# Patient Record
Sex: FEMALE | Race: WHITE | NOT HISPANIC OR LATINO | Employment: FULL TIME | URBAN - METROPOLITAN AREA
[De-identification: names, ages, dates, MRNs, and addresses within clinical notes are randomized per-mention and may not be internally consistent; named-entity substitution may affect disease eponyms.]

---

## 2020-01-06 ENCOUNTER — OFFICE VISIT (OUTPATIENT)
Dept: INTERNAL MEDICINE | Facility: CLINIC | Age: 30
End: 2020-01-06

## 2020-01-06 VITALS
DIASTOLIC BLOOD PRESSURE: 80 MMHG | TEMPERATURE: 98.9 F | RESPIRATION RATE: 16 BRPM | WEIGHT: 170.13 LBS | SYSTOLIC BLOOD PRESSURE: 120 MMHG | BODY MASS INDEX: 28.35 KG/M2 | HEART RATE: 82 BPM | HEIGHT: 65 IN | OXYGEN SATURATION: 97 %

## 2020-01-06 DIAGNOSIS — Z00.00 ANNUAL PHYSICAL EXAM: Primary | ICD-10-CM

## 2020-01-06 DIAGNOSIS — Z13.29 SCREENING FOR THYROID DISORDER: ICD-10-CM

## 2020-01-06 DIAGNOSIS — J40 BRONCHITIS: ICD-10-CM

## 2020-01-06 DIAGNOSIS — D22.9 ATYPICAL NEVI: ICD-10-CM

## 2020-01-06 DIAGNOSIS — Z13.220 SCREENING, LIPID: ICD-10-CM

## 2020-01-06 DIAGNOSIS — J30.9 ALLERGIC RHINITIS, UNSPECIFIED SEASONALITY, UNSPECIFIED TRIGGER: ICD-10-CM

## 2020-01-06 PROBLEM — Z80.8 FAMILY HISTORY OF MELANOMA: Status: ACTIVE | Noted: 2020-01-06

## 2020-01-06 PROBLEM — Z83.71 FAMILY HISTORY OF COLONIC POLYPS: Status: ACTIVE | Noted: 2020-01-06

## 2020-01-06 PROBLEM — Z83.719 FAMILY HISTORY OF COLONIC POLYPS: Status: ACTIVE | Noted: 2020-01-06

## 2020-01-06 PROCEDURE — 99385 PREV VISIT NEW AGE 18-39: CPT | Performed by: NURSE PRACTITIONER

## 2020-01-06 RX ORDER — BENZONATATE 200 MG/1
200 CAPSULE ORAL 3 TIMES DAILY PRN
Qty: 30 CAPSULE | Refills: 0 | Status: SHIPPED | OUTPATIENT
Start: 2020-01-06 | End: 2020-03-03

## 2020-01-06 RX ORDER — AZITHROMYCIN 250 MG/1
TABLET, FILM COATED ORAL
Qty: 6 TABLET | Refills: 0 | Status: SHIPPED | OUTPATIENT
Start: 2020-01-06 | End: 2020-03-03

## 2020-01-06 NOTE — PROGRESS NOTES
Chief Complaint   Patient presents with   • Establish Care        Subjective     History of Present Illness   Patient is here today to establish care.    She has a croupy cough and otc meds are not helping and has had one week.     Last opt yearly exam  Pap 1 yr ago sees gyn  Walks 3-5 times per week not following a diet.     LFA with new change mold now bigger and irregular.  FH grandparent with melanoma.     Past medical history allergies, history of mono.    Past surgical history colonoscopy sinus surgery    Family history includes mother thyroid disease; father hyperlipidemia; maternal grandmother arthritis; maternal grandmother thyroid disease; maternal grandfather diabetes and stroke.    Social history patient is sexually active, non-smoker, alcohol use occasional.  No substance use.    The following portions of the patient's history were reviewed and updated as appropriate: allergies, current medications, past family history, past medical history, past social history, past surgical history and problem list.    Review of Systems   Constitutional: Negative for activity change, appetite change, chills, fatigue, fever, unexpected weight gain and unexpected weight loss.        No night sweats.  No generalized pain.   HENT: Negative for congestion, ear pain, hearing loss, nosebleeds, postnasal drip, rhinorrhea, sinus pressure, sneezing, sore throat, tinnitus and voice change.         Denies snoring.   Eyes: Negative for photophobia, pain, discharge, redness, itching and visual disturbance.   Respiratory: Negative for cough, chest tightness, shortness of breath, wheezing and stridor.         No orthopnea, dyspnea on exertion, or PND.  No chest congestion.   No  hemoptysis.   Cardiovascular: Negative for chest pain, palpitations and leg swelling.        No claudication or syncope.   Gastrointestinal: Negative for abdominal pain, blood in stool, constipation, diarrhea, nausea, rectal pain, vomiting and GERD.         No hematemesis.  No heartburn, dysphagia or odynophagia.  No belching or bloating.  No melena.   Endocrine: Negative for cold intolerance, heat intolerance, polydipsia, polyphagia and polyuria.        No hair loss or dry skin.  No generalized weakness.  No hot flashes.   Genitourinary: Negative for breast discharge, difficulty urinating, dyspareunia, dysuria, flank pain, frequency, hematuria, menstrual problem, pelvic pain, urgency, urinary incontinence, vaginal bleeding and vaginal discharge.        No nocturia or incomplete emptying.   Musculoskeletal: Negative for arthralgias, back pain, gait problem, joint swelling, myalgias, neck pain and neck stiffness.        No joint stiffness.   Skin: Positive for skin lesions. Negative for rash.   Allergic/Immunologic: Negative for environmental allergies.   Neurological: Negative for dizziness, tremors, syncope, speech difficulty, weakness, light-headedness, numbness, memory problem and confusion.        No tingling.   Hematological: Negative for adenopathy. Does not bruise/bleed easily.   Psychiatric/Behavioral: Negative for decreased concentration, sleep disturbance, suicidal ideas and depressed mood. The patient is not nervous/anxious.         No confusion.   All other systems reviewed and are negative.      Objective   Physical Exam   Constitutional: She is oriented to person, place, and time. She appears well-developed and well-nourished.   HENT:   Head: Normocephalic and atraumatic.   Right Ear: External ear normal.   Left Ear: External ear normal.   Nose: Nose normal.   Mouth/Throat: Oropharynx is clear and moist.   Eyes: Conjunctivae are normal. Right eye exhibits no discharge. Left eye exhibits no discharge.   Neck: No thyromegaly present.   Cardiovascular: Normal rate, regular rhythm and intact distal pulses.   Pulmonary/Chest: Effort normal and breath sounds normal.   Abdominal: Soft. Bowel sounds are normal. She exhibits no distension. There is no tenderness.    Musculoskeletal: She exhibits no edema.   Lymphadenopathy:     She has no cervical adenopathy.   Neurological: She is alert and oriented to person, place, and time.   Skin: Skin is warm and dry. Capillary refill takes 2 to 3 seconds.   2mm irregular shaped flesh light brown   Psychiatric: She has a normal mood and affect. Her behavior is normal. Judgment and thought content normal.   Nursing note and vitals reviewed.         Assessment/Plan   Sima was seen today for establish care.    Diagnoses and all orders for this visit:    Annual physical exam  -     CBC & Differential; Future  -     Comprehensive Metabolic Panel; Future  -     POC Urinalysis Dipstick, Automated; Future    Bronchitis  -     azithromycin (ZITHROMAX Z-LEODAN) 250 MG tablet; Take 2 tablets the first day, then 1 tablet daily for 4 days.  -     benzonatate (TESSALON) 200 MG capsule; Take 1 capsule by mouth 3 (Three) Times a Day As Needed for Cough.    Allergic rhinitis, unspecified seasonality, unspecified trigger    Screening for thyroid disorder  -     TSH; Future    Screening, lipid  -     Lipid Panel; Future    Atypical nevi  -     Ambulatory Referral to Dermatology; Future      Patient education regarding the importance of avoidance of texting while driving and the need for wearing seatbelt.  Use of sunscreen, use of helmets while on bikes.  The appropriate amounts of sleep and H20 consumption per day was reviewed with pt.  The need for healthy well-balanced diet based off the food guide pyramid and avoidance of skipping meals along with following a NCS diet with appropriate amounts of fats, protein, and carbohydrate and low sodium diet. Encouraging 30minutes of cardio 5d weekly and muscle strengthening 3x weekly.       Return if symptoms worsen or fail to improve.  RTC/call  If symptoms worsen  Meds MOA and SE's reviewed and pt v/u

## 2020-01-15 ENCOUNTER — LAB (OUTPATIENT)
Dept: INTERNAL MEDICINE | Facility: CLINIC | Age: 30
End: 2020-01-15

## 2020-01-15 DIAGNOSIS — Z13.29 SCREENING FOR THYROID DISORDER: ICD-10-CM

## 2020-01-15 DIAGNOSIS — Z00.00 ANNUAL PHYSICAL EXAM: ICD-10-CM

## 2020-01-15 DIAGNOSIS — Z13.220 SCREENING, LIPID: ICD-10-CM

## 2020-01-15 LAB
ALBUMIN SERPL-MCNC: 4.4 G/DL (ref 3.5–5.2)
ALBUMIN/GLOB SERPL: 1.6 G/DL
ALP SERPL-CCNC: 62 U/L (ref 39–117)
ALT SERPL W P-5'-P-CCNC: 18 U/L (ref 1–33)
ANION GAP SERPL CALCULATED.3IONS-SCNC: 14.3 MMOL/L (ref 5–15)
AST SERPL-CCNC: 17 U/L (ref 1–32)
BASOPHILS # BLD AUTO: 0.06 10*3/MM3 (ref 0–0.2)
BASOPHILS NFR BLD AUTO: 1 % (ref 0–1.5)
BILIRUB BLD-MCNC: NEGATIVE MG/DL
BILIRUB SERPL-MCNC: 0.5 MG/DL (ref 0.2–1.2)
BUN BLD-MCNC: 10 MG/DL (ref 6–20)
BUN/CREAT SERPL: 13.7 (ref 7–25)
CALCIUM SPEC-SCNC: 9.4 MG/DL (ref 8.6–10.5)
CHLORIDE SERPL-SCNC: 99 MMOL/L (ref 98–107)
CHOLEST SERPL-MCNC: 160 MG/DL (ref 0–200)
CLARITY, POC: CLEAR
CO2 SERPL-SCNC: 25.7 MMOL/L (ref 22–29)
COLOR UR: YELLOW
CREAT BLD-MCNC: 0.73 MG/DL (ref 0.57–1)
DEPRECATED RDW RBC AUTO: 41 FL (ref 37–54)
EOSINOPHIL # BLD AUTO: 0.1 10*3/MM3 (ref 0–0.4)
EOSINOPHIL NFR BLD AUTO: 1.7 % (ref 0.3–6.2)
ERYTHROCYTE [DISTWIDTH] IN BLOOD BY AUTOMATED COUNT: 12.5 % (ref 12.3–15.4)
EXPIRATION DATE: NORMAL
GFR SERPL CREATININE-BSD FRML MDRD: 94 ML/MIN/1.73
GLOBULIN UR ELPH-MCNC: 2.8 GM/DL
GLUCOSE BLD-MCNC: 85 MG/DL (ref 65–99)
GLUCOSE UR STRIP-MCNC: NEGATIVE MG/DL
HCT VFR BLD AUTO: 42 % (ref 34–46.6)
HDLC SERPL-MCNC: 43 MG/DL (ref 40–60)
HGB BLD-MCNC: 14.5 G/DL (ref 12–15.9)
IMM GRANULOCYTES # BLD AUTO: 0.02 10*3/MM3 (ref 0–0.05)
IMM GRANULOCYTES NFR BLD AUTO: 0.3 % (ref 0–0.5)
KETONES UR QL: NEGATIVE
LDLC SERPL CALC-MCNC: 99 MG/DL (ref 0–100)
LDLC/HDLC SERPL: 2.3 {RATIO}
LEUKOCYTE EST, POC: NEGATIVE
LYMPHOCYTES # BLD AUTO: 1.69 10*3/MM3 (ref 0.7–3.1)
LYMPHOCYTES NFR BLD AUTO: 28.4 % (ref 19.6–45.3)
Lab: NORMAL
MCH RBC QN AUTO: 31.1 PG (ref 26.6–33)
MCHC RBC AUTO-ENTMCNC: 34.5 G/DL (ref 31.5–35.7)
MCV RBC AUTO: 90.1 FL (ref 79–97)
MONOCYTES # BLD AUTO: 0.69 10*3/MM3 (ref 0.1–0.9)
MONOCYTES NFR BLD AUTO: 11.6 % (ref 5–12)
NEUTROPHILS # BLD AUTO: 3.39 10*3/MM3 (ref 1.7–7)
NEUTROPHILS NFR BLD AUTO: 57 % (ref 42.7–76)
NITRITE UR-MCNC: NEGATIVE MG/ML
NRBC BLD AUTO-RTO: 0 /100 WBC (ref 0–0.2)
PH UR: 7 [PH] (ref 5–8)
PLATELET # BLD AUTO: 293 10*3/MM3 (ref 140–450)
PMV BLD AUTO: 10.4 FL (ref 6–12)
POTASSIUM BLD-SCNC: 3.9 MMOL/L (ref 3.5–5.2)
PROT SERPL-MCNC: 7.2 G/DL (ref 6–8.5)
PROT UR STRIP-MCNC: NEGATIVE MG/DL
RBC # BLD AUTO: 4.66 10*6/MM3 (ref 3.77–5.28)
RBC # UR STRIP: NEGATIVE /UL
SODIUM BLD-SCNC: 139 MMOL/L (ref 136–145)
SP GR UR: 1.01 (ref 1–1.03)
TRIGL SERPL-MCNC: 90 MG/DL (ref 0–150)
TSH SERPL DL<=0.05 MIU/L-ACNC: 3.09 UIU/ML (ref 0.27–4.2)
UROBILINOGEN UR QL: NORMAL
VLDLC SERPL-MCNC: 18 MG/DL (ref 5–40)
WBC NRBC COR # BLD: 5.95 10*3/MM3 (ref 3.4–10.8)

## 2020-01-15 PROCEDURE — 85025 COMPLETE CBC W/AUTO DIFF WBC: CPT | Performed by: NURSE PRACTITIONER

## 2020-01-15 PROCEDURE — 80053 COMPREHEN METABOLIC PANEL: CPT | Performed by: NURSE PRACTITIONER

## 2020-01-15 PROCEDURE — 80061 LIPID PANEL: CPT | Performed by: NURSE PRACTITIONER

## 2020-01-15 PROCEDURE — 81003 URINALYSIS AUTO W/O SCOPE: CPT | Performed by: NURSE PRACTITIONER

## 2020-01-15 PROCEDURE — 36415 COLL VENOUS BLD VENIPUNCTURE: CPT | Performed by: NURSE PRACTITIONER

## 2020-01-15 PROCEDURE — 84443 ASSAY THYROID STIM HORMONE: CPT | Performed by: NURSE PRACTITIONER

## 2020-02-27 DIAGNOSIS — F41.9 ANXIETY: Primary | ICD-10-CM

## 2020-02-27 RX ORDER — HYDROXYZINE PAMOATE 25 MG/1
25 CAPSULE ORAL 3 TIMES DAILY PRN
Qty: 30 CAPSULE | Refills: 0 | Status: SHIPPED | OUTPATIENT
Start: 2020-02-27 | End: 2021-02-08

## 2020-03-03 ENCOUNTER — OFFICE VISIT (OUTPATIENT)
Dept: INTERNAL MEDICINE | Facility: CLINIC | Age: 30
End: 2020-03-03

## 2020-03-03 VITALS
TEMPERATURE: 98.7 F | HEART RATE: 70 BPM | DIASTOLIC BLOOD PRESSURE: 70 MMHG | OXYGEN SATURATION: 98 % | RESPIRATION RATE: 18 BRPM | SYSTOLIC BLOOD PRESSURE: 110 MMHG | BODY MASS INDEX: 28.87 KG/M2 | WEIGHT: 173.5 LBS

## 2020-03-03 DIAGNOSIS — R25.1 TREMORS OF NERVOUS SYSTEM: Primary | ICD-10-CM

## 2020-03-03 LAB
T4 FREE SERPL-MCNC: 1.26 NG/DL (ref 0.93–1.7)
TSH SERPL DL<=0.05 MIU/L-ACNC: 2.92 UIU/ML (ref 0.27–4.2)

## 2020-03-03 PROCEDURE — 86376 MICROSOMAL ANTIBODY EACH: CPT | Performed by: NURSE PRACTITIONER

## 2020-03-03 PROCEDURE — 84439 ASSAY OF FREE THYROXINE: CPT | Performed by: NURSE PRACTITIONER

## 2020-03-03 PROCEDURE — 83520 IMMUNOASSAY QUANT NOS NONAB: CPT | Performed by: NURSE PRACTITIONER

## 2020-03-03 PROCEDURE — 84432 ASSAY OF THYROGLOBULIN: CPT | Performed by: NURSE PRACTITIONER

## 2020-03-03 PROCEDURE — 36415 COLL VENOUS BLD VENIPUNCTURE: CPT | Performed by: NURSE PRACTITIONER

## 2020-03-03 PROCEDURE — 86800 THYROGLOBULIN ANTIBODY: CPT | Performed by: NURSE PRACTITIONER

## 2020-03-03 PROCEDURE — 84443 ASSAY THYROID STIM HORMONE: CPT | Performed by: NURSE PRACTITIONER

## 2020-03-03 PROCEDURE — 99214 OFFICE O/P EST MOD 30 MIN: CPT | Performed by: NURSE PRACTITIONER

## 2020-03-03 RX ORDER — CITALOPRAM 10 MG/1
10 TABLET ORAL DAILY
Qty: 30 TABLET | Refills: 2 | Status: SHIPPED | OUTPATIENT
Start: 2020-03-03 | End: 2020-05-31 | Stop reason: SDUPTHER

## 2020-03-03 NOTE — PROGRESS NOTES
Chief Complaint   Patient presents with   • Panic Attack     out of nowhere was shaking/ hot and cold/ hyperventilation and crying/ jittery/ legs were out/ feels medictation works but does not like the drowsiness        Subjective     History of Present Illness   The patient reports last week she had a 30 minutes episode that occurred at work.  She was searching for a file and developed dizziness.  She got down and sat down then when she got up she had hot flases and chills and started crying off and on. She felt tired and exhausted.  She did not feel well the next day.  She feels like she is shaking but not and at times her heart feel like it starts to pound.  She sent message about feeling like she was having a panic attack last week and given vistaril and this helps but then develops drowsiness. She started to hyperventilate.  She cant think what she wants to say and has to stop mid sentence and has to think about what she wanted to say.    Per the patients mom - she has had panic attacks in past and hyperventilated.     She is on her period and thinks HA and abd cramping are related to her periods.    No new  Stressor for this patient.      The following portions of the patient's history were reviewed and updated as appropriate: allergies, current medications, past family history, past medical history, past social history, past surgical history and problem list.      Current Outpatient Medications:   •  Azelastine-Fluticasone (DYMISTA NA), into the nostril(s) as directed by provider., Disp: , Rfl:   •  hydrOXYzine pamoate (VISTARIL) 25 MG capsule, Take 1 capsule by mouth 3 (Three) Times a Day As Needed for Anxiety., Disp: 30 capsule, Rfl: 0  •  Loratadine-Pseudoephedrine (CLARITIN-D 24 HOUR PO), Take  by mouth., Disp: , Rfl:   •  citalopram (CELEXA) 10 MG tablet, Take 1 tablet by mouth Daily., Disp: 30 tablet, Rfl: 2    Vitals:    03/03/20 0859   BP: 110/70   Pulse: 70   Resp: 18   Temp: 98.7 °F (37.1 °C)    SpO2: 98%       Body mass index is 28.87 kg/m².        Review of Systems   Constitutional: Positive for fatigue. Negative for activity change, appetite change, chills and fever.   HENT: Negative for congestion, postnasal drip and sore throat.    Eyes: Negative for visual disturbance.   Respiratory: Negative for cough and shortness of breath.    Cardiovascular: Negative for chest pain, palpitations and leg swelling.   Gastrointestinal: Negative for abdominal pain, constipation, diarrhea, nausea and GERD.   Musculoskeletal: Negative for arthralgias and myalgias.   Skin: Negative for rash.   Allergic/Immunologic: Negative for environmental allergies.   Neurological: Positive for dizziness, tremors and weakness.   Psychiatric/Behavioral: Negative for sleep disturbance. The patient is nervous/anxious.    All other systems reviewed and are negative.      Objective   Physical Exam   Constitutional: She is oriented to person, place, and time. She appears well-developed and well-nourished.   HENT:   Head: Normocephalic and atraumatic.   Right Ear: External ear normal.   Left Ear: External ear normal.   Nose: Nose normal.   Mouth/Throat: Oropharynx is clear and moist.   Neck: No thyromegaly present.   Cardiovascular: Normal rate, regular rhythm and intact distal pulses.   Pulmonary/Chest: Effort normal and breath sounds normal.   Abdominal: Soft. Bowel sounds are normal. She exhibits no distension. There is no tenderness.   Musculoskeletal: She exhibits no edema.   Lymphadenopathy:     She has no cervical adenopathy.   Neurological: She is alert and oriented to person, place, and time. She displays normal reflexes. No cranial nerve deficit. She exhibits normal muscle tone.   Skin: Capillary refill takes 2 to 3 seconds.   Psychiatric: She has a normal mood and affect. Her behavior is normal.   Nursing note and vitals reviewed.              Assessment/Plan   Sima was seen today for panic attack.    Diagnoses and all  orders for this visit:    Tremors of nervous system  -     Thyroid Antibodies  -     Cancel: Thyroid Peroxidase Antibody  -     Thyrotropin Receptor Antibody  -     Cancel: Thyroglobulin  -     TSH  -     T4, Free    Other orders  -     citalopram (CELEXA) 10 MG tablet; Take 1 tablet by mouth Daily.    labs normal and celexa not helpful then consider CT head / neuro.      Return if symptoms worsen or fail to improve.  RTC/call  If symptoms worsen  Meds MOA and SE's reviewed and pt v/u

## 2020-03-04 LAB
THYROGLOB AB SERPL-ACNC: <1 IU/ML (ref 0–0.9)
THYROPEROXIDASE AB SERPL-ACNC: 10 IU/ML (ref 0–34)

## 2020-03-05 LAB — TSH RECEP AB SER-ACNC: <1.1 IU/L (ref 0–1.75)

## 2020-03-25 ENCOUNTER — TELEPHONE (OUTPATIENT)
Dept: INTERNAL MEDICINE | Facility: CLINIC | Age: 30
End: 2020-03-25

## 2020-03-25 NOTE — TELEPHONE ENCOUNTER
Spoke to pt about a referral-she stated she thought a CT of her (head) or maybe a neuro referral was going to be placed?  Are you ordering and/or referring?

## 2020-03-30 DIAGNOSIS — R25.1 TREMORS OF NERVOUS SYSTEM: Primary | ICD-10-CM

## 2020-03-30 NOTE — TELEPHONE ENCOUNTER
Neuro consult placed let pt know may take extra time due to pandemic.  Please have her let me know if symptoms worsen.

## 2020-05-19 ENCOUNTER — OFFICE VISIT (OUTPATIENT)
Dept: NEUROLOGY | Facility: CLINIC | Age: 30
End: 2020-05-19

## 2020-05-19 VITALS
BODY MASS INDEX: 28.82 KG/M2 | DIASTOLIC BLOOD PRESSURE: 82 MMHG | SYSTOLIC BLOOD PRESSURE: 143 MMHG | HEART RATE: 92 BPM | OXYGEN SATURATION: 98 % | WEIGHT: 173 LBS | TEMPERATURE: 97.2 F | HEIGHT: 65 IN

## 2020-05-19 DIAGNOSIS — R25.1 TREMORS OF NERVOUS SYSTEM: Primary | ICD-10-CM

## 2020-05-19 PROCEDURE — 99245 OFF/OP CONSLTJ NEW/EST HI 55: CPT | Performed by: PSYCHIATRY & NEUROLOGY

## 2020-05-19 RX ORDER — CHLORCYCLIZINE HYDROCHLORIDE AND PSEUDOEPHEDRINE HYDROCHLORIDE 25; 60 MG/1; MG/1
1 TABLET ORAL 2 TIMES DAILY
COMMUNITY
Start: 2020-05-03 | End: 2021-02-08

## 2020-05-19 RX ORDER — OLOPATADINE HYDROCHLORIDE 7 MG/ML
SOLUTION OPHTHALMIC
COMMUNITY
Start: 2020-04-07 | End: 2021-02-08

## 2020-05-19 RX ORDER — AZELASTINE HYDROCHLORIDE AND FLUTICASONE PROPIONATE 137; 50 UG/1; UG/1
SPRAY, METERED NASAL
COMMUNITY
Start: 2020-04-07 | End: 2021-02-08

## 2020-05-19 RX ORDER — CHLORPHENIRAMINE MALEATE 4 MG
TABLET ORAL
COMMUNITY
Start: 2020-02-27 | End: 2020-06-17

## 2020-05-19 NOTE — PROGRESS NOTES
Subjective   Patient ID: Sima Booker is a 30 y.o. female     Chief Complaint   Patient presents with   • Tremors        History of Present Illness    30 y.o. female referred by Sonia Bridges for tremors.  Feb 26, 2020 developed tunnel vision and unsteadiness.  Sat down and dizziness improved.  Then felt hot and had something to eat.  Hands had tremors.  Then felt cold.  Trouble speaking and started crying and hyperventilating.  Calmed down and able to speak.      Started on hydroxyzine and Celexa.      Internal sensation of tremor.  Left hand will tingles and has dizziness/LH.      HA frequency a two times a week.  Located over forehead.  Pulling sensation.      Reviewed medical records:    Pt had episdoe of dizziness, hot flashes and chills, started crying on and off.  Tired and exhausted after spell.  PMH of panic attacks.      Labs TSH, T4, thyroid ab    Past Medical History:   Diagnosis Date   • Allergic    • Infectious mononucleosis      Family History   Problem Relation Age of Onset   • Thyroid disease Mother    • Hyperlipidemia Father    • Arthritis Maternal Grandmother    • Miscarriages / Stillbirths Maternal Grandmother    • Diabetes Maternal Grandfather    • Miscarriages / Stillbirths Maternal Grandfather    • Diabetes Paternal Grandmother      Social History     Socioeconomic History   • Marital status:      Spouse name: Not on file   • Number of children: Not on file   • Years of education: Not on file   • Highest education level: Not on file   Tobacco Use   • Smoking status: Never Smoker   • Smokeless tobacco: Never Used   Substance and Sexual Activity   • Alcohol use: Yes     Comment: OCCASIONALLY   • Drug use: Never   • Sexual activity: Yes     Partners: Male     Birth control/protection: None       Review of Systems   Constitutional: Negative for activity change, fatigue and unexpected weight change.   HENT: Negative for tinnitus and trouble swallowing.    Eyes: Negative for photophobia  "and visual disturbance.   Respiratory: Negative for apnea, cough and choking.    Cardiovascular: Negative for leg swelling.   Gastrointestinal: Negative for nausea and vomiting.   Endocrine: Negative for cold intolerance and heat intolerance.   Genitourinary: Negative for difficulty urinating, frequency, menstrual problem and urgency.   Musculoskeletal: Negative for back pain, gait problem, myalgias and neck pain.   Skin: Negative for color change and rash.   Allergic/Immunologic: Negative for immunocompromised state.   Neurological: Positive for dizziness, tremors, light-headedness, numbness and headaches. Negative for seizures, syncope, facial asymmetry, speech difficulty and weakness.   Hematological: Negative for adenopathy. Does not bruise/bleed easily.   Psychiatric/Behavioral: Negative for behavioral problems, confusion, decreased concentration, hallucinations and sleep disturbance. The patient is nervous/anxious.        Objective     Vitals:    05/19/20 1422   BP: 143/82   Pulse: 92   Temp: 97.2 °F (36.2 °C)   SpO2: 98%   Weight: 78.5 kg (173 lb)   Height: 165.1 cm (65\")       Neurologic Exam     Mental Status   Oriented to person, place, and time.   Registration: recalls 3 of 3 objects. Recall at 5 minutes: recalls 3 of 3 objects. Follows 3 step commands.   Attention: normal. Concentration: normal.   Speech: speech is normal   Level of consciousness: alert  Knowledge: good and consistent with education.   Able to name object. Able to read. Able to repeat. Able to write. Normal comprehension.     Cranial Nerves     CN II   Visual fields full to confrontation.   Visual acuity: normal  Right visual field deficit: none  Left visual field deficit: none     CN III, IV, VI   Pupils are equal, round, and reactive to light.  Extraocular motions are normal.   Right pupil: Shape: regular. Reactivity: brisk. Consensual response: intact.   Left pupil: Shape: regular. Reactivity: brisk. Consensual response: intact. "   Nystagmus: none   Diplopia: none  Ophthalmoparesis: none  Upgaze: normal  Downgaze: normal  Conjugate gaze: present  Vestibulo-ocular reflex: present    CN V   Facial sensation intact.   Right corneal reflex: normal  Left corneal reflex: normal    CN VII   Right facial weakness: none  Left facial weakness: none    CN VIII   Hearing: intact    CN IX, X   Palate: symmetric  Right gag reflex: normal  Left gag reflex: normal    CN XI   Right sternocleidomastoid strength: normal  Left sternocleidomastoid strength: normal    CN XII   Tongue: not atrophic  Fasciculations: absent  Tongue deviation: none    Motor Exam   Muscle bulk: normal  Overall muscle tone: normal  Right arm tone: normal  Left arm tone: normal  Right leg tone: normal  Left leg tone: normal    Strength   Strength 5/5 throughout.     Sensory Exam   Light touch normal.   Vibration normal.   Proprioception normal.   Pinprick normal.     Gait, Coordination, and Reflexes     Gait  Gait: normal    Coordination   Romberg: negative  Finger to nose coordination: normal  Heel to shin coordination: normal  Tandem walking coordination: normal    Tremor   Resting tremor: absent  Intention tremor: absent  Action tremor: absent    Reflexes   Reflexes 2+ except as noted.       Physical Exam   Constitutional: She is oriented to person, place, and time. Vital signs are normal. She appears well-developed and well-nourished.   HENT:   Head: Normocephalic and atraumatic.   Eyes: Pupils are equal, round, and reactive to light. EOM and lids are normal.   Fundoscopic exam:       The right eye shows no exudate, no hemorrhage and no papilledema. The right eye shows venous pulsations.        The left eye shows no exudate, no hemorrhage and no papilledema. The left eye shows venous pulsations.   Neck: Normal range of motion and phonation normal. Normal carotid pulses present. Carotid bruit is not present. No thyroid mass and no thyromegaly present.   Cardiovascular: Normal rate,  regular rhythm and normal heart sounds.   Pulmonary/Chest: Effort normal.   Neurological: She is oriented to person, place, and time. She has normal strength. She has a normal Finger-Nose-Finger Test, a normal Heel to Shin Test, a normal Romberg Test and a normal Tandem Gait Test. Gait normal.   Skin: Skin is warm and dry.   Psychiatric: She has a normal mood and affect. Her speech is normal.   Nursing note and vitals reviewed.      Office Visit on 03/03/2020   Component Date Value Ref Range Status   • Thyroid Peroxidase Antibody 03/03/2020 10  0 - 34 IU/mL Final   • Thyroglobulin Ab 03/03/2020 <1.0  0.0 - 0.9 IU/mL Final    Thyroglobulin Antibody measured by [facility] Methodology   • Thyrotropin Receptor Antibody 03/03/2020 <1.10  0.00 - 1.75 IU/L Final   • TSH 03/03/2020 2.920  0.270 - 4.200 uIU/mL Final   • Free T4 03/03/2020 1.26  0.93 - 1.70 ng/dL Final         Assessment/Plan     Problem List Items Addressed This Visit        Other    Tremors of nervous system - Primary    Current Assessment & Plan     Episodes of tremors and dizziness    MRI Brain and EEG          Relevant Orders    MRI Brain With & Without Contrast    EEG Awake or Drowsy Routine             No follow-ups on file.                 Answers for HPI/ROS submitted by the patient on 5/19/2020   What is the primary reason for your visit?: Other  Please describe your symptoms.: Have had small issue with hands filing like they are shaking when they aren't, light headedness, dizziness. It comes and goes.  Have you had these symptoms before?: Yes  How long have you been having these symptoms?: Greater than 2 weeks

## 2020-06-01 NOTE — TELEPHONE ENCOUNTER
Please advise.  Notes do not indicate when patient needs to fup and she does not have a pending appt.  Thanks

## 2020-06-02 RX ORDER — CITALOPRAM 10 MG/1
10 TABLET ORAL DAILY
Qty: 30 TABLET | Refills: 2 | Status: SHIPPED | OUTPATIENT
Start: 2020-06-02 | End: 2020-09-02 | Stop reason: SDUPTHER

## 2020-06-05 ENCOUNTER — HOSPITAL ENCOUNTER (OUTPATIENT)
Dept: NEUROLOGY | Facility: HOSPITAL | Age: 30
Discharge: HOME | End: 2020-06-05
Admitting: PSYCHIATRY & NEUROLOGY

## 2020-06-05 ENCOUNTER — HOSPITAL ENCOUNTER (OUTPATIENT)
Dept: MRI IMAGING | Facility: HOSPITAL | Age: 30
Discharge: HOME | End: 2020-06-05

## 2020-06-05 DIAGNOSIS — R25.1 TREMORS OF NERVOUS SYSTEM: ICD-10-CM

## 2020-06-05 PROCEDURE — A9577 INJ MULTIHANCE: HCPCS | Performed by: PSYCHIATRY & NEUROLOGY

## 2020-06-05 PROCEDURE — 0 GADOBENATE DIMEGLUMINE 529 MG/ML SOLUTION: Performed by: PSYCHIATRY & NEUROLOGY

## 2020-06-05 PROCEDURE — 70553 MRI BRAIN STEM W/O & W/DYE: CPT

## 2020-06-05 PROCEDURE — 95819 EEG AWAKE AND ASLEEP: CPT

## 2020-06-05 PROCEDURE — 95819 EEG AWAKE AND ASLEEP: CPT | Performed by: PSYCHIATRY & NEUROLOGY

## 2020-06-05 RX ADMIN — GADOBENATE DIMEGLUMINE 15 ML: 529 INJECTION, SOLUTION INTRAVENOUS at 08:10

## 2020-06-17 ENCOUNTER — OFFICE VISIT (OUTPATIENT)
Dept: NEUROLOGY | Facility: CLINIC | Age: 30
End: 2020-06-17

## 2020-06-17 VITALS
TEMPERATURE: 98 F | OXYGEN SATURATION: 98 % | BODY MASS INDEX: 27.49 KG/M2 | HEIGHT: 65 IN | SYSTOLIC BLOOD PRESSURE: 116 MMHG | HEART RATE: 71 BPM | DIASTOLIC BLOOD PRESSURE: 78 MMHG | WEIGHT: 165 LBS

## 2020-06-17 DIAGNOSIS — R25.1 TREMORS OF NERVOUS SYSTEM: Primary | ICD-10-CM

## 2020-06-17 PROCEDURE — 99213 OFFICE O/P EST LOW 20 MIN: CPT | Performed by: PSYCHIATRY & NEUROLOGY

## 2020-06-17 NOTE — PROGRESS NOTES
Subjective   Patient ID: Siam Booker is a 30 y.o. female     Chief Complaint   Patient presents with   • Tremors     Post MRI/EEG        History of Present Illness    30 y.o. female returns in follow up for tremors.  Last visit on 5/19/20 ordered MRI Brain and EEG.      MRI Brain, my review of films, normal     EEG - normal     Denies further episodes.  Internal sensation of tremors.  Lasts for a few minutes.  Moving hand improves sx.      Problem history:    Feb 26, 2020 developed tunnel vision and unsteadiness.  Sat down and dizziness improved.  Then felt hot and had something to eat.  Hands had tremors.  Then felt cold.  Trouble speaking and started crying and hyperventilating.  Calmed down and able to speak.      Started on hydroxyzine and Celexa.      Internal sensation of tremor.  Left hand will tingles and has dizziness/LH.      HA frequency a two times a week.  Located over forehead.  Pulling sensation.      Reviewed medical records:    Pt had episdoe of dizziness, hot flashes and chills, started crying on and off.  Tired and exhausted after spell.  PMH of panic attacks.      Labs TSH, T4, thyroid ab    Past Medical History:   Diagnosis Date   • Allergic    • Infectious mononucleosis      Family History   Problem Relation Age of Onset   • Thyroid disease Mother    • Hyperlipidemia Father    • Arthritis Maternal Grandmother    • Miscarriages / Stillbirths Maternal Grandmother    • Diabetes Maternal Grandfather    • Miscarriages / Stillbirths Maternal Grandfather    • Diabetes Paternal Grandmother      Social History     Socioeconomic History   • Marital status:      Spouse name: Not on file   • Number of children: Not on file   • Years of education: Not on file   • Highest education level: Not on file   Tobacco Use   • Smoking status: Never Smoker   • Smokeless tobacco: Never Used   Substance and Sexual Activity   • Alcohol use: Yes     Comment: OCCASIONALLY   • Drug use: Never   • Sexual  "activity: Yes     Partners: Male     Birth control/protection: None       Review of Systems   Constitutional: Negative for activity change, fatigue and unexpected weight change.   HENT: Negative for tinnitus and trouble swallowing.    Eyes: Negative for photophobia and visual disturbance.   Respiratory: Negative for apnea, cough and choking.    Cardiovascular: Negative for leg swelling.   Gastrointestinal: Negative for nausea and vomiting.   Endocrine: Negative for cold intolerance and heat intolerance.   Genitourinary: Negative for difficulty urinating, frequency, menstrual problem and urgency.   Musculoskeletal: Negative for back pain, gait problem, myalgias and neck pain.   Skin: Negative for color change and rash.   Allergic/Immunologic: Negative for immunocompromised state.   Neurological: Positive for dizziness, tremors, light-headedness, numbness and headaches. Negative for seizures, syncope, facial asymmetry, speech difficulty and weakness.   Hematological: Negative for adenopathy. Does not bruise/bleed easily.   Psychiatric/Behavioral: Negative for behavioral problems, confusion, decreased concentration, hallucinations and sleep disturbance. The patient is nervous/anxious.        Objective     Vitals:    06/17/20 1315   BP: 116/78   Pulse: 71   Temp: 98 °F (36.7 °C)   SpO2: 98%   Weight: 74.8 kg (165 lb)   Height: 165.1 cm (65\")       Neurologic Exam     Mental Status   Oriented to person, place, and time.   Registration: recalls 3 of 3 objects. Recall at 5 minutes: recalls 3 of 3 objects. Follows 3 step commands.   Attention: normal. Concentration: normal.   Speech: speech is normal   Level of consciousness: alert  Knowledge: good and consistent with education.   Able to name object. Able to read. Able to repeat. Able to write. Normal comprehension.     Cranial Nerves     CN II   Visual fields full to confrontation.   Visual acuity: normal  Right visual field deficit: none  Left visual field deficit: none "     CN III, IV, VI   Pupils are equal, round, and reactive to light.  Extraocular motions are normal.   Nystagmus: none   Diplopia: none  Ophthalmoparesis: none  Upgaze: normal  Downgaze: normal  Conjugate gaze: present  Vestibulo-ocular reflex: present    CN V   Facial sensation intact.   Right corneal reflex: normal  Left corneal reflex: normal    CN VII   Right facial weakness: none  Left facial weakness: none    CN VIII   Hearing: intact    CN IX, X   Palate: symmetric  Right gag reflex: normal  Left gag reflex: normal    CN XI   Right sternocleidomastoid strength: normal  Left sternocleidomastoid strength: normal    CN XII   Tongue: not atrophic  Fasciculations: absent  Tongue deviation: none    Motor Exam   Muscle bulk: normal  Overall muscle tone: normal  Right arm tone: normal  Left arm tone: normal  Right leg tone: normal  Left leg tone: normal    Strength   Strength 5/5 throughout.     Sensory Exam   Light touch normal.   Pinprick normal.     Gait, Coordination, and Reflexes     Gait  Gait: normal    Coordination   Romberg: negative  Finger to nose coordination: normal  Heel to shin coordination: normal  Tandem walking coordination: normal    Tremor   Resting tremor: absent  Intention tremor: absent  Action tremor: absent    Reflexes   Reflexes 2+ except as noted.       Physical Exam   Constitutional: She is oriented to person, place, and time. She appears well-developed and well-nourished.   Eyes: Pupils are equal, round, and reactive to light. EOM are normal.   Neurological: She is oriented to person, place, and time. She has normal strength. She has a normal Finger-Nose-Finger Test, a normal Heel to Shin Test, a normal Romberg Test and a normal Tandem Gait Test. Gait normal.   Psychiatric: Her speech is normal.   Nursing note and vitals reviewed.      Office Visit on 03/03/2020   Component Date Value Ref Range Status   • Thyroid Peroxidase Antibody 03/03/2020 10  0 - 34 IU/mL Final   • Thyroglobulin Ab  03/03/2020 <1.0  0.0 - 0.9 IU/mL Final    Thyroglobulin Antibody measured by [facility] [place] Methodology   • Thyrotropin Receptor Antibody 03/03/2020 <1.10  0.00 - 1.75 IU/L Final   • TSH 03/03/2020 2.920  0.270 - 4.200 uIU/mL Final   • Free T4 03/03/2020 1.26  0.93 - 1.70 ng/dL Final         Assessment/Plan     Problem List Items Addressed This Visit        Other    Tremors of nervous system - Primary    Overview     Neuro eval-Dr. Turner plan for MRI and EEG 5/2020         Current Assessment & Plan     MRI Brain and EEG are normal    Suspect due to anxiety                   No follow-ups on file.                 Answers for HPI/ROS submitted by the patient on 5/19/2020   What is the primary reason for your visit?: Other  Please describe your symptoms.: Have had small issue with hands filing like they are shaking when they aren't, light headedness, dizziness. It comes and goes.  Have you had these symptoms before?: Yes  How long have you been having these symptoms?: Greater than 2 weeks

## 2020-09-04 RX ORDER — CITALOPRAM 10 MG/1
10 TABLET ORAL DAILY
Qty: 30 TABLET | Refills: 2 | Status: SHIPPED | OUTPATIENT
Start: 2020-09-04 | End: 2020-12-14 | Stop reason: SDUPTHER

## 2020-11-30 ENCOUNTER — TELEPHONE (OUTPATIENT)
Dept: INTERNAL MEDICINE | Facility: CLINIC | Age: 30
End: 2020-11-30

## 2020-12-14 RX ORDER — CITALOPRAM 10 MG/1
10 TABLET ORAL DAILY
Qty: 30 TABLET | Refills: 2 | Status: SHIPPED | OUTPATIENT
Start: 2020-12-14 | End: 2021-02-08

## 2020-12-14 NOTE — TELEPHONE ENCOUNTER
Last Office Visit: 3/3/2020  Next Office Visit: 1/19/2021    Labs completed in past 6 months? no  Labs completed in past year? yes    Last Refill Date: 9/4/2020  Quantity: 30  Refills: 2    Pharmacy:   [facility] - [place] [address] - [phone] [facility] [phone] FX

## 2021-01-06 ENCOUNTER — TELEPHONE (OUTPATIENT)
Dept: INTERNAL MEDICINE | Facility: CLINIC | Age: 31
End: 2021-01-06

## 2021-01-06 NOTE — TELEPHONE ENCOUNTER
Patient called and stated that she left a message about her anxiety medications on MyChart but never got a response.  Patient is newly pregnant and is asking if her anxiety medication can be changed from her OBGYN.  Patient is also asking if she needs to keep her physical on 2/9/21 as she will be going to OBGYN for next few months.      Patient callback: [phone]    Please advise

## 2021-01-07 NOTE — TELEPHONE ENCOUNTER
Please have patient call OB/GYN in regards to her anxiety medication.  Please let patient know she can have her physical completed after her pregnancy.

## 2021-02-08 ENCOUNTER — INITIAL PRENATAL (OUTPATIENT)
Dept: OBSTETRICS AND GYNECOLOGY | Facility: CLINIC | Age: 31
End: 2021-02-08

## 2021-02-08 VITALS — SYSTOLIC BLOOD PRESSURE: 120 MMHG | BODY MASS INDEX: 30.29 KG/M2 | DIASTOLIC BLOOD PRESSURE: 76 MMHG | WEIGHT: 171 LBS

## 2021-02-08 DIAGNOSIS — Z34.91 FIRST TRIMESTER PREGNANCY: ICD-10-CM

## 2021-02-08 PROBLEM — O09.819 PREGNANCY RESULTING FROM IN VITRO FERTILIZATION: Status: ACTIVE | Noted: 2021-02-08

## 2021-02-08 PROBLEM — Z34.90 PREGNANCY: Status: ACTIVE | Noted: 2021-02-08

## 2021-02-08 PROCEDURE — 76817 TRANSVAGINAL US OBSTETRIC: CPT | Performed by: OBSTETRICS & GYNECOLOGY

## 2021-02-08 PROCEDURE — 0501F PRENATAL FLOW SHEET: CPT | Performed by: OBSTETRICS & GYNECOLOGY

## 2021-02-08 NOTE — PROGRESS NOTES
Initial ob visit     CC- Here for care of pregnancy        Sima Booker is a 31 y.o. female, , who presents for her first obstetrical visit.  Her last LMP was Patient's last menstrual period was 2020..    # 1 - Date: None, Sex: None, Weight: None, GA: None, Delivery: None, Apgar1: None, Apgar5: None, Living: None, Birth Comments: None      Current obstetric complaints : none   Initial positive test date : 21     Location : [facility]    Prior obstetric issues, none  Potential pregnancy concerns: none  Family history of genetic issues (includes FOB): none  Prior infections concerning in pregnancy (Rash, fever in last 2 weeks): none  Varicella Hx -yes  Prior testing for Cystic Fibrosis Carrier or Sickle Cell Trait- no  Prepregnancy BMI - Body mass index is 30.29 kg/m².  Hx of HSV for patient or partner : no     Additional Pertinent History   Last Pap :   Last Completed Pap Smear       Status Date      PAP SMEAR Done 10/25/2018 neg        History of abnormal Pap smear: no  Family history of uterine, colon, breast, or ovarian cancer: yes - Breast CA-great aunt  Performs monthly Self-Breast Exam: no  Exercises Regularly: yes - 1-3 times/week  Feelings of Anxiety or Depression: no  Tobacco Usage?: No     The additional following portions of the patient's history were reviewed and updated as appropriate: allergies, current medications, past family history, past medical history, past social history, past surgical history and problem list.    Review of Systems   Review of Systems  Constitutional : Nausea, fatigue -no nausea. Mild fatigue   : Vaginal bleeding, cramping -2weeks ago spotting  Breast Tenderness : yes  All systems reviewed and updated    /76   Wt 77.6 kg (171 lb)   LMP 2020   BMI 30.29 kg/m²     Physical Exam  General Appearance:    Alert, cooperative, in no acute distress   Head:    Normocephalic, without obvious abnormality, atraumatic   Eyes:            Lids and lashes  normal, conjunctivae and sclerae normal, no icterus, no pallor, corneas clear   Ears:    Ears appear intact with no abnormalities noted       Neck:      Neck without masses or thyromegaly    Abdomen:    Soft without masses or tenderness   :           Neck:   No adenopathy, supple, trachea midline, no thyromegaly   Back:     No kyphosis present, no scoliosis present,                       Extremities:   Moves all extremities well, no edema, no cyanosis   Skin:   No bleeding, bruising or rash   Lymph nodes:   No palpable adenopathy   Neurologic:   Sensation intact, A&O times 3        Assessment/Plan   Assessment     Problem List Items Addressed This Visit     RESOLVED: First trimester pregnancy    Relevant Orders    Pap IG, Ct-Ng, HPV-hr    Obstetric Panel    HIV-1 / O / 2 Ag / Antibody 4th Generation    Urine Culture - Urine, Urine, Clean Catch    Urine Drug Screen - Urine, Clean Catch          1. Pregnancy at 9w3d      Plan     1. Reviewed routine prenatal care with the office and educational materials given  2. Lab(s) Ordered  3. Patient is on Prenatal vitamins and encouraged additional folic acid supplementation.  4. Counseled on genetic testing options including CF DNA, carrier testing including CF, SMA, and Fragile X,  and NT screening.  5. Follow up: 4 week(s)  6. Plan fetal echo 22-24 wks      Hue Patel MD  02/08/2021

## 2021-02-10 LAB
ABO GROUP BLD: ABNORMAL
AMPHETAMINES UR QL SCN: NEGATIVE NG/ML
BACTERIA UR CULT: NO GROWTH
BACTERIA UR CULT: NORMAL
BARBITURATES UR QL SCN: NEGATIVE NG/ML
BASOPHILS # BLD AUTO: 0 X10E3/UL (ref 0–0.2)
BASOPHILS NFR BLD AUTO: 0 %
BENZODIAZ UR QL SCN: NEGATIVE NG/ML
BLD GP AB SCN SERPL QL: NEGATIVE
BZE UR QL SCN: NEGATIVE NG/ML
CANNABINOIDS UR QL SCN: NEGATIVE NG/ML
CREAT UR-MCNC: 82 MG/DL (ref 20–300)
EOSINOPHIL # BLD AUTO: 0 X10E3/UL (ref 0–0.4)
EOSINOPHIL NFR BLD AUTO: 0 %
ERYTHROCYTE [DISTWIDTH] IN BLOOD BY AUTOMATED COUNT: 12 % (ref 11.7–15.4)
HBV SURFACE AG SERPL QL IA: NEGATIVE
HCT VFR BLD AUTO: 38.8 % (ref 34–46.6)
HCV AB S/CO SERPL IA: <0.1 S/CO RATIO (ref 0–0.9)
HGB BLD-MCNC: 13.4 G/DL (ref 11.1–15.9)
HIV 1+2 AB+HIV1 P24 AG SERPL QL IA: NON REACTIVE
IMM GRANULOCYTES # BLD AUTO: 0 X10E3/UL (ref 0–0.1)
IMM GRANULOCYTES NFR BLD AUTO: 0 %
LABORATORY COMMENT REPORT: NORMAL
LYMPHOCYTES # BLD AUTO: 2 X10E3/UL (ref 0.7–3.1)
LYMPHOCYTES NFR BLD AUTO: 17 %
MCH RBC QN AUTO: 30.5 PG (ref 26.6–33)
MCHC RBC AUTO-ENTMCNC: 34.5 G/DL (ref 31.5–35.7)
MCV RBC AUTO: 88 FL (ref 79–97)
METHADONE UR QL SCN: NEGATIVE NG/ML
MONOCYTES # BLD AUTO: 1 X10E3/UL (ref 0.1–0.9)
MONOCYTES NFR BLD AUTO: 8 %
NEUTROPHILS # BLD AUTO: 8.4 X10E3/UL (ref 1.4–7)
NEUTROPHILS NFR BLD AUTO: 75 %
OPIATES UR QL SCN: NEGATIVE NG/ML
OXYCODONE+OXYMORPHONE UR QL SCN: NEGATIVE NG/ML
PCP UR QL: NEGATIVE NG/ML
PH UR: 6.7 [PH] (ref 4.5–8.9)
PLATELET # BLD AUTO: 297 X10E3/UL (ref 150–450)
PROPOXYPH UR QL SCN: NEGATIVE NG/ML
RBC # BLD AUTO: 4.39 X10E6/UL (ref 3.77–5.28)
RH BLD: POSITIVE
RPR SER QL: NON REACTIVE
RUBV IGG SERPL IA-ACNC: 2.32 INDEX
WBC # BLD AUTO: 11.5 X10E3/UL (ref 3.4–10.8)

## 2021-02-12 DIAGNOSIS — Z34.91 FIRST TRIMESTER PREGNANCY: ICD-10-CM

## 2021-03-09 ENCOUNTER — ROUTINE PRENATAL (OUTPATIENT)
Dept: OBSTETRICS AND GYNECOLOGY | Facility: CLINIC | Age: 31
End: 2021-03-09

## 2021-03-09 VITALS — DIASTOLIC BLOOD PRESSURE: 82 MMHG | WEIGHT: 175 LBS | BODY MASS INDEX: 31 KG/M2 | SYSTOLIC BLOOD PRESSURE: 140 MMHG

## 2021-03-09 DIAGNOSIS — Z3A.13 13 WEEKS GESTATION OF PREGNANCY: Primary | ICD-10-CM

## 2021-03-09 DIAGNOSIS — O09.819 PREGNANCY RESULTING FROM IN VITRO FERTILIZATION, ANTEPARTUM: ICD-10-CM

## 2021-03-09 LAB
GLUCOSE UR STRIP-MCNC: NEGATIVE MG/DL
PROT UR STRIP-MCNC: NEGATIVE MG/DL

## 2021-03-09 PROCEDURE — 0502F SUBSEQUENT PRENATAL CARE: CPT | Performed by: OBSTETRICS & GYNECOLOGY

## 2021-03-09 NOTE — PROGRESS NOTES
OB FOLLOW UP  CC- Here for care of pregnancy        Sima Booker is a 31 y.o.  13w4d patient being seen today for her obstetrical follow up visit. Patient reports headache and light headed/dizzy spells . Denies vag bldg, lof, dysuria, n/v.  Declines genetic testing.       Her prenatal care is complicated by (and status) :    Patient Active Problem List   Diagnosis   • Family history of melanoma   • Family history of colonic polyps   • Tremors of nervous system   • Pregnancy   • Pregnancy resulting from in vitro fertilization       Ultrasound Today: No.        The additional following portions of the patient's history were reviewed and updated as appropriate: allergies, current medications, past family history, past medical history, past social history, past surgical history and problem list.    I have reviewed and agree with the HPI, ROS, and historical information as entered above. Hue Patel MD    /82   Wt 79.4 kg (175 lb)   LMP 2020   BMI 31.00 kg/m²       EXAM:     FHT: + BPM   Uterine Size: size equals dates  Pelvic Exam: No    Urine glucose/protein: See prenatal flowsheet       Assessment and Plan    Problem List Items Addressed This Visit     Pregnancy - Primary    Relevant Orders    POC Urinalysis Dipstick (Completed)    Pregnancy resulting from in vitro fertilization    Overview     Fetal echo 22-24 wks               1. Pregnancy at 13w4d  2. Declines cfDNA, desires gender US.   3. BP elevated today, she denies history, will monitor.   4. Fetal status reassuring.   5. Activity and Exercise discussed.  No follow-ups on file.    Hue Patel MD  2021

## 2021-04-05 ENCOUNTER — ROUTINE PRENATAL (OUTPATIENT)
Dept: OBSTETRICS AND GYNECOLOGY | Facility: CLINIC | Age: 31
End: 2021-04-05

## 2021-04-05 VITALS — SYSTOLIC BLOOD PRESSURE: 122 MMHG | WEIGHT: 180 LBS | BODY MASS INDEX: 31.89 KG/M2 | DIASTOLIC BLOOD PRESSURE: 72 MMHG

## 2021-04-05 DIAGNOSIS — Z3A.17 17 WEEKS GESTATION OF PREGNANCY: Primary | ICD-10-CM

## 2021-04-05 DIAGNOSIS — O09.819 PREGNANCY RESULTING FROM IN VITRO FERTILIZATION, ANTEPARTUM: ICD-10-CM

## 2021-04-05 LAB
GLUCOSE UR STRIP-MCNC: NEGATIVE MG/DL
PROT UR STRIP-MCNC: NEGATIVE MG/DL

## 2021-04-05 PROCEDURE — 0502F SUBSEQUENT PRENATAL CARE: CPT | Performed by: OBSTETRICS & GYNECOLOGY

## 2021-04-05 NOTE — PROGRESS NOTES
OB FOLLOW UP    Sima Booker is a 31 y.o.  17w3d patient being seen today for her obstetrical follow up visit. Patient reports intermittent dizziness and restless legs. She also has itchy red patches on both ankles and feet.    Her prenatal care is complicated by (and status) : None    The additional following portions of the patient's history were reviewed and updated as appropriate: allergies, current medications, past family history, past medical history, past social history, past surgical history and problem list.      ROS -   dizziness, restless leg, constipation   Vaginal bleeding no    Wt 81.6 kg (180 lb)   LMP 2020   BMI 31.89 kg/m²     FHT:  present   Pelvic Exam: Performed: No     Assessment    1. Pregnancy at 17w3d  2. Fetal status reassuring   3. Counseled on MSAFP alone in relation to OTD and placental issues.  Declines AFP    Problem List Items Addressed This Visit     Pregnancy - Primary    Relevant Orders    POC Urinalysis Dipstick (Completed)    US Ob 14 + Weeks Single or First Gestation    Maternal Screen 4    Pregnancy resulting from in vitro fertilization    Overview     Fetal echo 22-24 wks         Relevant Orders    Maternal Screen 4          Plan    1. Anatomy scan next visit.   2. desires QS today.   3. Follow up: 3 weeks  4. Call for any problems, bleeding    Hue Patel MD  2021

## 2021-04-07 LAB
2ND TRIMESTER 4 SCREEN SERPL-IMP: NORMAL
2ND TRIMESTER 4 SCREEN SERPL-IMP: NORMAL
AFP ADJ MOM SERPL: 1.13
AFP SERPL-MCNC: 41.7 NG/ML
AGE AT DELIVERY: 31.6 YR
FET TS 18 RISK FROM MAT AGE: NORMAL
FET TS 21 RISK FROM MAT AGE: 561
GA METHOD: NORMAL
GA: 17.4 WEEKS
HCG ADJ MOM SERPL: 2.31
HCG SERPL-ACNC: NORMAL MIU/ML
IDDM PATIENT QL: NO
INHIBIN A ADJ MOM SERPL: 1.44
INHIBIN A SERPL-MCNC: 199.47 PG/ML
LABORATORY COMMENT REPORT: NORMAL
MULTIPLE PREGNANCY: NO
NEURAL TUBE DEFECT RISK FETUS: 8106 %
RESULT: NORMAL
TS 18 RISK FETUS: NORMAL
TS 21 RISK FETUS: 1670
U ESTRIOL ADJ MOM SERPL: 0.93
U ESTRIOL SERPL-MCNC: 1.13 NG/ML

## 2021-04-27 ENCOUNTER — ROUTINE PRENATAL (OUTPATIENT)
Dept: OBSTETRICS AND GYNECOLOGY | Facility: CLINIC | Age: 31
End: 2021-04-27

## 2021-04-27 VITALS — SYSTOLIC BLOOD PRESSURE: 128 MMHG | WEIGHT: 185 LBS | BODY MASS INDEX: 32.77 KG/M2 | DIASTOLIC BLOOD PRESSURE: 80 MMHG

## 2021-04-27 DIAGNOSIS — Z3A.20 20 WEEKS GESTATION OF PREGNANCY: Primary | ICD-10-CM

## 2021-04-27 DIAGNOSIS — O09.812 PREGNANCY RESULTING FROM IN VITRO FERTILIZATION IN SECOND TRIMESTER: ICD-10-CM

## 2021-04-27 LAB
EXPIRATION DATE: NORMAL
GLUCOSE UR STRIP-MCNC: NEGATIVE MG/DL
Lab: NORMAL
PROT UR STRIP-MCNC: NEGATIVE MG/DL

## 2021-04-27 PROCEDURE — 0502F SUBSEQUENT PRENATAL CARE: CPT | Performed by: NURSE PRACTITIONER

## 2021-04-27 NOTE — PROGRESS NOTES
OB FOLLOW UP  CC- Here for care of pregnancy        Sima Booker is a 31 y.o.  20w4d patient being seen today for her obstetrical follow up visit. Patient reports no complaints today. She does have questions regarding some blood work that her allergist suggested, which includes MTHFR and Vit D. Her allergist also suggested she stop her iron.     Her prenatal care is complicated by (and status) :    Patient Active Problem List   Diagnosis   • Family history of melanoma   • Family history of colonic polyps   • Tremors of nervous system   • Pregnancy   • Pregnancy resulting from in vitro fertilization         Ultrasound Today: Yes.  Findings showed normal anatomy.     ROS -   Patient Reports : No Problems  Patient Denies: Loss of Fluid, Vaginal Spotting, Vision Changes, Headaches, Nausea , Vomiting  and Epigastric pain  Fetal Movement : has not felt yet  All other systems reviewed and are negative.       The additional following portions of the patient's history were reviewed and updated as appropriate: allergies, current medications, past family history, past medical history, past social history, past surgical history and problem list.    I have reviewed and agree with the HPI, ROS, and historical information as entered above. Brenda Olivera, APRN    /80   Wt 83.9 kg (185 lb)   LMP 2020   BMI 32.77 kg/m²       EXAM:     FHT: 153 BPM     Urine glucose/protein: See prenatal flowsheet       Assessment and Plan    Problem List Items Addressed This Visit        Gravid and     Pregnancy - Primary    Relevant Orders    POC Protein, Urine, Qualitative, Dipstick (Completed)    POC Glucose, Urine, Qualitative, Dipstick (Completed)    Pregnancy resulting from in vitro fertilization    Overview     Fetal echo 22-24 wks         Relevant Orders    [facility]          1. Pregnancy at 20w4d  2. Fetal status reassuring. Anatomy scan wnl.   3. Pt and KS had discussion regarding  bloodwork.   4. Fetal echo at [facility] in next 2-4 weeks.   5. RTC in 4 weeks with Jorge.      Brenda Olivera, APRN  04/27/2021

## 2021-05-25 ENCOUNTER — HOSPITAL ENCOUNTER (OUTPATIENT)
Dept: WOMENS IMAGING | Facility: HOSPITAL | Age: 31
Discharge: HOME | End: 2021-05-25
Admitting: NURSE PRACTITIONER

## 2021-05-25 ENCOUNTER — OFFICE VISIT (OUTPATIENT)
Dept: OBSTETRICS AND GYNECOLOGY | Facility: HOSPITAL | Age: 31
End: 2021-05-25

## 2021-05-25 ENCOUNTER — ROUTINE PRENATAL (OUTPATIENT)
Dept: OBSTETRICS AND GYNECOLOGY | Facility: CLINIC | Age: 31
End: 2021-05-25

## 2021-05-25 VITALS
DIASTOLIC BLOOD PRESSURE: 84 MMHG | BODY MASS INDEX: 31.96 KG/M2 | WEIGHT: 191.8 LBS | HEIGHT: 65 IN | SYSTOLIC BLOOD PRESSURE: 132 MMHG

## 2021-05-25 VITALS — SYSTOLIC BLOOD PRESSURE: 140 MMHG | DIASTOLIC BLOOD PRESSURE: 88 MMHG | WEIGHT: 192 LBS | BODY MASS INDEX: 32.45 KG/M2

## 2021-05-25 DIAGNOSIS — O09.812 PREGNANCY RESULTING FROM IN VITRO FERTILIZATION IN SECOND TRIMESTER: Primary | ICD-10-CM

## 2021-05-25 DIAGNOSIS — Z3A.24 24 WEEKS GESTATION OF PREGNANCY: Primary | ICD-10-CM

## 2021-05-25 DIAGNOSIS — O09.812 PREGNANCY RESULTING FROM IN VITRO FERTILIZATION IN SECOND TRIMESTER: ICD-10-CM

## 2021-05-25 DIAGNOSIS — Z34.90 PREGNANCY, UNSPECIFIED GESTATIONAL AGE: ICD-10-CM

## 2021-05-25 LAB
GLUCOSE UR STRIP-MCNC: NEGATIVE MG/DL
PROT UR STRIP-MCNC: NEGATIVE MG/DL

## 2021-05-25 PROCEDURE — 76825 ECHO EXAM OF FETAL HEART: CPT

## 2021-05-25 PROCEDURE — 93325 DOPPLER ECHO COLOR FLOW MAPG: CPT

## 2021-05-25 PROCEDURE — 0502F SUBSEQUENT PRENATAL CARE: CPT | Performed by: OBSTETRICS & GYNECOLOGY

## 2021-05-25 PROCEDURE — 76825 ECHO EXAM OF FETAL HEART: CPT | Performed by: OBSTETRICS & GYNECOLOGY

## 2021-05-25 PROCEDURE — 76811 OB US DETAILED SNGL FETUS: CPT | Performed by: OBSTETRICS & GYNECOLOGY

## 2021-05-25 PROCEDURE — 76811 OB US DETAILED SNGL FETUS: CPT

## 2021-05-25 PROCEDURE — 93325 DOPPLER ECHO COLOR FLOW MAPG: CPT | Performed by: OBSTETRICS & GYNECOLOGY

## 2021-05-25 RX ORDER — LORATADINE 10 MG/1
10 TABLET ORAL DAILY
COMMUNITY
End: 2022-09-16

## 2021-05-25 NOTE — PROGRESS NOTES
"OB FOLLOW UP  CC- Here for care of pregnancy        Sima Booker is a 31 y.o.  24w4d patient being seen today for her obstetrical follow up visit. Patient reports headaches and feels 2-3 episodes weekly of \"irregular heartbeat\" . Fetal echo at [facility] today wnl. GTT next visit.     Her prenatal care is complicated by (and status) :    Patient Active Problem List   Diagnosis   • Family history of melanoma   • Family history of colonic polyps   • Tremors of nervous system   • Pregnancy   • Pregnancy resulting from in vitro fertilization         Ultrasound Today: Yes.  Findings showed (see PDC report) .  I have personally evaluated the U/S and agree with the findings. Hue Patel MD    ROS -   Patient Reports : No Problems  Patient Denies: Loss of Fluid, Vaginal Spotting, Vision Changes, Nausea , Vomiting  and Contractions  Fetal Movement : normal  All other systems reviewed and are negative.       The additional following portions of the patient's history were reviewed and updated as appropriate: allergies, current medications, past family history, past medical history, past social history, past surgical history and problem list.    I have reviewed and agree with the HPI, ROS, and historical information as entered above. Hue Patel MD    /88   Wt 87.1 kg (192 lb)   LMP 2020   BMI 32.45 kg/m²       EXAM:     FHT: + BPM     Urine glucose/protein: See prenatal flowsheet       Assessment and Plan    Problem List Items Addressed This Visit     Pregnancy - Primary    Relevant Orders    POC Urinalysis Dipstick (Completed)    Pregnancy resulting from in vitro fertilization    Overview     Fetal echo 22-24 wks               1. Pregnancy at 24w4d. She will increase hydration, compression socks, glucola and can check tsh next time if palpitations continue. PDC echo today wnl.   2. Fetal status reassuring.   3. Activity and Exercise discussed.  Return in about 1 month (around 2021) for F/U " Prenatal and glucola.    Hue Patel MD  05/25/2021

## 2021-06-15 ENCOUNTER — OFFICE VISIT (OUTPATIENT)
Dept: INTERNAL MEDICINE | Facility: CLINIC | Age: 31
End: 2021-06-15

## 2021-06-15 VITALS
SYSTOLIC BLOOD PRESSURE: 142 MMHG | RESPIRATION RATE: 20 BRPM | BODY MASS INDEX: 33.63 KG/M2 | WEIGHT: 199 LBS | DIASTOLIC BLOOD PRESSURE: 90 MMHG | TEMPERATURE: 98 F | OXYGEN SATURATION: 97 % | HEART RATE: 105 BPM

## 2021-06-15 DIAGNOSIS — L30.9 DERMATITIS: Primary | ICD-10-CM

## 2021-06-15 PROCEDURE — 99213 OFFICE O/P EST LOW 20 MIN: CPT | Performed by: NURSE PRACTITIONER

## 2021-06-25 ENCOUNTER — ROUTINE PRENATAL (OUTPATIENT)
Dept: OBSTETRICS AND GYNECOLOGY | Facility: CLINIC | Age: 31
End: 2021-06-25

## 2021-06-25 VITALS — SYSTOLIC BLOOD PRESSURE: 138 MMHG | WEIGHT: 207.4 LBS | BODY MASS INDEX: 35.05 KG/M2 | DIASTOLIC BLOOD PRESSURE: 82 MMHG

## 2021-06-25 DIAGNOSIS — Z3A.29 29 WEEKS GESTATION OF PREGNANCY: Primary | ICD-10-CM

## 2021-06-25 DIAGNOSIS — O09.819 PREGNANCY RESULTING FROM IN VITRO FERTILIZATION, ANTEPARTUM: ICD-10-CM

## 2021-06-25 DIAGNOSIS — O26.849 UTERINE SIZE DATE DISCREPANCY PREGNANCY: ICD-10-CM

## 2021-06-25 LAB
GLUCOSE UR STRIP-MCNC: NEGATIVE MG/DL
PROT UR STRIP-MCNC: NEGATIVE MG/DL

## 2021-06-25 PROCEDURE — 90715 TDAP VACCINE 7 YRS/> IM: CPT | Performed by: OBSTETRICS & GYNECOLOGY

## 2021-06-25 PROCEDURE — 90471 IMMUNIZATION ADMIN: CPT | Performed by: OBSTETRICS & GYNECOLOGY

## 2021-06-25 PROCEDURE — 0502F SUBSEQUENT PRENATAL CARE: CPT | Performed by: OBSTETRICS & GYNECOLOGY

## 2021-06-25 NOTE — PROGRESS NOTES
OB FOLLOW UP    Sima Booker is a 31 y.o.  29w0d patient being seen today for her obstetrical follow up visit. Patient reports backache, fatigue, headache and heartburn.     Her prenatal care is complicated by (and status) :    Patient Active Problem List   Diagnosis   • Family history of melanoma   • Family history of colonic polyps   • Tremors of nervous system   • Pregnancy   • Pregnancy resulting from in vitro fertilization       ROS -   Patient Reports : Headaches  Patient Denies: Loss of Fluid, Vaginal Spotting, Vision Changes and Cramping/Contractions   Fetal Movement : normal      /82   Wt 94.1 kg (207 lb 6.4 oz)   LMP 2020   BMI 35.05 kg/m²     Ultrasound Today: no    EXAM:  Vitals: See prenatal flowsheet   Abdomen: See prenatal flowsheet   Urine glucose/protein: See prenatal flowsheet   Pelvic: See prenatal flowsheet     Assessment    1. Pregnancy at 29w0d  2. Fetal status reassuring     Problem List Items Addressed This Visit     Pregnancy - Primary    Relevant Orders    CBC (No Diff)    Antibody Screen    Gestational Screen 1 Hr ([facility])    POC Urinalysis Dipstick (Completed)    US Ob Follow Up Transabdominal Approach    Pregnancy resulting from in vitro fertilization    Overview     Fetal echo 22-24 wks wnl         Relevant Orders    US Ob Follow Up Transabdominal Approach      Other Visit Diagnoses     Uterine size date discrepancy pregnancy              Plan    1. Fetal movement/ Labor Precautions  2. glucola today. tdap today. Discussed monitoring sugar, carbs and weight gain.   3. Follow up: 3 week(s)      Hue Patel MD  2021

## 2021-06-26 LAB
BLD GP AB SCN SERPL QL: NEGATIVE
ERYTHROCYTE [DISTWIDTH] IN BLOOD BY AUTOMATED COUNT: 13.7 % (ref 12.3–15.4)
GLUCOSE 1H P 50 G GLC PO SERPL-MCNC: 146 MG/DL (ref 65–139)
HCT VFR BLD AUTO: 35.4 % (ref 34–46.6)
HGB BLD-MCNC: 11.9 G/DL (ref 12–15.9)
MCH RBC QN AUTO: 31.2 PG (ref 26.6–33)
MCHC RBC AUTO-ENTMCNC: 33.6 G/DL (ref 31.5–35.7)
MCV RBC AUTO: 92.9 FL (ref 79–97)
PLATELET # BLD AUTO: 235 10*3/MM3 (ref 140–450)
RBC # BLD AUTO: 3.81 10*6/MM3 (ref 3.77–5.28)
WBC # BLD AUTO: 10.2 10*3/MM3 (ref 3.4–10.8)

## 2021-07-15 ENCOUNTER — ROUTINE PRENATAL (OUTPATIENT)
Dept: OBSTETRICS AND GYNECOLOGY | Facility: CLINIC | Age: 31
End: 2021-07-15

## 2021-07-15 VITALS — WEIGHT: 202 LBS | BODY MASS INDEX: 34.14 KG/M2 | SYSTOLIC BLOOD PRESSURE: 138 MMHG | DIASTOLIC BLOOD PRESSURE: 80 MMHG

## 2021-07-15 DIAGNOSIS — Z3A.31 31 WEEKS GESTATION OF PREGNANCY: Primary | ICD-10-CM

## 2021-07-15 DIAGNOSIS — O09.819 PREGNANCY RESULTING FROM IN VITRO FERTILIZATION, ANTEPARTUM: ICD-10-CM

## 2021-07-15 LAB
GLUCOSE UR STRIP-MCNC: NEGATIVE MG/DL
PROT UR STRIP-MCNC: NEGATIVE MG/DL

## 2021-07-15 PROCEDURE — 0502F SUBSEQUENT PRENATAL CARE: CPT | Performed by: OBSTETRICS & GYNECOLOGY

## 2021-07-15 NOTE — PROGRESS NOTES
OB FOLLOW UP  CC- Here for care of pregnancy        Sima Booker is a 31 y.o.  31w6d patient being seen today for her obstetrical follow up visit. Patient reports backache.     Her prenatal care is complicated by (and status) :    Patient Active Problem List   Diagnosis   • Family history of melanoma   • Family history of colonic polyps   • Tremors of nervous system   • Pregnancy   • Pregnancy resulting from in vitro fertilization       Flu Status: Already given in current flu season  Ultrasound Today: yes    ROS -   Patient Reports : No Problems  Patient Denies: Loss of Fluid, Vaginal Spotting, Vision Changes, Headaches, Nausea , Vomiting , Contractions and Epigastric pain  Fetal Movement : normal  All other systems reviewed and are negative.       The additional following portions of the patient's history were reviewed and updated as appropriate: allergies, current medications, past family history, past medical history, past social history, past surgical history and problem list.    I have reviewed and agree with the HPI, ROS, and historical information as entered above. Hue Patel MD    /80   Wt 91.6 kg (202 lb)   LMP 2020   BMI 34.14 kg/m²       EXAM:     FHT: + BPM   Uterine Size: size equals dates  Pelvic Exam: No    Urine glucose/protein: See prenatal flowsheet       Assessment and Plan    Problem List Items Addressed This Visit     Pregnancy - Primary    Overview     EFW 31 wks 59%ile         Relevant Orders    POC Urinalysis Dipstick (Completed)    Pregnancy resulting from in vitro fertilization    Overview     Fetal echo 22-24 wks wnl               1. Pregnancy at 31w6d. US today for growth, EFW 59%ile, DIMA 12cm,   2. Fetal status reassuring.   3. Activity and Exercise discussed.  Return in about 2 weeks (around 2021) for F/U Prenatal.    Hue Patel MD  07/15/2021

## 2021-07-30 ENCOUNTER — ROUTINE PRENATAL (OUTPATIENT)
Dept: OBSTETRICS AND GYNECOLOGY | Facility: CLINIC | Age: 31
End: 2021-07-30

## 2021-07-30 ENCOUNTER — HOSPITAL ENCOUNTER (INPATIENT)
Facility: HOSPITAL | Age: 31
LOS: 2 days | Discharge: HOME | End: 2021-08-02
Attending: OBSTETRICS & GYNECOLOGY | Admitting: OBSTETRICS & GYNECOLOGY

## 2021-07-30 VITALS — DIASTOLIC BLOOD PRESSURE: 100 MMHG | BODY MASS INDEX: 34.98 KG/M2 | WEIGHT: 207 LBS | SYSTOLIC BLOOD PRESSURE: 160 MMHG

## 2021-07-30 DIAGNOSIS — Z3A.34 34 WEEKS GESTATION OF PREGNANCY: Primary | ICD-10-CM

## 2021-07-30 LAB
ABO GROUP BLD: NORMAL
ABO GROUP BLD: NORMAL
ALP SERPL-CCNC: 81 U/L (ref 39–117)
ALT SERPL W P-5'-P-CCNC: 13 U/L (ref 1–33)
AST SERPL-CCNC: 19 U/L (ref 1–32)
BILIRUB SERPL-MCNC: 0.3 MG/DL (ref 0–1.2)
BLD GP AB SCN SERPL QL: NEGATIVE
CREAT SERPL-MCNC: 0.53 MG/DL (ref 0.57–1)
DEPRECATED RDW RBC AUTO: 48.5 FL (ref 37–54)
ERYTHROCYTE [DISTWIDTH] IN BLOOD BY AUTOMATED COUNT: 14.3 % (ref 12.3–15.4)
EXPIRATION DATE: 0
GLUCOSE UR STRIP-MCNC: NEGATIVE MG/DL
HCT VFR BLD AUTO: 35.9 % (ref 34–46.6)
HGB BLD-MCNC: 12.1 G/DL (ref 12–15.9)
LDH SERPL-CCNC: 192 U/L (ref 135–214)
Lab: 0
MCH RBC QN AUTO: 31.6 PG (ref 26.6–33)
MCHC RBC AUTO-ENTMCNC: 33.7 G/DL (ref 31.5–35.7)
MCV RBC AUTO: 93.7 FL (ref 79–97)
PLATELET # BLD AUTO: 200 10*3/MM3 (ref 140–450)
PMV BLD AUTO: 9.9 FL (ref 6–12)
PROT UR STRIP-MCNC: NEGATIVE MG/DL
PROT UR STRIP-MCNC: NEGATIVE MG/DL
RBC # BLD AUTO: 3.83 10*6/MM3 (ref 3.77–5.28)
RH BLD: POSITIVE
RH BLD: POSITIVE
SARS-COV-2 RDRP RESP QL NAA+PROBE: NORMAL
T&S EXPIRATION DATE: NORMAL
URATE SERPL-MCNC: 4.4 MG/DL (ref 2.4–5.7)
WBC # BLD AUTO: 10.28 10*3/MM3 (ref 3.4–10.8)

## 2021-07-30 PROCEDURE — 83615 LACTATE (LD) (LDH) ENZYME: CPT | Performed by: OBSTETRICS & GYNECOLOGY

## 2021-07-30 PROCEDURE — 84460 ALANINE AMINO (ALT) (SGPT): CPT | Performed by: OBSTETRICS & GYNECOLOGY

## 2021-07-30 PROCEDURE — 87635 SARS-COV-2 COVID-19 AMP PRB: CPT | Performed by: OBSTETRICS & GYNECOLOGY

## 2021-07-30 PROCEDURE — 84450 TRANSFERASE (AST) (SGOT): CPT | Performed by: OBSTETRICS & GYNECOLOGY

## 2021-07-30 PROCEDURE — 84075 ASSAY ALKALINE PHOSPHATASE: CPT | Performed by: OBSTETRICS & GYNECOLOGY

## 2021-07-30 PROCEDURE — 86900 BLOOD TYPING SEROLOGIC ABO: CPT

## 2021-07-30 PROCEDURE — 81002 URINALYSIS NONAUTO W/O SCOPE: CPT | Performed by: OBSTETRICS & GYNECOLOGY

## 2021-07-30 PROCEDURE — 84550 ASSAY OF BLOOD/URIC ACID: CPT | Performed by: OBSTETRICS & GYNECOLOGY

## 2021-07-30 PROCEDURE — 59025 FETAL NON-STRESS TEST: CPT

## 2021-07-30 PROCEDURE — 82565 ASSAY OF CREATININE: CPT | Performed by: OBSTETRICS & GYNECOLOGY

## 2021-07-30 PROCEDURE — 82247 BILIRUBIN TOTAL: CPT | Performed by: OBSTETRICS & GYNECOLOGY

## 2021-07-30 PROCEDURE — 0502F SUBSEQUENT PRENATAL CARE: CPT | Performed by: OBSTETRICS & GYNECOLOGY

## 2021-07-30 PROCEDURE — 25010000002 BETAMETHASONE ACET & SOD PHOS PER 4 MG: Performed by: OBSTETRICS & GYNECOLOGY

## 2021-07-30 PROCEDURE — 86900 BLOOD TYPING SEROLOGIC ABO: CPT | Performed by: OBSTETRICS & GYNECOLOGY

## 2021-07-30 PROCEDURE — 86850 RBC ANTIBODY SCREEN: CPT | Performed by: OBSTETRICS & GYNECOLOGY

## 2021-07-30 PROCEDURE — 85027 COMPLETE CBC AUTOMATED: CPT | Performed by: OBSTETRICS & GYNECOLOGY

## 2021-07-30 PROCEDURE — 86901 BLOOD TYPING SEROLOGIC RH(D): CPT

## 2021-07-30 PROCEDURE — 99218 POCT URINALYSIS DIPSTICK, MANUAL: CPT | Performed by: OBSTETRICS & GYNECOLOGY

## 2021-07-30 PROCEDURE — 86901 BLOOD TYPING SEROLOGIC RH(D): CPT | Performed by: OBSTETRICS & GYNECOLOGY

## 2021-07-30 RX ORDER — BETAMETHASONE SODIUM PHOSPHATE AND BETAMETHASONE ACETATE 3; 3 MG/ML; MG/ML
12 INJECTION, SUSPENSION INTRA-ARTICULAR; INTRALESIONAL; INTRAMUSCULAR; SOFT TISSUE EVERY 24 HOURS
Status: COMPLETED | OUTPATIENT
Start: 2021-07-30 | End: 2021-07-31

## 2021-07-30 RX ORDER — ACETAMINOPHEN 500 MG
1000 TABLET ORAL EVERY 6 HOURS PRN
Status: DISCONTINUED | OUTPATIENT
Start: 2021-07-30 | End: 2021-08-02 | Stop reason: HOSPADM

## 2021-07-30 RX ORDER — BETAMETHASONE SODIUM PHOSPHATE AND BETAMETHASONE ACETATE 3; 3 MG/ML; MG/ML
12 INJECTION, SUSPENSION INTRA-ARTICULAR; INTRALESIONAL; INTRAMUSCULAR; SOFT TISSUE EVERY 24 HOURS
Status: DISCONTINUED | OUTPATIENT
Start: 2021-07-30 | End: 2021-07-30

## 2021-07-30 RX ADMIN — BETAMETHASONE SODIUM PHOSPHATE AND BETAMETHASONE ACETATE 12 MG: 3; 3 INJECTION, SUSPENSION INTRA-ARTICULAR; INTRALESIONAL; INTRAMUSCULAR at 17:59

## 2021-07-30 RX ADMIN — ACETAMINOPHEN 1000 MG: 500 TABLET, FILM COATED ORAL at 17:29

## 2021-07-30 NOTE — PROGRESS NOTES
OB FOLLOW UP  CC- Here for care of pregnancy        Sima Booker is a 31 y.o.  34w0d patient being seen today for her obstetrical follow up visit. Patient reports bruise on abdomen without trauma and lower abdominal/pelvic pain with position changes.  GBS next visit. BP today 160/100; NST today.     Her prenatal care is complicated by (and status) :    Patient Active Problem List   Diagnosis   • Family history of melanoma   • Family history of colonic polyps   • Tremors of nervous system   • Pregnancy   • Pregnancy resulting from in vitro fertilization         Her Delivery Plan is: Undecided  Ultrasound Today: No.    ROS -   Patient Denies: Loss of Fluid, Vaginal Spotting, Vision Changes, Headaches, Vomiting  and Contractions  Fetal Movement : normal  All other systems reviewed and are negative.       The additional following portions of the patient's history were reviewed and updated as appropriate: allergies, current medications, past family history, past medical history, past social history, past surgical history and problem list.    I have reviewed and agree with the HPI, ROS, and historical information as entered above. Hue Patel MD        /100   Wt 93.9 kg (207 lb)   LMP 2020   BMI 34.98 kg/m²     EXAM:     FHT: +  Uterine Size: size equals dates      Urine glucose/protein: See prenatal flowsheet       Assessment and Plan    Problem List Items Addressed This Visit     Pregnancy - Primary    Overview     EFW 31 wks 59%ile         Relevant Orders    POC Urinalysis Dipstick (Completed)          1. Pregnancy at 34w0d. Initial severe range bp. Repeats were mild range. She has no headache or visual changes, neg proteinuria. On NST there was a questionalble variable decel, and so given her bp and efm, will sent to l&d for further eval.   2. Needs 3hGTT.   3. Fetal status reassuring.   4. Activity and Exercise discussed.  No follow-ups on file.    Hue Patel MD  2021

## 2021-07-31 PROBLEM — O13.9 GESTATIONAL HYPERTENSION: Status: ACTIVE | Noted: 2021-07-31

## 2021-07-31 LAB — PROT 24H UR-MRATE: 195.3 MG/24HOURS (ref 0–150)

## 2021-07-31 PROCEDURE — 81050 URINALYSIS VOLUME MEASURE: CPT | Performed by: OBSTETRICS & GYNECOLOGY

## 2021-07-31 PROCEDURE — 99232 SBSQ HOSP IP/OBS MODERATE 35: CPT | Performed by: OBSTETRICS & GYNECOLOGY

## 2021-07-31 PROCEDURE — 25010000002 BETAMETHASONE ACET & SOD PHOS PER 4 MG: Performed by: OBSTETRICS & GYNECOLOGY

## 2021-07-31 PROCEDURE — 84156 ASSAY OF PROTEIN URINE: CPT | Performed by: OBSTETRICS & GYNECOLOGY

## 2021-07-31 PROCEDURE — 59025 FETAL NON-STRESS TEST: CPT

## 2021-07-31 RX ORDER — CHOLECALCIFEROL (VITAMIN D3) 125 MCG
10 CAPSULE ORAL NIGHTLY PRN
Status: DISCONTINUED | OUTPATIENT
Start: 2021-07-31 | End: 2021-08-02 | Stop reason: HOSPADM

## 2021-07-31 RX ADMIN — BETAMETHASONE SODIUM PHOSPHATE AND BETAMETHASONE ACETATE 12 MG: 3; 3 INJECTION, SUSPENSION INTRA-ARTICULAR; INTRALESIONAL; INTRAMUSCULAR at 18:04

## 2021-07-31 RX ADMIN — Medication 5 MG: at 21:06

## 2021-08-01 PROCEDURE — 59025 FETAL NON-STRESS TEST: CPT

## 2021-08-01 PROCEDURE — 99232 SBSQ HOSP IP/OBS MODERATE 35: CPT | Performed by: OBSTETRICS & GYNECOLOGY

## 2021-08-01 RX ADMIN — Medication 10 MG: at 21:42

## 2021-08-01 NOTE — PROGRESS NOTES
Progress Note    Patient name: Sima Booker  YOB: 1990   MRN: [id number]  Admission Date: 2021  Date of Service: 2021    Sima Booker is a 31 y.o.    at 34w2d  admitted on 2021 for   Patient Active Problem List   Diagnosis   • Family history of melanoma   • Family history of colonic polyps   • Tremors of nervous system   • Pregnancy   • Pregnancy resulting from in vitro fertilization   • Gestational hypertension       Hospital day 1    Diagnoses:   Patient Active Problem List   Diagnosis   • Family history of melanoma   • Family history of colonic polyps   • Tremors of nervous system   • Pregnancy   • Pregnancy resulting from in vitro fertilization   • Gestational hypertension       Subjective:      Sima has no complaints today. No HA/vision changes/ RUQ pain  Reports fetal movement is normal  Denies leakage of amniotic fluid.  Denies vaginal bleeding  denies ctxs    Objective:     Vital signs:  Temp:  [98 °F (36.7 °C)-98.7 °F (37.1 °C)] 98.4 °F (36.9 °C)  Heart Rate:  [] 89  Resp:  [14-18] 14  BP: (120-148)/(60-83) 131/77    Abdomen: soft, nontender  Uterus: gravid, nontender  Extremities: nontender; no edema     FHT's: Category 1  TOCO: none    Cervix: last check      Medications:      •  acetaminophen  •  melatonin    Labs:  Lab Results   Component Value Date    HGB 12.1 2021     Lab Results   Component Value Date    GLUCOSE 85 01/15/2020       Assessment/Plan:      Sima is a 31 y.o.    at 34w2d.  1. Gestational HTN  :   2. Elevated 1 hour test, no 3 hour testing yet.    3. IVF pregnancy      Plan:   1. Will be in steroid window tonight. If BPs stable on bedrest, will dc home with twice weekly testing tomorrow. PDC to scan in am.        Vale Sepulveda MD  2021  12:16 EDT

## 2021-08-01 NOTE — NURSING NOTE
PRENATAL CONTACT - NDRT    Requested by OB to meet with parents to update them with delivery and admission procedures for their infant.    Present: mother and sister    Pertinent Prenatal Information:    Gestational Age: 34  2   /7 weeks  US report: ok  Other: pos.  birth due to HTN      The following issues were discussed:    1) Admission Procedure.  2) NICU Visitation Policy.  3) Sibling Visitation Policy may not  4) Skin to Skin Care ([facility]).  5) Hand Expression for Breast Milk soon after birth.  6) Breast Feeding/Expressing Breast Milk.  7) Tour of NICU offered. Not posible  8) Other Issues Discussed: none      Concerns Voiced by Parents: How long will he need to stay in hospitol        Haylee Aldridge RN    2021   16:05 EDT

## 2021-08-02 ENCOUNTER — APPOINTMENT (OUTPATIENT)
Dept: WOMENS IMAGING | Facility: HOSPITAL | Age: 31
End: 2021-08-02

## 2021-08-02 VITALS
SYSTOLIC BLOOD PRESSURE: 122 MMHG | RESPIRATION RATE: 16 BRPM | HEIGHT: 64 IN | TEMPERATURE: 98.3 F | WEIGHT: 206 LBS | BODY MASS INDEX: 35.17 KG/M2 | HEART RATE: 82 BPM | DIASTOLIC BLOOD PRESSURE: 75 MMHG | OXYGEN SATURATION: 97 %

## 2021-08-02 PROBLEM — R73.09 ELEVATED GLUCOSE TOLERANCE TEST: Status: ACTIVE | Noted: 2021-08-02

## 2021-08-02 PROCEDURE — 76816 OB US FOLLOW-UP PER FETUS: CPT

## 2021-08-02 PROCEDURE — 59025 FETAL NON-STRESS TEST: CPT

## 2021-08-02 PROCEDURE — 76819 FETAL BIOPHYS PROFIL W/O NST: CPT

## 2021-08-02 PROCEDURE — 99238 HOSP IP/OBS DSCHRG MGMT 30/<: CPT | Performed by: OBSTETRICS & GYNECOLOGY

## 2021-08-02 PROCEDURE — 76819 FETAL BIOPHYS PROFIL W/O NST: CPT | Performed by: OBSTETRICS & GYNECOLOGY

## 2021-08-02 PROCEDURE — 76816 OB US FOLLOW-UP PER FETUS: CPT | Performed by: OBSTETRICS & GYNECOLOGY

## 2021-08-02 NOTE — PAYOR COMM NOTE
"Shanice Llanos Mirela (31 y.o. Female)   ID: [id number]    Notification of Admission for medical Diagnosis code O13.9 Gestational Hypertension    Faxed BY : Charisse Barnes [phone] p, [phone] f    Date of Birth Social Security Number Address Home Phone MRN    1990  [address] [facility] [address] [phone] [id number]    Baptism Marital Status          Hinduism        Admission Date Admission Type Admitting Provider Attending Provider Department, Room/Bed    7/30/21 Elective Hue Patel MD Schell, Karen, MD [facility] ANTEPARTUM, N331/1    Discharge Date Discharge Disposition Discharge Destination         Home or Self Care              Attending Provider: Hue Patel MD    Allergies: Penicillins    Isolation: None   Infection: None   Code Status: CPR    Ht: 162.6 cm (64\")   Wt: 93.4 kg (206 lb)    Admission Cmt: None   Principal Problem: None                Active Insurance as of 7/30/2021     Primary Coverage     Payor Plan Insurance Group Employer/Plan Group    [facility] [id number]     Payor Plan Address Payor Plan Phone Number Payor Plan Fax Number Effective Dates    [address] [phone]  1/1/2020 - None Entered    [facility] [address]       Subscriber Name Subscriber Birth Date Member ID       SHANICE LLANOS MIRELA 1990 [id number]                 Emergency Contacts      (Rel.) Home Phone Work Phone Mobile Phone    Elis Clifton (Mother) [phone] -- --    LAURENCE LLANOS (Spouse) [phone] -- [phone]            Insurance Information                [facility] Phone: [phone]    Subscriber: Shanice Llanos Subscriber#: [id number]    Group#: [id number] Precert#:           Problem List         Codes Noted - Resolved       Hospital    Elevated glucose tolerance test ICD-10-CM: R73.09  ICD-9-CM: 790.22 8/2/2021 - Present    Gestational hypertension ICD-10-CM: O13.9  ICD-9-CM: 642.30 " 2021 - Present       Non-Hospital    Pregnancy ICD-10-CM: Z34.90  ICD-9-CM: V22.2 2021 - Present    Pregnancy resulting from in vitro fertilization ICD-10-CM: O09.819  ICD-9-CM: V23.85 2021 - Present    Tremors of nervous system ICD-10-CM: R25.1  ICD-9-CM: 781.0 2020 - Present    Family history of melanoma ICD-10-CM: Z80.8  ICD-9-CM: V16.8 2020 - Present    Family history of colonic polyps ICD-10-CM: Z83.71  ICD-9-CM: V18.51 2020 - Present             History & Physical      Hue Patel MD at 21 1441          [facility]  Sima Booker  : 1990  MRN: [id number]  CSN: [id number]    History and Physical    Subjective   Chief Complaint   Patient presents with   • Elevated Blood Pressure     Sima Booker is a 31 y.o. year old  with an Estimated Date of Delivery: 9/10/21 currently at 34w0d presenting with elevated BP in the office of 160/100, then repeats 140s-150s/90s. No headache, visual changes, RUQ pain. Neg proteinuria.     Prenatal care has been with jennifer.  It has been complicated by IVF pregnancy..    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    Allergies   Allergen Reactions   • Penicillins Hives     Social History    Tobacco Use      Smoking status: Never Smoker      " Smokeless tobacco: Never Used    Review of Systems      Objective   /94   Pulse 105   Temp 98.3 °F (36.8 °C) (Oral)   Resp 16   Ht 162.6 cm (64\")   Wt 93.4 kg (206 lb)   LMP 12/04/2020   BMI 35.36 kg/m²   General: well developed; well nourished  no acute distress   Abdomen: soft, non-tender; no masses  no umbilical or inguinal hernias are present  no hepato-splenomegaly   FHT's: reassuring and category 1      Cervix: was not checked.   Presentation: cephalic   Contractions: none     Prenatal Labs  Lab Results   Component Value Date    HGB 12.1 07/30/2021    RUBELLAABIGG 2.32 02/08/2021    HEPBSAG Negative 02/08/2021    ABSCRN Negative 06/25/2021    [id number] Non Reactive 02/08/2021    HEPCVIRUSABY <0.1 02/08/2021     (H) 06/25/2021    URINECX Final report 02/08/2021       Current Labs Reviewed   CBC w/ diff:   Lab Results   Component Value Date     07/30/2021    HGB 12.1 07/30/2021    HCT 35.9 07/30/2021    MCV 93.7 07/30/2021    RDW 14.3 07/30/2021    WBC 10.28 07/30/2021    NEUTRORELPCT 75 02/08/2021    AUTOIGPER 0.3 01/15/2020    LYMPHORELPCT 17 02/08/2021    MONORELPCT 8 02/08/2021    EOSRELPCT 0 02/08/2021    BASORELPCT 0 02/08/2021     Pre-eclampsia Panel:   Lab Results   Component Value Date    ALKPHOS 81 07/30/2021    AST 19 07/30/2021    ALT 13 07/30/2021    BILITOT 0.3 07/30/2021     07/30/2021    URICACID 4.4 07/30/2021    CREATININE 0.53 (L) 07/30/2021          Assessment   1. IUP at 34w0d  2. Elevated BP- had an initial severe range in office. They cont to be elevated, but in the mild range. Her labs are normal. A 24h urine was started. Fetal monitoring reassuring.      Plan   1. Will plan on finishing 24h urine in hospital, but currently no severe si/sx. As long as this continues, anticipate home tomorrow, and then twice weekly follow up in the office for GHTN. If any more severe range pressures, will need to start steroids for FLM, and continue inpatient eval. She " will do her 3hGTT in the morning.     Hue Patel MD  7/30/2021  14:41 EDT       Electronically signed by Hue Patel MD at 07/30/21 1445       Vital Signs (last 7 days)     Date/Time   Temp   Temp src   Pulse   Resp   BP   Patient Position   SpO2    08/02/21 1150   98.3 (36.8)   Oral   82   16   122/75   Lying   --    08/02/21 0750   98.5 (36.9)   Oral   94   16   129/83   Sitting   --    08/02/21 0410   98.1 (36.7)   Oral   80   14   127/70   Lying   --    08/02/21 0012   98.4 (36.9)   --   85   16   111/54   Lying   --    08/01/21 1949   98.1 (36.7)   Oral   81   14   127/61   Sitting   --    08/01/21 1600   98.3 (36.8)   Oral   106   14   139/80   Sitting   --    08/01/21 1209   98.4 (36.9)   Oral   89   14   131/77   Sitting   --    08/01/21 0825   98.7 (37.1)   Oral   89   16   136/81   Sitting   --    08/01/21 0824   98.7 (37.1)   Oral   --   --   --   --   --    08/01/21 0430   98.1 (36.7)   Oral   91   16   122/64   Lying   --    08/01/21 0008   98.3 (36.8)   Oral   94   16   120/60   Lying   --    07/31/21 2036   --   --   99   --   139/80   --   --    07/31/21 1938   --   --   97   --   --   --   97    07/31/21 1935   98 (36.7)   Oral   96   18   148/79   Lying   97    07/31/21 1605   98.2 (36.8)   Oral   103   16   138/83   Lying   --    07/31/21 1146   98.5 (36.9)   Oral   106   16   134/85   Sitting   --    07/31/21 0759   98.2 (36.8)   Oral   118   16   157/89   Lying   --    07/31/21 0440   98.3 (36.8)   Oral   104   16   118/69   Lying   --    07/31/21 0043   98.2 (36.8)   Oral   91   16   144/77   Lying   --    07/30/21 2052   --   --   97   --   --   --   97    07/30/21 1932   --   --   96   --   132/80   --   --    07/30/21 1930   98.2 (36.8)   Oral   98   16   131/82   Sitting   --    07/30/21 1844   --   --   98   --   127/74   --   --    07/30/21 1747   --   --   102   --   143/83   --   --    07/30/21 1732   --   --   102   --   153/86   --   --    07/30/21 1717   --   --   98   --    136/82   --   --    07/30/21 1702   --   --   95   --   164/96   --   --    07/30/21 1658   --   --   102   --   149/96   --   --    07/30/21 1647   --   --   95   --   166/92   --   --    07/30/21 1632   --   --   93   --   143/80   --   --    07/30/21 1617   --   --   90   --   132/75   --   --    07/30/21 1602   --   --   98   --   124/66   --   --    07/30/21 1547   --   --   99   --   135/80   --   --    07/30/21 1534   --   --   93   --   124/70   --   --    07/30/21 1517   --   --   96   --   144/84   --   --    07/30/21 1516   --   --   96   --   144/84   --   --    Comment rows:    OBSERV: updates given to pt about her stay at 07/30/21 1516    07/30/21 1502   --   --   80   --   134/79   --   --    07/30/21 1447   --   --   92   --   142/80   --   --    07/30/21 1432   --   --   97   --   142/88   --   --    07/30/21 1417   --   --   105   --   152/94   --   --    07/30/21 1402   --   --   81   --   152/83   --   --    07/30/21 1348   --   --   85   --   140/82   --   --    07/30/21 1332   --   --   91   --   145/94   --   --    07/30/21 1327   --   --   85   --   141/95   --   --    07/30/21 1302   --   --   104   --   149/87   --   --    07/30/21 1247   --   --   83   --   159/89   --   --    07/30/21 1232   --   --   93   --   141/89   --   --    07/30/21 1217   --   --   85   --   151/84   --   --    07/30/21 1203   --   --   88   --   158/91   --   --    07/30/21 1147   --   --   95   --   139/92   --   --    07/30/21 1127   98.3 (36.8)   Oral   91   16   157/89   Lying   --                Facility-Administered Medications as of 8/2/2021   Medication Dose Route Frequency Provider Last Rate Last Admin   • acetaminophen (TYLENOL) tablet 1,000 mg  1,000 mg Oral Q6H PRN Hue Patel MD   1,000 mg at 07/30/21 1729   • [COMPLETED] betamethasone acetate-betamethasone sodium phosphate (CELESTONE SOLUSPAN) injection 12 mg  12 mg Intramuscular Q24H Hue Patel MD   12 mg at 07/31/21 1804   • melatonin  tablet 10 mg  10 mg Oral Nightly WENDYN James Call MD   10 mg at 08/01/21 2142     Lab Results (last 7 days)     Procedure Component Value Units Date/Time    Protein, Urine, 24 Hour - Urine, Clean Catch [[id number]]  (Abnormal) Collected: 07/31/21 1124    Specimen: 24 Hour Urine from Urine, Clean Catch Updated: 07/31/21 1206     Protein, 24H Urine 195.3 mg/24hours     Narrative:      Reference ranges are based on a 24 hour period, interperet results accordingly.    COVID PRE-OP / PRE-PROCEDURE SCREENING ORDER (NO ISOLATION) - Swab, Nasopharynx [[id number]]  (Normal) Collected: 07/30/21 2045    Specimen: Swab from Nasopharynx Updated: 07/30/21 2133    Narrative:      The following orders were created for panel order COVID PRE-OP / PRE-PROCEDURE SCREENING ORDER (NO ISOLATION) - Swab, Nasopharynx.  Procedure                               Abnormality         Status                     ---------                               -----------         ------                     COVID-19, ABBOTT IN-HOUS...[[id number]]  Normal              Final result                 Please view results for these tests on the individual orders.    COVID-19, ABBOTT IN-HOUSE,NASAL Swab (NO TRANSPORT MEDIA) 2 HR TAT - Swab, Nasopharynx [[id number]]  (Normal) Collected: 07/30/21 2045    Specimen: Swab from Nasopharynx Updated: 07/30/21 2133     COVID19 Presumptive Negative    Narrative:      Fact sheet for providers: https://www.fda.gov/media/580309/download     Fact sheet for patients: https://www.fda.gov/media/301695/download    Test performed by PCR.  Fact sheet for providers: https://www.fda.gov/media/330826/download     Fact sheet for patients: https://www.fda.gov/media/435870/download    Test performed by PCR.  If inconsistent with clinical signs and symptoms patient should be tested with different authorized molecular test.    Preeclampsia Panel [[id number]]  (Abnormal) Collected: 07/30/21 1324    Specimen: Blood Updated: 07/30/21 1408      Alkaline Phosphatase 81 U/L      ALT (SGPT) 13 U/L      AST (SGOT) 19 U/L      Creatinine 0.53 mg/dL      Total Bilirubin 0.3 mg/dL       U/L      Uric Acid 4.4 mg/dL     CBC (No Diff) [[id number]]  (Normal) Collected: 21 1324    Specimen: Blood Updated: 21 1348     WBC 10.28 10*3/mm3      RBC 3.83 10*6/mm3      Hemoglobin 12.1 g/dL      Hematocrit 35.9 %      MCV 93.7 fL      MCH 31.6 pg      MCHC 33.7 g/dL      RDW 14.3 %      RDW-SD 48.5 fl      MPV 9.9 fL      Platelets 200 10*3/mm3     POC Protein, Urine, Qualitative, Dipstick [[id number]]  (Normal) Collected: 21 1300    Specimen: Urine Updated: 21 1347     Protein, POC Negative mg/dL      Lot Number 0     Expiration Date 0          Imaging Results (Last 7 Days)     Procedure Component Value Units Date/Time    [facility] [[id number]] Collected: 21     Updated: 21    Narrative:      PAT NAME: SHANICE LLANOS  [facility] REC#: [id number]  BIRTH DA: 1990  PAT GEND: F  ACCOUNT#: [id number]  PAT TYPE: I  EXAM WENDY: [id number]  REF PHYS HEAVENLY KHANNA  ACCESSION [id number]      Patient Status  ============    Inpatient      Indication  ========    GHTN, IVF pregnancy      History  ======    General History  Height 163 cm  Height (ft) 5 ft  Height (in) 4 in  Other: BMI-35.3      Maternal Assessment  ==================    Height 163 cm  Height (ft) 5 ft  Height (in) 4 in      Method  =======    Transabdominal ultrasound examination. View: Adequate view      Pregnancy  =========    Chamberlain pregnancy. Number of fetuses: 1      Dating  ======    LMP on: 2020  GA by LMP 34 w + 3 d  ANI by LMP: 9/10/2021  GA by prior assessment 34 w + 3 d  ANI by prior assessment: 9/10/2021  Ultrasound examination on: 2021  GA by U/S based upon: AC, BPD, Femur, HC  GA by U/S 36 w + 5 d  ANI by U/S: 2021  Method of dating: Restore dating from previous exam  Previous dating: based on stated ANI,  selected on 07/15/2021  Agreed ANI of previous datin/10/2021  Assigned: based on stated ANI, selected on 07/15/2021  Assigned GA 34 w + 3 d  Assigned ANI: 9/10/2021  Pregnancy length 280 d      Fetal Biometry  ============    Standard  BPD 90.2 mm  36w 4d        94%        Hadlock    .6 mm  -/-        >99%        Lalito    .1 mm  39w 4d        >99%        Hadlock    Cerebellum tr 47.2 mm  35w 4d        58%        Hill    .5 mm  34w 1d        48%        Hadlock    Femur 71.2 mm  36w 3d        88%        Hadlock    HC / AC 1.13    EFW 2,727 g          66%        Rodrigo    EFW (lb) 6 lb  EFW (oz) 0 oz  EFW by: Hadlock (BPD-HC-AC-FL)  Extended  Cav. septi pel. tr 6.7 mm   4.8 mm  CM 8.3 mm          72%        Nicolaides    Head / Face / Neck  Cephalic index 0.72          <1%        Nicolaides    Extremities / Bony Struc  FL / BPD 0.79    FL / HC 0.21    FL / AC 0.24    Other Structures   bpm      General Evaluation  ================    Cardiac activity present.  bpm.  Fetal movements present.  Presentation cephalic.  Placenta anterior, Grade 1.  Umbilical cord 3 vessel cord; normal placental insertion.  Amniotic fluid Amount of AF: normal. MVP 3.8 cm. DIMA 12.3 cm. Q1 2.6 cm, Q2 3.8 cm, Q3 3.4 cm, Q4 2.5 cm.      Fetal Anatomy  ============    Cranium: Normal  Cavum septi pellucidi: Normal  Cerebellum: Normal  Cisterna magna: Normal  Head / Neck  Rt lateral ventricle: Normal  Lt lateral ventricle: Normal  Lips: Normal  Profile: Normal  Nose: Normal  4-chamber view: Appears normal  RVOT view: Normal  LVOT view: Normal  Heart / Thorax  3-vessel view: Normal  3-vessel-trachea view: normal  Cord insertion: Normal  Stomach: Appears normal  Kidneys: Appears normal  Bladder: Appears normal  Gender: male  Wants to know gender: yes      Fetal Doppler  ===========    Arterial  Umbilical A PI 1.05          83%        Choco    Umbilical A RI 0.64          74%        Choco    Umbilical A  PS 54.40 cm/s          72%        Ebbing    Umbilical A ED 19.64 cm/s  Umbilical A TAmax 33.20 cm/s          54%        Ebbing    Umbilical A MD 19.56 cm/s  Umbilical A S / D 2.77          65%        Choco    Umbilical A  bpm      Biophysical Profile  ===============    2: Fetal breathing movements  2: Gross body movements  2: Fetal tone  2: Amniotic fluid volume   Biophysical profile score      Impression  =========    Cephalic  S=D  Normal appearing limited anatomy  Normal fluid  BPP   UA doppler normal      Coding  ======    Description:  Follow Up Ultrasound  Description:  BPP without NST        Sonographer: Reny Morin RDMS  Physician: Jolynn Diaz MD, FACOG    Electronically signed by: Jolynn Diaz MD, FACOG at:  08:52        Operative/Procedure Notes (last 7 days) (Notes from 21 1527 through 21 1527)    No notes of this type exist for this encounter.            Physician Progress Notes (last 7 days) (Notes from 21 1528 through 21 1528)      Vale Sepulveda MD at 21 1216            Progress Note    Patient name: Sima Booker  YOB: 1990   MRN: [id number]  Admission Date: 2021  Date of Service: 2021    Sima Booker is a 31 y.o.    at 34w2d  admitted on 2021 for   Patient Active Problem List   Diagnosis   • Family history of melanoma   • Family history of colonic polyps   • Tremors of nervous system   • Pregnancy   • Pregnancy resulting from in vitro fertilization   • Gestational hypertension       Hospital day 1    Diagnoses:   Patient Active Problem List   Diagnosis   • Family history of melanoma   • Family history of colonic polyps   • Tremors of nervous system   • Pregnancy   • Pregnancy resulting from in vitro fertilization   • Gestational hypertension       Subjective:      Sima has no complaints today. No HA/vision changes/ RUQ pain  Reports fetal movement is  normal  Denies leakage of amniotic fluid.  Denies vaginal bleeding  denies ctxs    Objective:     Vital signs:  Temp:  [98 °F (36.7 °C)-98.7 °F (37.1 °C)] 98.4 °F (36.9 °C)  Heart Rate:  [] 89  Resp:  [14-18] 14  BP: (120-148)/(60-83) 131/77    Abdomen: soft, nontender  Uterus: gravid, nontender  Extremities: nontender; no edema     FHT's: Category 1  TOCO: none    Cervix: last check      Medications:      •  acetaminophen  •  melatonin    Labs:  Lab Results   Component Value Date    HGB 12.1 2021     Lab Results   Component Value Date    GLUCOSE 85 01/15/2020       Assessment/Plan:      Sima is a 31 y.o.    at 34w2d.  1. Gestational HTN  :   2. Elevated 1 hour test, no 3 hour testing yet.    3. IVF pregnancy      Plan:   1. Will be in steroid window tonight. If BPs stable on bedrest, will dc home with twice weekly testing tomorrow. PDC to scan in am.        Vale Sepulveda MD  2021  12:16 EDT        Electronically signed by Vale Sepulveda MD at 21 1218     Vale Sepulveda MD at 21 1510            Progress Note    Patient name: Sima Booker  YOB: 1990   MRN: [id number]  Admission Date: 2021  Date of Service: 2021    Sima Booker is a 31 y.o.    at 34w1d  admitted on 2021 for   Patient Active Problem List   Diagnosis   • Family history of melanoma   • Family history of colonic polyps   • Tremors of nervous system   • Pregnancy   • Pregnancy resulting from in vitro fertilization   • Gestational hypertension       Hospital day 0    Diagnoses:   Patient Active Problem List   Diagnosis   • Family history of melanoma   • Family history of colonic polyps   • Tremors of nervous system   • Pregnancy   • Pregnancy resulting from in vitro fertilization   • Gestational hypertension       Subjective:      Sima has no complaints today. Patient denies headache, nausea, right upper quadrant pain, vision  changes.  Reports fetal movement is normal  Denies leakage of amniotic fluid.  Denies vaginal bleeding  Patient reports no contractions    Objective:     Vital signs:  Temp:  [98.2 °F (36.8 °C)-98.5 °F (36.9 °C)] 98.5 °F (36.9 °C)  Heart Rate:  [] 106  Resp:  [16] 16  BP: (118-166)/(66-96) 134/85    Abdomen: soft, nontender  Uterus: gravid, nontender  Extremities: nontender; no edema     FHT's: Category 1  TOCO: none    Cervix: last check      Medications:  betamethasone acetate-betamethasone sodium phosphate, 12 mg, Intramuscular, Q24H       •  acetaminophen    Labs:  Lab Results   Component Value Date    HGB 12.1 2021     Lab Results   Component Value Date    GLUCOSE 85 01/15/2020       Assessment/Plan:      Sima is a 31 y.o.    at 34w1d.  1. Gestational hypertension-all labs reviewed with the patient. Preeclampsia labs as well as 24-hour urine are within normal limits. Patient has had 2 severe range blood pressures at 4:30 AM 5:00 yesterday afternoon. She was started on steroids at approximately 1730 last p.m. Blood pressures have been mainly in the mild range since that time.  :   2. IVF pregnancy      Plan:   1. Continue in-house management of blood pressures at this time. Plan to keep patient in-house at least until Monday when steroid effect will show if she has a rebound of blood pressures and will get a PDC ultrasound at that time.      Vale Sepulveda MD  2021  15:10 EDT        Electronically signed by Vale Sepulveda MD at 21 1512       Consult Notes (last 7 days) (Notes from 21 through 21)    No notes of this type exist for this encounter.            Discharge Summary      Vale Sepulveda MD at 21 1315          Daily Progress Note    Patient name: Sima Booker  YOB: 1990   MRN: [id number]  Admission Date: 2021  Date of Service: 2021  Referring Provider: Hue Patel MD    Sima Booker is a 31  yAngeliqueoAngelique    at 34w3d  admitted on 2021 for elevated blood pressure    Hospital day 2      Diagnoses:   Patient Active Problem List    Diagnosis    • Elevated glucose tolerance test [R73.09]    • Gestational hypertension [O13.9]    • Pregnancy [Z34.90]    • Pregnancy resulting from in vitro fertilization [O09.819]    • Tremors of nervous system [R25.1]    • Family history of melanoma [Z80.8]    • Family history of colonic polyps [Z83.71]        Chief Complaint:  Chief Complaint   Patient presents with   • Elevated Blood Pressure       Subjective:      Sima has no complaints today.  Reports fetal movement is normal  Denies leakage of amniotic fluid.  Denies vaginal bleeding    Objective:     Vital signs:  Temp:  [98.1 °F (36.7 °C)-98.5 °F (36.9 °C)] 98.3 °F (36.8 °C)  Heart Rate:  [] 82  Resp:  [14-16] 16  BP: (111-139)/(54-83) 122/75    Abdomen: soft, nontender  Uterus: gravid, nontender  Extremities: nontender; no edema        Non-Stress Test:    Fetal Heart Rate Assessment   Method: Fetal HR Assessment Method: external   Beats/min: Fetal HR (beats/min): 140   Baseline: Fetal Heart Baseline Rate: normal range   Variability: Fetal HR Variability: moderate (amplitude range 6 to 25 bpm)   Accels: Fetal HR Accelerations: greater than/equal to 15 bpm, lasting at least 15 seconds   Decels: Fetal HR Decelerations: absent   Tracing Category:       Uterine Assessment   Method: Method: external tocotransducer   Frequency (min): Contraction Frequency (Minutes): x1   Ctx Count in 10 min:     Duration:     Intensity: Contraction Intensity: no contractions   Intensity by IUPC:     Resting Tone: Uterine Resting Tone: soft by palpation   Resting Tone by IUPC:     [place] Units:       Cervix: Exam by:     Dilation:     Effacement:     Station:         Most recent ultrasound:  Estimated fetal weight 66 percentile with BPP 8 out of 8.    Medications:      •  acetaminophen  •  melatonin    Labs:  Lab Results (last  24 hours)     ** No results found for the last 24 hours. **        Lab Results   Component Value Date    HGB 12.1 07/30/2021     Hospital course: Patient was admitted for elevated blood pressures.  They reached severe range on 7/30/2021.  Decision was made to start steroid administration.  She received these on 7/30/2021 and 7/31/2021.  Continuous blood pressure monitoring occurred during this time.  With rest, and blood pressure is improved without medication.  Preeclampsia panel and CBC within normal limits.  24-hour urine came back normal at 194.  She had a PDC ultrasound on 8/2/2021 that was within normal limits and an estimated fetal weight of 66 percentile and a BPP of 8 out of 8.  Decision was made for discharge to home with twice weekly monitoring.  Patient to be off work at this time to encourage rest to control blood pressures.  She was advised to call if blood pressures are consistently above 140/90.    Assessment/Plan:      Gestational hypertension-DC for now.  Follow-up in 3 days in the office for nonstress testing and evaluation.  Patient will need 3-hour glucose tolerance testing.  I would recommend this greater than 1 week after steroids complete.    Vale Sepulveda MD  8/2/2021      Electronically signed by Vale Sepulveda MD at 08/02/21 6790

## 2021-08-02 NOTE — DISCHARGE SUMMARY
Daily Progress Note    Patient name: Sima Booker  YOB: 1990   MRN: [id number]  Admission Date: 2021  Date of Service: 2021  Referring Provider: Hue Patel MD    Sima Booker is a 31 y.o.    at 34w3d  admitted on 2021 for elevated blood pressure    Hospital day 2      Diagnoses:   Patient Active Problem List    Diagnosis    • Elevated glucose tolerance test [R73.09]    • Gestational hypertension [O13.9]    • Pregnancy [Z34.90]    • Pregnancy resulting from in vitro fertilization [O09.819]    • Tremors of nervous system [R25.1]    • Family history of melanoma [Z80.8]    • Family history of colonic polyps [Z83.71]        Chief Complaint:  Chief Complaint   Patient presents with   • Elevated Blood Pressure       Subjective:      Sima has no complaints today.  Reports fetal movement is normal  Denies leakage of amniotic fluid.  Denies vaginal bleeding    Objective:     Vital signs:  Temp:  [98.1 °F (36.7 °C)-98.5 °F (36.9 °C)] 98.3 °F (36.8 °C)  Heart Rate:  [] 82  Resp:  [14-16] 16  BP: (111-139)/(54-83) 122/75    Abdomen: soft, nontender  Uterus: gravid, nontender  Extremities: nontender; no edema        Non-Stress Test:    Fetal Heart Rate Assessment   Method: Fetal HR Assessment Method: external   Beats/min: Fetal HR (beats/min): 140   Baseline: Fetal Heart Baseline Rate: normal range   Variability: Fetal HR Variability: moderate (amplitude range 6 to 25 bpm)   Accels: Fetal HR Accelerations: greater than/equal to 15 bpm, lasting at least 15 seconds   Decels: Fetal HR Decelerations: absent   Tracing Category:       Uterine Assessment   Method: Method: external tocotransducer   Frequency (min): Contraction Frequency (Minutes): x1   Ctx Count in 10 min:     Duration:     Intensity: Contraction Intensity: no contractions   Intensity by IUPC:     Resting Tone: Uterine Resting Tone: soft by palpation   Resting Tone by IUPC:     [place] Units:        Cervix: Exam by:     Dilation:     Effacement:     Station:         Most recent ultrasound:  Estimated fetal weight 66 percentile with BPP 8 out of 8.    Medications:      •  acetaminophen  •  melatonin    Labs:  Lab Results (last 24 hours)     ** No results found for the last 24 hours. **        Lab Results   Component Value Date    HGB 12.1 07/30/2021     Hospital course: Patient was admitted for elevated blood pressures.  They reached severe range on 7/30/2021.  Decision was made to start steroid administration.  She received these on 7/30/2021 and 7/31/2021.  Continuous blood pressure monitoring occurred during this time.  With rest, and blood pressure is improved without medication.  Preeclampsia panel and CBC within normal limits.  24-hour urine came back normal at 194.  She had a PDC ultrasound on 8/2/2021 that was within normal limits and an estimated fetal weight of 66 percentile and a BPP of 8 out of 8.  Decision was made for discharge to home with twice weekly monitoring.  Patient to be off work at this time to encourage rest to control blood pressures.  She was advised to call if blood pressures are consistently above 140/90.    Assessment/Plan:      Gestational hypertension-DC for now.  Follow-up in 3 days in the office for nonstress testing and evaluation.  Patient will need 3-hour glucose tolerance testing.  I would recommend this greater than 1 week after steroids complete.    Vale Sepulveda MD  8/2/2021

## 2021-08-03 NOTE — PAYOR COMM NOTE
"Shanice Llanos (31 y.o. Female)   Ref# [id number]    Pt DC on 21    Faxed By : Charisse Barnes [phone] p , [phone] F    Date of Birth Social Security Number Address [place] Phone MRN    1990  [address] [address] [phone] [id number]    Jew Marital Status          Latter day        Admission Date Admission Type Admitting Provider Attending Provider Department, Room/Bed    21 Elective Hue Patel MD  [facility] ANTEPARTUM, N331/    Discharge Date Discharge Disposition Discharge Destination        2021 Home or Self Care              Attending Provider: (none)   Allergies: Penicillins    Isolation: None   Infection: None   Code Status: Prior    Ht: 162.6 cm (64\")   Wt: 93.4 kg (206 lb)    Admission Cmt: None   Principal Problem: None                Active Insurance as of 2021     Primary Coverage     Payor Plan Insurance Group Employer/Plan Group    [facility] [id number]     Payor Plan Address Payor Plan Phone Number Payor Plan Fax Number Effective Dates    [address] [phone]  2020 - None Entered    [facility] [id number]       Subscriber Name Subscriber Birth Date Member ID       SHANICE LLANOS 1990 [id number]                 Emergency Contacts      (Rel.) Home Phone Work Phone Mobile Phone    Elis Clifton (Mother) [phone] -- --    LAURENCE LLANOS (Spouse) [phone] -- [phone]               Discharge Summary      Vale Sepulveda MD at 21 1315          Daily Progress Note    Patient name: Shanice Llanos  YOB: 1990   MRN: [id number]  Admission Date: 2021  Date of Service: 2021  Referring Provider: Hue Patel MD    Shanice Llanos is a 31 y.o.    at 34w3d  admitted on 2021 for elevated blood pressure    Hospital day 2      Diagnoses:   Patient Active Problem List    Diagnosis    • Elevated glucose tolerance " test [R73.09]    • Gestational hypertension [O13.9]    • Pregnancy [Z34.90]    • Pregnancy resulting from in vitro fertilization [O09.819]    • Tremors of nervous system [R25.1]    • Family history of melanoma [Z80.8]    • Family history of colonic polyps [Z83.71]        Chief Complaint:  Chief Complaint   Patient presents with   • Elevated Blood Pressure       Subjective:      Sima has no complaints today.  Reports fetal movement is normal  Denies leakage of amniotic fluid.  Denies vaginal bleeding    Objective:     Vital signs:  Temp:  [98.1 °F (36.7 °C)-98.5 °F (36.9 °C)] 98.3 °F (36.8 °C)  Heart Rate:  [] 82  Resp:  [14-16] 16  BP: (111-139)/(54-83) 122/75    Abdomen: soft, nontender  Uterus: gravid, nontender  Extremities: nontender; no edema        Non-Stress Test:    Fetal Heart Rate Assessment   Method: Fetal HR Assessment Method: external   Beats/min: Fetal HR (beats/min): 140   Baseline: Fetal Heart Baseline Rate: normal range   Variability: Fetal HR Variability: moderate (amplitude range 6 to 25 bpm)   Accels: Fetal HR Accelerations: greater than/equal to 15 bpm, lasting at least 15 seconds   Decels: Fetal HR Decelerations: absent   Tracing Category:       Uterine Assessment   Method: Method: external tocotransducer   Frequency (min): Contraction Frequency (Minutes): x1   Ctx Count in 10 min:     Duration:     Intensity: Contraction Intensity: no contractions   Intensity by IUPC:     Resting Tone: Uterine Resting Tone: soft by palpation   Resting Tone by IUPC:     [place] Units:       Cervix: Exam by:     Dilation:     Effacement:     Station:         Most recent ultrasound:  Estimated fetal weight 66 percentile with BPP 8 out of 8.    Medications:      •  acetaminophen  •  melatonin    Labs:  Lab Results (last 24 hours)     ** No results found for the last 24 hours. **        Lab Results   Component Value Date    HGB 12.1 07/30/2021     Hospital course: Patient was admitted for elevated  blood pressures.  They reached severe range on 7/30/2021.  Decision was made to start steroid administration.  She received these on 7/30/2021 and 7/31/2021.  Continuous blood pressure monitoring occurred during this time.  With rest, and blood pressure is improved without medication.  Preeclampsia panel and CBC within normal limits.  24-hour urine came back normal at 194.  She had a PDC ultrasound on 8/2/2021 that was within normal limits and an estimated fetal weight of 66 percentile and a BPP of 8 out of 8.  Decision was made for discharge to home with twice weekly monitoring.  Patient to be off work at this time to encourage rest to control blood pressures.  She was advised to call if blood pressures are consistently above 140/90.    Assessment/Plan:      Gestational hypertension-DC for now.  Follow-up in 3 days in the office for nonstress testing and evaluation.  Patient will need 3-hour glucose tolerance testing.  I would recommend this greater than 1 week after steroids complete.    Vale Sepulveda MD  8/2/2021      Electronically signed by Vale Sepulveda MD at 08/02/21 6314

## 2021-08-05 ENCOUNTER — ROUTINE PRENATAL (OUTPATIENT)
Dept: OBSTETRICS AND GYNECOLOGY | Facility: CLINIC | Age: 31
End: 2021-08-05

## 2021-08-05 VITALS — DIASTOLIC BLOOD PRESSURE: 80 MMHG | WEIGHT: 200 LBS | SYSTOLIC BLOOD PRESSURE: 138 MMHG | BODY MASS INDEX: 34.33 KG/M2

## 2021-08-05 DIAGNOSIS — Z3A.34 34 WEEKS GESTATION OF PREGNANCY: Primary | ICD-10-CM

## 2021-08-05 DIAGNOSIS — R73.09 ELEVATED GLUCOSE TOLERANCE TEST: ICD-10-CM

## 2021-08-05 DIAGNOSIS — O13.3 GESTATIONAL HYPERTENSION, THIRD TRIMESTER: ICD-10-CM

## 2021-08-05 LAB
GLUCOSE UR STRIP-MCNC: NEGATIVE MG/DL
PROT UR STRIP-MCNC: NEGATIVE MG/DL

## 2021-08-05 PROCEDURE — 0502F SUBSEQUENT PRENATAL CARE: CPT | Performed by: OBSTETRICS & GYNECOLOGY

## 2021-08-05 PROCEDURE — 59025 FETAL NON-STRESS TEST: CPT | Performed by: OBSTETRICS & GYNECOLOGY

## 2021-08-05 NOTE — PROGRESS NOTES
OB FOLLOW UP  CC- Here for care of pregnancy        Sima Booker is a 31 y.o.  34w6d patient being seen today for her obstetrical follow up visit. Patient reports no complaints. Her BP since d/c has averaged 128-138/70-82.    Her prenatal care is complicated by (and status) :    Patient Active Problem List   Diagnosis   • Family history of melanoma   • Family history of colonic polyps   • Tremors of nervous system   • Pregnancy   • Pregnancy resulting from in vitro fertilization   • Gestational hypertension   • Elevated glucose tolerance test       Ultrasound Today: No.  Fetal Non-Stress Test  Patient: Sima Booker  : 1990  MRN: [id number]  CSN: [id number]  Date: 2021    Estimated Date of Delivery: 9/10/21  Gestational Age: 34w6d    Indication for NST pregnancy-induced hypertension       Time On 2:53 pm   Time Off 3:15 pm       Interpretation    Baseline  beats per minute   Category 1   Decelerations Absent       Additional Comments  reactive NST               Vale Sepulveda MD      ROS -   Patient Reports : No Problems  Patient Denies: Loss of Fluid, Vaginal Spotting, Vision Changes, Headaches and Contractions  Fetal Movement : normal  All other systems reviewed and are negative.       The additional following portions of the patient's history were reviewed and updated as appropriate: allergies, current medications, past family history, past medical history, past social history, past surgical history and problem list.    I have reviewed and agree with the HPI, ROS, and historical information as entered above. Vale Sepulveda MD    /80   Wt 90.7 kg (200 lb)   LMP 2020   BMI 34.33 kg/m²       EXAM:     FHT: 150 BPM   Uterine Size: size equals dates  Pelvic Exam: No    Urine glucose/protein: See prenatal flowsheet       Assessment and Plan    Problem List Items Addressed This Visit        Endocrine and Metabolic    Elevated glucose tolerance test     Overview     3 or needs to be done.  Patient status post steroids on 2021.  Would not recommend performing until after 2021.            Gravid and     Pregnancy - Primary    Overview     EFW 31 wks 59%ile         Relevant Orders    POC Urinalysis Dipstick (Completed)    Gestational hypertension    Overview     Patient taken off work on 2021.  Plan for twice weekly NSTs.  Status post steroids on 2021 and 2021.           BP stable off of work.  Will recommend Glucola testing on Monday when she comes in for her repeat NST.    1. Pregnancy at 34w6d  2. Fetal status reassuring.   3. Activity and Exercise discussed.  Return in about 4 days (around 2021) for glucola with Hue Fernandez or the nurse practitioner.    Vale Sepulveda MD  2021

## 2021-08-09 ENCOUNTER — LAB (OUTPATIENT)
Dept: OBSTETRICS AND GYNECOLOGY | Facility: CLINIC | Age: 31
End: 2021-08-09

## 2021-08-09 ENCOUNTER — ROUTINE PRENATAL (OUTPATIENT)
Dept: OBSTETRICS AND GYNECOLOGY | Facility: CLINIC | Age: 31
End: 2021-08-09

## 2021-08-09 VITALS — SYSTOLIC BLOOD PRESSURE: 128 MMHG | DIASTOLIC BLOOD PRESSURE: 70 MMHG | BODY MASS INDEX: 34.71 KG/M2 | WEIGHT: 202.2 LBS

## 2021-08-09 DIAGNOSIS — O13.3 GESTATIONAL HYPERTENSION, THIRD TRIMESTER: ICD-10-CM

## 2021-08-09 DIAGNOSIS — Z3A.35 35 WEEKS GESTATION OF PREGNANCY: Primary | ICD-10-CM

## 2021-08-09 DIAGNOSIS — O09.819 PREGNANCY RESULTING FROM IN VITRO FERTILIZATION, ANTEPARTUM: ICD-10-CM

## 2021-08-09 LAB
CLARITY, POC: CLEAR
COLOR UR: YELLOW
GLUCOSE UR STRIP-MCNC: NEGATIVE MG/DL
PROT UR STRIP-MCNC: NEGATIVE MG/DL

## 2021-08-09 PROCEDURE — 0502F SUBSEQUENT PRENATAL CARE: CPT | Performed by: OBSTETRICS & GYNECOLOGY

## 2021-08-09 NOTE — PROGRESS NOTES
OB FOLLOW UP  CC- Here for care of pregnancy        Sima Booker is a 31 y.o.  35w3d patient being seen today for her obstetrical follow up visit. Patient reports headache. Patient stated that she had one headache in the back of her head. Patient stated that she believes it was a tension headache. NST today. Patient stated that she had her 3HR Gucose testing earlier this morning.    Her prenatal care is complicated by (and status) :    Patient Active Problem List   Diagnosis   • Family history of melanoma   • Family history of colonic polyps   • Tremors of nervous system   • Pregnancy   • Pregnancy resulting from in vitro fertilization   • Gestational hypertension   • Elevated glucose tolerance test       Flu Status: Already given in current flu season  Ultrasound Today: No.    ROS -   Patient Reports : Headaches  Patient Denies: Loss of Fluid, Vaginal Spotting, Vision Changes, Headaches, Contractions and Epigastric pain  Fetal Movement : normal  All other systems reviewed and are negative.       The additional following portions of the patient's history were reviewed and updated as appropriate: allergies and current medications.    I have reviewed and agree with the HPI, ROS, and historical information as entered above. Hue Patel MD    /70   Wt 91.7 kg (202 lb 3.2 oz)   LMP 2020   BMI 34.71 kg/m²       EXAM:     FHT: + BPM   Uterine Size: size equals dates  Pelvic Exam: No   Non Stress Test: minutes 20  non-stress test: NST: Reactive        Urine glucose/protein: See prenatal flowsheet       Assessment and Plan    Problem List Items Addressed This Visit     Pregnancy - Primary    Overview     EFW 31 wks 59%ile         Relevant Orders    POC Urinalysis Dipstick (Completed)    Fetal Nonstress Test    Pregnancy resulting from in vitro fertilization    Overview     Fetal echo 22-24 wks wnl         Gestational hypertension    Overview     Patient taken off work on 2021.  Plan for  twice weekly NSTs.  Status post steroids on 7/30/2021 and 7/31/2021.               1. Pregnancy at 35w3d. NST today reactive, BP stable. She will go home and treat headache and call if not improved. She gal monitor BP at home.   2. Cont twice weekly monitoriing for GHTN.   3. 3hGTT today.   4. sched induction  5. Fetal status reassuring.   6. Activity and Exercise discussed.  Return in about 3 days (around 8/12/2021) for F/U Prenatal, NST Next Visit.    Hue Patel MD  08/09/2021

## 2021-08-10 LAB
GLUCOSE 1H P 100 G GLC PO SERPL-MCNC: 192 MG/DL (ref 65–179)
GLUCOSE 2H P 100 G GLC PO SERPL-MCNC: 189 MG/DL (ref 65–154)
GLUCOSE 3H P 100 G GLC PO SERPL-MCNC: 173 MG/DL (ref 65–139)
GLUCOSE P FAST SERPL-MCNC: 83 MG/DL (ref 65–94)

## 2021-08-12 ENCOUNTER — ROUTINE PRENATAL (OUTPATIENT)
Dept: OBSTETRICS AND GYNECOLOGY | Facility: CLINIC | Age: 31
End: 2021-08-12

## 2021-08-12 ENCOUNTER — LAB (OUTPATIENT)
Dept: LAB | Facility: HOSPITAL | Age: 31
End: 2021-08-12

## 2021-08-12 VITALS — DIASTOLIC BLOOD PRESSURE: 80 MMHG | SYSTOLIC BLOOD PRESSURE: 130 MMHG | WEIGHT: 201 LBS | BODY MASS INDEX: 34.5 KG/M2

## 2021-08-12 DIAGNOSIS — O24.410 DIET CONTROLLED GESTATIONAL DIABETES MELLITUS (GDM), ANTEPARTUM: ICD-10-CM

## 2021-08-12 DIAGNOSIS — O24.410 GDM (GESTATIONAL DIABETES MELLITUS), CLASS A1: ICD-10-CM

## 2021-08-12 DIAGNOSIS — Z3A.35 35 WEEKS GESTATION OF PREGNANCY: Primary | ICD-10-CM

## 2021-08-12 DIAGNOSIS — O09.819 PREGNANCY RESULTING FROM IN VITRO FERTILIZATION, ANTEPARTUM: ICD-10-CM

## 2021-08-12 DIAGNOSIS — O13.3 GESTATIONAL HYPERTENSION, THIRD TRIMESTER: ICD-10-CM

## 2021-08-12 LAB
GLUCOSE UR STRIP-MCNC: NEGATIVE MG/DL
PROT UR STRIP-MCNC: NEGATIVE MG/DL

## 2021-08-12 PROCEDURE — 87081 CULTURE SCREEN ONLY: CPT

## 2021-08-12 PROCEDURE — 0502F SUBSEQUENT PRENATAL CARE: CPT | Performed by: OBSTETRICS & GYNECOLOGY

## 2021-08-12 NOTE — PROGRESS NOTES
OB FOLLOW UP  CC- Here for care of pregnancy        Sima Booker is a 31 y.o.  35w6d patient being seen today for her obstetrical follow up visit. Patient reports occasional headaches. Elevated 3h GTT, will start testing.     Her prenatal care is complicated by (and status) :    Patient Active Problem List   Diagnosis   • Family history of melanoma   • Family history of colonic polyps   • Tremors of nervous system   • Pregnancy   • Pregnancy resulting from in vitro fertilization   • Gestational hypertension   • Elevated glucose tolerance test   • GDM (gestational diabetes mellitus), class A1         GBS Status: performed today   Her Delivery Plan is: IOL that is scheduled (instructions reviewed)  Ultrasound Today: No.    ROS -     Patient Denies: Loss of Fluid, Vaginal Spotting, Vision Changes, Nausea , Vomiting  and Contractions  Fetal Movement : normal  All other systems reviewed and are negative.       The additional following portions of the patient's history were reviewed and updated as appropriate: allergies, current medications, past family history, past medical history, past social history, past surgical history and problem list.    I have reviewed and agree with the HPI, ROS, and historical information as entered above. Hue Patel MD        /80   Wt 91.2 kg (201 lb)   LMP 2020   BMI 34.50 kg/m²     EXAM:     FHT: + BPM   Uterine Size: size equals dates  Pelvic Exam: No    Urine glucose/protein: See prenatal flowsheet       Assessment and Plan    Problem List Items Addressed This Visit     Pregnancy - Primary    Overview     EFW 31 wks 59%ile         Relevant Orders    Group B Streptococcus Culture - Swab, Vaginal/Rectum    POC Urinalysis Dipstick (Completed)    Fetal Nonstress Test    Pregnancy resulting from in vitro fertilization    Overview     Fetal echo 22-24 wks wnl         Gestational hypertension    Overview     Patient taken off work on 2021.  Plan for twice  weekly NSTs.  Status post steroids on 7/30/2021 and 7/31/2021.         GDM (gestational diabetes mellitus), class A1      Other Visit Diagnoses     Diet controlled gestational diabetes mellitus (GDM), antepartum        Relevant Medications    Blood Glucose Monitoring Suppl w/Device kit    Other Relevant Orders    Fetal Nonstress Test          1. Pregnancy at 35w6d. Checking fsbs, cont twice weekly monitoring for GHTN. gbs today  2. Fetal status reassuring.   3. Activity and Exercise discussed.  Return in about 4 days (around 8/16/2021) for F/U Prenatal, NST Next Visit.    Hue Patel MD  08/12/2021

## 2021-08-15 LAB — BACTERIA SPEC AEROBE CULT: NORMAL

## 2021-08-17 ENCOUNTER — ROUTINE PRENATAL (OUTPATIENT)
Dept: OBSTETRICS AND GYNECOLOGY | Facility: CLINIC | Age: 31
End: 2021-08-17

## 2021-08-17 VITALS — WEIGHT: 203 LBS | SYSTOLIC BLOOD PRESSURE: 140 MMHG | BODY MASS INDEX: 34.84 KG/M2 | DIASTOLIC BLOOD PRESSURE: 78 MMHG

## 2021-08-17 DIAGNOSIS — O24.410 GDM (GESTATIONAL DIABETES MELLITUS), CLASS A1: ICD-10-CM

## 2021-08-17 DIAGNOSIS — O13.3 GESTATIONAL HYPERTENSION, THIRD TRIMESTER: ICD-10-CM

## 2021-08-17 DIAGNOSIS — Z3A.36 36 WEEKS GESTATION OF PREGNANCY: Primary | ICD-10-CM

## 2021-08-17 LAB
GLUCOSE UR STRIP-MCNC: NEGATIVE MG/DL
PROT UR STRIP-MCNC: NEGATIVE MG/DL

## 2021-08-17 PROCEDURE — 59025 FETAL NON-STRESS TEST: CPT | Performed by: NURSE PRACTITIONER

## 2021-08-17 PROCEDURE — 0502F SUBSEQUENT PRENATAL CARE: CPT | Performed by: NURSE PRACTITIONER

## 2021-08-17 NOTE — PROGRESS NOTES
OB FOLLOW UP  CC- Here for care of pregnancy        Sima Booker is a 31 y.o.  36w4d patient being seen today for her obstetrical follow up visit. Patient reports no complaints.  She reports glucose has been wnl.      Her prenatal care is complicated by (and status) :    Patient Active Problem List   Diagnosis   • Family history of melanoma   • Family history of colonic polyps   • Tremors of nervous system   • Pregnancy   • Pregnancy resulting from in vitro fertilization   • Gestational hypertension   • Elevated glucose tolerance test   • GDM (gestational diabetes mellitus), class A1           GBS Status: Was already done and it was negative.   Her Delivery Plan is: Desires IOL at 39wks. Scheduled  Ultrasound Today: No.    ROS -   Patient Reports : No Problems  Patient Denies: Loss of Fluid, Vaginal Spotting, Vision Changes, Headaches and Contractions  Fetal Movement : normal  All other systems reviewed and are negative.       The additional following portions of the patient's history were reviewed and updated as appropriate: allergies, current medications, past family history, past medical history, past social history, past surgical history and problem list.    I have reviewed and agree with the HPI, ROS, and historical information as entered above. Rosalie Olmos Jadyn, APRN        /78   Wt 92.1 kg (203 lb)   LMP 2020   BMI 34.84 kg/m²     EXAM:     FHT: wnl BPM   Uterine Size: deferred  Pelvic Exam: deferred    Urine glucose/protein: See prenatal flowsheet       Assessment and Plan    Problem List Items Addressed This Visit        Endocrine and Metabolic    GDM (gestational diabetes mellitus), class A1       Gravid and     Pregnancy - Primary    Overview     EFW 31 wks 59%ile         Relevant Orders    POC Urinalysis Dipstick (Completed)    Gestational hypertension    Overview     Patient taken off work on 2021.  Plan for twice weekly NSTs.  Status post steroids on 2021  and 7/31/2021.               1. Pregnancy at 36w4d  2. Fetal status reassuring.   3. Activity and Exercise discussed.  Return in about 3 days (around 8/20/2021) for Next scheduled follow up.    Rosalie Salamanca, APRN  08/17/2021

## 2021-08-18 ENCOUNTER — HOSPITAL ENCOUNTER (INPATIENT)
Facility: HOSPITAL | Age: 31
LOS: 1 days | Discharge: HOME | End: 2021-08-18
Attending: OBSTETRICS & GYNECOLOGY | Admitting: OBSTETRICS & GYNECOLOGY

## 2021-08-18 VITALS
HEART RATE: 100 BPM | TEMPERATURE: 97.9 F | DIASTOLIC BLOOD PRESSURE: 79 MMHG | SYSTOLIC BLOOD PRESSURE: 135 MMHG | RESPIRATION RATE: 16 BRPM

## 2021-08-18 PROBLEM — O99.891 KIDNEY STONE COMPLICATING PREGNANCY, THIRD TRIMESTER: Status: ACTIVE | Noted: 2021-08-18

## 2021-08-18 PROBLEM — O26.833 KIDNEY STONE COMPLICATING PREGNANCY, THIRD TRIMESTER: Status: ACTIVE | Noted: 2021-08-18

## 2021-08-18 PROBLEM — N20.0 KIDNEY STONE COMPLICATING PREGNANCY, THIRD TRIMESTER: Status: ACTIVE | Noted: 2021-08-18

## 2021-08-18 LAB
ALP SERPL-CCNC: 94 U/L (ref 39–117)
ALT SERPL W P-5'-P-CCNC: 12 U/L (ref 1–33)
AST SERPL-CCNC: 15 U/L (ref 1–32)
BACTERIA UR QL AUTO: ABNORMAL /HPF
BILIRUB SERPL-MCNC: 0.2 MG/DL (ref 0–1.2)
BILIRUB UR QL STRIP: NEGATIVE
CLARITY UR: ABNORMAL
COLOR UR: YELLOW
CREAT SERPL-MCNC: 0.6 MG/DL (ref 0.57–1)
DEPRECATED RDW RBC AUTO: 47.3 FL (ref 37–54)
ERYTHROCYTE [DISTWIDTH] IN BLOOD BY AUTOMATED COUNT: 14.1 % (ref 12.3–15.4)
GLUCOSE BLDC GLUCOMTR-MCNC: 107 MG/DL (ref 70–130)
GLUCOSE BLDC GLUCOMTR-MCNC: 114 MG/DL (ref 70–130)
GLUCOSE UR STRIP-MCNC: NEGATIVE MG/DL
HCT VFR BLD AUTO: 33 % (ref 34–46.6)
HGB BLD-MCNC: 11.7 G/DL (ref 12–15.9)
HGB UR QL STRIP.AUTO: ABNORMAL
HYALINE CASTS UR QL AUTO: ABNORMAL /LPF
KETONES UR QL STRIP: ABNORMAL
LDH SERPL-CCNC: 194 U/L (ref 135–214)
LEUKOCYTE ESTERASE UR QL STRIP.AUTO: NEGATIVE
MCH RBC QN AUTO: 32.6 PG (ref 26.6–33)
MCHC RBC AUTO-ENTMCNC: 35.5 G/DL (ref 31.5–35.7)
MCV RBC AUTO: 91.9 FL (ref 79–97)
NITRITE UR QL STRIP: NEGATIVE
PH UR STRIP.AUTO: 6 [PH] (ref 5–8)
PLATELET # BLD AUTO: 185 10*3/MM3 (ref 140–450)
PMV BLD AUTO: 10.2 FL (ref 6–12)
PROT UR QL STRIP: ABNORMAL
RBC # BLD AUTO: 3.59 10*6/MM3 (ref 3.77–5.28)
RBC # UR: ABNORMAL /HPF
REF LAB TEST METHOD: ABNORMAL
SARS-COV-2 RDRP RESP QL NAA+PROBE: NORMAL
SP GR UR STRIP: 1.02 (ref 1–1.03)
SQUAMOUS #/AREA URNS HPF: ABNORMAL /HPF
URATE SERPL-MCNC: 4.3 MG/DL (ref 2.4–5.7)
UROBILINOGEN UR QL STRIP: ABNORMAL
WBC # BLD AUTO: 11.68 10*3/MM3 (ref 3.4–10.8)
WBC UR QL AUTO: ABNORMAL /HPF

## 2021-08-18 PROCEDURE — 25010000002 ONDANSETRON PER 1 MG: Performed by: OBSTETRICS & GYNECOLOGY

## 2021-08-18 PROCEDURE — 84460 ALANINE AMINO (ALT) (SGPT): CPT | Performed by: OBSTETRICS & GYNECOLOGY

## 2021-08-18 PROCEDURE — 84450 TRANSFERASE (AST) (SGOT): CPT | Performed by: OBSTETRICS & GYNECOLOGY

## 2021-08-18 PROCEDURE — 82247 BILIRUBIN TOTAL: CPT | Performed by: OBSTETRICS & GYNECOLOGY

## 2021-08-18 PROCEDURE — 87635 SARS-COV-2 COVID-19 AMP PRB: CPT | Performed by: OBSTETRICS & GYNECOLOGY

## 2021-08-18 PROCEDURE — 25010000002 CEFTRIAXONE PER 250 MG: Performed by: OBSTETRICS & GYNECOLOGY

## 2021-08-18 PROCEDURE — 85027 COMPLETE CBC AUTOMATED: CPT | Performed by: OBSTETRICS & GYNECOLOGY

## 2021-08-18 PROCEDURE — 82962 GLUCOSE BLOOD TEST: CPT

## 2021-08-18 PROCEDURE — 82565 ASSAY OF CREATININE: CPT | Performed by: OBSTETRICS & GYNECOLOGY

## 2021-08-18 PROCEDURE — 25010000002 HYDROMORPHONE 1 MG/ML SOLUTION: Performed by: OBSTETRICS & GYNECOLOGY

## 2021-08-18 PROCEDURE — 59025 FETAL NON-STRESS TEST: CPT

## 2021-08-18 PROCEDURE — 84075 ASSAY ALKALINE PHOSPHATASE: CPT | Performed by: OBSTETRICS & GYNECOLOGY

## 2021-08-18 PROCEDURE — 25010000002 HYDROMORPHONE PER 4 MG: Performed by: OBSTETRICS & GYNECOLOGY

## 2021-08-18 PROCEDURE — 84550 ASSAY OF BLOOD/URIC ACID: CPT | Performed by: OBSTETRICS & GYNECOLOGY

## 2021-08-18 PROCEDURE — 87086 URINE CULTURE/COLONY COUNT: CPT | Performed by: OBSTETRICS & GYNECOLOGY

## 2021-08-18 PROCEDURE — 83615 LACTATE (LD) (LDH) ENZYME: CPT | Performed by: OBSTETRICS & GYNECOLOGY

## 2021-08-18 PROCEDURE — 81001 URINALYSIS AUTO W/SCOPE: CPT | Performed by: OBSTETRICS & GYNECOLOGY

## 2021-08-18 PROCEDURE — 99238 HOSP IP/OBS DSCHRG MGMT 30/<: CPT | Performed by: OBSTETRICS & GYNECOLOGY

## 2021-08-18 RX ORDER — LIDOCAINE HYDROCHLORIDE 10 MG/ML
5 INJECTION, SOLUTION EPIDURAL; INFILTRATION; INTRACAUDAL; PERINEURAL AS NEEDED
Status: DISCONTINUED | OUTPATIENT
Start: 2021-08-18 | End: 2021-08-18 | Stop reason: HOSPADM

## 2021-08-18 RX ORDER — ONDANSETRON 4 MG/1
4 TABLET, FILM COATED ORAL EVERY 8 HOURS PRN
Status: DISCONTINUED | OUTPATIENT
Start: 2021-08-18 | End: 2021-08-18 | Stop reason: HOSPADM

## 2021-08-18 RX ORDER — SODIUM CHLORIDE 0.9 % (FLUSH) 0.9 %
10 SYRINGE (ML) INJECTION AS NEEDED
Status: DISCONTINUED | OUTPATIENT
Start: 2021-08-18 | End: 2021-08-18 | Stop reason: HOSPADM

## 2021-08-18 RX ORDER — SODIUM CHLORIDE, SODIUM LACTATE, POTASSIUM CHLORIDE, CALCIUM CHLORIDE 600; 310; 30; 20 MG/100ML; MG/100ML; MG/100ML; MG/100ML
1000 INJECTION, SOLUTION INTRAVENOUS ONCE
Status: COMPLETED | OUTPATIENT
Start: 2021-08-18 | End: 2021-08-18

## 2021-08-18 RX ORDER — CEFTRIAXONE SODIUM 1 G/50ML
1 INJECTION, SOLUTION INTRAVENOUS
Status: DISCONTINUED | OUTPATIENT
Start: 2021-08-18 | End: 2021-08-18 | Stop reason: HOSPADM

## 2021-08-18 RX ORDER — HYDROMORPHONE HYDROCHLORIDE 1 MG/ML
0.5 INJECTION, SOLUTION INTRAMUSCULAR; INTRAVENOUS; SUBCUTANEOUS
Status: DISCONTINUED | OUTPATIENT
Start: 2021-08-18 | End: 2021-08-18 | Stop reason: HOSPADM

## 2021-08-18 RX ORDER — ONDANSETRON 2 MG/ML
4 INJECTION INTRAMUSCULAR; INTRAVENOUS EVERY 6 HOURS PRN
Status: DISCONTINUED | OUTPATIENT
Start: 2021-08-18 | End: 2021-08-18 | Stop reason: HOSPADM

## 2021-08-18 RX ORDER — ONDANSETRON 2 MG/ML
4 INJECTION INTRAMUSCULAR; INTRAVENOUS EVERY 8 HOURS PRN
Status: DISCONTINUED | OUTPATIENT
Start: 2021-08-18 | End: 2021-08-18 | Stop reason: HOSPADM

## 2021-08-18 RX ORDER — CEPHALEXIN 500 MG/1
500 CAPSULE ORAL 3 TIMES DAILY
Qty: 21 CAPSULE | Refills: 0 | Status: ON HOLD | OUTPATIENT
Start: 2021-08-18 | End: 2021-08-24

## 2021-08-18 RX ORDER — ACETAMINOPHEN 500 MG
1000 TABLET ORAL EVERY 4 HOURS PRN
Status: DISCONTINUED | OUTPATIENT
Start: 2021-08-18 | End: 2021-08-18 | Stop reason: HOSPADM

## 2021-08-18 RX ORDER — SODIUM CHLORIDE 0.9 % (FLUSH) 0.9 %
10 SYRINGE (ML) INJECTION EVERY 12 HOURS SCHEDULED
Status: DISCONTINUED | OUTPATIENT
Start: 2021-08-18 | End: 2021-08-18 | Stop reason: HOSPADM

## 2021-08-18 RX ORDER — DEXTROSE AND SODIUM CHLORIDE 5; .45 G/100ML; G/100ML
150 INJECTION, SOLUTION INTRAVENOUS CONTINUOUS
Status: DISCONTINUED | OUTPATIENT
Start: 2021-08-18 | End: 2021-08-18 | Stop reason: HOSPADM

## 2021-08-18 RX ORDER — SODIUM CHLORIDE 0.9 % (FLUSH) 0.9 %
3 SYRINGE (ML) INJECTION EVERY 12 HOURS SCHEDULED
Status: DISCONTINUED | OUTPATIENT
Start: 2021-08-18 | End: 2021-08-18 | Stop reason: HOSPADM

## 2021-08-18 RX ADMIN — ONDANSETRON 4 MG: 2 INJECTION INTRAMUSCULAR; INTRAVENOUS at 00:33

## 2021-08-18 RX ADMIN — HYDROMORPHONE HYDROCHLORIDE 1 MG: 1 INJECTION, SOLUTION INTRAMUSCULAR; INTRAVENOUS; SUBCUTANEOUS at 01:02

## 2021-08-18 RX ADMIN — DEXTROSE AND SODIUM CHLORIDE 150 ML/HR: 5; 450 INJECTION, SOLUTION INTRAVENOUS at 01:51

## 2021-08-18 RX ADMIN — SODIUM CHLORIDE, POTASSIUM CHLORIDE, SODIUM LACTATE AND CALCIUM CHLORIDE 1000 ML/HR: 600; 310; 30; 20 INJECTION, SOLUTION INTRAVENOUS at 00:34

## 2021-08-18 RX ADMIN — CEFTRIAXONE SODIUM 1 G: 1 INJECTION, SOLUTION INTRAVENOUS at 02:19

## 2021-08-18 RX ADMIN — HYDROMORPHONE HYDROCHLORIDE 0.5 MG: 1 INJECTION, SOLUTION INTRAMUSCULAR; INTRAVENOUS; SUBCUTANEOUS at 00:33

## 2021-08-18 NOTE — DISCHARGE SUMMARY
2021  HD:0  31 y.o. female  at 36w5d    Ryan Gao was admitted overnight for possible kidney stone, +/- UTI. She required a dose of iv pain meds at admission, but no pain since then. She received dose of iv rocephin. Urine culture pending this am.       This morning her pain is gone. EFM reassuring. BPs stable, with known GHTN. Afebrile.        Objective   Temp: Temp:  [97.9 °F (36.6 °C)-98.7 °F (37.1 °C)] 97.9 °F (36.6 °C) Temp src: Oral   BP: BP: (117-156)/(69-87) 135/79        Pulse: Heart Rate:  [] 100  RR: Resp:  [16-20] 16    General:  nad   Abdomen: Gravid, nontender         Lab Results   Component Value Date    WBC 11.68 (H) 2021    HGB 11.7 (L) 2021    HCT 33.0 (L) 2021    MCV 91.9 2021     2021    HEPBSAG Negative 2021     Results from last 7 days   Lab Units 21  0142   AST (SGOT) U/L 15     Results from last 7 days   Lab Units 21  0142   ALT (SGPT) U/L 12      Results from last 7 days   Lab Units 21  0142   CREATININE mg/dL 0.60      Results from last 7 days   Lab Units 21  0142   BILIRUBIN mg/dL 0.2   EFM reactive      Assessment  1.   31 y.o. yo female  at 36w5d  2. UTI  3. Possible kidney stone, but pain completely resolved.     Plan  She desires to go home now that pain gone. Will cont PO antibiotics for UTI, will fu on culture when it returns. She has sched FU in office in next couple days.     This note has been electronically signed.    Hue Patel MD  2021

## 2021-08-18 NOTE — NURSING NOTE
Patient discharged home undelivered. Informed patient to keep follow up appointment or induction date. Informed patient to return to hospital or clinic with elevated blood pressure, contractions that get stronger or closer together, decreased fetal movement, leaking of fluid, or vaginal bleeding. Patient verbalized understanding. Patient ambulated to private vehicle.

## 2021-08-18 NOTE — H&P
Sima Amarilis [facility]  1990  [id number]  [id number]    CC: rt flank pain  HPI:  Patient is 31 y.o. female   currently at 36w5d presents with c/o right flank pain, onset ~2200, constant, radiates to abd, severe, assoc N, V.  Denies fever.  Good FM.  PNC comp by gest HTN    PMH:  Current meds: PNV, claritan  Illnesses: none  Surgeries: sinus surg, oral surg  Allergies: PCN- hives (childhood)    Past OB History:       OB History    Para Term  AB Living   1 0 0 0 0 0   SAB TAB Ectopic Molar Multiple Live Births   0 0 0 0 0 0      # Outcome Date GA Lbr Russell/2nd Weight Sex Delivery Anes PTL Lv   1 Current               Obstetric Comments   FOB #1 : Pregnancy #1(IVF, frozen cycle retrieved 2020, no donors)            SH: tob neg , EtOH neg, drugs neg  FH: heart dz pos , diabetes pos , cancer pos    General ROS: flank pain, N, V.   All other systems reviewed and are negative.      Physical Examination: General appearance - alert, well appearing, and in no distress  Vital signs - /87   Temp 98.7 °F (37.1 °C) (Oral)   Resp 20   LMP 2020   HEENT: normocephalic, atraumatic,oropharynx clear, appearance of ears and nose normal  Neck - supple, no significant adenopathy, no thyromegaly  Lymphatics - no palpable lymphadenopathy in the neck or groin, no hepatosplenomegaly  Chest - clear to auscultation, no wheezes, rales or rhonchi, respiratory effort non-labored  Back: Right CVAT  Heart - normal rate, regular rhythm, no murmurs, rubs, clicks or gallops, no JVD, no lower extremity edema  Abdomen - soft, nontender, nondistended, no masses, no hepatosplenomegaly  no rebound tenderness noted, bowel sounds normal  Vaginal Exam: deferred ,external genitalia normal  Extremities - no pedal edema noted, no calf tend  Skin -warm and dry, normal coloration and turgor, no rashes, no suspicious skin lesions noted    Fetal monitoring: indication flank pain , onset 0016 , offset 0102 , baseline 125 , mod  BTB variability , no accels (15 X 15), no decels, no contractions, interpretation non-reactive NST (limited strip)    Radiology     Assessment 1)IUP 36 5/7 weeks   2)flank pain- ccUA c/w stone +/- pyelo vs severe UTI   3)gest HTN    Plan 1)admit   2)pain meds/antiemetics   3)IV hydration   4)rocephin    Nba Bazzi MD  8/18/2021  01:02 EDT

## 2021-08-19 LAB — BACTERIA SPEC AEROBE CULT: NORMAL

## 2021-08-20 ENCOUNTER — ROUTINE PRENATAL (OUTPATIENT)
Dept: OBSTETRICS AND GYNECOLOGY | Facility: CLINIC | Age: 31
End: 2021-08-20

## 2021-08-20 ENCOUNTER — APPOINTMENT (OUTPATIENT)
Dept: PREADMISSION TESTING | Facility: HOSPITAL | Age: 31
End: 2021-08-20

## 2021-08-20 ENCOUNTER — PREP FOR SURGERY (OUTPATIENT)
Dept: OTHER | Facility: HOSPITAL | Age: 31
End: 2021-08-20

## 2021-08-20 VITALS — DIASTOLIC BLOOD PRESSURE: 80 MMHG | WEIGHT: 205.8 LBS | SYSTOLIC BLOOD PRESSURE: 132 MMHG | BODY MASS INDEX: 35.33 KG/M2

## 2021-08-20 DIAGNOSIS — O13.3 GESTATIONAL HYPERTENSION, THIRD TRIMESTER: ICD-10-CM

## 2021-08-20 DIAGNOSIS — O09.819 PREGNANCY RESULTING FROM IN VITRO FERTILIZATION, ANTEPARTUM: ICD-10-CM

## 2021-08-20 DIAGNOSIS — Z3A.37 37 WEEKS GESTATION OF PREGNANCY: Primary | ICD-10-CM

## 2021-08-20 DIAGNOSIS — O24.410 GDM (GESTATIONAL DIABETES MELLITUS), CLASS A1: ICD-10-CM

## 2021-08-20 DIAGNOSIS — O13.3 GESTATIONAL HYPERTENSION, THIRD TRIMESTER: Primary | ICD-10-CM

## 2021-08-20 LAB
GLUCOSE UR STRIP-MCNC: NEGATIVE MG/DL
PROT UR STRIP-MCNC: NEGATIVE MG/DL
SARS-COV-2 RNA PNL SPEC NAA+PROBE: NOT DETECTED

## 2021-08-20 PROCEDURE — 0502F SUBSEQUENT PRENATAL CARE: CPT | Performed by: OBSTETRICS & GYNECOLOGY

## 2021-08-20 PROCEDURE — U0004 COV-19 TEST NON-CDC HGH THRU: HCPCS

## 2021-08-20 PROCEDURE — C9803 HOPD COVID-19 SPEC COLLECT: HCPCS

## 2021-08-20 PROCEDURE — 59025 FETAL NON-STRESS TEST: CPT | Performed by: OBSTETRICS & GYNECOLOGY

## 2021-08-20 RX ORDER — MISOPROSTOL 200 UG/1
800 TABLET ORAL AS NEEDED
Status: CANCELLED | OUTPATIENT
Start: 2021-08-20

## 2021-08-20 RX ORDER — OXYTOCIN-SODIUM CHLORIDE 0.9% IV SOLN 30 UNIT/500ML 30-0.9/5 UT/ML-%
650 SOLUTION INTRAVENOUS ONCE
Status: CANCELLED | OUTPATIENT
Start: 2021-08-20 | End: 2021-08-20

## 2021-08-20 RX ORDER — MORPHINE SULFATE 2 MG/ML
2 INJECTION, SOLUTION INTRAMUSCULAR; INTRAVENOUS
Status: CANCELLED | OUTPATIENT
Start: 2021-08-20 | End: 2021-08-30

## 2021-08-20 RX ORDER — SODIUM CHLORIDE, SODIUM LACTATE, POTASSIUM CHLORIDE, CALCIUM CHLORIDE 600; 310; 30; 20 MG/100ML; MG/100ML; MG/100ML; MG/100ML
125 INJECTION, SOLUTION INTRAVENOUS CONTINUOUS
Status: CANCELLED | OUTPATIENT
Start: 2021-08-20

## 2021-08-20 RX ORDER — SODIUM CHLORIDE 0.9 % (FLUSH) 0.9 %
3 SYRINGE (ML) INJECTION EVERY 12 HOURS SCHEDULED
Status: CANCELLED | OUTPATIENT
Start: 2021-08-20

## 2021-08-20 RX ORDER — MAGNESIUM CARB/ALUMINUM HYDROX 105-160MG
30 TABLET,CHEWABLE ORAL ONCE
Status: CANCELLED | OUTPATIENT
Start: 2021-08-20 | End: 2021-08-20

## 2021-08-20 RX ORDER — PROMETHAZINE HYDROCHLORIDE 12.5 MG/1
12.5 TABLET ORAL EVERY 6 HOURS PRN
Status: CANCELLED | OUTPATIENT
Start: 2021-08-20

## 2021-08-20 RX ORDER — OXYCODONE AND ACETAMINOPHEN 7.5; 325 MG/1; MG/1
2 TABLET ORAL EVERY 4 HOURS PRN
Status: CANCELLED | OUTPATIENT
Start: 2021-08-20 | End: 2021-08-30

## 2021-08-20 RX ORDER — CARBOPROST TROMETHAMINE 250 UG/ML
250 INJECTION, SOLUTION INTRAMUSCULAR AS NEEDED
Status: CANCELLED | OUTPATIENT
Start: 2021-08-20

## 2021-08-20 RX ORDER — SODIUM CHLORIDE 0.9 % (FLUSH) 0.9 %
10 SYRINGE (ML) INJECTION AS NEEDED
Status: CANCELLED | OUTPATIENT
Start: 2021-08-20

## 2021-08-20 RX ORDER — LIDOCAINE HYDROCHLORIDE 10 MG/ML
5 INJECTION, SOLUTION EPIDURAL; INFILTRATION; INTRACAUDAL; PERINEURAL AS NEEDED
Status: CANCELLED | OUTPATIENT
Start: 2021-08-20

## 2021-08-20 RX ORDER — OXYTOCIN-SODIUM CHLORIDE 0.9% IV SOLN 30 UNIT/500ML 30-0.9/5 UT/ML-%
2-30 SOLUTION INTRAVENOUS
Status: CANCELLED | OUTPATIENT
Start: 2021-08-20

## 2021-08-20 RX ORDER — PROMETHAZINE HYDROCHLORIDE 12.5 MG/1
12.5 SUPPOSITORY RECTAL EVERY 6 HOURS PRN
Status: CANCELLED | OUTPATIENT
Start: 2021-08-20

## 2021-08-20 RX ORDER — ACETAMINOPHEN 325 MG/1
650 TABLET ORAL EVERY 4 HOURS PRN
Status: CANCELLED | OUTPATIENT
Start: 2021-08-20

## 2021-08-20 RX ORDER — OXYTOCIN-SODIUM CHLORIDE 0.9% IV SOLN 30 UNIT/500ML 30-0.9/5 UT/ML-%
85 SOLUTION INTRAVENOUS ONCE
Status: CANCELLED | OUTPATIENT
Start: 2021-08-20 | End: 2021-08-20

## 2021-08-20 RX ORDER — METHYLERGONOVINE MALEATE 0.2 MG/ML
200 INJECTION INTRAVENOUS ONCE AS NEEDED
Status: CANCELLED | OUTPATIENT
Start: 2021-08-20

## 2021-08-20 NOTE — PROGRESS NOTES
OB FOLLOW UP  CC- Here for care of pregnancy        Sima Booker is a 31 y.o.  37w0d patient being seen today for her obstetrical follow up visit. Patient reports backache and headache. Patient states she has seen slight improvements since getting treated for a UTI. Patient still has the urgency. Patient reports BP and glucose has been well controled.      Her prenatal care is complicated by (and status) :    Patient Active Problem List   Diagnosis   • Family history of melanoma   • Family history of colonic polyps   • Tremors of nervous system   • Pregnancy   • Pregnancy resulting from in vitro fertilization   • Gestational hypertension   • Elevated glucose tolerance test   • GDM (gestational diabetes mellitus), class A1   • Kidney stone complicating pregnancy, third trimester       Flu Status: Already given in current flu season  Ultrasound Today: No.    ROS -   Patient Reports : No Problems and Headaches  Patient Denies: Loss of Fluid, Vaginal Spotting, Vision Changes, Nausea , Vomiting , Contractions and Epigastric pain  Fetal Movement : normal  All other systems reviewed and are negative.       The additional following portions of the patient's history were reviewed and updated as appropriate: allergies, current medications, past family history, past medical history, past social history, past surgical history and problem list.    I have reviewed and agree with the HPI, ROS, and historical information as entered above. Hue Patel MD    /80   Wt 93.4 kg (205 lb 12.8 oz)   LMP 2020   BMI 35.33 kg/m²       EXAM:     FHT: + BPM   Uterine Size: size equals dates  Pelvic Exam: No   Non Stress Test: minutes 20  non-stress test: NST: Reactive      Urine glucose/protein: See prenatal flowsheet       Assessment and Plan    Problem List Items Addressed This Visit     Pregnancy - Primary    Overview     EFW 31 wks 59%ile         Relevant Orders    POC Urinalysis Dipstick (Completed)     Pregnancy resulting from in vitro fertilization    Overview     Fetal echo 22-24 wks wnl         Gestational hypertension    Overview     Patient taken off work on 8/2/2021.  Plan for twice weekly NSTs.  Status post steroids on 7/30/2021 and 7/31/2021.         GDM (gestational diabetes mellitus), class A1          1. Pregnancy at 37w0d. BP nl, FSBS nl. Induction is Sunday.   2. Fetal status reassuring.   3. Activity and Exercise discussed.  No follow-ups on file.    Hue Patel MD  08/20/2021

## 2021-08-22 ENCOUNTER — HOSPITAL ENCOUNTER (INPATIENT)
Facility: HOSPITAL | Age: 31
LOS: 5 days | Discharge: HOME | End: 2021-08-27
Attending: OBSTETRICS & GYNECOLOGY | Admitting: OBSTETRICS & GYNECOLOGY

## 2021-08-22 ENCOUNTER — HOSPITAL ENCOUNTER (OUTPATIENT)
Dept: LABOR AND DELIVERY | Facility: HOSPITAL | Age: 31
Discharge: HOME | End: 2021-08-22

## 2021-08-22 DIAGNOSIS — O13.3 GESTATIONAL HYPERTENSION, THIRD TRIMESTER: ICD-10-CM

## 2021-08-22 PROBLEM — Z34.90 CURRENTLY PREGNANT: Status: ACTIVE | Noted: 2021-08-22

## 2021-08-22 LAB
ABO GROUP BLD: NORMAL
ALP SERPL-CCNC: 99 U/L (ref 39–117)
ALT SERPL W P-5'-P-CCNC: 13 U/L (ref 1–33)
AST SERPL-CCNC: 19 U/L (ref 1–32)
BILIRUB SERPL-MCNC: 0.2 MG/DL (ref 0–1.2)
BLD GP AB SCN SERPL QL: NEGATIVE
CREAT SERPL-MCNC: 0.59 MG/DL (ref 0.57–1)
DEPRECATED RDW RBC AUTO: 46.8 FL (ref 37–54)
ERYTHROCYTE [DISTWIDTH] IN BLOOD BY AUTOMATED COUNT: 14 % (ref 12.3–15.4)
GLUCOSE BLDC GLUCOMTR-MCNC: 84 MG/DL (ref 70–130)
GLUCOSE BLDC GLUCOMTR-MCNC: 93 MG/DL (ref 70–130)
HCT VFR BLD AUTO: 33.7 % (ref 34–46.6)
HGB BLD-MCNC: 12 G/DL (ref 12–15.9)
LDH SERPL-CCNC: 230 U/L (ref 135–214)
MCH RBC QN AUTO: 32.6 PG (ref 26.6–33)
MCHC RBC AUTO-ENTMCNC: 35.6 G/DL (ref 31.5–35.7)
MCV RBC AUTO: 91.6 FL (ref 79–97)
PLATELET # BLD AUTO: 221 10*3/MM3 (ref 140–450)
PMV BLD AUTO: 10.4 FL (ref 6–12)
RBC # BLD AUTO: 3.68 10*6/MM3 (ref 3.77–5.28)
RH BLD: POSITIVE
T&S EXPIRATION DATE: NORMAL
URATE SERPL-MCNC: 4.6 MG/DL (ref 2.4–5.7)
WBC # BLD AUTO: 9.16 10*3/MM3 (ref 3.4–10.8)

## 2021-08-22 PROCEDURE — 83615 LACTATE (LD) (LDH) ENZYME: CPT | Performed by: OBSTETRICS & GYNECOLOGY

## 2021-08-22 PROCEDURE — 84075 ASSAY ALKALINE PHOSPHATASE: CPT | Performed by: OBSTETRICS & GYNECOLOGY

## 2021-08-22 PROCEDURE — 59025 FETAL NON-STRESS TEST: CPT

## 2021-08-22 PROCEDURE — 85027 COMPLETE CBC AUTOMATED: CPT | Performed by: OBSTETRICS & GYNECOLOGY

## 2021-08-22 PROCEDURE — 86850 RBC ANTIBODY SCREEN: CPT | Performed by: OBSTETRICS & GYNECOLOGY

## 2021-08-22 PROCEDURE — 82962 GLUCOSE BLOOD TEST: CPT

## 2021-08-22 PROCEDURE — 82247 BILIRUBIN TOTAL: CPT | Performed by: OBSTETRICS & GYNECOLOGY

## 2021-08-22 PROCEDURE — 86901 BLOOD TYPING SEROLOGIC RH(D): CPT | Performed by: OBSTETRICS & GYNECOLOGY

## 2021-08-22 PROCEDURE — 84550 ASSAY OF BLOOD/URIC ACID: CPT | Performed by: OBSTETRICS & GYNECOLOGY

## 2021-08-22 PROCEDURE — 25010000002 BUTORPHANOL PER 1 MG: Performed by: OBSTETRICS & GYNECOLOGY

## 2021-08-22 PROCEDURE — 84460 ALANINE AMINO (ALT) (SGPT): CPT | Performed by: OBSTETRICS & GYNECOLOGY

## 2021-08-22 PROCEDURE — 82565 ASSAY OF CREATININE: CPT | Performed by: OBSTETRICS & GYNECOLOGY

## 2021-08-22 PROCEDURE — 59200 INSERT CERVICAL DILATOR: CPT | Performed by: OBSTETRICS & GYNECOLOGY

## 2021-08-22 PROCEDURE — 84450 TRANSFERASE (AST) (SGOT): CPT | Performed by: OBSTETRICS & GYNECOLOGY

## 2021-08-22 PROCEDURE — 86900 BLOOD TYPING SEROLOGIC ABO: CPT | Performed by: OBSTETRICS & GYNECOLOGY

## 2021-08-22 RX ORDER — MAGNESIUM CARB/ALUMINUM HYDROX 105-160MG
30 TABLET,CHEWABLE ORAL ONCE
Status: DISCONTINUED | OUTPATIENT
Start: 2021-08-22 | End: 2021-08-24 | Stop reason: HOSPADM

## 2021-08-22 RX ORDER — SODIUM CHLORIDE 0.9 % (FLUSH) 0.9 %
3 SYRINGE (ML) INJECTION EVERY 12 HOURS SCHEDULED
Status: DISCONTINUED | OUTPATIENT
Start: 2021-08-22 | End: 2021-08-24 | Stop reason: HOSPADM

## 2021-08-22 RX ORDER — SODIUM CHLORIDE, SODIUM LACTATE, POTASSIUM CHLORIDE, CALCIUM CHLORIDE 600; 310; 30; 20 MG/100ML; MG/100ML; MG/100ML; MG/100ML
125 INJECTION, SOLUTION INTRAVENOUS CONTINUOUS
Status: DISCONTINUED | OUTPATIENT
Start: 2021-08-22 | End: 2021-08-27 | Stop reason: HOSPADM

## 2021-08-22 RX ORDER — ONDANSETRON 2 MG/ML
4 INJECTION INTRAMUSCULAR; INTRAVENOUS EVERY 6 HOURS PRN
Status: DISCONTINUED | OUTPATIENT
Start: 2021-08-22 | End: 2021-08-27 | Stop reason: HOSPADM

## 2021-08-22 RX ORDER — OXYTOCIN-SODIUM CHLORIDE 0.9% IV SOLN 30 UNIT/500ML 30-0.9/5 UT/ML-%
2-30 SOLUTION INTRAVENOUS
Status: DISCONTINUED | OUTPATIENT
Start: 2021-08-22 | End: 2021-08-24 | Stop reason: HOSPADM

## 2021-08-22 RX ORDER — BUTORPHANOL TARTRATE 1 MG/ML
1 INJECTION, SOLUTION INTRAMUSCULAR; INTRAVENOUS
Status: DISCONTINUED | OUTPATIENT
Start: 2021-08-22 | End: 2021-08-26

## 2021-08-22 RX ORDER — SODIUM CHLORIDE 0.9 % (FLUSH) 0.9 %
10 SYRINGE (ML) INJECTION AS NEEDED
Status: DISCONTINUED | OUTPATIENT
Start: 2021-08-22 | End: 2021-08-24 | Stop reason: HOSPADM

## 2021-08-22 RX ORDER — EPHEDRINE SULFATE 5 MG/ML
INJECTION INTRAVENOUS
Status: DISCONTINUED
Start: 2021-08-22 | End: 2021-08-23 | Stop reason: WASHOUT

## 2021-08-22 RX ORDER — LIDOCAINE HYDROCHLORIDE 10 MG/ML
5 INJECTION, SOLUTION EPIDURAL; INFILTRATION; INTRACAUDAL; PERINEURAL AS NEEDED
Status: DISCONTINUED | OUTPATIENT
Start: 2021-08-22 | End: 2021-08-24 | Stop reason: HOSPADM

## 2021-08-22 RX ORDER — ERYTHROMYCIN 5 MG/G
OINTMENT OPHTHALMIC
Status: DISCONTINUED
Start: 2021-08-22 | End: 2021-08-27 | Stop reason: HOSPADM

## 2021-08-22 RX ADMIN — BUTORPHANOL TARTRATE 1 MG: 1 INJECTION, SOLUTION INTRAMUSCULAR; INTRAVENOUS at 21:23

## 2021-08-22 RX ADMIN — SODIUM CHLORIDE, POTASSIUM CHLORIDE, SODIUM LACTATE AND CALCIUM CHLORIDE 125 ML/HR: 600; 310; 30; 20 INJECTION, SOLUTION INTRAVENOUS at 21:02

## 2021-08-22 RX ADMIN — OXYTOCIN 2 MILLI-UNITS/MIN: 10 INJECTION INTRAVENOUS at 21:00

## 2021-08-22 RX ADMIN — SODIUM CHLORIDE, POTASSIUM CHLORIDE, SODIUM LACTATE AND CALCIUM CHLORIDE 125 ML/HR: 600; 310; 30; 20 INJECTION, SOLUTION INTRAVENOUS at 18:11

## 2021-08-22 RX ADMIN — BUTORPHANOL TARTRATE 2 MG: 2 INJECTION, SOLUTION INTRAMUSCULAR; INTRAVENOUS at 23:30

## 2021-08-23 ENCOUNTER — ANESTHESIA EVENT (OUTPATIENT)
Dept: LABOR AND DELIVERY | Facility: HOSPITAL | Age: 31
End: 2021-08-23

## 2021-08-23 ENCOUNTER — ANESTHESIA (OUTPATIENT)
Dept: LABOR AND DELIVERY | Facility: HOSPITAL | Age: 31
End: 2021-08-23

## 2021-08-23 LAB
GLUCOSE BLDC GLUCOMTR-MCNC: 69 MG/DL (ref 70–130)
GLUCOSE BLDC GLUCOMTR-MCNC: 70 MG/DL (ref 70–130)
GLUCOSE BLDC GLUCOMTR-MCNC: 72 MG/DL (ref 70–130)
GLUCOSE BLDC GLUCOMTR-MCNC: 76 MG/DL (ref 70–130)
GLUCOSE BLDC GLUCOMTR-MCNC: 77 MG/DL (ref 70–130)
GLUCOSE BLDC GLUCOMTR-MCNC: 97 MG/DL (ref 70–130)

## 2021-08-23 PROCEDURE — 25010000002 ONDANSETRON PER 1 MG: Performed by: NURSE ANESTHETIST, CERTIFIED REGISTERED

## 2021-08-23 PROCEDURE — C1755 CATHETER, INTRASPINAL: HCPCS | Performed by: ANESTHESIOLOGY

## 2021-08-23 PROCEDURE — C1755 CATHETER, INTRASPINAL: HCPCS

## 2021-08-23 PROCEDURE — 25010000002 ONDANSETRON PER 1 MG: Performed by: OBSTETRICS & GYNECOLOGY

## 2021-08-23 PROCEDURE — 25010000002 DIPHENHYDRAMINE PER 50 MG: Performed by: NURSE ANESTHETIST, CERTIFIED REGISTERED

## 2021-08-23 PROCEDURE — 82962 GLUCOSE BLOOD TEST: CPT

## 2021-08-23 PROCEDURE — 25010000002 ROPIVACAINE PER 1 MG: Performed by: NURSE ANESTHETIST, CERTIFIED REGISTERED

## 2021-08-23 PROCEDURE — 25010000003 CEFAZOLIN IN DEXTROSE 2-4 GM/100ML-% SOLUTION: Performed by: OBSTETRICS & GYNECOLOGY

## 2021-08-23 PROCEDURE — 51703 INSERT BLADDER CATH COMPLEX: CPT

## 2021-08-23 PROCEDURE — 0 CEFAZOLIN IN DEXTROSE 2-4 GM/100ML-% SOLUTION: Performed by: OBSTETRICS & GYNECOLOGY

## 2021-08-23 PROCEDURE — S0260 H&P FOR SURGERY: HCPCS | Performed by: OBSTETRICS & GYNECOLOGY

## 2021-08-23 PROCEDURE — 59025 FETAL NON-STRESS TEST: CPT

## 2021-08-23 PROCEDURE — 25010000002 FENTANYL CITRATE (PF) 50 MCG/ML SOLUTION: Performed by: NURSE ANESTHETIST, CERTIFIED REGISTERED

## 2021-08-23 RX ORDER — ROPIVACAINE HYDROCHLORIDE 2 MG/ML
15 INJECTION, SOLUTION EPIDURAL; INFILTRATION; PERINEURAL CONTINUOUS
Status: DISCONTINUED | OUTPATIENT
Start: 2021-08-23 | End: 2021-08-26

## 2021-08-23 RX ORDER — FAMOTIDINE 10 MG/ML
20 INJECTION, SOLUTION INTRAVENOUS ONCE AS NEEDED
Status: COMPLETED | OUTPATIENT
Start: 2021-08-23 | End: 2021-08-23

## 2021-08-23 RX ORDER — CEFAZOLIN SODIUM 2 G/100ML
2 INJECTION, SOLUTION INTRAVENOUS ONCE
Status: COMPLETED | OUTPATIENT
Start: 2021-08-23 | End: 2021-08-24

## 2021-08-23 RX ORDER — TRISODIUM CITRATE DIHYDRATE AND CITRIC ACID MONOHYDRATE 500; 334 MG/5ML; MG/5ML
30 SOLUTION ORAL ONCE
Status: COMPLETED | OUTPATIENT
Start: 2021-08-23 | End: 2021-08-24

## 2021-08-23 RX ORDER — CLINDAMYCIN PHOSPHATE 900 MG/50ML
900 INJECTION INTRAVENOUS ONCE
Status: COMPLETED | OUTPATIENT
Start: 2021-08-23 | End: 2021-08-23

## 2021-08-23 RX ORDER — FENTANYL CITRATE 50 UG/ML
INJECTION, SOLUTION INTRAMUSCULAR; INTRAVENOUS AS NEEDED
Status: DISCONTINUED | OUTPATIENT
Start: 2021-08-23 | End: 2021-08-24 | Stop reason: SURG

## 2021-08-23 RX ORDER — DIPHENHYDRAMINE HYDROCHLORIDE 50 MG/ML
12.5 INJECTION INTRAMUSCULAR; INTRAVENOUS EVERY 8 HOURS PRN
Status: DISCONTINUED | OUTPATIENT
Start: 2021-08-23 | End: 2021-08-24 | Stop reason: HOSPADM

## 2021-08-23 RX ORDER — METOCLOPRAMIDE HYDROCHLORIDE 5 MG/ML
10 INJECTION INTRAMUSCULAR; INTRAVENOUS ONCE AS NEEDED
Status: DISCONTINUED | OUTPATIENT
Start: 2021-08-23 | End: 2021-08-24 | Stop reason: HOSPADM

## 2021-08-23 RX ORDER — ACETAMINOPHEN 500 MG
1000 TABLET ORAL ONCE
Status: DISCONTINUED | OUTPATIENT
Start: 2021-08-24 | End: 2021-08-23

## 2021-08-23 RX ORDER — FENTANYL CITRATE 50 UG/ML
INJECTION, SOLUTION INTRAMUSCULAR; INTRAVENOUS
Status: COMPLETED
Start: 2021-08-23 | End: 2021-08-23

## 2021-08-23 RX ORDER — LIDOCAINE HYDROCHLORIDE AND EPINEPHRINE 20; 5 MG/ML; UG/ML
INJECTION, SOLUTION EPIDURAL; INFILTRATION; INTRACAUDAL; PERINEURAL AS NEEDED
Status: DISCONTINUED | OUTPATIENT
Start: 2021-08-23 | End: 2021-08-24 | Stop reason: SURG

## 2021-08-23 RX ORDER — ONDANSETRON 2 MG/ML
4 INJECTION INTRAMUSCULAR; INTRAVENOUS ONCE AS NEEDED
Status: COMPLETED | OUTPATIENT
Start: 2021-08-23 | End: 2021-08-23

## 2021-08-23 RX ORDER — ACETAMINOPHEN 500 MG
1000 TABLET ORAL ONCE
Status: COMPLETED | OUTPATIENT
Start: 2021-08-24 | End: 2021-08-23

## 2021-08-23 RX ORDER — ACETAMINOPHEN 325 MG/1
650 TABLET ORAL EVERY 6 HOURS PRN
Status: DISCONTINUED | OUTPATIENT
Start: 2021-08-23 | End: 2021-08-27 | Stop reason: HOSPADM

## 2021-08-23 RX ORDER — LIDOCAINE HYDROCHLORIDE AND EPINEPHRINE 15; 5 MG/ML; UG/ML
INJECTION, SOLUTION EPIDURAL AS NEEDED
Status: DISCONTINUED | OUTPATIENT
Start: 2021-08-23 | End: 2021-08-24 | Stop reason: SURG

## 2021-08-23 RX ORDER — EPHEDRINE SULFATE 5 MG/ML
10 INJECTION INTRAVENOUS
Status: DISCONTINUED | OUTPATIENT
Start: 2021-08-23 | End: 2021-08-24 | Stop reason: HOSPADM

## 2021-08-23 RX ADMIN — FENTANYL CITRATE 100 MCG: 50 INJECTION, SOLUTION INTRAMUSCULAR; INTRAVENOUS at 19:06

## 2021-08-23 RX ADMIN — CEFAZOLIN SODIUM 2 G: 2 INJECTION, SOLUTION INTRAVENOUS at 23:44

## 2021-08-23 RX ADMIN — FENTANYL CITRATE 100 MCG: 50 INJECTION, SOLUTION INTRAMUSCULAR; INTRAVENOUS at 10:41

## 2021-08-23 RX ADMIN — CLINDAMYCIN PHOSPHATE 900 MG: 900 INJECTION, SOLUTION INTRAVENOUS at 22:20

## 2021-08-23 RX ADMIN — LIDOCAINE HYDROCHLORIDE,EPINEPHRINE BITARTRATE 5 ML: 20; .005 INJECTION, SOLUTION EPIDURAL; INFILTRATION; INTRACAUDAL; PERINEURAL at 21:57

## 2021-08-23 RX ADMIN — ACETAMINOPHEN 1000 MG: 500 TABLET ORAL at 23:59

## 2021-08-23 RX ADMIN — ROPIVACAINE HYDROCHLORIDE 15 ML/HR: 2 INJECTION, SOLUTION EPIDURAL; INFILTRATION at 10:47

## 2021-08-23 RX ADMIN — ACETAMINOPHEN 650 MG: 325 TABLET, FILM COATED ORAL at 04:26

## 2021-08-23 RX ADMIN — FAMOTIDINE 20 MG: 10 INJECTION INTRAVENOUS at 16:52

## 2021-08-23 RX ADMIN — ONDANSETRON 4 MG: 2 INJECTION INTRAMUSCULAR; INTRAVENOUS at 15:13

## 2021-08-23 RX ADMIN — ONDANSETRON 4 MG: 2 INJECTION INTRAMUSCULAR; INTRAVENOUS at 19:52

## 2021-08-23 RX ADMIN — LIDOCAINE HYDROCHLORIDE,EPINEPHRINE BITARTRATE 5 ML: 20; .005 INJECTION, SOLUTION EPIDURAL; INFILTRATION; INTRACAUDAL; PERINEURAL at 19:06

## 2021-08-23 RX ADMIN — ROPIVACAINE HYDROCHLORIDE 10 ML: 5 INJECTION, SOLUTION EPIDURAL; INFILTRATION; PERINEURAL at 10:45

## 2021-08-23 RX ADMIN — LIDOCAINE HYDROCHLORIDE AND EPINEPHRINE 1 ML: 15; 5 INJECTION, SOLUTION EPIDURAL at 10:41

## 2021-08-23 RX ADMIN — DIPHENHYDRAMINE HYDROCHLORIDE 12.5 MG: 50 INJECTION INTRAMUSCULAR; INTRAVENOUS at 20:26

## 2021-08-23 RX ADMIN — LIDOCAINE HYDROCHLORIDE AND EPINEPHRINE 3 ML: 15; 5 INJECTION, SOLUTION EPIDURAL at 10:38

## 2021-08-23 RX ADMIN — ROPIVACAINE HYDROCHLORIDE 15 ML/HR: 2 INJECTION, SOLUTION EPIDURAL; INFILTRATION at 17:36

## 2021-08-23 RX ADMIN — SODIUM CHLORIDE, POTASSIUM CHLORIDE, SODIUM LACTATE AND CALCIUM CHLORIDE 1000 ML: 600; 310; 30; 20 INJECTION, SOLUTION INTRAVENOUS at 23:40

## 2021-08-23 NOTE — ANESTHESIA PREPROCEDURE EVALUATION
Anesthesia Evaluation     Patient summary reviewed and Nursing notes reviewed   NPO Solid Status: > 6 hours  NPO Liquid Status: < 2 hours           Airway   Mallampati: II  TM distance: >3 FB  Neck ROM: full  Dental      Pulmonary - negative pulmonary ROS   Cardiovascular     (+) hypertension,       Neuro/Psych  (+) tremors, psychiatric history Anxiety,     GI/Hepatic/Renal/Endo    (+)   diabetes mellitus gestational,     Musculoskeletal (-) negative ROS    Abdominal    Substance History - negative use     OB/GYN    (+) Pregnant, pregnancy induced hypertension        Other - negative ROS                       Anesthesia Plan    ASA 3     epidural       Anesthetic plan, all risks, benefits, and alternatives have been provided, discussed and informed consent has been obtained with: patient.    Plan discussed with attending.

## 2021-08-23 NOTE — ANESTHESIA PROCEDURE NOTES
Labor Epidural      Patient reassessed immediately prior to procedure    Patient location during procedure: floor  Performed By  CRNA: nIa Rayo CRNA  Preanesthetic Checklist  Completed: patient identified, IV checked, risks and benefits discussed, surgical consent, monitors and equipment checked, pre-op evaluation and timeout performed  Prep:  Pt Position:sitting  Sterile Tech:cap, gloves, mask and sterile barrier  Prep:DuraPrep  Monitoring:blood pressure monitoring  Epidural Block Procedure:  Approach:midline  Guidance:palpation technique  Location:L4-L5  Needle Type:Tuohy  Needle Gauge:17 G  Loss of Resistance Medium: air  Loss of Resistance: 5cm  Cath Depth at skin:10 cm  Paresthesia: none  Aspiration:negative  Test Dose:negative  Number of Attempts: 2  Post Assessment:  Dressing:occlusive dressing applied and secured with tape  Pt Tolerance:patient tolerated the procedure well with no apparent complications  Complications:no

## 2021-08-24 PROBLEM — O33.9 PELVIC INADEQUACY IN PREGNANCY: Status: ACTIVE | Noted: 2021-08-24

## 2021-08-24 LAB
ALP SERPL-CCNC: 78 U/L (ref 39–117)
ALT SERPL W P-5'-P-CCNC: 10 U/L (ref 1–33)
AST SERPL-CCNC: 25 U/L (ref 1–32)
ATMOSPHERIC PRESS: ABNORMAL MM[HG]
ATMOSPHERIC PRESS: ABNORMAL MM[HG]
BASE EXCESS BLDCOA CALC-SCNC: -3.1 MMOL/L (ref 0–2)
BASE EXCESS BLDCOV CALC-SCNC: -3.2 MMOL/L (ref 0–2)
BASOPHILS # BLD MANUAL: 0 10*3/MM3 (ref 0–0.2)
BASOPHILS NFR BLD AUTO: 0 % (ref 0–1.5)
BDY SITE: ABNORMAL
BDY SITE: ABNORMAL
BILIRUB SERPL-MCNC: 0.5 MG/DL (ref 0–1.2)
BODY TEMPERATURE: 37 C
BODY TEMPERATURE: 37 C
CO2 BLDA-SCNC: 22.8 MMOL/L (ref 22–33)
CO2 BLDA-SCNC: 26.2 MMOL/L (ref 22–33)
COLLECT TME SMN: ABNORMAL
CREAT SERPL-MCNC: 0.71 MG/DL (ref 0.57–1)
DACRYOCYTES BLD QL SMEAR: ABNORMAL
DEPRECATED RDW RBC AUTO: 49.5 FL (ref 37–54)
EOSINOPHIL # BLD MANUAL: 0 10*3/MM3 (ref 0–0.4)
EOSINOPHIL NFR BLD MANUAL: 0 % (ref 0.3–6.2)
EPAP: 0
EPAP: 0
ERYTHROCYTE [DISTWIDTH] IN BLOOD BY AUTOMATED COUNT: 14.5 % (ref 12.3–15.4)
HCO3 BLDCOA-SCNC: 24.5 MMOL/L (ref 16.9–20.5)
HCO3 BLDCOV-SCNC: 21.6 MMOL/L (ref 18.6–21.4)
HCT VFR BLD AUTO: 23.9 % (ref 34–46.6)
HGB BLD-MCNC: 8.3 G/DL (ref 12–15.9)
HGB BLDA-MCNC: 15.6 G/DL (ref 14–18)
HGB BLDA-MCNC: 15.7 G/DL (ref 14–18)
INHALED O2 CONCENTRATION: 21 %
INHALED O2 CONCENTRATION: 21 %
IPAP: 0
IPAP: 0
LDH SERPL-CCNC: 322 U/L (ref 135–214)
LYMPHOCYTES # BLD MANUAL: 1.47 10*3/MM3 (ref 0.7–3.1)
LYMPHOCYTES NFR BLD MANUAL: 11 % (ref 19.6–45.3)
LYMPHOCYTES NFR BLD MANUAL: 7 % (ref 5–12)
MCH RBC QN AUTO: 32.9 PG (ref 26.6–33)
MCHC RBC AUTO-ENTMCNC: 34.7 G/DL (ref 31.5–35.7)
MCV RBC AUTO: 94.8 FL (ref 79–97)
METAMYELOCYTES NFR BLD MANUAL: 2 % (ref 0–0)
MODALITY: ABNORMAL
MODALITY: ABNORMAL
MONOCYTES # BLD AUTO: 0.94 10*3/MM3 (ref 0.1–0.9)
NEUTROPHILS # BLD AUTO: 10.72 10*3/MM3 (ref 1.7–7)
NEUTROPHILS NFR BLD MANUAL: 70 % (ref 42.7–76)
NEUTS BAND NFR BLD MANUAL: 10 % (ref 0–5)
NOTE: ABNORMAL
NOTE: ABNORMAL
PAW @ PEAK INSP FLOW SETTING VENT: 0 CMH2O
PAW @ PEAK INSP FLOW SETTING VENT: 0 CMH2O
PCO2 BLDCOA: 52.7 MMHG (ref 43.3–54.9)
PCO2 BLDCOV: 37.4 MM HG (ref 28–40)
PH BLDCOA: 7.28 PH UNITS (ref 7.22–7.3)
PH BLDCOV: 7.37 PH UNITS (ref 7.31–7.37)
PLAT MORPH BLD: NORMAL
PLATELET # BLD AUTO: 177 10*3/MM3 (ref 140–450)
PMV BLD AUTO: 10.3 FL (ref 6–12)
PO2 BLDCOA: 22.5 MMHG (ref 11.5–43.3)
PO2 BLDCOV: 37 MM HG (ref 21–31)
RBC # BLD AUTO: 2.52 10*6/MM3 (ref 3.77–5.28)
SAO2 % BLDCOA: 44.3 %
SAO2 % BLDCOA: ABNORMAL %
SAO2 % BLDCOV: 82.4 %
TOTAL RATE: 0 BREATHS/MINUTE
TOTAL RATE: 0 BREATHS/MINUTE
URATE SERPL-MCNC: 4.9 MG/DL (ref 2.4–5.7)
VENTILATOR MODE: ABNORMAL
VENTILATOR MODE: ABNORMAL
WBC # BLD AUTO: 13.4 10*3/MM3 (ref 3.4–10.8)
WBC MORPH BLD: NORMAL

## 2021-08-24 PROCEDURE — 25010000002 GENTAMICIN PER 80 MG: Performed by: OBSTETRICS & GYNECOLOGY

## 2021-08-24 PROCEDURE — 0 CEFAZOLIN IN DEXTROSE 2-4 GM/100ML-% SOLUTION: Performed by: OBSTETRICS & GYNECOLOGY

## 2021-08-24 PROCEDURE — 84550 ASSAY OF BLOOD/URIC ACID: CPT | Performed by: OBSTETRICS & GYNECOLOGY

## 2021-08-24 PROCEDURE — 59510 CESAREAN DELIVERY: CPT | Performed by: OBSTETRICS & GYNECOLOGY

## 2021-08-24 PROCEDURE — 0503F POSTPARTUM CARE VISIT: CPT | Performed by: NURSE PRACTITIONER

## 2021-08-24 PROCEDURE — 82805 BLOOD GASES W/O2 SATURATION: CPT

## 2021-08-24 PROCEDURE — 59025 FETAL NON-STRESS TEST: CPT

## 2021-08-24 PROCEDURE — 82565 ASSAY OF CREATININE: CPT | Performed by: OBSTETRICS & GYNECOLOGY

## 2021-08-24 PROCEDURE — 84450 TRANSFERASE (AST) (SGOT): CPT | Performed by: OBSTETRICS & GYNECOLOGY

## 2021-08-24 PROCEDURE — 59514 CESAREAN DELIVERY ONLY: CPT | Performed by: OBSTETRICS & GYNECOLOGY

## 2021-08-24 PROCEDURE — 0 MORPHINE PER 10 MG: Performed by: ANESTHESIOLOGY

## 2021-08-24 PROCEDURE — 25010000002 MIDAZOLAM PER 1 MG: Performed by: ANESTHESIOLOGY

## 2021-08-24 PROCEDURE — 25010000002 METOCLOPRAMIDE PER 10 MG: Performed by: ANESTHESIOLOGY

## 2021-08-24 PROCEDURE — 84075 ASSAY ALKALINE PHOSPHATASE: CPT | Performed by: OBSTETRICS & GYNECOLOGY

## 2021-08-24 PROCEDURE — 25010000002 FENTANYL CITRATE (PF) 50 MCG/ML SOLUTION: Performed by: NURSE ANESTHETIST, CERTIFIED REGISTERED

## 2021-08-24 PROCEDURE — 85027 COMPLETE CBC AUTOMATED: CPT | Performed by: OBSTETRICS & GYNECOLOGY

## 2021-08-24 PROCEDURE — 25010000002 ONDANSETRON PER 1 MG: Performed by: ANESTHESIOLOGY

## 2021-08-24 PROCEDURE — 82247 BILIRUBIN TOTAL: CPT | Performed by: OBSTETRICS & GYNECOLOGY

## 2021-08-24 PROCEDURE — 85007 BL SMEAR W/DIFF WBC COUNT: CPT | Performed by: OBSTETRICS & GYNECOLOGY

## 2021-08-24 PROCEDURE — 25010000003 MORPHINE PER 10 MG: Performed by: ANESTHESIOLOGY

## 2021-08-24 PROCEDURE — 25010000002 KETOROLAC TROMETHAMINE PER 15 MG: Performed by: OBSTETRICS & GYNECOLOGY

## 2021-08-24 PROCEDURE — 83615 LACTATE (LD) (LDH) ENZYME: CPT | Performed by: OBSTETRICS & GYNECOLOGY

## 2021-08-24 PROCEDURE — 84460 ALANINE AMINO (ALT) (SGPT): CPT | Performed by: OBSTETRICS & GYNECOLOGY

## 2021-08-24 PROCEDURE — 25010000003 CEFAZOLIN IN DEXTROSE 2-4 GM/100ML-% SOLUTION: Performed by: OBSTETRICS & GYNECOLOGY

## 2021-08-24 RX ORDER — KETOROLAC TROMETHAMINE 15 MG/ML
15 INJECTION, SOLUTION INTRAMUSCULAR; INTRAVENOUS EVERY 6 HOURS
Status: COMPLETED | OUTPATIENT
Start: 2021-08-24 | End: 2021-08-25

## 2021-08-24 RX ORDER — ONDANSETRON 2 MG/ML
INJECTION INTRAMUSCULAR; INTRAVENOUS AS NEEDED
Status: DISCONTINUED | OUTPATIENT
Start: 2021-08-24 | End: 2021-08-24 | Stop reason: SURG

## 2021-08-24 RX ORDER — OXYTOCIN-SODIUM CHLORIDE 0.9% IV SOLN 30 UNIT/500ML 30-0.9/5 UT/ML-%
SOLUTION INTRAVENOUS AS NEEDED
Status: DISCONTINUED | OUTPATIENT
Start: 2021-08-24 | End: 2021-08-24 | Stop reason: SURG

## 2021-08-24 RX ORDER — METHYLERGONOVINE MALEATE 0.2 MG/ML
200 INJECTION INTRAVENOUS ONCE AS NEEDED
Status: DISCONTINUED | OUTPATIENT
Start: 2021-08-24 | End: 2021-08-24 | Stop reason: HOSPADM

## 2021-08-24 RX ORDER — ONDANSETRON 2 MG/ML
4 INJECTION INTRAMUSCULAR; INTRAVENOUS EVERY 6 HOURS PRN
Status: DISCONTINUED | OUTPATIENT
Start: 2021-08-24 | End: 2021-08-24 | Stop reason: HOSPADM

## 2021-08-24 RX ORDER — ACETAMINOPHEN 325 MG/1
650 TABLET ORAL EVERY 6 HOURS
Status: DISCONTINUED | OUTPATIENT
Start: 2021-08-25 | End: 2021-08-27 | Stop reason: HOSPADM

## 2021-08-24 RX ORDER — FENTANYL CITRATE 50 UG/ML
INJECTION, SOLUTION INTRAMUSCULAR; INTRAVENOUS AS NEEDED
Status: DISCONTINUED | OUTPATIENT
Start: 2021-08-24 | End: 2021-08-24 | Stop reason: SURG

## 2021-08-24 RX ORDER — KETOROLAC TROMETHAMINE 30 MG/ML
30 INJECTION, SOLUTION INTRAMUSCULAR; INTRAVENOUS ONCE
Status: COMPLETED | OUTPATIENT
Start: 2021-08-24 | End: 2021-08-24

## 2021-08-24 RX ORDER — MORPHINE SULFATE 0.5 MG/ML
INJECTION, SOLUTION EPIDURAL; INTRATHECAL; INTRAVENOUS AS NEEDED
Status: DISCONTINUED | OUTPATIENT
Start: 2021-08-24 | End: 2021-08-24 | Stop reason: SURG

## 2021-08-24 RX ORDER — OXYTOCIN-SODIUM CHLORIDE 0.9% IV SOLN 30 UNIT/500ML 30-0.9/5 UT/ML-%
85 SOLUTION INTRAVENOUS ONCE
Status: DISCONTINUED | OUTPATIENT
Start: 2021-08-24 | End: 2021-08-24 | Stop reason: HOSPADM

## 2021-08-24 RX ORDER — OXYCODONE HYDROCHLORIDE 5 MG/1
10 TABLET ORAL EVERY 6 HOURS PRN
Status: DISCONTINUED | OUTPATIENT
Start: 2021-08-24 | End: 2021-08-27 | Stop reason: HOSPADM

## 2021-08-24 RX ORDER — DOCUSATE SODIUM 100 MG/1
100 CAPSULE, LIQUID FILLED ORAL 2 TIMES DAILY PRN
Status: DISCONTINUED | OUTPATIENT
Start: 2021-08-24 | End: 2021-08-25 | Stop reason: SDUPTHER

## 2021-08-24 RX ORDER — CARBOPROST TROMETHAMINE 250 UG/ML
250 INJECTION, SOLUTION INTRAMUSCULAR AS NEEDED
Status: DISCONTINUED | OUTPATIENT
Start: 2021-08-24 | End: 2021-08-24 | Stop reason: HOSPADM

## 2021-08-24 RX ORDER — MISOPROSTOL 200 UG/1
800 TABLET ORAL AS NEEDED
Status: DISCONTINUED | OUTPATIENT
Start: 2021-08-24 | End: 2021-08-24 | Stop reason: HOSPADM

## 2021-08-24 RX ORDER — OXYTOCIN-SODIUM CHLORIDE 0.9% IV SOLN 30 UNIT/500ML 30-0.9/5 UT/ML-%
650 SOLUTION INTRAVENOUS ONCE
Status: DISCONTINUED | OUTPATIENT
Start: 2021-08-24 | End: 2021-08-24 | Stop reason: HOSPADM

## 2021-08-24 RX ORDER — CEFAZOLIN SODIUM 2 G/100ML
2 INJECTION, SOLUTION INTRAVENOUS EVERY 8 HOURS
Status: COMPLETED | OUTPATIENT
Start: 2021-08-24 | End: 2021-08-25

## 2021-08-24 RX ORDER — ONDANSETRON 4 MG/1
4 TABLET, FILM COATED ORAL EVERY 6 HOURS PRN
Status: DISCONTINUED | OUTPATIENT
Start: 2021-08-24 | End: 2021-08-24 | Stop reason: HOSPADM

## 2021-08-24 RX ORDER — ALUMINA, MAGNESIA, AND SIMETHICONE 2400; 2400; 240 MG/30ML; MG/30ML; MG/30ML
15 SUSPENSION ORAL EVERY 4 HOURS PRN
Status: DISCONTINUED | OUTPATIENT
Start: 2021-08-24 | End: 2021-08-27 | Stop reason: HOSPADM

## 2021-08-24 RX ORDER — METOCLOPRAMIDE HYDROCHLORIDE 5 MG/ML
INJECTION INTRAMUSCULAR; INTRAVENOUS AS NEEDED
Status: DISCONTINUED | OUTPATIENT
Start: 2021-08-24 | End: 2021-08-24 | Stop reason: SURG

## 2021-08-24 RX ORDER — IBUPROFEN 600 MG/1
600 TABLET ORAL EVERY 6 HOURS
Status: DISCONTINUED | OUTPATIENT
Start: 2021-08-25 | End: 2021-08-27 | Stop reason: HOSPADM

## 2021-08-24 RX ORDER — CLINDAMYCIN PHOSPHATE 900 MG/50ML
900 INJECTION INTRAVENOUS EVERY 8 HOURS
Status: COMPLETED | OUTPATIENT
Start: 2021-08-24 | End: 2021-08-24

## 2021-08-24 RX ORDER — MIDAZOLAM HYDROCHLORIDE 1 MG/ML
INJECTION INTRAMUSCULAR; INTRAVENOUS AS NEEDED
Status: DISCONTINUED | OUTPATIENT
Start: 2021-08-24 | End: 2021-08-24 | Stop reason: SURG

## 2021-08-24 RX ORDER — HYDROCORTISONE 25 MG/G
1 CREAM TOPICAL AS NEEDED
Status: DISCONTINUED | OUTPATIENT
Start: 2021-08-24 | End: 2021-08-27 | Stop reason: HOSPADM

## 2021-08-24 RX ORDER — FAMOTIDINE 10 MG/ML
INJECTION, SOLUTION INTRAVENOUS AS NEEDED
Status: DISCONTINUED | OUTPATIENT
Start: 2021-08-24 | End: 2021-08-24 | Stop reason: SURG

## 2021-08-24 RX ORDER — BUPIVACAINE HYDROCHLORIDE 7.5 MG/ML
INJECTION, SOLUTION INTRASPINAL AS NEEDED
Status: DISCONTINUED | OUTPATIENT
Start: 2021-08-24 | End: 2021-08-24 | Stop reason: SURG

## 2021-08-24 RX ORDER — PRENATAL VIT/IRON FUM/FOLIC AC 27MG-0.8MG
1 TABLET ORAL DAILY
Status: DISCONTINUED | OUTPATIENT
Start: 2021-08-24 | End: 2021-08-27 | Stop reason: HOSPADM

## 2021-08-24 RX ORDER — OXYCODONE HYDROCHLORIDE 5 MG/1
5 TABLET ORAL EVERY 6 HOURS PRN
Status: DISCONTINUED | OUTPATIENT
Start: 2021-08-24 | End: 2021-08-27 | Stop reason: HOSPADM

## 2021-08-24 RX ORDER — ONDANSETRON 2 MG/ML
4 INJECTION INTRAMUSCULAR; INTRAVENOUS EVERY 6 HOURS PRN
Status: DISCONTINUED | OUTPATIENT
Start: 2021-08-24 | End: 2021-08-27 | Stop reason: HOSPADM

## 2021-08-24 RX ORDER — ACETAMINOPHEN 500 MG
1000 TABLET ORAL EVERY 6 HOURS
Status: COMPLETED | OUTPATIENT
Start: 2021-08-24 | End: 2021-08-25

## 2021-08-24 RX ORDER — CALCIUM CARBONATE 200(500)MG
1 TABLET,CHEWABLE ORAL EVERY 4 HOURS PRN
Status: DISCONTINUED | OUTPATIENT
Start: 2021-08-24 | End: 2021-08-27 | Stop reason: HOSPADM

## 2021-08-24 RX ORDER — LANOLIN
CREAM (ML) TOPICAL
Status: DISCONTINUED | OUTPATIENT
Start: 2021-08-24 | End: 2021-08-27 | Stop reason: HOSPADM

## 2021-08-24 RX ORDER — CARBOPROST TROMETHAMINE 250 UG/ML
250 INJECTION, SOLUTION INTRAMUSCULAR AS NEEDED
Status: DISCONTINUED | OUTPATIENT
Start: 2021-08-24 | End: 2021-08-27 | Stop reason: HOSPADM

## 2021-08-24 RX ORDER — SIMETHICONE 80 MG
80 TABLET,CHEWABLE ORAL 4 TIMES DAILY PRN
Status: DISCONTINUED | OUTPATIENT
Start: 2021-08-24 | End: 2021-08-27 | Stop reason: HOSPADM

## 2021-08-24 RX ADMIN — KETOROLAC TROMETHAMINE 15 MG: 15 INJECTION, SOLUTION INTRAMUSCULAR; INTRAVENOUS at 20:55

## 2021-08-24 RX ADMIN — MIDAZOLAM 1 MG: 1 INJECTION INTRAMUSCULAR; INTRAVENOUS at 00:37

## 2021-08-24 RX ADMIN — CLINDAMYCIN PHOSPHATE 900 MG: 900 INJECTION, SOLUTION INTRAVENOUS at 22:37

## 2021-08-24 RX ADMIN — BUPIVACAINE HYDROCHLORIDE IN DEXTROSE 1.4 ML: 7.5 INJECTION, SOLUTION SUBARACHNOID at 00:13

## 2021-08-24 RX ADMIN — OXYCODONE 5 MG: 5 TABLET ORAL at 02:24

## 2021-08-24 RX ADMIN — ACETAMINOPHEN 1000 MG: 500 TABLET, FILM COATED ORAL at 11:16

## 2021-08-24 RX ADMIN — SIMETHICONE 80 MG: 80 TABLET, CHEWABLE ORAL at 17:32

## 2021-08-24 RX ADMIN — OXYTOCIN 500 ML: 10 INJECTION INTRAVENOUS at 00:32

## 2021-08-24 RX ADMIN — ACETAMINOPHEN 1000 MG: 500 TABLET, FILM COATED ORAL at 06:09

## 2021-08-24 RX ADMIN — MORPHINE SULFATE 4.85 MG: 0.5 INJECTION, SOLUTION EPIDURAL; INTRATHECAL; INTRAVENOUS at 00:53

## 2021-08-24 RX ADMIN — MORPHINE SULFATE 5 MG: 0.5 INJECTION, SOLUTION EPIDURAL; INTRATHECAL; INTRAVENOUS at 00:50

## 2021-08-24 RX ADMIN — MIDAZOLAM 1 MG: 1 INJECTION INTRAMUSCULAR; INTRAVENOUS at 00:35

## 2021-08-24 RX ADMIN — ACETAMINOPHEN 1000 MG: 500 TABLET, FILM COATED ORAL at 17:31

## 2021-08-24 RX ADMIN — SODIUM CITRATE AND CITRIC ACID MONOHYDRATE 30 ML: 500; 334 SOLUTION ORAL at 00:00

## 2021-08-24 RX ADMIN — METOCLOPRAMIDE 10 MG: 5 INJECTION, SOLUTION INTRAMUSCULAR; INTRAVENOUS at 00:08

## 2021-08-24 RX ADMIN — KETOROLAC TROMETHAMINE 15 MG: 15 INJECTION, SOLUTION INTRAMUSCULAR; INTRAVENOUS at 14:41

## 2021-08-24 RX ADMIN — SODIUM CHLORIDE, POTASSIUM CHLORIDE, SODIUM LACTATE AND CALCIUM CHLORIDE: 600; 310; 30; 20 INJECTION, SOLUTION INTRAVENOUS at 01:01

## 2021-08-24 RX ADMIN — OXYTOCIN 500 ML: 10 INJECTION INTRAVENOUS at 00:47

## 2021-08-24 RX ADMIN — KETOROLAC TROMETHAMINE 15 MG: 15 INJECTION, SOLUTION INTRAMUSCULAR; INTRAVENOUS at 08:33

## 2021-08-24 RX ADMIN — CLINDAMYCIN PHOSPHATE 900 MG: 900 INJECTION, SOLUTION INTRAVENOUS at 13:08

## 2021-08-24 RX ADMIN — CLINDAMYCIN PHOSPHATE 900 MG: 900 INJECTION, SOLUTION INTRAVENOUS at 05:57

## 2021-08-24 RX ADMIN — CEFAZOLIN SODIUM 2 G: 2 INJECTION, SOLUTION INTRAVENOUS at 15:00

## 2021-08-24 RX ADMIN — MIDAZOLAM 2 MG: 1 INJECTION INTRAMUSCULAR; INTRAVENOUS at 00:32

## 2021-08-24 RX ADMIN — SODIUM CHLORIDE, POTASSIUM CHLORIDE, SODIUM LACTATE AND CALCIUM CHLORIDE: 600; 310; 30; 20 INJECTION, SOLUTION INTRAVENOUS at 00:47

## 2021-08-24 RX ADMIN — FENTANYL CITRATE 20 MCG: 50 INJECTION, SOLUTION INTRAMUSCULAR; INTRAVENOUS at 00:13

## 2021-08-24 RX ADMIN — GENTAMICIN SULFATE 350 MG: 40 INJECTION, SOLUTION INTRAMUSCULAR; INTRAVENOUS at 00:16

## 2021-08-24 RX ADMIN — ONDANSETRON 4 MG: 2 INJECTION INTRAMUSCULAR; INTRAVENOUS at 00:08

## 2021-08-24 RX ADMIN — MORPHINE SULFATE 0.15 MG: 0.5 INJECTION, SOLUTION EPIDURAL; INTRATHECAL; INTRAVENOUS at 00:13

## 2021-08-24 RX ADMIN — MIDAZOLAM 1 MG: 1 INJECTION INTRAMUSCULAR; INTRAVENOUS at 00:08

## 2021-08-24 RX ADMIN — FAMOTIDINE 20 MG: 10 INJECTION, SOLUTION INTRAVENOUS at 00:08

## 2021-08-24 RX ADMIN — CEFAZOLIN SODIUM 2 G: 2 INJECTION, SOLUTION INTRAVENOUS at 08:33

## 2021-08-24 RX ADMIN — FENTANYL CITRATE 80 MCG: 50 INJECTION, SOLUTION INTRAMUSCULAR; INTRAVENOUS at 00:48

## 2021-08-24 RX ADMIN — KETOROLAC TROMETHAMINE 30 MG: 30 INJECTION, SOLUTION INTRAMUSCULAR; INTRAVENOUS at 01:54

## 2021-08-24 NOTE — ANESTHESIA PROCEDURE NOTES
Spinal Block      Patient reassessed immediately prior to procedure    Patient location during procedure: OB  Indication:procedure for pain  Performed By  Anesthesiologist: Shelia Morgan DO  Preanesthetic Checklist  Completed: patient identified, IV checked, risks and benefits discussed, surgical consent, monitors and equipment checked, pre-op evaluation and timeout performed  Spinal Block Prep:  Patient Position:sitting  Sterile Tech:cap, gloves, mask and sterile barriers  Prep:Chloraprep  Patient Monitoring:blood pressure monitoring and EKG  Spinal Block Procedure  Approach:midline  Guidance:palpation technique  Location:L3-L4  Needle Type:Clare  Needle Gauge:25 G  Placement of Spinal needle event:cerebrospinal fluid aspirated  Paresthesia: no  Fluid Appearance:clear     Post Assessment  Patient Tolerance:patient tolerated the procedure well with no apparent complications  Complications no

## 2021-08-24 NOTE — ANESTHESIA POSTPROCEDURE EVALUATION
Patient: Sima Booker    Procedure Summary     Date: 21 Room / Location: [facility] LABOR DELIVERY 2 /  [facility] LABOR DELIVERY    Anesthesia Start: 1030 Anesthesia Stop: 21 0116    Procedure:  SECTION PRIMARY (N/A Abdomen) Diagnosis:     Surgeons: Stef Lindsey MD Provider: Shelia Morgan DO    Anesthesia Type: epidural ASA Status: 3          Anesthesia Type: epidural    Vitals  Vitals Value Taken Time   /62 21 1200   Temp 98.5 °F (36.9 °C) 21 1200   Pulse 102 21 1200   Resp 20 21 1200   SpO2 97 % 21 0226   Vitals shown include unvalidated device data.        Post Anesthesia Care and Evaluation    Patient location during evaluation: bedside  Patient participation: complete - patient participated  Level of consciousness: awake and alert  Pain management: adequate  Airway patency: patent  Anesthetic complications: No anesthetic complications    Cardiovascular status: acceptable  Respiratory status: acceptable  Hydration status: acceptable  Post Neuraxial Block status: Motor and sensory function returned to baseline and No signs or symptoms of PDPH

## 2021-08-24 NOTE — ANESTHESIA POSTPROCEDURE EVALUATION
Patient: Sima Booker    Procedure Summary     Date: 21 Room / Location: [facility] LABOR DELIVERY 2 /  [facility] LABOR DELIVERY    Anesthesia Start: 1030 Anesthesia Stop:     Procedure:  SECTION PRIMARY (N/A Abdomen) Diagnosis:     Surgeons: Stef Lindsey MD Provider: Shelia Morgan DO    Anesthesia Type: epidural ASA Status: 3          Anesthesia Type: epidural    Vitals  Vitals Value Taken Time   /72 21 2350   Temp 98.5 °F (36.9 °C) 21 2330   Pulse 101 21 2350   Resp 20 21 2154   SpO2     Vitals shown include unvalidated device data.    RR 16  O2SAT 94%  T 097.9  /71      Post Anesthesia Care and Evaluation    Patient location during evaluation: bedside  Patient participation: complete - patient participated  Level of consciousness: awake and awake and alert  Pain score: 0  Pain management: satisfactory to patient  Airway patency: patent  Anesthetic complications: No anesthetic complications  PONV Status: none  Cardiovascular status: acceptable, hemodynamically stable and stable  Respiratory status: acceptable  Hydration status: stable  Post Neuraxial Block status: No signs or symptoms of PDPH

## 2021-08-25 LAB
ALP SERPL-CCNC: 79 U/L (ref 39–117)
ALT SERPL W P-5'-P-CCNC: 10 U/L (ref 1–33)
AST SERPL-CCNC: 26 U/L (ref 1–32)
BASOPHILS # BLD AUTO: 0.04 10*3/MM3 (ref 0–0.2)
BASOPHILS NFR BLD AUTO: 0.3 % (ref 0–1.5)
BILIRUB SERPL-MCNC: 0.2 MG/DL (ref 0–1.2)
CREAT SERPL-MCNC: 0.59 MG/DL (ref 0.57–1)
DEPRECATED RDW RBC AUTO: 50.2 FL (ref 37–54)
EOSINOPHIL # BLD AUTO: 0.19 10*3/MM3 (ref 0–0.4)
EOSINOPHIL NFR BLD AUTO: 1.3 % (ref 0.3–6.2)
ERYTHROCYTE [DISTWIDTH] IN BLOOD BY AUTOMATED COUNT: 14.6 % (ref 12.3–15.4)
HCT VFR BLD AUTO: 25.6 % (ref 34–46.6)
HGB BLD-MCNC: 8.6 G/DL (ref 12–15.9)
IMM GRANULOCYTES # BLD AUTO: 0.15 10*3/MM3 (ref 0–0.05)
IMM GRANULOCYTES NFR BLD AUTO: 1.1 % (ref 0–0.5)
LDH SERPL-CCNC: 261 U/L (ref 135–214)
LYMPHOCYTES # BLD AUTO: 1.76 10*3/MM3 (ref 0.7–3.1)
LYMPHOCYTES NFR BLD AUTO: 12.3 % (ref 19.6–45.3)
MCH RBC QN AUTO: 32.1 PG (ref 26.6–33)
MCHC RBC AUTO-ENTMCNC: 33.6 G/DL (ref 31.5–35.7)
MCV RBC AUTO: 95.5 FL (ref 79–97)
MONOCYTES # BLD AUTO: 1.72 10*3/MM3 (ref 0.1–0.9)
MONOCYTES NFR BLD AUTO: 12.1 % (ref 5–12)
NEUTROPHILS NFR BLD AUTO: 10.4 10*3/MM3 (ref 1.7–7)
NEUTROPHILS NFR BLD AUTO: 72.9 % (ref 42.7–76)
NRBC BLD AUTO-RTO: 0 /100 WBC (ref 0–0.2)
PLATELET # BLD AUTO: 187 10*3/MM3 (ref 140–450)
PMV BLD AUTO: 10.2 FL (ref 6–12)
RBC # BLD AUTO: 2.68 10*6/MM3 (ref 3.77–5.28)
URATE SERPL-MCNC: 5.4 MG/DL (ref 2.4–5.7)
WBC # BLD AUTO: 14.26 10*3/MM3 (ref 3.4–10.8)

## 2021-08-25 PROCEDURE — 25010000002 KETOROLAC TROMETHAMINE PER 15 MG: Performed by: OBSTETRICS & GYNECOLOGY

## 2021-08-25 PROCEDURE — 25010000003 CEFAZOLIN IN DEXTROSE 2-4 GM/100ML-% SOLUTION: Performed by: OBSTETRICS & GYNECOLOGY

## 2021-08-25 PROCEDURE — 84550 ASSAY OF BLOOD/URIC ACID: CPT | Performed by: OBSTETRICS & GYNECOLOGY

## 2021-08-25 PROCEDURE — 83615 LACTATE (LD) (LDH) ENZYME: CPT | Performed by: OBSTETRICS & GYNECOLOGY

## 2021-08-25 PROCEDURE — 84075 ASSAY ALKALINE PHOSPHATASE: CPT | Performed by: OBSTETRICS & GYNECOLOGY

## 2021-08-25 PROCEDURE — 82565 ASSAY OF CREATININE: CPT | Performed by: OBSTETRICS & GYNECOLOGY

## 2021-08-25 PROCEDURE — 0503F POSTPARTUM CARE VISIT: CPT | Performed by: NURSE PRACTITIONER

## 2021-08-25 PROCEDURE — 84450 TRANSFERASE (AST) (SGOT): CPT | Performed by: OBSTETRICS & GYNECOLOGY

## 2021-08-25 PROCEDURE — 85025 COMPLETE CBC W/AUTO DIFF WBC: CPT | Performed by: OBSTETRICS & GYNECOLOGY

## 2021-08-25 PROCEDURE — 82247 BILIRUBIN TOTAL: CPT | Performed by: OBSTETRICS & GYNECOLOGY

## 2021-08-25 PROCEDURE — 0 CEFAZOLIN IN DEXTROSE 2-4 GM/100ML-% SOLUTION: Performed by: OBSTETRICS & GYNECOLOGY

## 2021-08-25 PROCEDURE — 84460 ALANINE AMINO (ALT) (SGPT): CPT | Performed by: OBSTETRICS & GYNECOLOGY

## 2021-08-25 RX ORDER — FERROUS SULFATE 325(65) MG
325 TABLET ORAL 2 TIMES DAILY WITH MEALS
Status: DISCONTINUED | OUTPATIENT
Start: 2021-08-25 | End: 2021-08-27 | Stop reason: HOSPADM

## 2021-08-25 RX ORDER — POLYETHYLENE GLYCOL 3350 17 G/17G
17 POWDER, FOR SOLUTION ORAL DAILY
Status: DISCONTINUED | OUTPATIENT
Start: 2021-08-25 | End: 2021-08-27 | Stop reason: HOSPADM

## 2021-08-25 RX ORDER — DOCUSATE SODIUM 100 MG/1
100 CAPSULE, LIQUID FILLED ORAL 2 TIMES DAILY
Status: DISCONTINUED | OUTPATIENT
Start: 2021-08-25 | End: 2021-08-27 | Stop reason: HOSPADM

## 2021-08-25 RX ADMIN — ACETAMINOPHEN 1000 MG: 500 TABLET, FILM COATED ORAL at 00:48

## 2021-08-25 RX ADMIN — OXYCODONE 5 MG: 5 TABLET ORAL at 20:18

## 2021-08-25 RX ADMIN — ACETAMINOPHEN 650 MG: 325 TABLET, FILM COATED ORAL at 12:37

## 2021-08-25 RX ADMIN — OXYCODONE 5 MG: 5 TABLET ORAL at 03:48

## 2021-08-25 RX ADMIN — IBUPROFEN 600 MG: 600 TABLET, FILM COATED ORAL at 20:18

## 2021-08-25 RX ADMIN — ACETAMINOPHEN 650 MG: 325 TABLET, FILM COATED ORAL at 17:48

## 2021-08-25 RX ADMIN — PRENATAL VITAMINS-IRON FUMARATE 27 MG IRON-FOLIC ACID 0.8 MG TABLET 1 TABLET: at 10:32

## 2021-08-25 RX ADMIN — DOCUSATE SODIUM 100 MG: 100 CAPSULE, LIQUID FILLED ORAL at 10:32

## 2021-08-25 RX ADMIN — Medication 325 MG: at 17:48

## 2021-08-25 RX ADMIN — Medication 325 MG: at 10:32

## 2021-08-25 RX ADMIN — IBUPROFEN 600 MG: 600 TABLET, FILM COATED ORAL at 10:32

## 2021-08-25 RX ADMIN — CEFAZOLIN SODIUM 2 G: 2 INJECTION, SOLUTION INTRAVENOUS at 00:46

## 2021-08-25 RX ADMIN — KETOROLAC TROMETHAMINE 15 MG: 15 INJECTION, SOLUTION INTRAMUSCULAR; INTRAVENOUS at 03:41

## 2021-08-25 RX ADMIN — ACETAMINOPHEN 650 MG: 325 TABLET, FILM COATED ORAL at 06:42

## 2021-08-25 RX ADMIN — IBUPROFEN 600 MG: 600 TABLET, FILM COATED ORAL at 15:39

## 2021-08-25 RX ADMIN — DOCUSATE SODIUM 100 MG: 100 CAPSULE, LIQUID FILLED ORAL at 20:18

## 2021-08-26 RX ADMIN — SIMETHICONE 80 MG: 80 TABLET, CHEWABLE ORAL at 12:19

## 2021-08-26 RX ADMIN — IBUPROFEN 600 MG: 600 TABLET, FILM COATED ORAL at 02:44

## 2021-08-26 RX ADMIN — ACETAMINOPHEN 650 MG: 325 TABLET, FILM COATED ORAL at 12:19

## 2021-08-26 RX ADMIN — Medication 325 MG: at 09:49

## 2021-08-26 RX ADMIN — POLYETHYLENE GLYCOL 3350 17 G: 17 POWDER, FOR SOLUTION ORAL at 09:49

## 2021-08-26 RX ADMIN — ACETAMINOPHEN 650 MG: 325 TABLET, FILM COATED ORAL at 06:34

## 2021-08-26 RX ADMIN — ACETAMINOPHEN 650 MG: 325 TABLET, FILM COATED ORAL at 00:01

## 2021-08-26 RX ADMIN — IBUPROFEN 600 MG: 600 TABLET, FILM COATED ORAL at 15:32

## 2021-08-26 RX ADMIN — PRENATAL VITAMINS-IRON FUMARATE 27 MG IRON-FOLIC ACID 0.8 MG TABLET 1 TABLET: at 09:49

## 2021-08-26 RX ADMIN — IBUPROFEN 600 MG: 600 TABLET, FILM COATED ORAL at 20:58

## 2021-08-26 RX ADMIN — ACETAMINOPHEN 650 MG: 325 TABLET, FILM COATED ORAL at 18:18

## 2021-08-26 RX ADMIN — OXYCODONE 10 MG: 5 TABLET ORAL at 02:44

## 2021-08-26 RX ADMIN — DOCUSATE SODIUM 100 MG: 100 CAPSULE, LIQUID FILLED ORAL at 20:58

## 2021-08-26 RX ADMIN — IBUPROFEN 600 MG: 600 TABLET, FILM COATED ORAL at 09:49

## 2021-08-26 RX ADMIN — Medication 325 MG: at 19:21

## 2021-08-26 RX ADMIN — DOCUSATE SODIUM 100 MG: 100 CAPSULE, LIQUID FILLED ORAL at 09:49

## 2021-08-26 RX ADMIN — SIMETHICONE 80 MG: 80 TABLET, CHEWABLE ORAL at 18:18

## 2021-08-26 RX ADMIN — OXYCODONE 10 MG: 5 TABLET ORAL at 22:14

## 2021-08-27 VITALS
DIASTOLIC BLOOD PRESSURE: 89 MMHG | TEMPERATURE: 98.5 F | HEART RATE: 82 BPM | OXYGEN SATURATION: 97 % | SYSTOLIC BLOOD PRESSURE: 132 MMHG | RESPIRATION RATE: 16 BRPM

## 2021-08-27 PROBLEM — Z34.90 CURRENTLY PREGNANT: Status: RESOLVED | Noted: 2021-08-22 | Resolved: 2021-08-27

## 2021-08-27 PROBLEM — N20.0 KIDNEY STONE COMPLICATING PREGNANCY, THIRD TRIMESTER: Status: RESOLVED | Noted: 2021-08-18 | Resolved: 2021-08-27

## 2021-08-27 PROBLEM — R73.09 ELEVATED GLUCOSE TOLERANCE TEST: Status: RESOLVED | Noted: 2021-08-02 | Resolved: 2021-08-27

## 2021-08-27 PROBLEM — O13.9 GESTATIONAL HYPERTENSION: Status: RESOLVED | Noted: 2021-07-31 | Resolved: 2021-08-27

## 2021-08-27 PROBLEM — O24.410 GDM (GESTATIONAL DIABETES MELLITUS), CLASS A1: Status: RESOLVED | Noted: 2021-08-12 | Resolved: 2021-08-27

## 2021-08-27 PROBLEM — O99.891 KIDNEY STONE COMPLICATING PREGNANCY, THIRD TRIMESTER: Status: RESOLVED | Noted: 2021-08-18 | Resolved: 2021-08-27

## 2021-08-27 PROBLEM — O33.9 PELVIC INADEQUACY IN PREGNANCY: Status: RESOLVED | Noted: 2021-08-24 | Resolved: 2021-08-27

## 2021-08-27 PROBLEM — O09.819 PREGNANCY RESULTING FROM IN VITRO FERTILIZATION: Status: RESOLVED | Noted: 2021-02-08 | Resolved: 2021-08-27

## 2021-08-27 PROBLEM — Z34.90 PREGNANCY: Status: RESOLVED | Noted: 2021-02-08 | Resolved: 2021-08-27

## 2021-08-27 PROBLEM — O26.833 KIDNEY STONE COMPLICATING PREGNANCY, THIRD TRIMESTER: Status: RESOLVED | Noted: 2021-08-18 | Resolved: 2021-08-27

## 2021-08-27 PROCEDURE — 0503F POSTPARTUM CARE VISIT: CPT | Performed by: NURSE PRACTITIONER

## 2021-08-27 RX ORDER — ONDANSETRON 2 MG/ML
4 INJECTION INTRAMUSCULAR; INTRAVENOUS ONCE AS NEEDED
Status: DISCONTINUED | OUTPATIENT
Start: 2021-08-27 | End: 2021-08-27 | Stop reason: HOSPADM

## 2021-08-27 RX ORDER — DIPHENHYDRAMINE HYDROCHLORIDE 50 MG/ML
25 INJECTION INTRAMUSCULAR; INTRAVENOUS ONCE AS NEEDED
Status: DISCONTINUED | OUTPATIENT
Start: 2021-08-27 | End: 2021-08-27 | Stop reason: HOSPADM

## 2021-08-27 RX ORDER — NALOXONE HCL 0.4 MG/ML
0.4 VIAL (ML) INJECTION
Status: DISCONTINUED | OUTPATIENT
Start: 2021-08-27 | End: 2021-08-27 | Stop reason: HOSPADM

## 2021-08-27 RX ORDER — IBUPROFEN 600 MG/1
600 TABLET ORAL EVERY 6 HOURS
Qty: 30 TABLET | Refills: 0 | Status: SHIPPED | OUTPATIENT
Start: 2021-08-27 | End: 2022-01-19

## 2021-08-27 RX ORDER — OXYCODONE HYDROCHLORIDE 5 MG/1
5-10 TABLET ORAL EVERY 4 HOURS PRN
Qty: 18 TABLET | Refills: 0 | Status: SHIPPED | OUTPATIENT
Start: 2021-08-27 | End: 2022-01-19

## 2021-08-27 RX ORDER — DIPHENHYDRAMINE HYDROCHLORIDE 50 MG/ML
25 INJECTION INTRAMUSCULAR; INTRAVENOUS EVERY 4 HOURS PRN
Status: DISCONTINUED | OUTPATIENT
Start: 2021-08-27 | End: 2021-08-27 | Stop reason: HOSPADM

## 2021-08-27 RX ORDER — DIPHENHYDRAMINE HCL 25 MG
25 CAPSULE ORAL EVERY 4 HOURS PRN
Status: DISCONTINUED | OUTPATIENT
Start: 2021-08-27 | End: 2021-08-27 | Stop reason: HOSPADM

## 2021-08-27 RX ADMIN — SIMETHICONE 80 MG: 80 TABLET, CHEWABLE ORAL at 00:46

## 2021-08-27 RX ADMIN — DOCUSATE SODIUM 100 MG: 100 CAPSULE, LIQUID FILLED ORAL at 09:15

## 2021-08-27 RX ADMIN — ACETAMINOPHEN 650 MG: 325 TABLET, FILM COATED ORAL at 06:38

## 2021-08-27 RX ADMIN — POLYETHYLENE GLYCOL 3350 17 G: 17 POWDER, FOR SOLUTION ORAL at 09:15

## 2021-08-27 RX ADMIN — IBUPROFEN 600 MG: 600 TABLET, FILM COATED ORAL at 09:15

## 2021-08-27 RX ADMIN — ACETAMINOPHEN 650 MG: 325 TABLET, FILM COATED ORAL at 12:42

## 2021-08-27 RX ADMIN — ACETAMINOPHEN 650 MG: 325 TABLET, FILM COATED ORAL at 00:46

## 2021-08-27 RX ADMIN — PRENATAL VITAMINS-IRON FUMARATE 27 MG IRON-FOLIC ACID 0.8 MG TABLET 1 TABLET: at 09:15

## 2021-08-27 RX ADMIN — IBUPROFEN 600 MG: 600 TABLET, FILM COATED ORAL at 04:11

## 2021-08-27 RX ADMIN — Medication 325 MG: at 09:14

## 2021-08-28 ENCOUNTER — HOSPITAL ENCOUNTER (OUTPATIENT)
Facility: HOSPITAL | Age: 31
Discharge: HOME | End: 2021-08-29
Attending: OBSTETRICS & GYNECOLOGY | Admitting: OBSTETRICS & GYNECOLOGY

## 2021-08-28 PROCEDURE — G0463 HOSPITAL OUTPT CLINIC VISIT: HCPCS

## 2021-08-28 RX ORDER — DOCUSATE SODIUM 100 MG/1
100 CAPSULE, LIQUID FILLED ORAL 2 TIMES DAILY
COMMUNITY
End: 2022-01-19

## 2021-08-28 RX ORDER — ACETAMINOPHEN 500 MG
500 TABLET ORAL EVERY 6 HOURS PRN
COMMUNITY
End: 2022-01-19

## 2021-08-29 ENCOUNTER — HOSPITAL ENCOUNTER (OUTPATIENT)
Facility: HOSPITAL | Age: 31
End: 2021-08-29
Attending: OBSTETRICS & GYNECOLOGY | Admitting: OBSTETRICS & GYNECOLOGY

## 2021-08-29 VITALS
TEMPERATURE: 98.5 F | DIASTOLIC BLOOD PRESSURE: 89 MMHG | SYSTOLIC BLOOD PRESSURE: 141 MMHG | HEART RATE: 79 BPM | RESPIRATION RATE: 18 BRPM

## 2021-08-29 LAB
ALP SERPL-CCNC: 76 U/L (ref 39–117)
ALT SERPL W P-5'-P-CCNC: 23 U/L (ref 1–33)
AST SERPL-CCNC: 18 U/L (ref 1–32)
BASOPHILS # BLD AUTO: 0.04 10*3/MM3 (ref 0–0.2)
BASOPHILS NFR BLD AUTO: 0.4 % (ref 0–1.5)
BILIRUB SERPL-MCNC: 0.2 MG/DL (ref 0–1.2)
BILIRUB UR QL STRIP: NEGATIVE
CLARITY UR: CLEAR
COLOR UR: YELLOW
CREAT SERPL-MCNC: 0.62 MG/DL (ref 0.57–1)
DEPRECATED RDW RBC AUTO: 48.1 FL (ref 37–54)
EOSINOPHIL # BLD AUTO: 0.18 10*3/MM3 (ref 0–0.4)
EOSINOPHIL NFR BLD AUTO: 1.8 % (ref 0.3–6.2)
ERYTHROCYTE [DISTWIDTH] IN BLOOD BY AUTOMATED COUNT: 13.5 % (ref 12.3–15.4)
GLUCOSE UR STRIP-MCNC: NEGATIVE MG/DL
HCT VFR BLD AUTO: 24.5 % (ref 34–46.6)
HGB BLD-MCNC: 8.1 G/DL (ref 12–15.9)
HGB UR QL STRIP.AUTO: NEGATIVE
IMM GRANULOCYTES # BLD AUTO: 0.21 10*3/MM3 (ref 0–0.05)
IMM GRANULOCYTES NFR BLD AUTO: 2.2 % (ref 0–0.5)
KETONES UR QL STRIP: NEGATIVE
LDH SERPL-CCNC: 211 U/L (ref 135–214)
LEUKOCYTE ESTERASE UR QL STRIP.AUTO: NEGATIVE
LYMPHOCYTES # BLD AUTO: 1.68 10*3/MM3 (ref 0.7–3.1)
LYMPHOCYTES NFR BLD AUTO: 17.2 % (ref 19.6–45.3)
MCH RBC QN AUTO: 32.1 PG (ref 26.6–33)
MCHC RBC AUTO-ENTMCNC: 33.1 G/DL (ref 31.5–35.7)
MCV RBC AUTO: 97.2 FL (ref 79–97)
MONOCYTES # BLD AUTO: 1.33 10*3/MM3 (ref 0.1–0.9)
MONOCYTES NFR BLD AUTO: 13.6 % (ref 5–12)
NEUTROPHILS NFR BLD AUTO: 6.32 10*3/MM3 (ref 1.7–7)
NEUTROPHILS NFR BLD AUTO: 64.8 % (ref 42.7–76)
NITRITE UR QL STRIP: NEGATIVE
NRBC BLD AUTO-RTO: 0 /100 WBC (ref 0–0.2)
PH UR STRIP.AUTO: 7.5 [PH] (ref 5–8)
PLATELET # BLD AUTO: 274 10*3/MM3 (ref 140–450)
PMV BLD AUTO: 9 FL (ref 6–12)
PROT UR QL STRIP: NEGATIVE
RBC # BLD AUTO: 2.52 10*6/MM3 (ref 3.77–5.28)
SP GR UR STRIP: 1.01 (ref 1–1.03)
URATE SERPL-MCNC: 4.4 MG/DL (ref 2.4–5.7)
UROBILINOGEN UR QL STRIP: NORMAL
WBC # BLD AUTO: 9.76 10*3/MM3 (ref 3.4–10.8)

## 2021-08-29 PROCEDURE — 99213 OFFICE O/P EST LOW 20 MIN: CPT | Performed by: OBSTETRICS & GYNECOLOGY

## 2021-08-29 PROCEDURE — 82565 ASSAY OF CREATININE: CPT | Performed by: OBSTETRICS & GYNECOLOGY

## 2021-08-29 PROCEDURE — 84075 ASSAY ALKALINE PHOSPHATASE: CPT | Performed by: OBSTETRICS & GYNECOLOGY

## 2021-08-29 PROCEDURE — 84550 ASSAY OF BLOOD/URIC ACID: CPT | Performed by: OBSTETRICS & GYNECOLOGY

## 2021-08-29 PROCEDURE — 83615 LACTATE (LD) (LDH) ENZYME: CPT | Performed by: OBSTETRICS & GYNECOLOGY

## 2021-08-29 PROCEDURE — 84460 ALANINE AMINO (ALT) (SGPT): CPT | Performed by: OBSTETRICS & GYNECOLOGY

## 2021-08-29 PROCEDURE — 82247 BILIRUBIN TOTAL: CPT | Performed by: OBSTETRICS & GYNECOLOGY

## 2021-08-29 PROCEDURE — 81003 URINALYSIS AUTO W/O SCOPE: CPT | Performed by: OBSTETRICS & GYNECOLOGY

## 2021-08-29 PROCEDURE — 36415 COLL VENOUS BLD VENIPUNCTURE: CPT | Performed by: OBSTETRICS & GYNECOLOGY

## 2021-08-29 PROCEDURE — 84450 TRANSFERASE (AST) (SGOT): CPT | Performed by: OBSTETRICS & GYNECOLOGY

## 2021-08-29 PROCEDURE — 85025 COMPLETE CBC W/AUTO DIFF WBC: CPT | Performed by: OBSTETRICS & GYNECOLOGY

## 2021-08-29 NOTE — H&P
" Boby  Obstetric History and Physical    CC:  High blood pressure    HPI:      Patient is a 31 y.o. female  POD# 5 s/p LTCS for failure to progress.  During her pregnancy she had gestational HTN and GDM.   She also was admitted for renal calculi versus pyelo.  Tonight she laid down to rest and \"felt badly\" so took her blood pressure.  It was high in the 160s.   She had had a headache as well, but that had resolved.  She also complains of right sided lower back pain.  She said it kind of felt like the pain she had when she was hospitalized with the kidney issue.    She denies current headache, vision changes, RUQ pain, N/V/F/C.   No urinary symptoms.          The following portions of the patients history were reviewed and updated as appropriate: current medications, allergies, past medical history, past surgical history, past family history, past social history and problem list .       Prenatal Information:   Maternal Prenatal Labs  Blood Type No results found for: ABO   Rh Status No results found for: RH   Antibody Screen No results found for: ABSCRN   Gonnorhea No results found for: GCCX   Chlamydia No results found for: CLAMYDCU   RPR No results found for: RPR   Syphilis Antibody No results found for: SYPHILIS   Rubella No results found for: RUBELLAIGGIN   Hepatitis B Surface Antigen No results found for: HEPBSAG   HIV-1 Antibody No results found for: LABHIV1   Hepatitis C Antibody No results found for: HEPCAB   Rapid Urin Drug Screen No results found for: AMPMETHU, BARBITSCNUR, LABBENZSCN, LABMETHSCN, LABOPIASCN, THCURSCR, COCAINEUR, AMPHETSCREEN, PROPOXSCN, BUPRENORSCNU, METAMPSCNUR, OXYCODONESCN, TRICYCLICSCN   Group B Strep Culture No results found for: GBSANTIGEN           External Prenatal Results    The patient does not have a working ANI. Some results will not display without a working ANI.   Pregnancy Outside Results - Transcribed From Office Records - See Scanned Records For Details     " Test Value Date Time    ABO  A  21    Rh  Positive  21    Antibody Screen       Varicella IgG       Rubella       Hgb       Hct       Glucose Fasting GTT       Glucose Tolerance Test 1 hour       Glucose Tolerance Test 3 hour       Gonorrhea (discrete)       Chlamydia (discrete)       RPR       VDRL       Syphilis Antibody       HBsAg       Herpes Simplex Virus PCR       Herpes Simplex VIrus Culture       HIV       Hep C RNA Quant PCR       Hep C Antibody       AFP       Group B Strep       GBS Susceptibility to Clindamycin       GBS Susceptibility to Erythromycin       Fetal Fibronectin       Genetic Testing, Maternal Blood             Drug Screening     Test Value Date Time    Urine Drug Screen       Amphetamine Screen       Barbiturate Screen       Benzodiazepine Screen       Methadone Screen       Phencyclidine Screen       Opiates Screen       THC Screen       Cocaine Screen       Propoxyphene Screen       Buprenorphine Screen       Methamphetamine Screen       Oxycodone Screen       Tricyclic Antidepressants Screen             Legend    ^: Historical                          Past OB History:     OB History    Para Term  AB Living   2 1 1 0 0 1   SAB TAB Ectopic Molar Multiple Live Births   0 0 0 0 0 1      # Outcome Date GA Lbr Russell/2nd Weight Sex Delivery Anes PTL Lv   2 Current            1 Term 21 37w4d  3530 g (7 lb 12.5 oz) M CS-LTranv EPI, Spinal N SUJATA      Complications: Chorioamnionitis, Failure to Progress in Second Stage      Name: VINCENT LLANOS      Apgar1: 6  Apgar5: 7      Obstetric Comments   FOB #1 : Pregnancy #1(IVF, frozen cycle retrieved 2020, no donors)       Past Medical History: Past Medical History:   Diagnosis Date   • Allergic    • Anxiety    • 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    • Gestational diabetes    • Gestational hypertension    • Infectious mononucleosis    • Kidney stone    • Tooth fractures     porcelan tooth    • Urinary tract infection       Past Surgical History Past Surgical History:   Procedure Laterality Date   •  SECTION N/A 2021    Procedure:  SECTION PRIMARY;  Surgeon: Stef Lindsey MD;  Location: [facility] LABOR DELIVERY;  Service: Obstetrics/Gynecology;  Laterality: N/A;   • COLONOSCOPY  10/01/2017   • FERTILITY SURGERY  2020    Oocyte retrieval for IVF   • NASAL SINUS SURGERY  2016   • WISDOM TOOTH EXTRACTION        Family History: Family History   Problem Relation Age of Onset   • Thyroid disease Mother    • Hyperlipidemia Father    • Arthritis Maternal Grandmother    • Miscarriages / Stillbirths Maternal Grandmother    • Diabetes Maternal Grandfather    • Miscarriages / Stillbirths Maternal Grandfather    • Diabetes Paternal Grandmother    • Breast cancer Other         great aunt      Social History:  reports that she has never smoked. She has never used smokeless tobacco.   reports no history of alcohol use.   reports no history of drug use.        Review of Systems  Denies CP, Shortness of air, muscle weakness,                                             and rashes      Objective     Vital Signs Range for the last 24 hours  Temperature: Temp:  [98.5 °F (36.9 °C)] 98.5 °F (36.9 °C)   Temp Source: Temp src: Oral   BP: BP: (136-142)/(88-90) 141/89   Pulse: Heart Rate:  [79-86] 79   Respirations: Resp:  [16-18] 18   SPO2:     O2 Amount (l/min):     O2 Devices     Weight:       Physical Examination: General appearance - alert, well appearing, and in no distress and oriented to person, place, and time  Chest - clear to auscultation, no wheezes, rales or rhonchi, symmetric air entry  Heart - S1 and S2 normal, no murmurs noted  Abdomen - soft, nontender, nondistended, no masses or organomegaly  no rebound tenderness noted  bowel sounds normal  No guarding, No RUQ pain.  Back:   Mild tenderness to palpation just to the right of where her epidural was   Extremities - pedal edema  1+        Laboratory Results:   Lab Results   Component Value Date     2021    HGB 8.1 (L) 2021    HCT 24.5 (L) 2021    WBC 9.76 2021     Lab Results   Component Value Date    HGB 8.1 (L) 2021     2021    AST 18 2021    ALT 23 2021     2021    URICACID 4.4 2021    BUN 10 01/15/2020    CREATININE 0.62 2021    GLUCOSE 85 01/15/2020     Lab Results   Component Value Date    SQUAMEPIUA 0-2 2021    SPECGRAVUR 1.010 2021    KETONESU Negative 2021    BLOODU Negative 2021    LEUKOCYTESUR Negative 2021    NITRITEU Negative 2021                       Patient is a 31 y.o. female  POD# 5 with elevated BP and back pain     A/P   1.  Elevated BP at home.   Normal BP here with negative work-up for post   partum pre-eclampsia   2.  Lower right sided back pain with negative UA.  Pain likely related to  tenderness post epidural or M/S from recent pregnancy or combo of both   3.  Given education and reassurance.   Reviewed concerns for returning   4.  Questions answered   5.  D/C home.           James Call MD  2021  02:05 EDT

## 2021-08-30 ENCOUNTER — TELEPHONE (OUTPATIENT)
Dept: OBSTETRICS AND GYNECOLOGY | Facility: CLINIC | Age: 31
End: 2021-08-30

## 2021-09-01 ENCOUNTER — POSTPARTUM VISIT (OUTPATIENT)
Dept: OBSTETRICS AND GYNECOLOGY | Facility: CLINIC | Age: 31
End: 2021-09-01

## 2021-09-01 VITALS
HEIGHT: 64 IN | BODY MASS INDEX: 31.34 KG/M2 | DIASTOLIC BLOOD PRESSURE: 84 MMHG | SYSTOLIC BLOOD PRESSURE: 130 MMHG | WEIGHT: 183.6 LBS

## 2021-09-01 PROCEDURE — 0503F POSTPARTUM CARE VISIT: CPT | Performed by: NURSE PRACTITIONER

## 2021-09-01 RX ORDER — CALCIUM CITRATE/VITAMIN D3 200MG-6.25
TABLET ORAL
COMMUNITY
Start: 2021-08-12 | End: 2022-01-19

## 2021-09-01 RX ORDER — GLUCOSAM/CHON-MSM1/C/MANG/BOSW 500-416.6
TABLET ORAL 4 TIMES DAILY
COMMUNITY
Start: 2021-08-12 | End: 2022-01-19

## 2021-09-01 RX ORDER — ONDANSETRON HYDROCHLORIDE 8 MG/1
8 TABLET, FILM COATED ORAL EVERY 8 HOURS PRN
Qty: 20 TABLET | Refills: 0 | Status: SHIPPED | OUTPATIENT
Start: 2021-09-01 | End: 2022-02-18

## 2021-09-01 NOTE — PROGRESS NOTES
"Chief Complaint   Patient presents with   • Postpartum Care     blood pressure check        Postpartum Visit for blood pressure check        Sima Booker is a 31 y.o.  s/p , due to gestational hypertension in third trimester, GDM, failure to progress in labor at 37w4d weeks on 2021 who presents today for a post partum blood pressure check. Patient states she has not been checking her blood pressure at home. She only checked it one day it was 168/82. 168/84, she states she checked it twice that day which was this past Saturday. States she called Dr Corona and was told to go to the hospital which she did. She states they did not give her anything to get her blood pressure down they only monitored her and sent her home.    Patient reports her incision is clean, dry, intact. Patient denies postpartum depression.  Patient describes bleeding as moderate.  Patient is bottle feeding.  Desires contraceptive methods: None for contraception.  Patient denies bowel or bladder issues.    Postpartum Depression Screening Questionnaire: completed 3, treatment is not  indicated.  Baby Name: male infantKenney  Baby weight: 7lbs 12.5oz  Baby Discharged with Mom      The additional following portions of the patient's history were reviewed and updated as appropriate: allergies, current medications, past family history, past medical history, past social history, past surgical history and problem list.      Review of Systems   Constitutional: Negative.    Respiratory: Negative.    Cardiovascular: Negative.    Gastrointestinal: Positive for nausea.   Endocrine: Negative.    Genitourinary: Negative.    Neurological: Negative.    Psychiatric/Behavioral: Negative.        I have reviewed and agree with the HPI, ROS, and historical information as entered above. Elida Reyes, APRN    Objective   /84   Ht 162.6 cm (64\")   Wt 83.3 kg (183 lb 9.6 oz)   LMP 2020   Breastfeeding No   BMI 31.51 kg/m² "     Physical Exam  Constitutional:       Appearance: Normal appearance.   Pulmonary:      Effort: Pulmonary effort is normal.   Abdominal:      Palpations: Abdomen is soft.      Comments: Incision healing well   Neurological:      Mental Status: She is alert.              Assessment and Plan    Problem List Items Addressed This Visit     None      Visit Diagnoses     Encounter for postpartum visit    -  Primary        Patient is complaining of persistent nausea worsened with eating, she denies vomiting, fever.  She reports she is having soft bowel movements and is still taking stool softener and MiraLAX as needed.  Abdomen soft and appropriately tender      1. S/p , 1 weeks postpartum.  Doing well.    2. Continued pelvic rest  Return in about 1 week (around 2021) for [place] 2 week PP.   Blood pressure is in normal range today.  She reports intermittent headaches that are relieved with medication.  Preeclampsia precautions reviewed.  We will give Zofran for persistent nausea, continue with stool softener and MiraLAX.  Advised that her pain medications can also cause nausea, especially when not eating with them.  Call for worsening symptoms, vomiting, fever    Elida Reyes, QI  2021

## 2021-09-10 ENCOUNTER — POSTPARTUM VISIT (OUTPATIENT)
Dept: OBSTETRICS AND GYNECOLOGY | Facility: CLINIC | Age: 31
End: 2021-09-10

## 2021-09-10 VITALS
SYSTOLIC BLOOD PRESSURE: 122 MMHG | DIASTOLIC BLOOD PRESSURE: 80 MMHG | HEIGHT: 64 IN | WEIGHT: 174 LBS | BODY MASS INDEX: 29.71 KG/M2

## 2021-09-10 PROCEDURE — 0503F POSTPARTUM CARE VISIT: CPT | Performed by: OBSTETRICS & GYNECOLOGY

## 2021-09-10 NOTE — PROGRESS NOTES
"Chief Complaint   Patient presents with   • Postpartum Care       2 Week Postpartum Visit         Sima Booker is a 31 y.o.  s/p , due to hypertension, failure to progress at 37 weeks on 2021, who presents today for a 2 week postpartum check.      Patient reports her incision is clean, dry, intact. Patient denies postpartum depression.  Patient describes bleeding as moderate.  Patient is bottle feeding.  Desires contraceptive methods: None for contraception.  Patient denies bowel or bladder issues.    Postpartum Depression Screening Questionnaire: 6, No treatment indicated.  Baby Name: Kenney  Baby weight:7lb  Baby Discharged with Mom      The additional following portions of the patient's history were reviewed and updated as appropriate: allergies, current medications, past family history, past medical history, past social history, past surgical history and problem list.      Review of Systems   Constitutional: Negative.    HENT: Negative.    Eyes: Negative.    Respiratory: Negative.    Cardiovascular: Negative.    Gastrointestinal: Negative.    Endocrine: Negative.    Genitourinary: Negative.    Musculoskeletal: Negative.    Skin: Negative.    Allergic/Immunologic: Negative.    Neurological: Negative.    Hematological: Negative.    Psychiatric/Behavioral: Negative.        I have reviewed and agree with the HPI, ROS, and historical information as entered above. Hue Patel MD    Objective   /80   Ht 162.6 cm (64\")   Wt 78.9 kg (174 lb)   LMP 2020   Breastfeeding Unknown   BMI 29.87 kg/m²     Physical Exam  Vitals and nursing note reviewed.   Constitutional:       Appearance: She is well-developed.   HENT:      Head: Normocephalic and atraumatic.   Pulmonary:      Effort: Pulmonary effort is normal.   Abdominal:      General: A surgical scar is present.      Palpations: Abdomen is soft. Abdomen is not rigid.      Comments: Clean, Dry, and Intact.  No erythema.  "   Musculoskeletal:      Cervical back: Normal range of motion.   Neurological:      Mental Status: She is alert and oriented to person, place, and time.   Psychiatric:         Mood and Affect: Mood normal.         Behavior: Behavior normal.              Assessment and Plan    Problem List Items Addressed This Visit     None      Visit Diagnoses     Postpartum exam    -  Primary    Relevant Orders    CBC (No Diff)    TSH    Vitamin D 1,25 Dihydroxy          1. S/p , 2 weeks postpartum.  Doing well.    2. Continued pelvic rest with a return to driving and light physical activity.  3. Reports fatigue that seems out of proportion to  care, will check labs today  Return in about 1 month (around 10/10/2021) for Postpartum.    Hue Patel MD  09/10/2021

## 2021-09-13 LAB
1,25(OH)2D SERPL-MCNC: 33.1 PG/ML (ref 19.9–79.3)
ERYTHROCYTE [DISTWIDTH] IN BLOOD BY AUTOMATED COUNT: 13.8 % (ref 11.7–15.4)
HCT VFR BLD AUTO: 38.5 % (ref 34–46.6)
HGB BLD-MCNC: 12.4 G/DL (ref 11.1–15.9)
MCH RBC QN AUTO: 29.5 PG (ref 26.6–33)
MCHC RBC AUTO-ENTMCNC: 32.2 G/DL (ref 31.5–35.7)
MCV RBC AUTO: 92 FL (ref 79–97)
PLATELET # BLD AUTO: 582 X10E3/UL (ref 150–450)
RBC # BLD AUTO: 4.2 X10E6/UL (ref 3.77–5.28)
TSH SERPL DL<=0.005 MIU/L-ACNC: 1.38 UIU/ML (ref 0.45–4.5)
WBC # BLD AUTO: 7.4 X10E3/UL (ref 3.4–10.8)

## 2021-10-18 ENCOUNTER — POSTPARTUM VISIT (OUTPATIENT)
Dept: OBSTETRICS AND GYNECOLOGY | Facility: CLINIC | Age: 31
End: 2021-10-18

## 2021-10-18 VITALS
BODY MASS INDEX: 29.88 KG/M2 | SYSTOLIC BLOOD PRESSURE: 125 MMHG | HEIGHT: 64 IN | WEIGHT: 175 LBS | DIASTOLIC BLOOD PRESSURE: 80 MMHG

## 2021-10-18 PROCEDURE — 0503F POSTPARTUM CARE VISIT: CPT | Performed by: OBSTETRICS & GYNECOLOGY

## 2021-11-22 DIAGNOSIS — F41.9 ANXIETY: Primary | ICD-10-CM

## 2021-11-22 RX ORDER — HYDROXYZINE HYDROCHLORIDE 25 MG/1
25 TABLET, FILM COATED ORAL 3 TIMES DAILY PRN
Qty: 90 TABLET | Refills: 0 | Status: SHIPPED | OUTPATIENT
Start: 2021-11-22 | End: 2022-01-19 | Stop reason: SDUPTHER

## 2022-01-19 ENCOUNTER — OFFICE VISIT (OUTPATIENT)
Dept: INTERNAL MEDICINE | Facility: CLINIC | Age: 32
End: 2022-01-19

## 2022-01-19 ENCOUNTER — LAB (OUTPATIENT)
Dept: LAB | Facility: HOSPITAL | Age: 32
End: 2022-01-19

## 2022-01-19 VITALS
BODY MASS INDEX: 30.46 KG/M2 | HEART RATE: 80 BPM | SYSTOLIC BLOOD PRESSURE: 130 MMHG | RESPIRATION RATE: 18 BRPM | WEIGHT: 182.8 LBS | OXYGEN SATURATION: 97 % | TEMPERATURE: 98 F | HEIGHT: 65 IN | DIASTOLIC BLOOD PRESSURE: 80 MMHG

## 2022-01-19 DIAGNOSIS — O24.410 GDM (GESTATIONAL DIABETES MELLITUS), CLASS A1: ICD-10-CM

## 2022-01-19 DIAGNOSIS — D50.9 IRON DEFICIENCY ANEMIA, UNSPECIFIED IRON DEFICIENCY ANEMIA TYPE: ICD-10-CM

## 2022-01-19 DIAGNOSIS — Z00.00 ANNUAL PHYSICAL EXAM: Primary | ICD-10-CM

## 2022-01-19 DIAGNOSIS — Z13.220 LIPID SCREENING: ICD-10-CM

## 2022-01-19 DIAGNOSIS — F41.9 ANXIETY: ICD-10-CM

## 2022-01-19 LAB
ALBUMIN SERPL-MCNC: 4.5 G/DL (ref 3.5–5.2)
ALBUMIN/GLOB SERPL: 2 G/DL
ALP SERPL-CCNC: 77 U/L (ref 39–117)
ALT SERPL W P-5'-P-CCNC: 22 U/L (ref 1–33)
ANION GAP SERPL CALCULATED.3IONS-SCNC: 10.6 MMOL/L (ref 5–15)
AST SERPL-CCNC: 17 U/L (ref 1–32)
BASOPHILS # BLD AUTO: 0.05 10*3/MM3 (ref 0–0.2)
BASOPHILS NFR BLD AUTO: 0.8 % (ref 0–1.5)
BILIRUB SERPL-MCNC: 0.2 MG/DL (ref 0–1.2)
BUN SERPL-MCNC: 10 MG/DL (ref 6–20)
BUN/CREAT SERPL: 10 (ref 7–25)
CALCIUM SPEC-SCNC: 9.2 MG/DL (ref 8.6–10.5)
CHLORIDE SERPL-SCNC: 104 MMOL/L (ref 98–107)
CHOLEST SERPL-MCNC: 152 MG/DL (ref 0–200)
CO2 SERPL-SCNC: 25.4 MMOL/L (ref 22–29)
CREAT SERPL-MCNC: 1 MG/DL (ref 0.57–1)
DEPRECATED RDW RBC AUTO: 42.7 FL (ref 37–54)
EOSINOPHIL # BLD AUTO: 0.12 10*3/MM3 (ref 0–0.4)
EOSINOPHIL NFR BLD AUTO: 2 % (ref 0.3–6.2)
ERYTHROCYTE [DISTWIDTH] IN BLOOD BY AUTOMATED COUNT: 14.6 % (ref 12.3–15.4)
GFR SERPL CREATININE-BSD FRML MDRD: 64 ML/MIN/1.73
GLOBULIN UR ELPH-MCNC: 2.2 GM/DL
GLUCOSE SERPL-MCNC: 85 MG/DL (ref 65–99)
HCT VFR BLD AUTO: 41.2 % (ref 34–46.6)
HDLC SERPL-MCNC: 46 MG/DL (ref 40–60)
HGB BLD-MCNC: 13.8 G/DL (ref 12–15.9)
IMM GRANULOCYTES # BLD AUTO: 0.01 10*3/MM3 (ref 0–0.05)
IMM GRANULOCYTES NFR BLD AUTO: 0.2 % (ref 0–0.5)
LDLC SERPL CALC-MCNC: 82 MG/DL (ref 0–100)
LDLC/HDLC SERPL: 1.73 {RATIO}
LYMPHOCYTES # BLD AUTO: 1.78 10*3/MM3 (ref 0.7–3.1)
LYMPHOCYTES NFR BLD AUTO: 30 % (ref 19.6–45.3)
MCH RBC QN AUTO: 27.2 PG (ref 26.6–33)
MCHC RBC AUTO-ENTMCNC: 33.5 G/DL (ref 31.5–35.7)
MCV RBC AUTO: 81.3 FL (ref 79–97)
MONOCYTES # BLD AUTO: 0.66 10*3/MM3 (ref 0.1–0.9)
MONOCYTES NFR BLD AUTO: 11.1 % (ref 5–12)
NEUTROPHILS NFR BLD AUTO: 3.31 10*3/MM3 (ref 1.7–7)
NEUTROPHILS NFR BLD AUTO: 55.9 % (ref 42.7–76)
NRBC BLD AUTO-RTO: 0 /100 WBC (ref 0–0.2)
PLATELET # BLD AUTO: 308 10*3/MM3 (ref 140–450)
PMV BLD AUTO: 10.5 FL (ref 6–12)
POTASSIUM SERPL-SCNC: 4.2 MMOL/L (ref 3.5–5.2)
PROT SERPL-MCNC: 6.7 G/DL (ref 6–8.5)
RBC # BLD AUTO: 5.07 10*6/MM3 (ref 3.77–5.28)
SODIUM SERPL-SCNC: 140 MMOL/L (ref 136–145)
TRIGL SERPL-MCNC: 133 MG/DL (ref 0–150)
VLDLC SERPL-MCNC: 24 MG/DL (ref 5–40)
WBC NRBC COR # BLD: 5.93 10*3/MM3 (ref 3.4–10.8)

## 2022-01-19 PROCEDURE — 80061 LIPID PANEL: CPT

## 2022-01-19 PROCEDURE — 85025 COMPLETE CBC W/AUTO DIFF WBC: CPT

## 2022-01-19 PROCEDURE — 99395 PREV VISIT EST AGE 18-39: CPT | Performed by: NURSE PRACTITIONER

## 2022-01-19 PROCEDURE — 80053 COMPREHEN METABOLIC PANEL: CPT

## 2022-01-19 RX ORDER — HYDROXYZINE HYDROCHLORIDE 25 MG/1
25 TABLET, FILM COATED ORAL 3 TIMES DAILY PRN
Qty: 90 TABLET | Refills: 0 | Status: SHIPPED | OUTPATIENT
Start: 2022-01-19

## 2022-01-19 NOTE — PATIENT INSTRUCTIONS
Advance Care Planning and Advance Directives     You make decisions on a daily basis - decisions about where you want to live, your career, your home, your life. Perhaps one of the most important decisions you face is your choice for future medical care. Take time to talk with your family and your healthcare team and start planning today.  Advance Care Planning is a process that can help you:  · Understand possible future healthcare decisions in light of your own experiences  · Reflect on those decision in light of your goals and values  · Discuss your decisions with those closest to you and the healthcare professionals that care for you  · Make a plan by creating a document that reflects your wishes    Surrogate Decision Maker  In the event of a medical emergency, which has left you unable to communicate or to make your own decisions, you would need someone to make decisions for you.  It is important to discuss your preferences for medical treatment with this person while you are in good health.     Qualities of a surrogate decision maker:  • Willing to take on this role and responsibility  • Knows what you want for future medical care  • Willing to follow your wishes even if they don't agree with them  • Able to make difficult medical decisions under stressful circumstances    Advance Directives  These are legal documents you can create that will guide your healthcare team and decision maker(s) when needed. These documents can be stored in the electronic medical record.    · Living Will - a legal document to guide your care if you have a terminal condition or a serious illness and are unable to communicate. States vary by statute in document names/types, but most forms may include one or more of the following:        -  Directions regarding life-prolonging treatments        -  Directions regarding artificially provided nutrition/hydration        -  Choosing a healthcare decision maker        -  Direction  regarding organ/tissue donation    · Durable Power of  for Healthcare - this document names an -in-fact to make medical decisions for you, but it may also allow this person to make personal and financial decisions for you. Please seek the advice of an  if you need this type of document.    **Advance Directives are not required and no one may discriminate against you if you do not sign one.    Medical Orders  Many states allow specific forms/orders signed by your physician to record your wishes for medical treatment in your current state of health. This form, signed in personal communication with your physician, addresses resuscitation and other medical interventions that you may or may not want.      For more information or to schedule a time with a [facility] Advance Care Planning Facilitator contact: [facility]/ACP or call [phone] and someone will contact you directly.    Advance Care Planning and Advance Directives     You make decisions on a daily basis - decisions about where you want to live, your career, your home, your life. Perhaps one of the most important decisions you face is your choice for future medical care. Take time to talk with your family and your healthcare team and start planning today.  Advance Care Planning is a process that can help you:  · Understand possible future healthcare decisions in light of your own experiences  · Reflect on those decision in light of your goals and values  · Discuss your decisions with those closest to you and the healthcare professionals that care for you  · Make a plan by creating a document that reflects your wishes    Surrogate Decision Maker  In the event of a medical emergency, which has left you unable to communicate or to make your own decisions, you would need someone to make decisions for you.  It is important to discuss your preferences for medical treatment with this person while you are in good health.      Qualities of a surrogate decision maker:  • Willing to take on this role and responsibility  • Knows what you want for future medical care  • Willing to follow your wishes even if they don't agree with them  • Able to make difficult medical decisions under stressful circumstances    Advance Directives  These are legal documents you can create that will guide your healthcare team and decision maker(s) when needed. These documents can be stored in the electronic medical record.    · Living Will - a legal document to guide your care if you have a terminal condition or a serious illness and are unable to communicate. States vary by statute in document names/types, but most forms may include one or more of the following:        -  Directions regarding life-prolonging treatments        -  Directions regarding artificially provided nutrition/hydration        -  Choosing a healthcare decision maker        -  Direction regarding organ/tissue donation    · Durable Power of  for Healthcare - this document names an -in-fact to make medical decisions for you, but it may also allow this person to make personal and financial decisions for you. Please seek the advice of an  if you need this type of document.    **Advance Directives are not required and no one may discriminate against you if you do not sign one.    Medical Orders  Many states allow specific forms/orders signed by your physician to record your wishes for medical treatment in your current state of health. This form, signed in personal communication with your physician, addresses resuscitation and other medical interventions that you may or may not want.      For more information or to schedule a time with a [facility] Advance Care Planning Facilitator contact: [facility].[employer]/ACP or call [phone] and someone will contact you directly.    Advance Care Planning and Advance Directives     You make decisions on a daily basis -  decisions about where you want to live, your career, your home, your life. Perhaps one of the most important decisions you face is your choice for future medical care. Take time to talk with your family and your healthcare team and start planning today.  Advance Care Planning is a process that can help you:  · Understand possible future healthcare decisions in light of your own experiences  · Reflect on those decision in light of your goals and values  · Discuss your decisions with those closest to you and the healthcare professionals that care for you  · Make a plan by creating a document that reflects your wishes    Surrogate Decision Maker  In the event of a medical emergency, which has left you unable to communicate or to make your own decisions, you would need someone to make decisions for you.  It is important to discuss your preferences for medical treatment with this person while you are in good health.     Qualities of a surrogate decision maker:  • Willing to take on this role and responsibility  • Knows what you want for future medical care  • Willing to follow your wishes even if they don't agree with them  • Able to make difficult medical decisions under stressful circumstances    Advance Directives  These are legal documents you can create that will guide your healthcare team and decision maker(s) when needed. These documents can be stored in the electronic medical record.    · Living Will - a legal document to guide your care if you have a terminal condition or a serious illness and are unable to communicate. States vary by statute in document names/types, but most forms may include one or more of the following:        -  Directions regarding life-prolonging treatments        -  Directions regarding artificially provided nutrition/hydration        -  Choosing a healthcare decision maker        -  Direction regarding organ/tissue donation    · Durable Power of  for Healthcare - this document  names an -in-fact to make medical decisions for you, but it may also allow this person to make personal and financial decisions for you. Please seek the advice of an  if you need this type of document.    **Advance Directives are not required and no one may discriminate against you if you do not sign one.    Medical Orders  Many states allow specific forms/orders signed by your physician to record your wishes for medical treatment in your current state of health. This form, signed in personal communication with your physician, addresses resuscitation and other medical interventions that you may or may not want.      For more information or to schedule a time with a [facility] Advance Care Planning Facilitator contact: [facility]/ACP or call [phone] and someone will contact you directly.    MyPlate from USDA    MyPlate is an outline of a general healthy diet based on the 2010 Dietary Guidelines for Americans, from the U.S. Department of Agriculture (USDA). It sets guidelines for how much food you should eat from each food group based on your age, sex, and level of physical activity.  What are tips for following MyPlate?  To follow MyPlate recommendations:  · Eat a wide variety of fruits and vegetables, grains, and protein foods.  · Serve smaller portions and eat less food throughout the day.  · Limit portion sizes to avoid overeating.  · Enjoy your food.  · Get at least 150 minutes of exercise every week. This is about 30 minutes each day, 5 or more days per week.  It can be difficult to have every meal look like MyPlate. Think about MyPlate as eating guidelines for an entire day, rather than each individual meal.  Fruits and vegetables  · Make half of your plate fruits and vegetables.  · Eat many different colors of fruits and vegetables each day.  · For a 2,000 calorie daily food plan, eat:  ? 2½ cups of vegetables every day.  ? 2 cups of fruit every day.  · 1 cup is equal to:  ? 1  cup raw or cooked vegetables.  ? 1 cup raw fruit.  ? 1 medium-sized orange, apple, or banana.  ? 1 cup 100% fruit or vegetable juice.  ? 2 cups raw leafy greens, such as lettuce, spinach, or kale.  ? ½ cup dried fruit.  Grains  · One fourth of your plate should be grains.  · Make at least half of the grains you eat each day whole grains.  · For a 2,000 calorie daily food plan, eat 6 oz of grains every day.  · 1 oz is equal to:  ? 1 slice bread.  ? 1 cup cereal.  ? ½ cup cooked rice, cereal, or pasta.  Protein  · One fourth of your plate should be protein.  · Eat a wide variety of protein foods, including meat, poultry, fish, eggs, beans, nuts, and tofu.  · For a 2,000 calorie daily food plan, eat 5½ oz of protein every day.  · 1 oz is equal to:  ? 1 oz meat, poultry, or fish.  ? ¼ cup cooked beans.  ? 1 egg.  ? ½ oz nuts or seeds.  ? 1 Tbsp peanut butter.  Dairy  · Drink fat-free or low-fat (1%) milk.  · Eat or drink dairy as a side to meals.  · For a 2,000 calorie daily food plan, eat or drink 3 cups of dairy every day.  · 1 cup is equal to:  ? 1 cup milk, yogurt, cottage cheese, or soy milk (soy beverage).  ? 2 oz processed cheese.  ? 1½ oz natural cheese.  Fats, oils, salt, and sugars  · Only small amounts of oils are recommended.  · Avoid foods that are high in calories and low in nutritional value (empty calories), like foods high in fat or added sugars.  · Choose foods that are low in salt (sodium). Choose foods that have less than 140 milligrams (mg) of sodium per serving.  · Drink water instead of sugary drinks. Drink enough water each day to keep your urine pale yellow.  Where to find support  · Work with your health care provider or a nutrition specialist (dietitian) to develop a customized eating plan that is right for you.  · Download an antonieta (mobile application) to help you track your daily food intake.  Where to find more information  · Go to ChooseMyPlate.gov for more information.  Summary  · MyPlate  is a general guideline for healthy eating from the USDA. It is based on the 2010 Dietary Guidelines for Americans.  · In general, fruits and vegetables should take up ½ of your plate, grains should take up ¼ of your plate, and protein should take up ¼ of your plate.  This information is not intended to replace advice given to you by your health care provider. Make sure you discuss any questions you have with your health care provider.  Document Revised: 05/21/2020 Document Reviewed: 03/19/2018  Elsevier Patient Education © 2021 Elsevier Inc.    ACP information pamphlet given to the patient.  ACP information provided on the AVS.

## 2022-01-19 NOTE — PROGRESS NOTES
"Chief Complaint  Annual Exam    Subjective          Sima Booker presents to [facility]  History of Present Illness  Sima Booker is here today for physical exam.  Patient last seen 6/21 for dermatitis.  Last physical a couple of years prior.  Diet-n  Exercise-n  Nlorcorkbdxkxl-m-egmvuyothw  Dental-n-encouraged  Menstrual cycle-regular    Chronic problems  Anxiety takes Atarax as needed-controlled  Allergies Claritin as needed.  On allergy shots.  Constipation Colace as needed- not curretnly needed  Pregnancy- Full-time with delivery 8/21?  Gestational diabetes. born [birth year]/24/21 had macrina htn and dm- no polys no hypoglycemia bp normal range    Class one obesity-   utd with flu and covid vaccine    Review of labs-TSH normal 9/21 vitamin D normal 9/21.  Last CBC noted during delivery with hemoglobin decreased to 8.1 due for recheck today no recent lipids. She has not used any vitamin supplements after delivery.       Review of systems no fever sweats chills change of appetite no headache dizziness sore throat runny nose cough chest pain shortness of breath no abdominal pain no GERD no change in bowel bladder habits no new lumps bumps rashes no changes sleep  Objective   Vital Signs:   /80 (BP Location: Right arm, Patient Position: Sitting, Cuff Size: Adult)   Pulse 80   Temp 98 °F (36.7 °C) (Temporal)   Resp 18   Ht 164 cm (64.57\")   Wt 82.9 kg (182 lb 12.8 oz)   SpO2 97%   BMI 30.83 kg/m²     Physical Exam  Vitals and nursing note reviewed.   Constitutional:       Appearance: She is well-developed.   HENT:      Head: Normocephalic and atraumatic.      Right Ear: External ear normal.      Left Ear: External ear normal.      Nose: Nose normal.   Eyes:      General: No scleral icterus.        Right eye: No discharge.         Left eye: No discharge.      Conjunctiva/sclera: Conjunctivae normal.      Pupils: Pupils are equal, round, and reactive to " light.   Neck:      Thyroid: No thyromegaly.      Vascular: No carotid bruit.   Cardiovascular:      Rate and Rhythm: Normal rate and regular rhythm.      Heart sounds: Normal heart sounds. No murmur heard.  No friction rub. No gallop.    Pulmonary:      Effort: Pulmonary effort is normal.      Breath sounds: Normal breath sounds.   Chest:   Breasts:      Right: Normal. No axillary adenopathy or supraclavicular adenopathy.      Left: Normal. No axillary adenopathy or supraclavicular adenopathy.       Abdominal:      General: Bowel sounds are normal. There is no distension.      Palpations: Abdomen is soft. There is no mass.      Tenderness: There is no abdominal tenderness. There is no guarding or rebound.      Hernia: No hernia is present.   Musculoskeletal:         General: Normal range of motion.      Cervical back: Normal range of motion.   Lymphadenopathy:      Head:      Right side of head: No submental, submandibular, tonsillar, preauricular, posterior auricular or occipital adenopathy.      Left side of head: No submental, submandibular, tonsillar, preauricular, posterior auricular or occipital adenopathy.      Cervical: No cervical adenopathy.      Upper Body:      Right upper body: No supraclavicular, axillary, pectoral or epitrochlear adenopathy.      Left upper body: No supraclavicular, axillary, pectoral or epitrochlear adenopathy.   Skin:     General: Skin is warm and dry.      Capillary Refill: Capillary refill takes 2 to 3 seconds.   Neurological:      Mental Status: She is alert and oriented to person, place, and time.      Deep Tendon Reflexes: Reflexes normal.   Psychiatric:         Behavior: Behavior normal.         Thought Content: Thought content normal.         Judgment: Judgment normal.        Result Review :                 Assessment and Plan    Diagnoses and all orders for this visit:    1. Annual physical exam (Primary)    2. Anxiety  -     hydrOXYzine (ATARAX) 25 MG tablet; Take 1 tablet  by mouth 3 (Three) Times a Day As Needed for Anxiety.  Dispense: 90 tablet; Refill: 0    3. Iron deficiency anemia, unspecified iron deficiency anemia type  -     CBC & Differential; Future    4. GDM (gestational diabetes mellitus), class A1  -     Comprehensive Metabolic Panel; Future    5. Lipid screening  -     Lipid Panel; Future        Patient education regarding the importance of avoidance of texting while driving and the need for wearing seatbelt.  Use of sunscreen, use of helmets while on bikes.  The appropriate amounts of sleep and H20 consumption per day was reviewed with pt.  The need for healthy well-balanced diet based off the food guide pyramid and avoidance of skipping meals along with following a NCS diet with appropriate amounts of fats, protein, and carbohydrate and low sodium diet. Encouraging 30minutes of cardio 5d weekly and muscle strengthening 3x weekly.     Discussed the patient's BMI with her. BMI is above normal parameters. Recommendations include: educational material.    AWV: na  A1C: No results found for: HGBA1C   ACP: Advance Care Planning   ACP discussion was held with the patient during this visit. Patient does not have an advance directive, information provided.   Mammogram: na  Colonoscopy: na    Follow Up   Return in about 1 year (around 1/19/2023) for fasting, Annual.  Patient was given instructions and counseling regarding her condition or for health maintenance advice. Please see specific information pulled into the AVS if appropriate.     RTC/call  If symptoms worsen  Meds MOA and SE's reviewed and pt v/u

## 2022-02-18 ENCOUNTER — OFFICE VISIT (OUTPATIENT)
Dept: OBSTETRICS AND GYNECOLOGY | Facility: CLINIC | Age: 32
End: 2022-02-18

## 2022-02-18 VITALS
BODY MASS INDEX: 31.29 KG/M2 | SYSTOLIC BLOOD PRESSURE: 122 MMHG | HEIGHT: 65 IN | DIASTOLIC BLOOD PRESSURE: 84 MMHG | WEIGHT: 187.8 LBS

## 2022-02-18 DIAGNOSIS — N94.3 PMS (PREMENSTRUAL SYNDROME): ICD-10-CM

## 2022-02-18 DIAGNOSIS — Z01.419 WOMEN'S ANNUAL ROUTINE GYNECOLOGICAL EXAMINATION: Primary | ICD-10-CM

## 2022-02-18 PROCEDURE — 99395 PREV VISIT EST AGE 18-39: CPT | Performed by: NURSE PRACTITIONER

## 2022-02-18 RX ORDER — ESCITALOPRAM OXALATE 10 MG/1
TABLET ORAL
Qty: 30 TABLET | Refills: 12 | Status: SHIPPED | OUTPATIENT
Start: 2022-02-18 | End: 2023-02-07 | Stop reason: SDUPTHER

## 2022-02-18 NOTE — PROGRESS NOTES
GYN Annual Exam     CC - Here for annual exam.        [facility] [place]  Sima Booker is a 32 y.o. female, , who presents for annual well woman exam. Patient's last menstrual period was 2022..  Periods are regular every 25-35 days, lasting 5 days. .  Dysmenorrhea:mild, occurring first 1-2 days of flow.  Patient reports problems with: heavier periods since she had baby 2021.  There were no changes to her medical or surgical history since her last visit.. Partner Status: Marital Status: .  She is sexually active. She has not had new partners since her last STD testing. She does not desire STD testing. .She also reports increased PMS symptoms. Reports changing her menstrual cup 2-3 times daily    Additional OB/GYN History   Current contraception: contraceptive methods: None  Desires to: do not start contraception  Last Pap : 2021. Result: HPV negative   Last Completed Pap Smear          PAP SMEAR (Every 3 Years) Next due on 2021  Pap IG, Ct-Ng, HPV-hr    10/25/2018  Done - neg              History of abnormal Pap smear: no  Family history of uterine, colon, breast, or ovarian cancer: no  Performs monthly Self-Breast Exam: no  Exercises Regularly:no  Feelings of Anxiety or Depression: no  Tobacco Usage?: No   OB History        2    Para   1    Term   1       0    AB   0    Living   1       SAB   0    IAB   0    Ectopic   0    Molar   0    Multiple   0    Live Births   1          Obstetric Comments   FOB #1 : Pregnancy #1(IVF, frozen cycle retrieved 2020, no donors)             Health Maintenance   Topic Date Due   • Annual Gynecologic Pelvic and Breast Exam  Never done   • COVID-19 Vaccine (3 - Booster for Moderna series) 2022   • ANNUAL PHYSICAL  2023   • PAP SMEAR  2024   • TDAP/TD VACCINES (3 - Td or Tdap) 2031   • HEPATITIS C SCREENING  Completed   • INFLUENZA VACCINE  Completed   • Hepatitis B  Aged Out   • Pneumococcal Vaccine 0-64   "Aged Out       The additional following portions of the patient's history were reviewed and updated as appropriate: allergies, current medications, past family history, past medical history, past social history, past surgical history and problem list.    Review of Systems   Constitutional: Negative.    Respiratory: Negative.    Cardiovascular: Negative.    Gastrointestinal: Negative.    Endocrine: Negative.    Genitourinary: Positive for menstrual problem.   Neurological: Negative.    Psychiatric/Behavioral: The patient is nervous/anxious.          I have reviewed and agree with the HPI, ROS, and historical information as entered above. Elida YOUNG Amy, APRN    Objective   /84   Ht 164 cm (64.57\")   Wt 85.2 kg (187 lb 12.8 oz)   LMP 02/05/2022   BMI 31.67 kg/m²     Physical Exam  Vitals and nursing note reviewed. Exam conducted with a chaperone present.   Constitutional:       Appearance: She is well-developed.   HENT:      Head: Normocephalic and atraumatic.   Neck:      Thyroid: No thyroid mass or thyromegaly.   Cardiovascular:      Rate and Rhythm: Normal rate and regular rhythm.      Heart sounds: No murmur heard.      Pulmonary:      Effort: Pulmonary effort is normal. No retractions.      Breath sounds: Normal breath sounds. No wheezing, rhonchi or rales.   Chest:      Chest wall: No mass or tenderness.   Breasts:      Right: Normal. No mass, nipple discharge, skin change or tenderness.      Left: Normal. No mass, nipple discharge, skin change or tenderness.       Abdominal:      Palpations: Abdomen is soft. Abdomen is not rigid. There is no mass.      Tenderness: There is no abdominal tenderness. There is no guarding.      Hernia: No hernia is present. There is no hernia in the left inguinal area.   Genitourinary:     Labia:         Right: No rash, tenderness or lesion.         Left: No rash, tenderness or lesion.       Vagina: Normal. No vaginal discharge or lesions.      Cervix: No cervical " motion tenderness, discharge, lesion or cervical bleeding.      Uterus: Normal. Not enlarged, not fixed and not tender.       Adnexa:         Right: No mass or tenderness.          Left: No mass or tenderness.        Rectum: No external hemorrhoid.   Musculoskeletal:      Cervical back: Normal range of motion. No muscular tenderness.   Neurological:      Mental Status: She is alert and oriented to person, place, and time.   Psychiatric:         Behavior: Behavior normal.            Assessment and Plan    Problem List Items Addressed This Visit     None      Visit Diagnoses     Women's annual routine gynecological examination    -  Primary    PMS (premenstrual syndrome)            Discussed options for managing PMS including MALINA, SSRI. She would like to avoid hormones so we will do SSRI, she has underlying anxiety and has tried celexa in the past with minimal response. We will try lexapro. SE reviewed.     1. GYN annual well woman exam.   2. Reviewed monthly self breast exams.  Instructed to call with lumps, pain, or breast discharge.    Return in about 1 year (around 2/18/2023) for Annual physical.   Ibuprofen 600 mg q6h premenstrually or at onset of menses first 2-3 days, if heavy flow persists she will RTO for U/S and we can try lysteda if U/S WNL.      Elida Reyes, QI  02/18/2022

## 2022-06-13 ENCOUNTER — TELEMEDICINE (OUTPATIENT)
Dept: FAMILY MEDICINE CLINIC | Facility: TELEHEALTH | Age: 32
End: 2022-06-13

## 2022-06-13 VITALS — TEMPERATURE: 101 F

## 2022-06-13 DIAGNOSIS — J02.9 PHARYNGITIS, UNSPECIFIED ETIOLOGY: Primary | ICD-10-CM

## 2022-06-13 DIAGNOSIS — U07.1 COVID-19: ICD-10-CM

## 2022-06-13 PROCEDURE — 99213 OFFICE O/P EST LOW 20 MIN: CPT | Performed by: NURSE PRACTITIONER

## 2022-06-13 RX ORDER — GUAIFENESIN AND DEXTROMETHORPHAN HYDROBROMIDE 600; 30 MG/1; MG/1
1 TABLET, EXTENDED RELEASE ORAL EVERY 12 HOURS SCHEDULED
Qty: 20 TABLET | Refills: 0 | Status: SHIPPED | OUTPATIENT
Start: 2022-06-13 | End: 2022-09-16

## 2022-06-13 RX ORDER — AZITHROMYCIN 250 MG/1
TABLET, FILM COATED ORAL
Qty: 6 TABLET | Refills: 0 | Status: SHIPPED | OUTPATIENT
Start: 2022-06-13 | End: 2022-09-16

## 2022-06-13 RX ORDER — ALBUTEROL SULFATE 90 UG/1
2 AEROSOL, METERED RESPIRATORY (INHALATION) EVERY 4 HOURS PRN
Qty: 18 G | Refills: 0 | Status: SHIPPED | OUTPATIENT
Start: 2022-06-13 | End: 2022-09-16

## 2022-06-13 NOTE — PATIENT INSTRUCTIONS
10 Things You Can Do to Manage Your COVID-19 Symptoms at Home  If you have possible or confirmed COVID-19:  Stay home except to get medical care.  Monitor your symptoms carefully. If your symptoms get worse, call your healthcare provider immediately.  Get rest and stay hydrated.  If you have a medical appointment, call the healthcare provider ahead of time and tell them that you have or may have COVID-19.  For medical emergencies, call 911 and notify the dispatch personnel that you have or may have COVID-19.  Cover your cough and sneezes with a tissue or use the inside of your elbow.  Wash your hands often with soap and water for at least 20 seconds or clean your hands with an alcohol-based hand  that contains at least 60% alcohol.  As much as possible, stay in a specific room and away from other people in your home. Also, you should use a separate bathroom, if available. If you need to be around other people in or outside of the home, wear a mask.  Avoid sharing personal items with other people in your household, like dishes, towels, and bedding.  Clean all surfaces that are touched often, like counters, tabletops, and doorknobs. Use household cleaning sprays or wipes according to the label instructions.  cdc.gov/coronavirus  07/16/2021  This information is not intended to replace advice given to you by your health care provider. Make sure you discuss any questions you have with your health care provider.  Document Revised: 11/01/2021 Document Reviewed: 11/01/2021  [employer] Patient Education © 2021 [employer]  Cough, Adult  A cough helps to clear your throat and lungs. A cough may be a sign of an illness or another medical condition.  An acute cough may only last 2-3 weeks, while a chronic cough may last 8 or more weeks.  Many things can cause a cough. They include:  Germs (viruses or bacteria) that attack the airway.  Breathing in things that bother (irritate) your lungs.  Allergies.  Asthma.  Mucus  that runs down the back of your throat (postnasal drip).  Smoking.  Acid backing up from the stomach into the tube that moves food from the mouth to the stomach (gastroesophageal reflux).  Some medicines.  Lung problems.  Other medical conditions, such as heart failure or a blood clot in the lung (pulmonary embolism).  Follow these instructions at home:  Medicines  Take over-the-counter and prescription medicines only as told by your doctor.  Talk with your doctor before you take medicines that stop a cough (cough suppressants).  Lifestyle    Do not smoke, and try not to be around smoke. Do not use any products that contain nicotine or tobacco, such as cigarettes, e-cigarettes, and chewing tobacco. If you need help quitting, ask your doctor.  Drink enough fluid to keep your pee (urine) pale yellow.  Avoid caffeine.  Do not drink alcohol if your doctor tells you not to drink.    General instructions    Watch for any changes in your cough. Tell your doctor about them.  Always cover your mouth when you cough.  Stay away from things that make you cough, such as perfume, candles, campfire smoke, or cleaning products.  If the air is dry, use a cool mist vaporizer or humidifier in your home.  If your cough is worse at night, try using extra pillows to raise your head up higher while you sleep.  Rest as needed.  Keep all follow-up visits as told by your doctor. This is important.    Contact a doctor if:  You have new symptoms.  You cough up pus.  Your cough does not get better after 2-3 weeks, or your cough gets worse.  Cough medicine does not help your cough and you are not sleeping well.  You have pain that gets worse or pain that is not helped with medicine.  You have a fever.  You are losing weight and you do not know why.  You have night sweats.  Get help right away if:  You cough up blood.  You have trouble breathing.  Your heartbeat is very fast.  These symptoms may be an emergency. Do not wait to see if the symptoms  will go away. Get medical help right away. Call your local emergency services (911 in the [place] Do not drive yourself to the hospital.  Summary  A cough helps to clear your throat and lungs. Many things can cause a cough.  Take over-the-counter and prescription medicines only as told by your doctor.  Always cover your mouth when you cough.  Contact a doctor if you have new symptoms or you have a cough that does not get better or gets worse.  This information is not intended to replace advice given to you by your health care provider. Make sure you discuss any questions you have with your health care provider.  Document Revised: 02/05/2021 Document Reviewed: 01/06/2020  Elsevier Patient Education © 2021 Elsevier Inc.

## 2022-06-13 NOTE — PROGRESS NOTES
You have chosen to receive care through a telehealth visit.  Do you consent to use a video/audio connection for your medical care today? Yes     CHIEF COMPLAINT  Chief Complaint   Patient presents with   • Covid-19 Home Monitoring Video Visit     Positive on Thursday   • Sore Throat     Hard to swallow         HPI  Sima Booker is a 32 y.o. female  presents with complaint of + Covid test results last Thursday. She now has severe sore throat and cough and congestion with green phlegm. Her fever is 101 max and she is using IBU, Dymista and sudafed. She thinks she has a secondary bacterial infection.     Review of Systems   Constitutional: Positive for chills, fatigue and fever.   HENT: Positive for congestion, postnasal drip, rhinorrhea and sore throat.    Respiratory: Positive for cough and wheezing. Negative for shortness of breath and stridor.         Chest congestion with occasional wheezing.    Cardiovascular: Negative.    Gastrointestinal: Negative.    Musculoskeletal: Negative.    Neurological: Negative.    Hematological: Negative.    Psychiatric/Behavioral: Negative.        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 Not sure how old i was       Family History   Problem Relation Age of Onset   • Thyroid disease Mother    • Hyperlipidemia Father    • Arthritis Maternal Grandmother    • Miscarriages / Stillbirths Maternal Grandmother    • Diabetes Maternal Grandfather    • Miscarriages / Stillbirths Maternal Grandfather    • Diabetes Paternal Grandmother    • Breast cancer Other         great aunt       Social History     Socioeconomic History   • Marital status:      Spouse name: Leslie   • Highest education level: High  school graduate   Tobacco Use   • Smoking status: Never Smoker   • Smokeless tobacco: Never Used   Vaping Use   • Vaping Use: Never used   Substance and Sexual Activity   • Alcohol use: Yes     Alcohol/week: 0.0 standard drinks     Comment: OCCASIONALLY   • Drug use: Never   • Sexual activity: Yes     Partners: Male     Birth control/protection: None       Sima Booker  reports that she has never smoked. She has never used smokeless tobacco..      Temp (!) 101 °F (38.3 °C)     PHYSICAL EXAM  Physical Exam   Constitutional: She is oriented to person, place, and time. She appears well-developed and well-nourished. She does not have a sickly appearance. She does not appear ill. No distress.   HENT:   Head: Normocephalic and atraumatic.   Right Ear: Hearing normal.   Left Ear: Hearing normal.   Nose: Mucosal edema and congestion present.   Mouth/Throat: Mouth/Lips are normal.No oropharyngeal exudate.   Oral pharynx erythremic without exudate or tonsillar swelling.    Pulmonary/Chest: Effort normal. No stridor.  No respiratory distress. She no audible wheeze...  Neurological: She is alert and oriented to person, place, and time.   Psychiatric: She has a normal mood and affect.   Vitals reviewed.      Results for orders placed or performed in visit on 01/19/22   Lipid Panel    Specimen: Blood   Result Value Ref Range    Total Cholesterol 152 0 - 200 mg/dL    Triglycerides 133 0 - 150 mg/dL    HDL Cholesterol 46 40 - 60 mg/dL    LDL Cholesterol  82 0 - 100 mg/dL    VLDL Cholesterol 24 5 - 40 mg/dL    LDL/HDL Ratio 1.73    Comprehensive Metabolic Panel    Specimen: Blood   Result Value Ref Range    Glucose 85 65 - 99 mg/dL    BUN 10 6 - 20 mg/dL    Creatinine 1.00 0.57 - 1.00 mg/dL    Sodium 140 136 - 145 mmol/L    Potassium 4.2 3.5 - 5.2 mmol/L    Chloride 104 98 - 107 mmol/L    CO2 25.4 22.0 - 29.0 mmol/L    Calcium 9.2 8.6 - 10.5 mg/dL    Total Protein 6.7 6.0 - 8.5 g/dL    Albumin 4.50 3.50 - 5.20 g/dL    ALT  (SGPT) 22 1 - 33 U/L    AST (SGOT) 17 1 - 32 U/L    Alkaline Phosphatase 77 39 - 117 U/L    Total Bilirubin 0.2 0.0 - 1.2 mg/dL    eGFR Non African Amer 64 >60 mL/min/1.73    Globulin 2.2 gm/dL    A/G Ratio 2.0 g/dL    BUN/Creatinine Ratio 10.0 7.0 - 25.0    Anion Gap 10.6 5.0 - 15.0 mmol/L   CBC Auto Differential    Specimen: Blood   Result Value Ref Range    WBC 5.93 3.40 - 10.80 10*3/mm3    RBC 5.07 3.77 - 5.28 10*6/mm3    Hemoglobin 13.8 12.0 - 15.9 g/dL    Hematocrit 41.2 34.0 - 46.6 %    MCV 81.3 79.0 - 97.0 fL    MCH 27.2 26.6 - 33.0 pg    MCHC 33.5 31.5 - 35.7 g/dL    RDW 14.6 12.3 - 15.4 %    RDW-SD 42.7 37.0 - 54.0 fl    MPV 10.5 6.0 - 12.0 fL    Platelets 308 140 - 450 10*3/mm3    Neutrophil % 55.9 42.7 - 76.0 %    Lymphocyte % 30.0 19.6 - 45.3 %    Monocyte % 11.1 5.0 - 12.0 %    Eosinophil % 2.0 0.3 - 6.2 %    Basophil % 0.8 0.0 - 1.5 %    Immature Grans % 0.2 0.0 - 0.5 %    Neutrophils, Absolute 3.31 1.70 - 7.00 10*3/mm3    Lymphocytes, Absolute 1.78 0.70 - 3.10 10*3/mm3    Monocytes, Absolute 0.66 0.10 - 0.90 10*3/mm3    Eosinophils, Absolute 0.12 0.00 - 0.40 10*3/mm3    Basophils, Absolute 0.05 0.00 - 0.20 10*3/mm3    Immature Grans, Absolute 0.01 0.00 - 0.05 10*3/mm3    nRBC 0.0 0.0 - 0.2 /100 WBC       Diagnoses and all orders for this visit:    1. Pharyngitis, unspecified etiology (Primary)  -     azithromycin (Zithromax Z-Kendall) 250 MG tablet; Take 2 tablets the first day, then 1 tablet daily for 4 days.  Dispense: 6 tablet; Refill: 0    2. COVID-19  -     guaifenesin-dextromethorphan (MUCINEX DM)  MG tablet sustained-release 12 hour tablet; Take 1 tablet by mouth Every 12 (Twelve) Hours.  Dispense: 20 tablet; Refill: 0  -     albuterol sulfate  (90 Base) MCG/ACT inhaler; Inhale 2 puffs Every 4 (Four) Hours As Needed for Wheezing.  Dispense: 18 g; Refill: 0  -     QUESTIONNAIRE SERIES    rest and fluids.     You have tested positive for Covid-19. Please quarantine for 10 days since  symptoms first appeared. If symptoms fully resolve, isolation may be shortened and end after day 5 on the first day without symptoms. Wear a well-fitting face mask for 10 full days since the start of symptoms. Isolation should not be shortened if a mask cannot be worn properly and consistently. 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      FOLLOW-UP  As discussed during visit with PCP/[facility] if no improvement or Urgent Care/Emergency Department if worsening of symptoms    Patient verbalizes understanding of medication dosage, comfort measures, instructions for treatment and follow-up.    Mariam Gibbs, QI  06/13/2022  09:07 EDT    The use of a video visit has been reviewed with the patient and verbal informed consent has been obtained. Myself and Sima Booker participated in this visit. The patient is located in [address] [place] [address].    I am located in [place], [place]. Mychart and Zoom were utilized. I spent 20  minutes in the patient's chart for this visit.

## 2022-09-16 ENCOUNTER — OFFICE VISIT (OUTPATIENT)
Dept: INTERNAL MEDICINE | Facility: CLINIC | Age: 32
End: 2022-09-16

## 2022-09-16 ENCOUNTER — LAB (OUTPATIENT)
Dept: LAB | Facility: HOSPITAL | Age: 32
End: 2022-09-16

## 2022-09-16 VITALS
SYSTOLIC BLOOD PRESSURE: 124 MMHG | HEART RATE: 78 BPM | OXYGEN SATURATION: 98 % | HEIGHT: 65 IN | DIASTOLIC BLOOD PRESSURE: 78 MMHG | WEIGHT: 183.4 LBS | RESPIRATION RATE: 18 BRPM | BODY MASS INDEX: 30.56 KG/M2 | TEMPERATURE: 98.4 F

## 2022-09-16 DIAGNOSIS — R41.840 DIFFICULTY CONCENTRATING: Primary | ICD-10-CM

## 2022-09-16 LAB
25(OH)D3 SERPL-MCNC: 25.6 NG/ML (ref 30–100)
ALBUMIN SERPL-MCNC: 4.4 G/DL (ref 3.5–5.2)
ALBUMIN/GLOB SERPL: 1.9 G/DL
ALP SERPL-CCNC: 80 U/L (ref 39–117)
ALT SERPL W P-5'-P-CCNC: 18 U/L (ref 1–33)
ANION GAP SERPL CALCULATED.3IONS-SCNC: 11.8 MMOL/L (ref 5–15)
AST SERPL-CCNC: 19 U/L (ref 1–32)
BASOPHILS # BLD AUTO: 0.06 10*3/MM3 (ref 0–0.2)
BASOPHILS NFR BLD AUTO: 1 % (ref 0–1.5)
BILIRUB SERPL-MCNC: 0.6 MG/DL (ref 0–1.2)
BUN SERPL-MCNC: 11 MG/DL (ref 6–20)
BUN/CREAT SERPL: 13.8 (ref 7–25)
CALCIUM SPEC-SCNC: 9.4 MG/DL (ref 8.6–10.5)
CHLORIDE SERPL-SCNC: 102 MMOL/L (ref 98–107)
CO2 SERPL-SCNC: 25.2 MMOL/L (ref 22–29)
CREAT SERPL-MCNC: 0.8 MG/DL (ref 0.57–1)
DEPRECATED RDW RBC AUTO: 41.2 FL (ref 37–54)
EGFRCR SERPLBLD CKD-EPI 2021: 100.5 ML/MIN/1.73
EOSINOPHIL # BLD AUTO: 0.08 10*3/MM3 (ref 0–0.4)
EOSINOPHIL NFR BLD AUTO: 1.3 % (ref 0.3–6.2)
ERYTHROCYTE [DISTWIDTH] IN BLOOD BY AUTOMATED COUNT: 13.1 % (ref 12.3–15.4)
FOLATE SERPL-MCNC: 14.6 NG/ML (ref 4.78–24.2)
GLOBULIN UR ELPH-MCNC: 2.3 GM/DL
GLUCOSE SERPL-MCNC: 81 MG/DL (ref 65–99)
HCT VFR BLD AUTO: 41.6 % (ref 34–46.6)
HGB BLD-MCNC: 13.8 G/DL (ref 12–15.9)
IMM GRANULOCYTES # BLD AUTO: 0.01 10*3/MM3 (ref 0–0.05)
IMM GRANULOCYTES NFR BLD AUTO: 0.2 % (ref 0–0.5)
IRON 24H UR-MRATE: 71 MCG/DL (ref 37–145)
LYMPHOCYTES # BLD AUTO: 1.95 10*3/MM3 (ref 0.7–3.1)
LYMPHOCYTES NFR BLD AUTO: 31.7 % (ref 19.6–45.3)
MCH RBC QN AUTO: 28.8 PG (ref 26.6–33)
MCHC RBC AUTO-ENTMCNC: 33.2 G/DL (ref 31.5–35.7)
MCV RBC AUTO: 86.8 FL (ref 79–97)
MONOCYTES # BLD AUTO: 0.64 10*3/MM3 (ref 0.1–0.9)
MONOCYTES NFR BLD AUTO: 10.4 % (ref 5–12)
NEUTROPHILS NFR BLD AUTO: 3.41 10*3/MM3 (ref 1.7–7)
NEUTROPHILS NFR BLD AUTO: 55.4 % (ref 42.7–76)
NRBC BLD AUTO-RTO: 0 /100 WBC (ref 0–0.2)
PLATELET # BLD AUTO: 311 10*3/MM3 (ref 140–450)
PMV BLD AUTO: 10.2 FL (ref 6–12)
POTASSIUM SERPL-SCNC: 3.8 MMOL/L (ref 3.5–5.2)
PROT SERPL-MCNC: 6.7 G/DL (ref 6–8.5)
RBC # BLD AUTO: 4.79 10*6/MM3 (ref 3.77–5.28)
SODIUM SERPL-SCNC: 139 MMOL/L (ref 136–145)
TSH SERPL DL<=0.05 MIU/L-ACNC: 1.52 UIU/ML (ref 0.27–4.2)
VIT B12 BLD-MCNC: 477 PG/ML (ref 211–946)
WBC NRBC COR # BLD: 6.15 10*3/MM3 (ref 3.4–10.8)

## 2022-09-16 PROCEDURE — 82607 VITAMIN B-12: CPT | Performed by: NURSE PRACTITIONER

## 2022-09-16 PROCEDURE — 80053 COMPREHEN METABOLIC PANEL: CPT | Performed by: NURSE PRACTITIONER

## 2022-09-16 PROCEDURE — 99213 OFFICE O/P EST LOW 20 MIN: CPT | Performed by: NURSE PRACTITIONER

## 2022-09-16 PROCEDURE — 82306 VITAMIN D 25 HYDROXY: CPT | Performed by: NURSE PRACTITIONER

## 2022-09-16 PROCEDURE — 85025 COMPLETE CBC W/AUTO DIFF WBC: CPT | Performed by: NURSE PRACTITIONER

## 2022-09-16 PROCEDURE — 36415 COLL VENOUS BLD VENIPUNCTURE: CPT | Performed by: NURSE PRACTITIONER

## 2022-09-16 PROCEDURE — 82746 ASSAY OF FOLIC ACID SERUM: CPT | Performed by: NURSE PRACTITIONER

## 2022-09-16 PROCEDURE — 83540 ASSAY OF IRON: CPT | Performed by: NURSE PRACTITIONER

## 2022-09-16 PROCEDURE — 84443 ASSAY THYROID STIM HORMONE: CPT | Performed by: NURSE PRACTITIONER

## 2022-09-16 RX ORDER — LEVOCETIRIZINE DIHYDROCHLORIDE 5 MG/1
5 TABLET, FILM COATED ORAL DAILY
COMMUNITY
Start: 2022-08-12

## 2022-09-16 NOTE — PROGRESS NOTES
Chief Complaint  Trouble Focusing    Subjective          Sima Booker presents to [facility]  History of Present Illness     The patient presents today with concern for concentration. She was last seen in 01/2022 for a physical. At that time, she was treated for anxiety with Lexapro and hydroxyzine for worsening times of anxiety or sleep.     The patient reports that her anxiety has been stable since her last visit. She states that her focus has become more prevalent at work. 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 She states that if she is not getting the correct information for her supervisor to report, something comes back later. 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 She states that she has had the same job for 4 years. She notes that she has always had some issues with attention details, but now it has become more frequent and it seems to have gotten worse since her pregnancy. The patient reports that she is a little over 1 year postpartum. She states that she had some physical changes during her pregnancy. She notes that her blood pressure has been under control. The patient reports that her allergist was worried that where she had been on Claritin-D so long that could have caused some of the issues during the pregnancy, even though she had stopped taking it. She states that she is on Xyzal, Lexapro, and gets her allergy shots. The patient reports that she takes the hydroxyzine as needed. She states that she has not had to take the hydroxyzine in a while. The patient reports that she has a baby and she is tired. She states that the only time she has snored is when she had COVID-19 in 06/2022 when she had severe congestion. She notes that the only time she has ever had any snoring issues is when she has severe congestion. The patient reports that she is leaning more towards ADD. She states that they have always thought she  "had ADD as a child, but she was never diagnosed because her mother stopped testing because she had some concerns.  She notes that she has tried to do a checklist, but there are times that she does not see it. She states that she will be going through and processing and then she loses the focus and then she does not know where she lost the focus at to try and be able to go back. The patient reports that her brain goes off and she is sitting here manually doing something, but then she cannot go back. She states that her boss is willing to work with her. She notes that her boss has already taken some of her responsibilities away, she has hopes of giving them back to her.       She reports she is also difficulty focusing and she is missing important aspects that related to her job.  She did have a new baby 8/22.  She had medical complications at the time including gestational diabetes and hypertension.  She does take hydroxyzine as needed and Lexapro.    Objective   Vital Signs:   /78 (BP Location: Right arm, Patient Position: Sitting, Cuff Size: Adult)   Pulse 78   Temp 98.4 °F (36.9 °C) (Infrared)   Resp 18   Ht 163.8 cm (64.5\")   Wt 83.2 kg (183 lb 6.4 oz)   SpO2 98%   BMI 30.99 kg/m²     Physical Exam  Constitutional:       General: She is not in acute distress.     Appearance: Normal appearance. She is not ill-appearing.   Cardiovascular:      Rate and Rhythm: Normal rate and regular rhythm.   Pulmonary:      Effort: Pulmonary effort is normal.   Skin:     Coloration: Skin is not pale.   Neurological:      General: No focal deficit present.      Mental Status: She is alert and oriented to person, place, and time.   Psychiatric:         Mood and Affect: Mood normal.         Behavior: Behavior normal.         Thought Content: Thought content normal.         Judgment: Judgment normal.        Result Review :                 Assessment and Plan    Diagnoses and all orders for this visit:    1. Difficulty " concentrating (Primary)  -     Vitamin B12; Future  -     Folate; Future  -     Comprehensive Metabolic Panel; Future  -     TSH; Future  -     CBC & Differential; Future  -     Iron; Future  -     Vitamin D 25 Hydroxy; Future  -     Ambulatory Referral to Behavioral Health  -     Vitamin B12  -     Folate  -     Comprehensive Metabolic Panel  -     TSH  -     CBC & Differential  -     Iron  -     Vitamin D 25 Hydroxy          Concern for concentration        -  Referral to behavioral health.            Follow Up   Return if symptoms worsen or fail to improve.  Patient was given instructions and counseling regarding her condition or for health maintenance advice. Please see specific information pulled into the AVS if appropriate.     RTC/call  If symptoms worsen  Meds MOA and SE's reviewed and pt v/u    Transcribed from ambient dictation for QI Seay by Stephanie Oh.  09/16/22   14:14 EDT    Patient verbalized consent to the visit recording.      [facility] Se[facility]  [address]  [facility] [id number]  Fax [phone]  Phone [phone]

## 2022-09-17 PROBLEM — E55.9 VITAMIN D INSUFFICIENCY: Status: ACTIVE | Noted: 2022-09-17

## 2022-11-01 ENCOUNTER — OFFICE VISIT (OUTPATIENT)
Dept: BEHAVIORAL HEALTH | Facility: CLINIC | Age: 32
End: 2022-11-01

## 2022-11-01 VITALS
DIASTOLIC BLOOD PRESSURE: 86 MMHG | HEART RATE: 85 BPM | BODY MASS INDEX: 30.49 KG/M2 | HEIGHT: 65 IN | WEIGHT: 183 LBS | SYSTOLIC BLOOD PRESSURE: 122 MMHG

## 2022-11-01 DIAGNOSIS — F41.1 GENERALIZED ANXIETY DISORDER: ICD-10-CM

## 2022-11-01 DIAGNOSIS — F90.2 ATTENTION DEFICIT HYPERACTIVITY DISORDER, COMBINED TYPE: Primary | ICD-10-CM

## 2022-11-01 PROCEDURE — 90792 PSYCH DIAG EVAL W/MED SRVCS: CPT

## 2022-11-01 RX ORDER — DEXTROAMPHETAMINE SACCHARATE, AMPHETAMINE ASPARTATE, DEXTROAMPHETAMINE SULFATE AND AMPHETAMINE SULFATE 2.5; 2.5; 2.5; 2.5 MG/1; MG/1; MG/1; MG/1
10 TABLET ORAL 2 TIMES DAILY
Qty: 60 TABLET | Refills: 0 | Status: SHIPPED | OUTPATIENT
Start: 2022-11-01 | End: 2022-12-06 | Stop reason: SDUPTHER

## 2022-11-02 NOTE — PROGRESS NOTES
New Patient Office Visit      Patient Name: Sima Booker  : 1990   MRN: [id number]     Referring Provider: Sonia Bridges APRN    Chief Complaint:  Psychiatric evaluation related to:     ICD-10-CM ICD-9-CM   1. Attention deficit hyperactivity disorder, combined type  F90.2 314.01   2. Generalized anxiety disorder  F41.1 300.02        History of Present Illness:   Sima Booker is a 32 y.o. female who is here today for psychiatric evaluation on referral from primary care provider.  Patient presents today with issues with concentration which has been worse over the past year and a half.    ADHD:  Patient reports history of symptoms of inattention, hyperactivity and impulsivity which have been present since childhood, but worse over the past year and a half.  Patient reports symptoms/signs of inattention including difficulty concentrating, difficulty focusing on task, feeling easily overwhelmed, heightened distractibility, and zoning out while at work and in conversation.  Patient reports the symptoms are interfering with work, and she is making several mistakes.  Patient also endorses issues with hyperactivity including excessive fidgetiness, a feeling of inner restlessness and difficulty sitting still.  She reports issues with impulsivity including impulsive, interruptive speech.  Patient reports the symptoms overall are causing difficulties at work and at home.  At home, patient is unable to concentrate on many of the things she used to enjoy including reading.  At work, the patient reports frequent mistakes or putting her at risk for losing her job.    Anxiety:  Patient reports a history of generalized anxiety and anxiety attacks.  Patient reports she first had an anxiety attack in 2017.  Patient reports at that time she was under a good deal of stress related to her father-in-law being in the hospital.  Patient reports during this attack she experienced hot/cold flashes, dizziness,  hyperventilation and nervousness.  Patient has not since had an anxiety attack, but does have anxiety, particularly with confrontation at work.    Current treatment(s): Escitalopram 10 mg (partially effective), hydroxyzine 25 mg.    Subjective      Review of Systems:   Review of Systems    PHQ-9 Depression Screening Score: 15     Psychiatric History:     Previous Diagnoses: Generalized anxiety  Outpatient treatment history: Primary care, history of counseling between 2017 and 2021  Previous medications: Citalopram (ineffective)  Inpatient admissions: None  History of suicide attempts: None  Trauma/Abuse History: None  Developmental History: Patient met milestones on time to the best of her knowledge      SUICIDE RISK ASSESSMENT    Does patient have thoughts of suicide?   No  Does patient have intent for suicide?  No  Does patient have a current plan for suicide?  No    History of suicide attempts: No  Family history of suicide or attempts: No  Protective factors: No suicidal ideation, good support system, mother, lives with spouse and child    Risk Level: Low    SAFETY PLAN    Patient agrees to call 911/go to the nearest emergency department if he/she develops new or worsening SI, or develops intent or plan to act on these thoughts. Patient is agreeable to all elements of safety plan and verbalizes understanding.     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 Not sure how old i was       Past Surgical History:   Past Surgical History:   Procedure Laterality Date   •  SECTION N/A 2021    Procedure:  SECTION PRIMARY;  Surgeon: Stef Lindsey MD;  Location: [facility] LABOR DELIVERY;  Service: Obstetrics/Gynecology;  Laterality: N/A;   • COLONOSCOPY  10/01/2017   • FERTILITY SURGERY  2020    Oocyte retrieval for IVF   • NASAL SINUS SURGERY  2016   • WISDOM TOOTH EXTRACTION         Family History:   Family History   Problem Relation Age of Onset   • Thyroid disease Mother    • Hyperlipidemia Father    • Arthritis Maternal Grandmother    • Miscarriages / Stillbirths Maternal Grandmother    • Diabetes Maternal Grandfather    • Miscarriages / Stillbirths Maternal Grandfather    • Diabetes Paternal Grandmother    • Breast cancer Other         great aunt       Family Psychiatric History:  Great aunt: Bipolar disorder    Medications:     Current Outpatient Medications:   •  ALLERGY INJECTION SERUM OFFICE ADMINISTERED, Inject 2 each under the skin into the appropriate area as directed 1 (One) Time Per Week., Disp: , Rfl:   •  escitalopram (Lexapro) 10 MG tablet, Take 1/2 tablet (5mg) for 5 days and then take 1 tab (10mg) daily, Disp: 30 tablet, Rfl: 12  •  hydrOXYzine (ATARAX) 25 MG tablet, Take 1 tablet by mouth 3 (Three) Times a Day As Needed for Anxiety., Disp: 90 tablet, Rfl: 0  •  levocetirizine (XYZAL) 5 MG tablet, Take 5 mg by mouth Daily., Disp: , Rfl:   •  amphetamine-dextroamphetamine (Adderall) 10 MG tablet, Take 1 tablet by mouth 2 (Two) Times a Day., Disp: 60 tablet, Rfl: 0    Allergies:   Allergies   Allergen Reactions   • Penicillins Hives     Tolerated cefazolin 21         Objective     Physical Exam:  Vital Signs:   Vitals:    22 1601   BP: 122/86   Pulse: 85   Weight: 83 kg (183 lb)   Height: 163.8 cm (64.5\")     Body mass index is 30.93 kg/m².     Physical " Exam    Mental Status Exam:   Appearance: This is an overweight adult  female, appears stated age, dressed appropriately to situation and weather  Hygiene: Good  Cooperation: Calm, cooperative  Eye Contact: Within normal limits  Psychomotor Behavior: Fidgety, restless  Mood: Euthymic  Affect: Congruent, full range  Speech: Fast, pressured  Thought Process: Circumstantial  Thought Content: mood congruent, ego-syntonic  Suicidal: Denies  Homicidal: Denies  Hallucinations: Denies  Delusion: Denies  Memory: Intact  Orientation: X4  Reliability: Good  Insight: Fair  Judgement: Good  Impulse Control: Good    Assessment / Plan      Visit Diagnosis/Orders Placed This Visit:  Diagnoses and all orders for this visit:    1. Attention deficit hyperactivity disorder, combined type (Primary)  -     Compliance Drug Analysis, Ur - Urine, Clean Catch; Future  -     amphetamine-dextroamphetamine (Adderall) 10 MG tablet; Take 1 tablet by mouth 2 (Two) Times a Day.  Dispense: 60 tablet; Refill: 0  -     Urine Drug Screen - Urine, Clean Catch; Future  -     Urine Drug Screen - Urine, Clean Catch; Future  -     Urine Drug Screen - Urine, Clean Catch    2. Generalized anxiety disorder  -     Urine Drug Screen - Urine, Clean Catch; Future  -     Urine Drug Screen - Urine, Clean Catch; Future  -     Urine Drug Screen - Urine, Clean Catch         Differential:   None current    Plan:   1. Start Adderall 10 mg twice daily  2. Continue escitalopram 10 mg daily  3. Continue hydroxyzine as needed    Continue supportive psychotherapy efforts and medications as indicated. Treatment and medication options discussed during today's visit. Patient ackowledged and verbally consented to continue with current treatment plan and was educated on the importance of compliance with treatment and follow-up appointments. Patient seems reasonably able to adhere to treatment plan.      Medication Considerations:  FORREST reviewed and appropriate.  Discussed medication options and treatment plan of prescribed medication(s) as well as the risks, benefits, and potential side effects. Patient is agreeable to call the office with any worsening of symptoms or onset of side effects. Patient is agreeable to call 911 or go to the nearest ER should he/she begin having SI/HI.    Treatment Goals:    ADHD: Patient will experience improvement in symptoms of inattention, hyperactivity and impulsivity with Adderall, patient will be able to better function at work  Anxiety: Patient will experience improvement in symptoms of anxiety secondary to successful treatment of ADHD    Quality Measures:     Tobacco Use/Cessation:   Never smoker    I advised Sima Booker of the risks of tobacco use.     Follow Up:   Return in about 4 weeks (around 11/29/2022) for Medication Mangement Follow Up.      QI Duong, PMHNP-BC

## 2022-11-03 LAB
AMPHETAMINES UR QL SCN: NEGATIVE NG/ML
BARBITURATES UR QL SCN: NEGATIVE NG/ML
BENZODIAZ UR QL SCN: NEGATIVE NG/ML
BZE UR QL SCN: NEGATIVE NG/ML
CANNABINOIDS UR QL SCN: NEGATIVE NG/ML
CREAT UR-MCNC: 138.4 MG/DL (ref 20–300)
LABORATORY COMMENT REPORT: NORMAL
METHADONE UR QL SCN: NEGATIVE NG/ML
OPIATES UR QL SCN: NEGATIVE NG/ML
OXYCODONE+OXYMORPHONE UR QL SCN: NEGATIVE NG/ML
PCP UR QL: NEGATIVE NG/ML
PH UR: 7.1 [PH] (ref 4.5–8.9)
PROPOXYPH UR QL SCN: NEGATIVE NG/ML

## 2022-12-06 ENCOUNTER — OFFICE VISIT (OUTPATIENT)
Dept: BEHAVIORAL HEALTH | Facility: CLINIC | Age: 32
End: 2022-12-06

## 2022-12-06 VITALS
WEIGHT: 180 LBS | SYSTOLIC BLOOD PRESSURE: 110 MMHG | DIASTOLIC BLOOD PRESSURE: 76 MMHG | HEIGHT: 65 IN | BODY MASS INDEX: 29.99 KG/M2

## 2022-12-06 DIAGNOSIS — F90.2 ATTENTION DEFICIT HYPERACTIVITY DISORDER, COMBINED TYPE: Primary | ICD-10-CM

## 2022-12-06 DIAGNOSIS — F41.1 GENERALIZED ANXIETY DISORDER: ICD-10-CM

## 2022-12-06 DIAGNOSIS — G47.9 SLEEP DISTURBANCE: ICD-10-CM

## 2022-12-06 PROCEDURE — 99214 OFFICE O/P EST MOD 30 MIN: CPT

## 2022-12-06 RX ORDER — DEXTROAMPHETAMINE SACCHARATE, AMPHETAMINE ASPARTATE, DEXTROAMPHETAMINE SULFATE AND AMPHETAMINE SULFATE 2.5; 2.5; 2.5; 2.5 MG/1; MG/1; MG/1; MG/1
10 TABLET ORAL 2 TIMES DAILY
Qty: 60 TABLET | Refills: 0 | Status: SHIPPED | OUTPATIENT
Start: 2022-12-06 | End: 2023-01-06 | Stop reason: SDUPTHER

## 2022-12-06 NOTE — PROGRESS NOTES
Office  Follow Up Visit      Patient Name: Sima Booker  : 1990   MRN: [id number]     Referring Provider: Sonia Bridges APRN    Chief Complaint:  Sima Booker is a 32 y.o. female who is here today for follow up related to:      ICD-10-CM ICD-9-CM   1. Attention deficit hyperactivity disorder, combined type  F90.2 314.01   2. Generalized anxiety disorder  F41.1 300.02   3. Sleep disturbance  G47.9 780.50        HPI/Interval History:   ADHD:   Has improved substantially since beginning Adderall. Patient reports she better able to focus and prioritize tasks at work. Boss has noticed. Is having some issues falling asleep, however this may predate Adderall. Taking second dose at 12:30pm.     Anxiety:   Has improved substantially since last visit.       Subjective      Review of Systems:   Review of Systems   Constitutional: Negative.    Cardiovascular: Negative for chest pain, palpitations and leg swelling.   Psychiatric/Behavioral: Positive for sleep disturbance. Negative for decreased concentration, self-injury, suicidal ideas and depressed mood. The patient is not nervous/anxious.        PHQ-9 Depression Screening Score: 6     Patient History:   The following portions of the patient's history were reviewed and updated as appropriate: allergies, current medications, past family history, past medical history, past social history, past surgical history and problem list.     Social:  No significant update    Medications:     Current Outpatient Medications:   •  ALLERGY INJECTION SERUM OFFICE ADMINISTERED, Inject 2 each under the skin into the appropriate area as directed 1 (One) Time Per Week., Disp: , Rfl:   •  amphetamine-dextroamphetamine (Adderall) 10 MG tablet, Take 1 tablet by mouth 2 (Two) Times a Day., Disp: 60 tablet, Rfl: 0  •  escitalopram (Lexapro) 10 MG tablet, Take 1/2 tablet (5mg) for 5 days and then take 1 tab (10mg) daily, Disp: 30 tablet, Rfl: 12  •  hydrOXYzine (ATARAX) 25  "MG tablet, Take 1 tablet by mouth 3 (Three) Times a Day As Needed for Anxiety., Disp: 90 tablet, Rfl: 0  •  levocetirizine (XYZAL) 5 MG tablet, Take 5 mg by mouth Daily., Disp: , Rfl:     Objective     Physical Exam:  Vital Signs:   Vitals:    12/06/22 1624   BP: 110/76  Comment: HR 84   Weight: 81.6 kg (180 lb)   Height: 163.8 cm (64.5\")     Body mass index is 30.42 kg/m².     Mental Status Exam:     Appearance: This is an overweight, adult  female, dressed casually wearing ripped jeans and a holiday-themed shirt.  Hygiene: Good, well-kept  Cooperation: Calm, cooperative  Eye Contact: Within normal  Psychomotor Behavior: Normal limits  Mood: Euthymic  Affect: Congruent, full range  Speech: Normal rate, tone and prosody  Thought Process: Linear, goal directed  Thought Content: Mood congruent  Suicidal: Denies  Homicidal: Denies  Hallucinations: Denies  Delusion: Denies  Memory: Intact  Orientation: X4  Reliability: Good  Insight: Fair  Judgement: Good  Impulse Control: Good, no current concern      Assessment / Plan      Visit Diagnosis/Orders Placed This Visit:  Diagnoses and all orders for this visit:    1. Attention deficit hyperactivity disorder, combined type (Primary)    2. Generalized anxiety disorder    3. Sleep disturbance         Differential:   None current    Plan:   1. Continue Adderall 10 mg BID   2. Continue Lexapro 10 mg daily    Continue supportive psychotherapy efforts and medications as indicated. Treatment and medication options discussed during today's visit. Patient ackowledged and verbally consented to continue with current treatment plan and was educated on the importance of compliance with treatment and follow-up appointments. Patient seems reasonably able to adhere to treatment plan.      Medication Considerations:  FORREST reviewed and appropriate. Discussed medication options and treatment plan of prescribed medication(s) as well as the risks, benefits, and potential side effects. " Patient is agreeable to call the office with any worsening of symptoms or onset of side effects. Patient is agreeable to call 911 or go to the nearest ER should he/she begin having SI/HI.    Quality Measures:     Tobacco Use/Cessation:  Never smoker    I advised Sima Booker of the risks of tobacco use.     Follow Up:   No follow-ups on file.      QI Duong, PMHNP-BC

## 2023-01-06 DIAGNOSIS — F90.2 ATTENTION DEFICIT HYPERACTIVITY DISORDER, COMBINED TYPE: ICD-10-CM

## 2023-01-06 NOTE — TELEPHONE ENCOUNTER
Last appointment: 12/06/2022  Next appointment: 02/07/2023  [place]:   UDS:11/01/2022  CSA: 11/03/2022    Last Refill: 12/06/2022  Quantity: 60  Refills: 0

## 2023-01-07 RX ORDER — DEXTROAMPHETAMINE SACCHARATE, AMPHETAMINE ASPARTATE, DEXTROAMPHETAMINE SULFATE AND AMPHETAMINE SULFATE 2.5; 2.5; 2.5; 2.5 MG/1; MG/1; MG/1; MG/1
10 TABLET ORAL 2 TIMES DAILY
Qty: 60 TABLET | Refills: 0 | Status: SHIPPED | OUTPATIENT
Start: 2023-01-07 | End: 2023-02-07 | Stop reason: SDUPTHER

## 2023-01-20 ENCOUNTER — OFFICE VISIT (OUTPATIENT)
Dept: INTERNAL MEDICINE | Facility: CLINIC | Age: 33
End: 2023-01-20
Payer: COMMERCIAL

## 2023-01-20 VITALS
TEMPERATURE: 98.6 F | HEART RATE: 80 BPM | WEIGHT: 180 LBS | DIASTOLIC BLOOD PRESSURE: 82 MMHG | BODY MASS INDEX: 28.93 KG/M2 | SYSTOLIC BLOOD PRESSURE: 120 MMHG | RESPIRATION RATE: 18 BRPM | HEIGHT: 66 IN

## 2023-01-20 DIAGNOSIS — R82.998 LEUKOCYTES IN URINE: ICD-10-CM

## 2023-01-20 DIAGNOSIS — F98.8 ATTENTION DEFICIT DISORDER, UNSPECIFIED HYPERACTIVITY PRESENCE: ICD-10-CM

## 2023-01-20 DIAGNOSIS — Z13.220 LIPID SCREENING: ICD-10-CM

## 2023-01-20 DIAGNOSIS — F41.9 ANXIETY: ICD-10-CM

## 2023-01-20 DIAGNOSIS — Z00.00 ANNUAL PHYSICAL EXAM: Primary | ICD-10-CM

## 2023-01-20 DIAGNOSIS — Z13.1 ENCOUNTER FOR SCREENING EXAMINATION FOR IMPAIRED GLUCOSE REGULATION AND DIABETES MELLITUS: ICD-10-CM

## 2023-01-20 DIAGNOSIS — E55.9 VITAMIN D INSUFFICIENCY: ICD-10-CM

## 2023-01-20 DIAGNOSIS — Z13.29 THYROID DISORDER SCREEN: ICD-10-CM

## 2023-01-20 DIAGNOSIS — D50.9 IRON DEFICIENCY ANEMIA, UNSPECIFIED IRON DEFICIENCY ANEMIA TYPE: ICD-10-CM

## 2023-01-20 LAB
25(OH)D3 SERPL-MCNC: 17.9 NG/ML (ref 30–100)
ALBUMIN SERPL-MCNC: 4.5 G/DL (ref 3.5–5.2)
ALBUMIN/GLOB SERPL: 1.8 G/DL
ALP SERPL-CCNC: 95 U/L (ref 39–117)
ALT SERPL W P-5'-P-CCNC: 20 U/L (ref 1–33)
ANION GAP SERPL CALCULATED.3IONS-SCNC: 9.2 MMOL/L (ref 5–15)
AST SERPL-CCNC: 21 U/L (ref 1–32)
BASOPHILS # BLD AUTO: 0.06 10*3/MM3 (ref 0–0.2)
BASOPHILS NFR BLD AUTO: 1.2 % (ref 0–1.5)
BILIRUB BLD-MCNC: NEGATIVE MG/DL
BILIRUB SERPL-MCNC: 0.4 MG/DL (ref 0–1.2)
BUN SERPL-MCNC: 12 MG/DL (ref 6–20)
BUN/CREAT SERPL: 12.5 (ref 7–25)
CALCIUM SPEC-SCNC: 9.4 MG/DL (ref 8.6–10.5)
CHLORIDE SERPL-SCNC: 102 MMOL/L (ref 98–107)
CHOLEST SERPL-MCNC: 172 MG/DL (ref 0–200)
CLARITY, POC: CLEAR
CO2 SERPL-SCNC: 27.8 MMOL/L (ref 22–29)
COLOR UR: YELLOW
CREAT SERPL-MCNC: 0.96 MG/DL (ref 0.57–1)
DEPRECATED RDW RBC AUTO: 40.7 FL (ref 37–54)
EGFRCR SERPLBLD CKD-EPI 2021: 80.3 ML/MIN/1.73
EOSINOPHIL # BLD AUTO: 0.06 10*3/MM3 (ref 0–0.4)
EOSINOPHIL NFR BLD AUTO: 1.2 % (ref 0.3–6.2)
ERYTHROCYTE [DISTWIDTH] IN BLOOD BY AUTOMATED COUNT: 13 % (ref 12.3–15.4)
EXPIRATION DATE: ABNORMAL
GLOBULIN UR ELPH-MCNC: 2.5 GM/DL
GLUCOSE SERPL-MCNC: 89 MG/DL (ref 65–99)
GLUCOSE UR STRIP-MCNC: NEGATIVE MG/DL
HCT VFR BLD AUTO: 38.9 % (ref 34–46.6)
HDLC SERPL-MCNC: 42 MG/DL (ref 40–60)
HGB BLD-MCNC: 13.4 G/DL (ref 12–15.9)
IMM GRANULOCYTES # BLD AUTO: 0.01 10*3/MM3 (ref 0–0.05)
IMM GRANULOCYTES NFR BLD AUTO: 0.2 % (ref 0–0.5)
IRON 24H UR-MRATE: 81 MCG/DL (ref 37–145)
KETONES UR QL: NEGATIVE
LDLC SERPL CALC-MCNC: 113 MG/DL (ref 0–100)
LDLC/HDLC SERPL: 2.65 {RATIO}
LEUKOCYTE EST, POC: ABNORMAL
LYMPHOCYTES # BLD AUTO: 1.4 10*3/MM3 (ref 0.7–3.1)
LYMPHOCYTES NFR BLD AUTO: 28.6 % (ref 19.6–45.3)
Lab: ABNORMAL
MCH RBC QN AUTO: 29.8 PG (ref 26.6–33)
MCHC RBC AUTO-ENTMCNC: 34.4 G/DL (ref 31.5–35.7)
MCV RBC AUTO: 86.4 FL (ref 79–97)
MONOCYTES # BLD AUTO: 0.51 10*3/MM3 (ref 0.1–0.9)
MONOCYTES NFR BLD AUTO: 10.4 % (ref 5–12)
NEUTROPHILS NFR BLD AUTO: 2.85 10*3/MM3 (ref 1.7–7)
NEUTROPHILS NFR BLD AUTO: 58.4 % (ref 42.7–76)
NITRITE UR-MCNC: NEGATIVE MG/ML
NRBC BLD AUTO-RTO: 0 /100 WBC (ref 0–0.2)
PH UR: 8 [PH] (ref 5–8)
PLATELET # BLD AUTO: 291 10*3/MM3 (ref 140–450)
PMV BLD AUTO: 9.7 FL (ref 6–12)
POTASSIUM SERPL-SCNC: 4.6 MMOL/L (ref 3.5–5.2)
PROT SERPL-MCNC: 7 G/DL (ref 6–8.5)
PROT UR STRIP-MCNC: NEGATIVE MG/DL
RBC # BLD AUTO: 4.5 10*6/MM3 (ref 3.77–5.28)
RBC # UR STRIP: NEGATIVE /UL
SODIUM SERPL-SCNC: 139 MMOL/L (ref 136–145)
SP GR UR: 1.02 (ref 1–1.03)
TRIGL SERPL-MCNC: 94 MG/DL (ref 0–150)
TSH SERPL DL<=0.05 MIU/L-ACNC: 1.7 UIU/ML (ref 0.27–4.2)
UROBILINOGEN UR QL: NORMAL
VLDLC SERPL-MCNC: 17 MG/DL (ref 5–40)
WBC NRBC COR # BLD: 4.89 10*3/MM3 (ref 3.4–10.8)

## 2023-01-20 PROCEDURE — 84443 ASSAY THYROID STIM HORMONE: CPT | Performed by: NURSE PRACTITIONER

## 2023-01-20 PROCEDURE — 80053 COMPREHEN METABOLIC PANEL: CPT | Performed by: NURSE PRACTITIONER

## 2023-01-20 PROCEDURE — 99395 PREV VISIT EST AGE 18-39: CPT | Performed by: NURSE PRACTITIONER

## 2023-01-20 PROCEDURE — 36415 COLL VENOUS BLD VENIPUNCTURE: CPT | Performed by: NURSE PRACTITIONER

## 2023-01-20 PROCEDURE — 81003 URINALYSIS AUTO W/O SCOPE: CPT | Performed by: NURSE PRACTITIONER

## 2023-01-20 PROCEDURE — 80061 LIPID PANEL: CPT | Performed by: NURSE PRACTITIONER

## 2023-01-20 PROCEDURE — 87086 URINE CULTURE/COLONY COUNT: CPT | Performed by: NURSE PRACTITIONER

## 2023-01-20 PROCEDURE — 83540 ASSAY OF IRON: CPT | Performed by: NURSE PRACTITIONER

## 2023-01-20 PROCEDURE — 85025 COMPLETE CBC W/AUTO DIFF WBC: CPT | Performed by: NURSE PRACTITIONER

## 2023-01-20 PROCEDURE — 82306 VITAMIN D 25 HYDROXY: CPT | Performed by: NURSE PRACTITIONER

## 2023-01-20 NOTE — PROGRESS NOTES
Chief Complaint  Annual Exam    Subjective          Sima Booker presents to [facility]  History of Present Illness    The patient presents today for her annual physical exam.    ADHD  She was recently diagnosed with ADHD by behavioral health and is currently taking Adderall, which has made a significant change in her life. The patient reports that she did not realize how bad she had let herself get until she started taking the Adderall. She does admit to feeling lightheaded since starting the medication but contributes this to adjusting to the Adderall. She states that she was prescribed Lexapro by her OB through [facility], but is unsure if she needs to be on the anxiety medication anymore. She has not discussed this with her therapist. The patient reports that her therapist retired at the end of 12/2022. She states that she has been trying to find a new therapist that matches well with her and would also like to find a counselor to see on a regular basis.     Allergies  She has Xyzal and Dymista on hand to use as needed currently prescribed by her allergist.      Body mass index  Her body mass index has decreased from 31 to 29.5. Her blood pressure is in normal limits today at 120/82 mmHg. She has been mindful of her diet but reports difficulty in trying to create more space in her day for her workout routine with a toddler. She would like an elliptical when she gets moved into her new home.     Vitamin D insufficiency  The patient reports that she is taking a vitamin D supplement daily, but she is unsure of the dosage.    Iron deficiency anemia  She is not currently taking any iron supplements.    Gestational diabetes  The patient confirms a history of gestational diabetes.    Family history   The patient reports that she does have a family history of colonic polyps and melanoma.    Health maintenance  She is up-to-date with her influenza vaccine and  "reports her last COVID-19 vaccine was given in 09/2022. She has received a hepatitis vaccine but is unsure if it was A or B. The patient reports that she has not been to the dentist for some time. She states that she has an eye exam scheduled. She denies any changes in her skin, any new headaches, visual changes, fever, sweats, or chills. The patient denies any chest pain, shortness of breath, or swelling. She denies any abdominal pain, blood in the urine, the stool, or changes in her bowel or bladder habits. The patient reports that she does not have a living will, but plans to have a living will completed for who would take care of her children.       Current Outpatient Medications:   •  ALLERGY INJECTION SERUM OFFICE ADMINISTERED, Inject 2 each under the skin into the appropriate area as directed 1 (One) Time Per Week., Disp: , Rfl:   •  amphetamine-dextroamphetamine (Adderall) 10 MG tablet, Take 1 tablet by mouth 2 (Two) Times a Day., Disp: 60 tablet, Rfl: 0  •  escitalopram (Lexapro) 10 MG tablet, Take 1/2 tablet (5mg) for 5 days and then take 1 tab (10mg) daily, Disp: 30 tablet, Rfl: 12  •  hydrOXYzine (ATARAX) 25 MG tablet, Take 1 tablet by mouth 3 (Three) Times a Day As Needed for Anxiety., Disp: 90 tablet, Rfl: 0  •  levocetirizine (XYZAL) 5 MG tablet, Take 5 mg by mouth Daily., Disp: , Rfl:          Objective   Vital Signs:   /82 (BP Location: Right arm, Patient Position: Sitting, Cuff Size: Adult)   Pulse 80   Temp 98.6 °F (37 °C) (Infrared)   Resp 18   Ht 166.4 cm (65.5\")   Wt 81.6 kg (180 lb)   BMI 29.50 kg/m²     Physical Exam  Vitals and nursing note reviewed.   Constitutional:       General: She is not in acute distress.     Appearance: Normal appearance. She is well-developed. She is not ill-appearing.   HENT:      Head: Normocephalic and atraumatic.      Right Ear: Tympanic membrane and external ear normal.      Left Ear: Tympanic membrane and external ear normal.      Nose: Nose " normal.      Mouth/Throat:      Mouth: Mucous membranes are moist.      Pharynx: Oropharynx is clear. No oropharyngeal exudate or posterior oropharyngeal erythema.   Eyes:      General: No scleral icterus.        Right eye: No discharge.         Left eye: No discharge.      Conjunctiva/sclera: Conjunctivae normal.      Pupils: Pupils are equal, round, and reactive to light.   Neck:      Thyroid: No thyromegaly.      Vascular: No carotid bruit.   Cardiovascular:      Rate and Rhythm: Normal rate and regular rhythm.      Heart sounds: Normal heart sounds. No murmur heard.    No friction rub. No gallop.   Pulmonary:      Effort: Pulmonary effort is normal.      Breath sounds: Normal breath sounds.   Abdominal:      General: Bowel sounds are normal. There is no distension.      Palpations: Abdomen is soft. There is no mass.      Tenderness: There is no abdominal tenderness. There is no guarding or rebound.      Hernia: No hernia is present.   Musculoskeletal:         General: Normal range of motion.      Cervical back: Normal range of motion and neck supple.   Lymphadenopathy:      Head:      Right side of head: No submental, submandibular, tonsillar, preauricular, posterior auricular or occipital adenopathy.      Left side of head: No submental, submandibular, tonsillar, preauricular, posterior auricular or occipital adenopathy.      Cervical: No cervical adenopathy.      Right cervical: No superficial, deep or posterior cervical adenopathy.     Left cervical: No superficial, deep or posterior cervical adenopathy.      Upper Body:      Right upper body: No supraclavicular, axillary, pectoral or epitrochlear adenopathy.      Left upper body: No supraclavicular, axillary, pectoral or epitrochlear adenopathy.      Lower Body: No right inguinal adenopathy. No left inguinal adenopathy.   Skin:     General: Skin is warm and dry.      Capillary Refill: Capillary refill takes 2 to 3 seconds.   Neurological:      Mental Status:  She is alert and oriented to person, place, and time.      Deep Tendon Reflexes: Reflexes normal.   Psychiatric:         Behavior: Behavior normal.         Thought Content: Thought content normal.         Judgment: Judgment normal.     Pap pelvic breast for gynecology  Result Review :                 Assessment and Plan    Diagnoses and all orders for this visit:    1. Annual physical exam (Primary)  -     CBC & Differential; Future  -     CBC & Differential    2. Iron deficiency anemia, unspecified iron deficiency anemia type  -     CBC & Differential; Future  -     Iron; Future  -     CBC & Differential  -     Iron    3. Anxiety  -     CBC & Differential; Future  -     CBC & Differential    4. Vitamin D insufficiency  -     CBC & Differential; Future  -     Vitamin D,25-Hydroxy; Future  -     CBC & Differential  -     Vitamin D,25-Hydroxy    5. Attention deficit disorder, unspecified hyperactivity presence  -     CBC & Differential; Future  -     CBC & Differential    6. Thyroid disorder screen  -     CBC & Differential; Future  -     TSH; Future  -     CBC & Differential  -     TSH    7. Lipid screening  -     CBC & Differential; Future  -     Lipid Panel; Future  -     CBC & Differential  -     Lipid Panel    8. Encounter for screening examination for impaired glucose regulation and diabetes mellitus  -     CBC & Differential; Future  -     Comprehensive Metabolic Panel; Future  -     POC Urinalysis Dipstick, Automated; Future  -     CBC & Differential  -     Comprehensive Metabolic Panel  -     POC Urinalysis Dipstick, Automated    9. Leukocytes in urine  -     Urine Culture - Urine, Urine, Clean Catch     Annual physical exam.  -  Immunizations were discussed and are otherwise up-to-date, she will let us know if she received the hepatitis B vaccine series through her employer.     Iron deficiency anemia.  - She is not currently taking a iron supplement.   - We will recheck levels today.      Anxiety.  -   Continue Lexapro. She will discuss with behavioral health if there is a need to continue this.     Vitamin D insufficiency.  - Continue vitamin D supplement daily. Recheck levels today.     Attention deficit disorder.  - Placed on Adderall by behavioral health with significant improvement.  - She is encouraged to seek counseling for coping mechanisms in addition to continuing therapy.           BMI is >= 25 and <30. (Overweight) The following options were offered after discussion;: weight loss educational material (shared in after visit summary)       AWV: na  A1C: No results found for: HGBA1C   ACP: Advance Care Planning   ACP discussion was held with the patient during this visit. Patient does not have an advance directive, information provided.   Mammogram: na  Colonoscopy: na  Patient education regarding the importance of avoidance of texting while driving and the need for wearing seatbelt.  Use of sunscreen, use of helmets while on bikes.  The appropriate amounts of sleep and H20 consumption per day was reviewed with pt.  The need for healthy well-balanced diet based off the food guide pyramid and avoidance of skipping meals along with following a NCS diet with appropriate amounts of fats, protein, and carbohydrate and low sodium diet. Encouraging 30minutes of cardio 5d weekly and muscle strengthening 3x weekly.     Follow Up   Return in about 1 year (around 1/20/2024) for fasting, Annual.  Patient was given instructions and counseling regarding her condition or for health maintenance advice. Please see specific information pulled into the AVS if appropriate.     RTC/call  If symptoms worsen  Meds MOA and SE's reviewed and pt v/u    QI Seay [facility]  [address] [place] [phone]  Fax [phone]  Phone [phone]    Transcribed from ambient dictation for QI Seay by Isabela  Josué.  01/20/23   10:02 EST    Patient or patient representative verbalized consent to the visit recording.  I have personally performed the services described in this document as transcribed by the above individual, and it is both accurate and complete.

## 2023-01-20 NOTE — PATIENT INSTRUCTIONS
MyPlate from USDA  MyPlate is an outline of a general healthy diet based on the Dietary Guidelines for Americans, 9505-3968, from the U.[place]. Department of Agriculture (USDA). It sets guidelines for how much food you should eat from each food group based on your age, sex, and level of physical activity.  What are tips for following MyPlate?  To follow MyPlate recommendations:  Eat a wide variety of fruits and vegetables, grains, and protein foods.  Serve smaller portions and eat less food throughout the day.  Limit portion sizes to avoid overeating.  Enjoy your food.  Get at least 150 minutes of exercise every week. This is about 30 minutes each day, 5 or more days per week.  It can be difficult to have every meal look like MyPlate. Think about MyPlate as eating guidelines for an entire day, rather than each individual meal.  Fruits and vegetables  Make one half of your plate fruits and vegetables.  Eat many different colors of fruits and vegetables each day.  For a 2,000-calorie daily food plan, eat:  2½ cups of vegetables every day.  2 cups of fruit every day.  1 cup is equal to:  1 cup raw or cooked vegetables.  1 cup raw fruit.  1 medium-sized orange, apple, or banana.  1 cup 100% fruit or vegetable juice.  2 cups raw leafy greens, such as lettuce, spinach, or kale.  ½ cup dried fruit.  Grains  One fourth of your plate should be grains.  Make at least half of the grains you eat each day whole grains.  For a 2,000-calorie daily food plan, eat 6 oz of grains every day.  1 oz is equal to:  1 slice bread.  1 cup cereal.  ½ cup cooked rice, cereal, or pasta.  Protein  One fourth of your plate should be protein.  Eat a wide variety of protein foods, including meat, poultry, fish, eggs, beans, nuts, and tofu.  For a 2,000-calorie daily food plan, eat 5½ oz of protein every day.  1 oz is equal to:  1 oz meat, poultry, or fish.  ¼ cup cooked beans.  1 egg.  ½ oz nuts or seeds.  1 Tbsp peanut butter.  Dairy  Drink fat-free  or low-fat (1%) milk.  Eat or drink dairy as a side to meals.  For a 2,000-calorie daily food plan, eat or drink 3 cups of dairy every day.  1 cup is equal to:  1 cup milk, yogurt, cottage cheese, or soy milk (soy beverage).  2 oz processed cheese.  1½ oz natural cheese.  Fats, oils, salt, and sugars  Only small amounts of oils are recommended.  Avoid foods that are high in calories and low in nutritional value (empty calories), like foods high in fat or added sugars.  Choose foods that are low in salt (sodium). Choose foods that have less than 140 milligrams (mg) of sodium per serving.  Drink water instead of sugary drinks. Drink enough fluid to keep your urine pale yellow.  Where to find support  Work with your health care provider or a dietitian to develop a customized eating plan that is right for you.  Download an an[employer]ieta (mobile application) to help you track your daily food intake.  Where to find more information  USDA: ChooseMyPlate.gov  Summary  MyPlate is a general guideline for healthy eating from the USDA. It is based on the Dietary Guidelines for Americans, 5949-1295.  In general, fruits and vegetables should take up one half of your plate, grains should take up one fourth of your plate, and protein should take up one fourth of your plate.  This information is not intended to replace advice given to you by your health care provider. Make sure you discuss any questions you have with your health care provider.  Document Revised: 11/08/2021 Document Reviewed: 11/08/2021  [employer] Patient Education © 2022 [employer]    Advance Care Planning and Advance Directives     You make decisions on a daily basis - decisions about where you want to live, your career, your home, your life. Perhaps one of the most important decisions you face is your choice for future medical care. Take time to talk with your family and your healthcare team and start planning today.  Advance Care Planning is a process that can help  you:  Understand possible future healthcare decisions in light of your own experiences  Reflect on those decision in light of your goals and values  Discuss your decisions with those closest to you and the healthcare professionals that care for you  Make a plan by creating a document that reflects your wishes    Surrogate Decision Maker  In the event of a medical emergency, which has left you unable to communicate or to make your own decisions, you would need someone to make decisions for you.  It is important to discuss your preferences for medical treatment with this person while you are in good health.     Qualities of a surrogate decision maker:  Willing to take on this role and responsibility  Knows what you want for future medical care  Willing to follow your wishes even if they don't agree with them  Able to make difficult medical decisions under stressful circumstances    Advance Directives  These are legal documents you can create that will guide your healthcare team and decision maker(s) when needed. These documents can be stored in the electronic medical record.    Living Will - a legal document to guide your care if you have a terminal condition or a serious illness and are unable to communicate. States vary by statute in document names/types, but most forms may include one or more of the following:        -  Directions regarding life-prolonging treatments        -  Directions regarding artificially provided nutrition/hydration        -  Choosing a healthcare decision maker        -  Direction regarding organ/tissue donation    Durable Power of  for Healthcare - this document names an -in-fact to make medical decisions for you, but it may also allow this person to make personal and financial decisions for you. Please seek the advice of an  if you need this type of document.    **Advance Directives are not required and no one may discriminate against you if you do not sign  one.    Medical Orders  Many states allow specific forms/orders signed by your physician to record your wishes for medical treatment in your current state of health. This form, signed in personal communication with your physician, addresses resuscitation and other medical interventions that you may or may not want.      For more information or to schedule a time with a [facility] Advance Care Planning Facilitator contact: [facility] or call [phone] and someone will contact you directly.

## 2023-01-21 LAB — BACTERIA SPEC AEROBE CULT: NORMAL

## 2023-01-23 DIAGNOSIS — E55.9 VITAMIN D DEFICIENCY: Primary | ICD-10-CM

## 2023-01-23 RX ORDER — ERGOCALCIFEROL 1.25 MG/1
50000 CAPSULE ORAL WEEKLY
Qty: 6 CAPSULE | Refills: 0 | Status: SHIPPED | OUTPATIENT
Start: 2023-01-23 | End: 2023-03-29

## 2023-02-07 ENCOUNTER — OFFICE VISIT (OUTPATIENT)
Dept: BEHAVIORAL HEALTH | Facility: CLINIC | Age: 33
End: 2023-02-07
Payer: COMMERCIAL

## 2023-02-07 VITALS
BODY MASS INDEX: 29.44 KG/M2 | HEART RATE: 94 BPM | HEIGHT: 66 IN | SYSTOLIC BLOOD PRESSURE: 128 MMHG | WEIGHT: 183.2 LBS | DIASTOLIC BLOOD PRESSURE: 88 MMHG

## 2023-02-07 DIAGNOSIS — F90.2 ATTENTION DEFICIT HYPERACTIVITY DISORDER, COMBINED TYPE: Primary | ICD-10-CM

## 2023-02-07 DIAGNOSIS — G47.9 SLEEP DISTURBANCE: ICD-10-CM

## 2023-02-07 DIAGNOSIS — F41.9 ANXIETY DISORDER, UNSPECIFIED TYPE: ICD-10-CM

## 2023-02-07 PROCEDURE — 99214 OFFICE O/P EST MOD 30 MIN: CPT

## 2023-02-07 RX ORDER — ESCITALOPRAM OXALATE 10 MG/1
10 TABLET ORAL DAILY
Qty: 60 TABLET | Refills: 2 | Status: SHIPPED | OUTPATIENT
Start: 2023-02-07

## 2023-02-07 RX ORDER — DEXTROAMPHETAMINE SACCHARATE, AMPHETAMINE ASPARTATE, DEXTROAMPHETAMINE SULFATE AND AMPHETAMINE SULFATE 2.5; 2.5; 2.5; 2.5 MG/1; MG/1; MG/1; MG/1
10 TABLET ORAL 2 TIMES DAILY
Qty: 60 TABLET | Refills: 0 | Status: SHIPPED | OUTPATIENT
Start: 2023-02-07 | End: 2023-03-29 | Stop reason: DRUGHIGH

## 2023-02-07 NOTE — PROGRESS NOTES
Office  Follow Up Visit      Patient Name: Sima Booker  : 1990   MRN: [id number]     Referring Provider: Sonia Bridges APRN    Chief Complaint:  Sima Booker is a 33 y.o. female who is here today for follow up related to:      ICD-10-CM ICD-9-CM   1. Attention deficit hyperactivity disorder, combined type  F90.2 314.01   2. Anxiety disorder, unspecified type  F41.9 300.00   3. Sleep disturbance  G47.9 780.50        HPI/Interval History:   ADHD:   Well controlled with current medication regimen. Denies side effects.     Anxiety:   Well controlled. Anxiety may have been related to uncontrolled ADHD.     Sleep disturbance:   Sleeping well.       Subjective      Review of Systems:   Review of Systems   Constitutional: Negative.    Cardiovascular: Negative.    Psychiatric/Behavioral: Negative for decreased concentration, self-injury, sleep disturbance, suicidal ideas and depressed mood. The patient is not nervous/anxious.        PHQ-9 Depression Screening Score: 3     Patient History:   The following portions of the patient's history were reviewed and updated as appropriate: allergies, current medications, past family history, past medical history, past social history, past surgical history and problem list.     Social:  No sig update    Medications:     Current Outpatient Medications:   •  ALLERGY INJECTION SERUM OFFICE ADMINISTERED, Inject 2 each under the skin into the appropriate area as directed 1 (One) Time Per Week., Disp: , Rfl:   •  amphetamine-dextroamphetamine (Adderall) 10 MG tablet, Take 1 tablet by mouth 2 (Two) Times a Day., Disp: 60 tablet, Rfl: 0  •  escitalopram (Lexapro) 10 MG tablet, Take 1 tablet by mouth Daily., Disp: 60 tablet, Rfl: 2  •  hydrOXYzine (ATARAX) 25 MG tablet, Take 1 tablet by mouth 3 (Three) Times a Day As Needed for Anxiety., Disp: 90 tablet, Rfl: 0  •  levocetirizine (XYZAL) 5 MG tablet, Take 5 mg by mouth Daily., Disp: , Rfl:   •  vitamin D  "(ERGOCALCIFEROL) 1.25 MG (25112 UT) capsule capsule, Take 1 capsule by mouth 1 (One) Time Per Week., Disp: 6 capsule, Rfl: 0    Objective     Physical Exam:  Vital Signs:   Vitals:    02/07/23 1514   BP: 128/88   Pulse: 94   Weight: 83.1 kg (183 lb 3.2 oz)   Height: 166.4 cm (65.5\")     Body mass index is 30.02 kg/m².       Mental Status Exam:     Appearance: Adult female, appears stated age, dressed appropriately  Hygiene: Good, well-kept  Cooperation: Calm, cooperative  Eye Contact: Within normal  Psychomotor Behavior: Normal limits  Mood: Euthymic  Affect: Congruent, full range  Speech: Mildly pressured  Thought Process: Linear, goal directed  Thought Content: Mood congruent  Suicidal: Denies  Homicidal: Denies  Hallucinations: Denies  Delusion: Denies  Memory: Intact  Orientation: X4  Reliability: Good  Insight: Fair  Judgement: Good  Impulse Control: Good, no current concern      Assessment / Plan      Visit Diagnosis/Orders Placed This Visit:  Diagnoses and all orders for this visit:    1. Attention deficit hyperactivity disorder, combined type (Primary)  -     amphetamine-dextroamphetamine (Adderall) 10 MG tablet; Take 1 tablet by mouth 2 (Two) Times a Day.  Dispense: 60 tablet; Refill: 0  -     Ambulatory Referral to Behavioral Health    2. Anxiety disorder, unspecified type  -     escitalopram (Lexapro) 10 MG tablet; Take 1 tablet by mouth Daily.  Dispense: 60 tablet; Refill: 2    3. Sleep disturbance  -     Ambulatory Referral to Behavioral Health         Differential:   Rule out specific anxiety disorders    Plan:   1. Continue Adderall 10 mg twice daily  2. Continue escitalopram 10 mg daily    Continue supportive psychotherapy efforts and medications as indicated. Treatment and medication options discussed during today's visit. Patient ackowledged and verbally consented to continue with current treatment plan and was educated on the importance of compliance with treatment and follow-up appointments. " Patient seems reasonably able to adhere to treatment plan.      Medication Considerations:  FORREST reviewed and appropriate. Discussed medication options and treatment plan of prescribed medication(s) as well as the risks, benefits, and potential side effects. Patient is agreeable to call the office with any worsening of symptoms or onset of side effects. Patient is agreeable to call 911 or go to the nearest ER should he/she begin having SI/HI.    Quality Measures:     Tobacco Use/Cessation:  Never smoker    I advised Sima Booker of the risks of tobacco use.     Follow Up:   No follow-ups on file.    Transition of care:  I advised patient that I will be leaving the practice, effective March 23, 2023. I advised the patient that clinic staff will be reaching out to her soon to provide scheduling options for alternative psychiatric providers.  Advised patient to reach out to the clinic if he/she has not heard back from clinic staff by March 1 in regards to scheduling.  Patient verbalizes understanding.    QI Duong, PMHNP-BC

## 2023-02-13 ENCOUNTER — TELEPHONE (OUTPATIENT)
Dept: OBSTETRICS AND GYNECOLOGY | Facility: CLINIC | Age: 33
End: 2023-02-13
Payer: COMMERCIAL

## 2023-03-08 ENCOUNTER — LAB (OUTPATIENT)
Dept: INTERNAL MEDICINE | Facility: CLINIC | Age: 33
End: 2023-03-08
Payer: COMMERCIAL

## 2023-03-08 DIAGNOSIS — E55.9 VITAMIN D DEFICIENCY: ICD-10-CM

## 2023-03-08 PROCEDURE — 82306 VITAMIN D 25 HYDROXY: CPT | Performed by: NURSE PRACTITIONER

## 2023-03-09 LAB — 25(OH)D3 SERPL-MCNC: 32.3 NG/ML (ref 30–100)

## 2023-03-29 ENCOUNTER — TELEMEDICINE (OUTPATIENT)
Dept: PSYCHIATRY | Facility: CLINIC | Age: 33
End: 2023-03-29
Payer: COMMERCIAL

## 2023-03-29 DIAGNOSIS — F41.1 GENERALIZED ANXIETY DISORDER: ICD-10-CM

## 2023-03-29 DIAGNOSIS — F90.2 ATTENTION DEFICIT HYPERACTIVITY DISORDER (ADHD), COMBINED TYPE: Primary | ICD-10-CM

## 2023-03-29 PROCEDURE — 90792 PSYCH DIAG EVAL W/MED SRVCS: CPT | Performed by: NURSE PRACTITIONER

## 2023-03-29 RX ORDER — DEXTROAMPHETAMINE SACCHARATE, AMPHETAMINE ASPARTATE, DEXTROAMPHETAMINE SULFATE AND AMPHETAMINE SULFATE 5; 5; 5; 5 MG/1; MG/1; MG/1; MG/1
TABLET ORAL
Qty: 60 TABLET | Refills: 0 | Status: SHIPPED | OUTPATIENT
Start: 2023-03-29

## 2023-03-29 NOTE — PROGRESS NOTES
Patient Name: Sima Booker  MRN: [id number]   :  1990     Referring Physician: Sonia Bridges APRN    This provider is located in her home office through the [facility] (through [facility]), [address], [address] using a secure MyChart Video Visit through [employer]. Patient is being seen remotely via telehealth at their home address in [place], and stated they are in a secure environment for this session. The patient's condition being diagnosed/treated is appropriate for telemedicine. The provider identified herself as well as her credentials.   The patient, and/or patients guardian, consent to be seen remotely, and when consent is given they understand that the consent allows for patient identifiable information to be sent to a third party as needed.   They may refuse to be seen remotely at any time. The electronic data is encrypted and password protected, and the patient and/or guardian has been advised of the potential risks to privacy not withstanding such measures.    You have chosen to receive care through a telehealth visit.  Do you consent to use a video/audio connection for your medical care today? Yes    Chief Complaint:     ICD-10-CM ICD-9-CM   1. Attention deficit hyperactivity disorder (ADHD), combined type  F90.2 314.01   2. Generalized anxiety disorder  F41.1 300.02       HPI:   Sima Booker is a 33 y.o. female who is here today for initial evaluation of ADHD and Anxiety .  Patient was diagnosed with ADHD approximately 2022.  [place] she was doing okay without medication however lack of focus and disorganization started affecting her job.  It became important to treat the ADHD.  Patient was started on Adderall 10 mg twice a day.  Patient states in general she takes it twice a day Monday through Friday and usually only 1 tablet once a day on the weekend.  Patient saw vast improvement with the Adderall not only in focus  and task completion but also anxiety.  Questions whether she still needs Lexapro since Adderall is effective.  Had to obtain a new provider as her old provider left the practice.    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 Not sure how old i was       Past Surgical History:   Past Surgical History:   Procedure Laterality Date   •  SECTION N/A 2021    Procedure:  SECTION PRIMARY;  Surgeon: Stef Lindsey MD;  Location: [facility] LABOR DELIVERY;  Service: Obstetrics/Gynecology;  Laterality: N/A;   • COLONOSCOPY  10/01/2017   • FERTILITY SURGERY  2020    Oocyte retrieval for IVF   • NASAL SINUS SURGERY  2016   • WISDOM TOOTH EXTRACTION         Social History:   Social History     Socioeconomic History   • Marital status:      Spouse name: Leslie   • Highest education level: High school graduate   Tobacco Use   • Smoking status: Never   • Smokeless tobacco: Never   Vaping Use   • Vaping Use: Never used   Substance and Sexual Activity   • Alcohol use: Yes     Comment: OCCASIONALLY   • Drug use: Never   • Sexual activity: Yes     Partners: Male     Birth control/protection: None       Family History:  Family History   Problem Relation Age of Onset   • Thyroid disease Mother    • Hyperlipidemia Father    • Arthritis Maternal Grandmother    • Miscarriages / Stillbirths Maternal Grandmother    • Diabetes Maternal Grandfather    • Miscarriages / Stillbirths Maternal Grandfather    • Diabetes Paternal Grandmother    • Breast cancer Other         great aunt       Allergy:  Allergies   Allergen Reactions   • Penicillins Hives     Tolerated cefazolin 21       Current Medications:    Current Outpatient Medications   Medication Sig Dispense Refill   • ALLERGY INJECTION SERUM OFFICE ADMINISTERED Inject 2 each under the skin into the appropriate area as directed 1 (One) Time Per Week.     • amphetamine-dextroamphetamine (Adderall) 20 MG tablet Take 20 mg orally every morning and afternoon. 60 tablet 0   • escitalopram (Lexapro) 10 MG tablet Take 1 tablet by mouth Daily. 60 tablet 2   • hydrOXYzine (ATARAX) 25 MG tablet Take 1 tablet by mouth 3 (Three) Times a Day As Needed for Anxiety. 90 tablet 0   • levocetirizine (XYZAL) 5 MG tablet Take 5 mg by mouth Daily.       No current facility-administered medications for this visit.       Lab Results:   Lab on 03/08/2023   Component Date Value Ref Range Status   • 25 Hydroxy, Vitamin D 03/08/2023 32.3  30.0 - 100.0 ng/ml Final   Office Visit on 01/20/2023   Component Date Value Ref Range Status   • Glucose 01/20/2023 89  65 - 99 mg/dL Final   • BUN 01/20/2023 12  6 - 20 mg/dL Final   • Creatinine 01/20/2023 0.96  0.57 - 1.00 mg/dL Final   • Sodium 01/20/2023 139  136 - 145 mmol/L Final   • Potassium 01/20/2023 4.6  3.5 - 5.2 mmol/L Final   • Chloride 01/20/2023 102  98 - 107 mmol/L Final   • CO2 01/20/2023 27.8  22.0 - 29.0 mmol/L Final   • Calcium 01/20/2023 9.4  8.6 - 10.5 mg/dL Final   • Total Protein 01/20/2023 7.0  6.0 - 8.5 g/dL Final   • Albumin 01/20/2023 4.5  3.5 - 5.2 g/dL Final   • ALT (SGPT) 01/20/2023 20  1 - 33 U/L Final   • AST (SGOT) 01/20/2023 21  1 - 32 U/L Final   • Alkaline Phosphatase 01/20/2023 95  39 - 117 U/L Final   • Total Bilirubin 01/20/2023 0.4  0.0 - 1.2 mg/dL Final   • Globulin 01/20/2023 2.5  gm/dL Final   • A/G Ratio 01/20/2023 1.8  g/dL Final   • BUN/Creatinine Ratio 01/20/2023 12.5  7.0 - 25.0 Final   • Anion Gap 01/20/2023 9.2  5.0 - 15.0 mmol/L Final   • eGFR 01/20/2023 80.3  >60.0 mL/min/1.73 Final   • TSH 01/20/2023 1.700  0.270 - 4.200 uIU/mL Final   • Total Cholesterol 01/20/2023 172  0 - 200 mg/dL Final   •  Triglycerides 01/20/2023 94  0 - 150 mg/dL Final   • HDL Cholesterol 01/20/2023 42  40 - 60 mg/dL Final   • LDL Cholesterol  01/20/2023 113 (H)  0 - 100 mg/dL Final   • VLDL Cholesterol 01/20/2023 17  5 - 40 mg/dL Final   • LDL/HDL Ratio 01/20/2023 2.65   Final   • 25 Hydroxy, Vitamin D 01/20/2023 17.9 (L)  30.0 - 100.0 ng/ml Final   • Iron 01/20/2023 81  37 - 145 mcg/dL Final   • WBC 01/20/2023 4.89  3.40 - 10.80 10*3/mm3 Final   • RBC 01/20/2023 4.50  3.77 - 5.28 10*6/mm3 Final   • Hemoglobin 01/20/2023 13.4  12.0 - 15.9 g/dL Final   • Hematocrit 01/20/2023 38.9  34.0 - 46.6 % Final   • MCV 01/20/2023 86.4  79.0 - 97.0 fL Final   • MCH 01/20/2023 29.8  26.6 - 33.0 pg Final   • MCHC 01/20/2023 34.4  31.5 - 35.7 g/dL Final   • RDW 01/20/2023 13.0  12.3 - 15.4 % Final   • RDW-SD 01/20/2023 40.7  37.0 - 54.0 fl Final   • MPV 01/20/2023 9.7  6.0 - 12.0 fL Final   • Platelets 01/20/2023 291  140 - 450 10*3/mm3 Final   • Neutrophil % 01/20/2023 58.4  42.7 - 76.0 % Final   • Lymphocyte % 01/20/2023 28.6  19.6 - 45.3 % Final   • Monocyte % 01/20/2023 10.4  5.0 - 12.0 % Final   • Eosinophil % 01/20/2023 1.2  0.3 - 6.2 % Final   • Basophil % 01/20/2023 1.2  0.0 - 1.5 % Final   • Immature Grans % 01/20/2023 0.2  0.0 - 0.5 % Final   • Neutrophils, Absolute 01/20/2023 2.85  1.70 - 7.00 10*3/mm3 Final   • Lymphocytes, Absolute 01/20/2023 1.40  0.70 - 3.10 10*3/mm3 Final   • Monocytes, Absolute 01/20/2023 0.51  0.10 - 0.90 10*3/mm3 Final   • Eosinophils, Absolute 01/20/2023 0.06  0.00 - 0.40 10*3/mm3 Final   • Basophils, Absolute 01/20/2023 0.06  0.00 - 0.20 10*3/mm3 Final   • Immature Grans, Absolute 01/20/2023 0.01  0.00 - 0.05 10*3/mm3 Final   • nRBC 01/20/2023 0.0  0.0 - 0.2 /100 WBC Final   • Color 01/20/2023 Yellow  Yellow, Straw, Dark Yellow, Isabela Final   • Clarity, UA 01/20/2023 Clear  Clear Final   • Specific Gravity  01/20/2023 1.020  1.005 - 1.030 Final   • pH, Urine 01/20/2023 8.0  5.0 - 8.0 Final   • Leukocytes  01/20/2023 25 [place]/ul (A)  Negative Final   • Nitrite, UA 01/20/2023 Negative  Negative Final   • Protein, POC 01/20/2023 Negative  Negative mg/dL Final   • Glucose, UA 01/20/2023 Negative  Negative mg/dL Final   • Ketones, UA 01/20/2023 Negative  Negative Final   • Urobilinogen, UA 01/20/2023 Normal  Normal, 0.2 E.U./dL Final   • Bilirubin 01/20/2023 Negative  Negative Final   • Blood, UA 01/20/2023 Negative  Negative Final   • Lot Number 01/20/2023 [id number]   Final   • Expiration Date 01/20/2023 2/29/2024   Final   • Urine Culture 01/20/2023 25,000 CFU/mL Normal Urogenital Shital   Final       Review of Symptoms:   Review of Systems   Constitutional: Negative for activity change, appetite change, fatigue, unexpected weight gain and unexpected weight loss.   Respiratory: Negative for shortness of breath and wheezing.    Gastrointestinal: Negative for constipation, diarrhea, nausea and vomiting.   Musculoskeletal: Negative for gait problem.   Skin: Negative for dry skin and rash.   Neurological: Negative for dizziness, speech difficulty, weakness, light-headedness, headache, memory problem and confusion.   Psychiatric/Behavioral: Positive for stress. Negative for agitation, behavioral problems, decreased concentration, dysphoric mood, hallucinations, self-injury, sleep disturbance, suicidal ideas, negative for hyperactivity and depressed mood. The patient is not nervous/anxious.        Physical Exam:   Physical Exam  Constitutional:       General: She is not in acute distress.     Appearance: She is well-developed. She is not diaphoretic.   HENT:      Head: Normocephalic and atraumatic.   Eyes:      Conjunctiva/sclera: Conjunctivae normal.   Pulmonary:      Effort: Pulmonary effort is normal. No respiratory distress.   Musculoskeletal:         General: Normal range of motion.      Cervical back: Full passive range of motion without pain and normal range of motion.   Neurological:      Mental Status: She is alert and  oriented to person, place, and time.   Psychiatric:         Mood and Affect: Mood is not anxious or depressed. Affect is not labile, blunt, angry or inappropriate.         Speech: Speech is not rapid and pressured or tangential.         Behavior: Behavior normal. Behavior is not agitated, slowed, aggressive, withdrawn, hyperactive or combative. Behavior is cooperative.         Thought Content: Thought content normal. Thought content is not paranoid or delusional. Thought content does not include homicidal or suicidal ideation. Thought content does not include homicidal or suicidal plan.         Judgment: Judgment normal.       not currently breastfeeding.  There is no height or weight on file to calculate BMI. Video appt unable to obtain.     Mental Status Exam:   Appearance: appropriate  Hygiene:   good  Cooperation:  Cooperative  Eye Contact:  Good  Psychomotor Behavior:  Appropriate  Mood:within normal limits  Affect:  Appropriate  Hopelessness: Denies  Speech:  Normal  Thought Process:  Goal directed  Thought Content:  Normal  Suicidal:  None  Homicidal:  None  Hallucinations:  None  Delusion:  None  Memory:  Intact  Orientation:  Person, Place, Time and Situation  Reliability:  good  Insight:  Good  Judgement:  Good  Impulse Control:  Good    PHQ-9 Depression Screening  Little interest or pleasure in doing things?     Feeling down, depressed, or hopeless?     Trouble falling or staying asleep, or sleeping too much?     Feeling tired or having little energy?     Poor appetite or overeating?     Feeling bad about yourself - or that you are a failure or have let yourself or your family down?     Trouble concentrating on things, such as reading the newspaper or watching television?     Moving or speaking so slowly that other people could have noticed? Or the opposite - being so fidgety or restless that you have been moving around a lot more than usual?     Thoughts that you would be better off dead, or of hurting  yourself in some way?     PHQ-9 Total Score     If you checked off any problems, how difficult have these problems made it for you to do your work, take care of things at home, or get along with other people?        Assessment/Plan:   Diagnoses and all orders for this visit:    1. Attention deficit hyperactivity disorder (ADHD), combined type (Primary)  -     amphetamine-dextroamphetamine (Adderall) 20 MG tablet; Take 20 mg orally every morning and afternoon.  Dispense: 60 tablet; Refill: 0    2. Generalized anxiety disorder    Patient states she is concerned about upcoming busy season at work as it relates to the dose of her Adderall being enough.  We will increase to 20 mg twice a day.  Patient may break it in half and take it spread out 4 times a day if needed.  Patient already has urine drug screen.    A psychological evaluation was conducted in order to assess past and current level of functioning. Areas assessed included, but were not limited to: perception of social support, perception of ability to face and deal with challenges in life (positive functioning), anxiety symptoms, depressive symptoms, perspective on beliefs/belief system, coping skills for stress, intelligence level,  Therapeutic rapport was established. Interventions conducted today were geared towards incorporating medication management along with support for continued therapy. Education was also provided as to the med management with this provider and what to expect in subsequent sessions.    We discussed risks, benefits,goals and side effects of the above medication and the patient was agreeable with the plan.Patient was educated on the importance of compliance with treatment and follow-up appointments. Patient is aware to contact the [facility] [phone] with any worsening of symptoms. To call for questions or concerns and return early if necessary. Patent is agreeable to go to the Emergency Department or  call 911 should they begin SI/HI.     Part of this note may be an electronic transcription/translation of spoken language to printed text using the Dragon Dictation System.    Return in about 4 weeks (around 4/26/2023) for Follow Up 30 min, Video visit.    Gaye Elizondo, APRN

## 2023-04-26 ENCOUNTER — TELEMEDICINE (OUTPATIENT)
Dept: PSYCHIATRY | Facility: CLINIC | Age: 33
End: 2023-04-26
Payer: COMMERCIAL

## 2023-04-26 DIAGNOSIS — F90.2 ATTENTION DEFICIT HYPERACTIVITY DISORDER (ADHD), COMBINED TYPE: Primary | ICD-10-CM

## 2023-04-26 DIAGNOSIS — F41.9 ANXIETY DISORDER, UNSPECIFIED TYPE: ICD-10-CM

## 2023-04-26 DIAGNOSIS — F41.1 GENERALIZED ANXIETY DISORDER: ICD-10-CM

## 2023-04-26 PROCEDURE — 99214 OFFICE O/P EST MOD 30 MIN: CPT | Performed by: NURSE PRACTITIONER

## 2023-04-26 RX ORDER — ESCITALOPRAM OXALATE 10 MG/1
10 TABLET ORAL DAILY
Qty: 30 TABLET | Refills: 6 | Status: SHIPPED | OUTPATIENT
Start: 2023-04-26

## 2023-04-26 RX ORDER — DEXTROAMPHETAMINE SACCHARATE, AMPHETAMINE ASPARTATE, DEXTROAMPHETAMINE SULFATE AND AMPHETAMINE SULFATE 5; 5; 5; 5 MG/1; MG/1; MG/1; MG/1
TABLET ORAL
Qty: 60 TABLET | Refills: 0 | Status: SHIPPED | OUTPATIENT
Start: 2023-04-26

## 2023-04-26 NOTE — PROGRESS NOTES
Patient Name: Sima Booker  MRN: [id number]   :  1990     This provider is located at her home office through the [facility] (through [facility]), [address], [address] using a secure [employer] Video Visit through [employer]. Patient is being seen remotely via telehealth at their home address in [place], and stated they are in a secure environment for this session. The patient's condition being diagnosed/treated is appropriate for telemedicine. The provider identified herself as well as her credentials.   The patient, and/or patients guardian, consent to be seen remotely, and when consent is given they understand that the consent allows for patient identifiable information to be sent to a third party as needed.   They may refuse to be seen remotely at any time. The electronic data is encrypted and password protected, and the patient and/or guardian has been advised of the potential risks to privacy not withstanding such measures.    You have chosen to receive care through a telehealth visit.  Do you consent to use a video/audio connection for your medical care today? Yes    Chief Complaint:      ICD-10-CM ICD-9-CM   1. Attention deficit hyperactivity disorder (ADHD), combined type  F90.2 314.01   2. Generalized anxiety disorder  F41.1 300.02   3. Anxiety disorder, unspecified type  F41.9 300.00       History of Present Illness: Sima Booker is a 33 y.o. female is here today for medication management follow up.  Patient states the increase in Adderall has been good.  Is having some anxiety however she has a lot going on.  Moved into a new house and has a baby.    The following portions of the patient's history were reviewed and updated as appropriate: allergies, current medications, past family history, past medical history, past social history, past surgical history and problem list.    Review of Systems;;  Review of Systems   Constitutional:  Negative for activity change, appetite change, fatigue, unexpected weight gain and unexpected weight loss.   Respiratory: Negative for shortness of breath and wheezing.    Gastrointestinal: Negative for constipation, diarrhea, nausea and vomiting.   Musculoskeletal: Negative for gait problem.   Skin: Negative for dry skin and rash.   Neurological: Negative for dizziness, speech difficulty, weakness, light-headedness, headache, memory problem and confusion.   Psychiatric/Behavioral: Negative for agitation, behavioral problems, decreased concentration, dysphoric mood, hallucinations, self-injury, sleep disturbance, suicidal ideas, negative for hyperactivity, depressed mood and stress. The patient is not nervous/anxious.        Physical Exam;;  Physical Exam  Constitutional:       General: She is not in acute distress.     Appearance: She is well-developed. She is not diaphoretic.   HENT:      Head: Normocephalic and atraumatic.   Eyes:      Conjunctiva/sclera: Conjunctivae normal.   Pulmonary:      Effort: Pulmonary effort is normal. No respiratory distress.   Musculoskeletal:         General: Normal range of motion.      Cervical back: Full passive range of motion without pain and normal range of motion.   Neurological:      Mental Status: She is alert and oriented to person, place, and time.   Psychiatric:         Mood and Affect: Mood is not anxious or depressed. Affect is not labile, blunt, angry or inappropriate.         Speech: Speech is not rapid and pressured or tangential.         Behavior: Behavior normal. Behavior is not agitated, slowed, aggressive, withdrawn, hyperactive or combative. Behavior is cooperative.         Thought Content: Thought content normal. Thought content is not paranoid or delusional. Thought content does not include homicidal or suicidal ideation. Thought content does not include homicidal or suicidal plan.         Judgment: Judgment normal.       not currently breastfeeding.  There is  no height or weight on file to calculate BMI. Video appt unable to obtain.     Current Medications;;    Current Outpatient Medications:   •  amphetamine-dextroamphetamine (Adderall) 20 MG tablet, Take 20 mg orally every morning and afternoon., Disp: 60 tablet, Rfl: 0  •  escitalopram (Lexapro) 10 MG tablet, Take 1 tablet by mouth Daily., Disp: 30 tablet, Rfl: 6  •  ALLERGY INJECTION SERUM OFFICE ADMINISTERED, Inject 2 each under the skin into the appropriate area as directed 1 (One) Time Per Week., Disp: , Rfl:   •  hydrOXYzine (ATARAX) 25 MG tablet, Take 1 tablet by mouth 3 (Three) Times a Day As Needed for Anxiety., Disp: 90 tablet, Rfl: 0  •  levocetirizine (XYZAL) 5 MG tablet, Take 5 mg by mouth Daily., Disp: , Rfl:     Lab Results:   Lab on 03/08/2023   Component Date Value Ref Range Status   • 25 Hydroxy, Vitamin D 03/08/2023 32.3  30.0 - 100.0 ng/ml Final       Mental Status Exam:   Hygiene:   good  Cooperation:  Cooperative  Eye Contact:  Good  Psychomotor Behavior:  Appropriate  Mood:within normal limits  Affect:  Appropriate  Hopelessness: Denies  Speech:  Normal  Thought Process:  Goal directed  Thought Content:  Normal  Suicidal:  None  Homicidal:  None  Hallucinations:  None  Delusion:  None  Memory:  Intact  Orientation:  Person, Place, Time and Situation  Reliability:  good  Insight:  Good  Judgement:  Good  Impulse Control:  Good    PHQ-9 Depression Screening  Little interest or pleasure in doing things? (P) 1-->several days   Feeling down, depressed, or hopeless? (P) 2-->more than half the days   Trouble falling or staying asleep, or sleeping too much? (P) 2-->more than half the days   Feeling tired or having little energy? (P) 1-->several days   Poor appetite or overeating? (P) 0-->not at all   Feeling bad about yourself - or that you are a failure or have let yourself or your family down? (P) 1-->several days   Trouble concentrating on things, such as reading the newspaper or watching television?  (P) 1-->several days   Moving or speaking so slowly that other people could have noticed? Or the opposite - being so fidgety or restless that you have been moving around a lot more than usual? (P) 0-->not at all   Thoughts that you would be better off dead, or of hurting yourself in some way? (P) 0-->not at all   PHQ-9 Total Score (P) 8   If you checked off any problems, how difficult have these problems made it for you to do your work, take care of things at home, or get along with other people? (P) somewhat difficult      Reviewed FORREST # [id number]     Assessment/Plan:  Diagnoses and all orders for this visit:    1. Attention deficit hyperactivity disorder (ADHD), combined type (Primary)  -     amphetamine-dextroamphetamine (Adderall) 20 MG tablet; Take 20 mg orally every morning and afternoon.  Dispense: 60 tablet; Refill: 0    2. Generalized anxiety disorder    3. Anxiety disorder, unspecified type  -     escitalopram (Lexapro) 10 MG tablet; Take 1 tablet by mouth Daily.  Dispense: 30 tablet; Refill: 6      Patient doing well with current medications.  Having some situational anxiety.    A psychological evaluation was conducted in order to assess past and current level of functioning. Areas assessed included, but were not limited to: perception of social support, perception of ability to face and deal with challenges in life (positive functioning), anxiety symptoms, depressive symptoms, perspective on beliefs/belief system, coping skills for stress, intelligence level,  Therapeutic rapport was established. Interventions conducted today were geared towards incorporating medication management along with support for continued therapy. Education was also provided as to the med management with this provider and what to expect in subsequent sessions.    We discussed risks, benefits,goals and side effects of the above medication and the patient was agreeable with the plan.Patient was educated on the importance of  compliance with treatment and follow-up appointments. Patient is aware to contact the [facility] [phone] with any worsening of symptoms. To call for questions or concerns and return early if necessary. Patent is agreeable to go to the Emergency Department or call 911 should they begin SI/HI.     Part of this note may be an electronic transcription/translation of spoken language to printed text using the Dragon Dictation System.    Return in about 3 months (around 7/26/2023) for Follow Up 30 min, Video visit.    Gaye Elizondo, QI

## 2023-07-26 ENCOUNTER — TELEMEDICINE (OUTPATIENT)
Dept: PSYCHIATRY | Facility: CLINIC | Age: 33
End: 2023-07-26
Payer: COMMERCIAL

## 2023-07-26 DIAGNOSIS — F41.1 GENERALIZED ANXIETY DISORDER: ICD-10-CM

## 2023-07-26 DIAGNOSIS — F90.2 ATTENTION DEFICIT HYPERACTIVITY DISORDER (ADHD), COMBINED TYPE: Primary | ICD-10-CM

## 2023-07-26 PROCEDURE — 99214 OFFICE O/P EST MOD 30 MIN: CPT | Performed by: NURSE PRACTITIONER

## 2023-07-26 RX ORDER — DEXTROAMPHETAMINE SACCHARATE, AMPHETAMINE ASPARTATE, DEXTROAMPHETAMINE SULFATE AND AMPHETAMINE SULFATE 5; 5; 5; 5 MG/1; MG/1; MG/1; MG/1
TABLET ORAL
Qty: 30 TABLET | Refills: 0 | Status: SHIPPED | OUTPATIENT
Start: 2023-07-26

## 2023-07-26 RX ORDER — ESCITALOPRAM OXALATE 20 MG/1
20 TABLET ORAL DAILY
Qty: 30 TABLET | Refills: 3 | Status: SHIPPED | OUTPATIENT
Start: 2023-07-26

## 2023-07-26 RX ORDER — DEXTROAMPHETAMINE SACCHARATE, AMPHETAMINE ASPARTATE MONOHYDRATE, DEXTROAMPHETAMINE SULFATE AND AMPHETAMINE SULFATE 7.5; 7.5; 7.5; 7.5 MG/1; MG/1; MG/1; MG/1
30 CAPSULE, EXTENDED RELEASE ORAL EVERY MORNING
Qty: 30 CAPSULE | Refills: 0 | Status: SHIPPED | OUTPATIENT
Start: 2023-07-26

## 2023-07-26 NOTE — PROGRESS NOTES
Patient Name: Sima Booker  MRN: [id number]   :  1990     This provider is located at her home office through the [facility] (through [facility]), [address], [address] using a secure [employer] Video Visit through "[employer]. Patient is being seen remotely via telehealth at their home address in [place], and stated they are in a secure environment for this session. The patient's condition being diagnosed/treated is appropriate for telemedicine. The provider identified herself as well as her credentials.   The patient, and/or patients guardian, consent to be seen remotely, and when consent is given they understand that the consent allows for patient identifiable information to be sent to a third party as needed.   They may refuse to be seen remotely at any time. The electronic data is encrypted and password protected, and the patient and/or guardian has been advised of the potential risks to privacy not withstanding such measures.    You have chosen to receive care through a telehealth visit.  Do you consent to use a video/audio connection for your medical care today? Yes    Chief Complaint:      ICD-10-CM ICD-9-CM   1. Attention deficit hyperactivity disorder (ADHD), combined type  F90.2 314.01   2. Generalized anxiety disorder  F41.1 300.02       History of Present Illness: Sima Booker is a 33 y.o. female is here today for medication management follow up.  Patient states the last few weeks she has been losing focus at work.  Has to repeat work.  Feels irritability is worse around menses.  Got moved in and working on changing her long.  In the morning she feels she has patience and is able to focus in the afternoon she does not feel it is good.    The following portions of the patient's history were reviewed and updated as appropriate: allergies, current medications, past family history, past medical history, past social history, past  surgical history, and problem list.    Review of Systems;;  Review of Systems   Constitutional:  Negative for activity change, appetite change, fatigue, unexpected weight gain and unexpected weight loss.   Respiratory:  Negative for shortness of breath and wheezing.    Gastrointestinal:  Negative for constipation, diarrhea, nausea and vomiting.   Musculoskeletal:  Negative for gait problem.   Skin:  Negative for dry skin and rash.   Neurological:  Negative for dizziness, speech difficulty, weakness, light-headedness, headache, memory problem and confusion.   Psychiatric/Behavioral:  Positive for decreased concentration. Negative for agitation, behavioral problems, dysphoric mood, hallucinations, self-injury, sleep disturbance, suicidal ideas, negative for hyperactivity, depressed mood and stress. The patient is nervous/anxious.      Physical Exam;;  Physical Exam  Constitutional:       General: She is not in acute distress.     Appearance: She is well-developed. She is not diaphoretic.   HENT:      Head: Normocephalic and atraumatic.   Eyes:      Conjunctiva/sclera: Conjunctivae normal.   Pulmonary:      Effort: Pulmonary effort is normal. No respiratory distress.   Musculoskeletal:         General: Normal range of motion.      Cervical back: Full passive range of motion without pain and normal range of motion.   Neurological:      Mental Status: She is alert and oriented to person, place, and time.   Psychiatric:         Mood and Affect: Mood is anxious. Mood is not depressed. Affect is not labile, blunt, angry or inappropriate.         Speech: Speech is not rapid and pressured or tangential.         Behavior: Behavior normal. Behavior is not agitated, slowed, aggressive, withdrawn, hyperactive or combative. Behavior is cooperative.         Thought Content: Thought content normal. Thought content is not paranoid or delusional. Thought content does not include homicidal or suicidal ideation. Thought content does not  include homicidal or suicidal plan.         Judgment: Judgment normal.     Last menstrual period 06/14/2023, not currently breastfeeding.  There is no height or weight on file to calculate BMI. Video appt unable to obtain.     Current Medications;;    Current Outpatient Medications:     ALLERGY INJECTION SERUM OFFICE ADMINISTERED, Inject 2 each under the skin into the appropriate area as directed 1 (One) Time Per Week., Disp: , Rfl:     amphetamine-dextroamphetamine (Adderall) 20 MG tablet, Take 20 mg orally every afternoon., Disp: 30 tablet, Rfl: 0    amphetamine-dextroamphetamine XR (Adderall XR) 30 MG 24 hr capsule, Take 1 capsule by mouth Every Morning, Disp: 30 capsule, Rfl: 0    escitalopram (Lexapro) 20 MG tablet, Take 1 tablet by mouth Daily., Disp: 30 tablet, Rfl: 3    hydrOXYzine (ATARAX) 25 MG tablet, Take 1 tablet by mouth 3 (Three) Times a Day As Needed for Anxiety., Disp: 90 tablet, Rfl: 0    levocetirizine (XYZAL) 5 MG tablet, Take 5 mg by mouth Daily., Disp: , Rfl:     Lab Results:   No visits with results within 3 Month(s) from this visit.   Latest known visit with results is:   Lab on 03/08/2023   Component Date Value Ref Range Status    25 Hydroxy, Vitamin D 03/08/2023 32.3  30.0 - 100.0 ng/ml Final       Mental Status Exam:   Hygiene:   good  Cooperation:  Cooperative  Eye Contact:  Good  Psychomotor Behavior:  Appropriate  Mood:irritable Anxious  Affect:  Appropriate  Hopelessness: Denies  Speech:  Normal  Thought Process:  Goal directed  Thought Content:  Normal  Suicidal:  None  Homicidal:  None  Hallucinations:  None  Delusion:  None  Memory:  Intact  Orientation:  Person, Place, Time, and Situation  Reliability:  good  Insight:  Good  Judgement:  Good  Impulse Control:  Good    PHQ-9 Depression Screening  Little interest or pleasure in doing things? (P) 1-->several days   Feeling down, depressed, or hopeless? (P) 2-->more than half the days   Trouble falling or staying asleep, or sleeping  too much? (P) 1-->several days   Feeling tired or having little energy? (P) 1-->several days   Poor appetite or overeating? (P) 1-->several days   Feeling bad about yourself - or that you are a failure or have let yourself or your family down? (P) 0-->not at all   Trouble concentrating on things, such as reading the newspaper or watching television? (P) 2-->more than half the days   Moving or speaking so slowly that other people could have noticed? Or the opposite - being so fidgety or restless that you have been moving around a lot more than usual? (P) 0-->not at all   Thoughts that you would be better off dead, or of hurting yourself in some way? (P) 0-->not at all   PHQ-9 Total Score (P) 8   If you checked off any problems, how difficult have these problems made it for you to do your work, take care of things at home, or get along with other people? (P) somewhat difficult      Reviewed [facility]REST # [id number]     Assessment/Plan:  Diagnoses and all orders for this visit:    1. Attention deficit hyperactivity disorder (ADHD), combined type (Primary)  -     amphetamine-dextroamphetamine XR (Adderall XR) 30 MG 24 hr capsule; Take 1 capsule by mouth Every Morning  Dispense: 30 capsule; Refill: 0  -     amphetamine-dextroamphetamine (Adderall) 20 MG tablet; Take 20 mg orally every afternoon.  Dispense: 30 tablet; Refill: 0    2. Generalized anxiety disorder  -     escitalopram (Lexapro) 20 MG tablet; Take 1 tablet by mouth Daily.  Dispense: 30 tablet; Refill: 3      For anxiety we will increase Lexapro to 20 mg daily.  For ADHD we will have Adderall XR 30 in the morning and Adderall short acting 20 in the afternoon.  We will follow-up in 1 month.    A psychological evaluation was conducted in order to assess past and current level of functioning. Areas assessed included, but were not limited to: perception of social support, perception of ability to face and deal with challenges in life (positive functioning), anxiety  symptoms, depressive symptoms, perspective on beliefs/belief system, coping skills for stress, intelligence level,  Therapeutic rapport was established. Interventions conducted today were geared towards incorporating medication management along with support for continued therapy. Education was also provided as to the med management with this provider and what to expect in subsequent sessions.    We discussed risks, benefits,goals and side effects of the above medication and the patient was agreeable with the plan.Patient was educated on the importance of compliance with treatment and follow-up appointments. Patient is aware to contact the [facility] [phone] with any worsening of symptoms. To call for questions or concerns and return early if necessary. Patent is agreeable to go to the Emergency Department or call 911 should they begin SI/HI.     Part of this note may be an electronic transcription/translation of spoken language to printed text using the Dragon Dictation System.    Return in about 4 weeks (around 8/23/2023) for Follow Up 30 min, Video visit.    QI Contreras

## 2023-08-23 ENCOUNTER — TELEMEDICINE (OUTPATIENT)
Dept: PSYCHIATRY | Facility: CLINIC | Age: 33
End: 2023-08-23
Payer: COMMERCIAL

## 2023-08-23 DIAGNOSIS — F90.2 ATTENTION DEFICIT HYPERACTIVITY DISORDER (ADHD), COMBINED TYPE: Primary | ICD-10-CM

## 2023-08-23 DIAGNOSIS — F41.1 GENERALIZED ANXIETY DISORDER: ICD-10-CM

## 2023-08-23 PROCEDURE — 99214 OFFICE O/P EST MOD 30 MIN: CPT | Performed by: NURSE PRACTITIONER

## 2023-08-23 RX ORDER — DEXTROAMPHETAMINE SACCHARATE, AMPHETAMINE ASPARTATE, DEXTROAMPHETAMINE SULFATE AND AMPHETAMINE SULFATE 5; 5; 5; 5 MG/1; MG/1; MG/1; MG/1
TABLET ORAL
Qty: 30 TABLET | Refills: 0 | Status: SHIPPED | OUTPATIENT
Start: 2023-08-23

## 2023-08-23 RX ORDER — DEXTROAMPHETAMINE SACCHARATE, AMPHETAMINE ASPARTATE MONOHYDRATE, DEXTROAMPHETAMINE SULFATE AND AMPHETAMINE SULFATE 7.5; 7.5; 7.5; 7.5 MG/1; MG/1; MG/1; MG/1
30 CAPSULE, EXTENDED RELEASE ORAL EVERY MORNING
Qty: 30 CAPSULE | Refills: 0 | Status: SHIPPED | OUTPATIENT
Start: 2023-08-23

## 2023-08-23 NOTE — PROGRESS NOTES
Patient Name: Sima Booker  MRN: [id number]   :  1990     This provider is located at her home office through the [facility] (through [facility]), [address], [address] using a secure [employer] Video Visit through [employer]. Patient is being seen remotely via telehealth at their home address in [place], and stated they are in a secure environment for this session. The patient's condition being diagnosed/treated is appropriate for telemedicine. The provider identified herself as well as her credentials.   The patient, and/or patients guardian, consent to be seen remotely, and when consent is given they understand that the consent allows for patient identifiable information to be sent to a third party as needed.   They may refuse to be seen remotely at any time. The electronic data is encrypted and password protected, and the patient and/or guardian has been advised of the potential risks to privacy not withstanding such measures.    You have chosen to receive care through a telehealth visit.  Do you consent to use a video/audio connection for your medical care today? Yes    Chief Complaint:      ICD-10-CM ICD-9-CM   1. Attention deficit hyperactivity disorder (ADHD), combined type  F90.2 314.01   2. Generalized anxiety disorder  F41.1 300.02       History of Present Illness: Sima Booker is a 33 y.o. female is here today for medication management follow up.  Patient doing well with focus and task completion as well as anxiety.  Medication does not affect sleep however patient has always had a little bit of issue with sleep.  Patient will take melatonin for 3 or 4 nights and then it helps regulate her sleep for the next month or so.    The following portions of the patient's history were reviewed and updated as appropriate: allergies, current medications, past family history, past medical history, past social history, past surgical history,  and problem list.    Review of Systems;;  Review of Systems   Constitutional:  Negative for activity change, appetite change, fatigue, unexpected weight gain and unexpected weight loss.   Respiratory:  Negative for shortness of breath and wheezing.    Gastrointestinal:  Negative for constipation, diarrhea, nausea and vomiting.   Musculoskeletal:  Negative for gait problem.   Skin:  Negative for dry skin and rash.   Neurological:  Negative for dizziness, speech difficulty, weakness, light-headedness, headache, memory problem and confusion.   Psychiatric/Behavioral:  Negative for agitation, behavioral problems, decreased concentration, dysphoric mood, hallucinations, self-injury, sleep disturbance, suicidal ideas, negative for hyperactivity, depressed mood and stress. The patient is not nervous/anxious.      Physical Exam;;  Physical Exam  Constitutional:       General: She is not in acute distress.     Appearance: She is well-developed. She is not diaphoretic.   HENT:      Head: Normocephalic and atraumatic.   Eyes:      Conjunctiva/sclera: Conjunctivae normal.   Pulmonary:      Effort: Pulmonary effort is normal. No respiratory distress.   Musculoskeletal:         General: Normal range of motion.      Cervical back: Full passive range of motion without pain and normal range of motion.   Neurological:      Mental Status: She is alert and oriented to person, place, and time.   Psychiatric:         Mood and Affect: Mood is not anxious or depressed. Affect is not labile, blunt, angry or inappropriate.         Speech: Speech is not rapid and pressured or tangential.         Behavior: Behavior normal. Behavior is not agitated, slowed, aggressive, withdrawn, hyperactive or combative. Behavior is cooperative.         Thought Content: Thought content normal. Thought content is not paranoid or delusional. Thought content does not include homicidal or suicidal ideation. Thought content does not include homicidal or suicidal  plan.         Judgment: Judgment normal.     not currently breastfeeding.  There is no height or weight on file to calculate BMI. Video appt unable to obtain.     Current Medications;;    Current Outpatient Medications:     amphetamine-dextroamphetamine (Adderall) 20 MG tablet, Take 20 mg orally every afternoon., Disp: 30 tablet, Rfl: 0    amphetamine-dextroamphetamine XR (Adderall XR) 30 MG 24 hr capsule, Take 1 capsule by mouth Every Morning, Disp: 30 capsule, Rfl: 0    ALLERGY INJECTION SERUM OFFICE ADMINISTERED, Inject 2 each under the skin into the appropriate area as directed 1 (One) Time Per Week., Disp: , Rfl:     escitalopram (Lexapro) 20 MG tablet, Take 1 tablet by mouth Daily., Disp: 30 tablet, Rfl: 3    hydrOXYzine (ATARAX) 25 MG tablet, Take 1 tablet by mouth 3 (Three) Times a Day As Needed for Anxiety., Disp: 90 tablet, Rfl: 0    levocetirizine (XYZAL) 5 MG tablet, Take 5 mg by mouth Daily., Disp: , Rfl:     Lab Results:   No visits with results within 3 Month(s) from this visit.   Latest known visit with results is:   Lab on 03/08/2023   Component Date Value Ref Range Status    25 Hydroxy, Vitamin D 03/08/2023 32.3  30.0 - 100.0 ng/ml Final       Mental Status Exam:   Hygiene:   good  Cooperation:  Cooperative  Eye Contact:  Good  Psychomotor Behavior:  Appropriate  Mood:within normal limits  Affect:  Appropriate  Hopelessness: Denies  Speech:  Normal  Thought Process:  Goal directed  Thought Content:  Normal  Suicidal:  None  Homicidal:  None  Hallucinations:  None  Delusion:  None  Memory:  Intact  Orientation:  Person, Place, Time, and Situation  Reliability:  good  Insight:  Good  Judgement:  Good  Impulse Control:  Good    PHQ-9 Depression Screening  Little interest or pleasure in doing things? (P) 1-->several days   Feeling down, depressed, or hopeless? (P) 1-->several days   Trouble falling or staying asleep, or sleeping too much? (P) 1-->several days   Feeling tired or having little energy?  (P) 1-->several days   Poor appetite or overeating? (P) 0-->not at all   Feeling bad about yourself - or that you are a failure or have let yourself or your family down? (P) 1-->several days   Trouble concentrating on things, such as reading the newspaper or watching television? (P) 1-->several days   Moving or speaking so slowly that other people could have noticed? Or the opposite - being so fidgety or restless that you have been moving around a lot more than usual? (P) 0-->not at all   Thoughts that you would be better off dead, or of hurting yourself in some way? (P) 0-->not at all   PHQ-9 Total Score (P) 6   If you checked off any problems, how difficult have these problems made it for you to do your work, take care of things at home, or get along with other people? (P) somewhat difficult        Assessment/Plan:  Diagnoses and all orders for this visit:    1. Attention deficit hyperactivity disorder (ADHD), combined type (Primary)  -     amphetamine-dextroamphetamine XR (Adderall XR) 30 MG 24 hr capsule; Take 1 capsule by mouth Every Morning  Dispense: 30 capsule; Refill: 0  -     amphetamine-dextroamphetamine (Adderall) 20 MG tablet; Take 20 mg orally every afternoon.  Dispense: 30 tablet; Refill: 0    2. Generalized anxiety disorder      Patient doing well with current medications for focus and task completion.  Anxiety is well managed as well.  Patient is considering a job change which will help her anxiety as well.  We will continue medication as ordered.    A psychological evaluation was conducted in order to assess past and current level of functioning. Areas assessed included, but were not limited to: perception of social support, perception of ability to face and deal with challenges in life (positive functioning), anxiety symptoms, depressive symptoms, perspective on beliefs/belief system, coping skills for stress, intelligence level,  Therapeutic rapport was established. Interventions conducted today  were geared towards incorporating medication management along with support for continued therapy. Education was also provided as to the med management with this provider and what to expect in subsequent sessions.    We discussed risks, benefits,goals and side effects of the above medication and the patient was agreeable with the plan.Patient was educated on the importance of compliance with treatment and follow-up appointments. Patient is aware to contact the [facility] [phone] with any worsening of symptoms. To call for questions or concerns and return early if necessary. Patent is agreeable to go to the Emergency Department or call 911 should they begin SI/HI.     Part of this note may be an electronic transcription/translation of spoken language to printed text using the Dragon Dictation System.    Return in about 3 months (around 11/23/2023) for Follow Up 30 min, Video visit.    QI Contreras

## 2023-09-25 DIAGNOSIS — F90.2 ATTENTION DEFICIT HYPERACTIVITY DISORDER (ADHD), COMBINED TYPE: ICD-10-CM

## 2023-09-25 RX ORDER — DEXTROAMPHETAMINE SACCHARATE, AMPHETAMINE ASPARTATE MONOHYDRATE, DEXTROAMPHETAMINE SULFATE AND AMPHETAMINE SULFATE 7.5; 7.5; 7.5; 7.5 MG/1; MG/1; MG/1; MG/1
30 CAPSULE, EXTENDED RELEASE ORAL EVERY MORNING
Qty: 30 CAPSULE | Refills: 0 | Status: SHIPPED | OUTPATIENT
Start: 2023-09-25

## 2023-10-25 ENCOUNTER — TELEMEDICINE (OUTPATIENT)
Dept: PSYCHIATRY | Facility: CLINIC | Age: 33
End: 2023-10-25
Payer: COMMERCIAL

## 2023-10-25 DIAGNOSIS — F90.2 ATTENTION DEFICIT HYPERACTIVITY DISORDER (ADHD), COMBINED TYPE: Primary | ICD-10-CM

## 2023-10-25 DIAGNOSIS — F41.1 GENERALIZED ANXIETY DISORDER: ICD-10-CM

## 2023-10-25 PROCEDURE — 99214 OFFICE O/P EST MOD 30 MIN: CPT | Performed by: NURSE PRACTITIONER

## 2023-10-25 RX ORDER — DEXTROAMPHETAMINE SACCHARATE, AMPHETAMINE ASPARTATE MONOHYDRATE, DEXTROAMPHETAMINE SULFATE AND AMPHETAMINE SULFATE 7.5; 7.5; 7.5; 7.5 MG/1; MG/1; MG/1; MG/1
30 CAPSULE, EXTENDED RELEASE ORAL EVERY MORNING
Qty: 30 CAPSULE | Refills: 0 | Status: SHIPPED | OUTPATIENT
Start: 2023-10-25

## 2023-10-25 RX ORDER — DEXTROAMPHETAMINE SACCHARATE, AMPHETAMINE ASPARTATE, DEXTROAMPHETAMINE SULFATE AND AMPHETAMINE SULFATE 5; 5; 5; 5 MG/1; MG/1; MG/1; MG/1
TABLET ORAL
Qty: 30 TABLET | Refills: 0 | Status: SHIPPED | OUTPATIENT
Start: 2023-10-25

## 2023-10-25 RX ORDER — ESCITALOPRAM OXALATE 20 MG/1
20 TABLET ORAL DAILY
Qty: 30 TABLET | Refills: 3 | Status: SHIPPED | OUTPATIENT
Start: 2023-10-25

## 2023-10-25 NOTE — PROGRESS NOTES
Patient Name: Sima Booker  MRN: [id number]   :  1990     This provider is located at her home office through the [facility] (through [facility]), [address], [address] using a secure Zing System[employer] Video Visit through [employer]. Patient is being seen remotely via telehealth at their home address in [place], and stated they are in a secure environment for this session. The patient's condition being diagnosed/treated is appropriate for telemedicine. The provider identified herself as well as her credentials.   The patient, and/or patients guardian, consent to be seen remotely, and when consent is given they understand that the consent allows for patient identifiable information to be sent to a third party as needed.   They may refuse to be seen remotely at any time. The electronic data is encrypted and password protected, and the patient and/or guardian has been advised of the potential risks to privacy not withstanding such measures.    You have chosen to receive care through a telehealth visit.  Do you consent to use a video/audio connection for your medical care today? Yes    Chief Complaint:      ICD-10-CM ICD-9-CM   1. Attention deficit hyperactivity disorder (ADHD), combined type  F90.2 314.01   2. Generalized anxiety disorder  F41.1 300.02       History of Present Illness: Sima Booker is a 33 y.o. female is here today for medication management follow up.  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  Overall patient is doing well.    The following portions of the patient's history were reviewed and updated as appropriate: allergies, current medications, past family history, past medical history, past social history, past surgical history, and problem list.    Review of Systems;;  Review of Systems   Constitutional:  Negative for activity change, appetite change, fatigue, unexpected weight  gain and unexpected weight loss.   Respiratory:  Negative for shortness of breath and wheezing.    Gastrointestinal:  Negative for constipation, diarrhea, nausea and vomiting.   Musculoskeletal:  Negative for gait problem.   Skin:  Negative for dry skin and rash.   Neurological:  Negative for dizziness, speech difficulty, weakness, light-headedness, headache, memory problem and confusion.   Psychiatric/Behavioral:  Negative for agitation, behavioral problems, decreased concentration, dysphoric mood, hallucinations, self-injury, sleep disturbance, suicidal ideas, negative for hyperactivity, depressed mood and stress. The patient is not nervous/anxious.        Physical Exam;;  Physical Exam  Constitutional:       General: She is not in acute distress.     Appearance: She is well-developed. She is not diaphoretic.   HENT:      Head: Normocephalic and atraumatic.   Eyes:      Conjunctiva/sclera: Conjunctivae normal.   Pulmonary:      Effort: Pulmonary effort is normal. No respiratory distress.   Musculoskeletal:         General: Normal range of motion.      Cervical back: Full passive range of motion without pain and normal range of motion.   Neurological:      Mental Status: She is alert and oriented to person, place, and time.   Psychiatric:         Mood and Affect: Mood is not anxious or depressed. Affect is not labile, blunt, angry or inappropriate.         Speech: Speech is not rapid and pressured or tangential.         Behavior: Behavior normal. Behavior is not agitated, slowed, aggressive, withdrawn, hyperactive or combative. Behavior is cooperative.         Thought Content: Thought content normal. Thought content is not paranoid or delusional. Thought content does not include homicidal or suicidal ideation. Thought content does not include homicidal or suicidal plan.         Judgment: Judgment normal.       not currently breastfeeding.  There is no height or weight on file to calculate BMI. Video appt unable to  obtain.     Current Medications;;    Current Outpatient Medications:     amphetamine-dextroamphetamine (Adderall) 20 MG tablet, Take 20 mg orally every afternoon., Disp: 30 tablet, Rfl: 0    amphetamine-dextroamphetamine XR (Adderall XR) 30 MG 24 hr capsule, Take 1 capsule by mouth Every Morning, Disp: 30 capsule, Rfl: 0    escitalopram (Lexapro) 20 MG tablet, Take 1 tablet by mouth Daily., Disp: 30 tablet, Rfl: 3    ALLERGY INJECTION SERUM OFFICE ADMINISTERED, Inject 2 each under the skin into the appropriate area as directed 1 (One) Time Per Week., Disp: , Rfl:     hydrOXYzine (ATARAX) 25 MG tablet, Take 1 tablet by mouth 3 (Three) Times a Day As Needed for Anxiety., Disp: 90 tablet, Rfl: 0    levocetirizine (XYZAL) 5 MG tablet, Take 5 mg by mouth Daily., Disp: , Rfl:     Lab Results:   No visits with results within 3 Month(s) from this visit.   Latest known visit with results is:   Lab on 03/08/2023   Component Date Value Ref Range Status    25 Hydroxy, Vitamin D 03/08/2023 32.3  30.0 - 100.0 ng/ml Final       Mental Status Exam:   Hygiene:   good  Cooperation:  Cooperative  Eye Contact:  Good  Psychomotor Behavior:  Appropriate  Mood:within normal limits  Affect:  Appropriate  Hopelessness: Denies  Speech:  Normal  Thought Process:  Goal directed  Thought Content:  Normal  Suicidal:  None  Homicidal:  None  Hallucinations:  None  Delusion:  None  Memory:  Intact  Orientation:  Person, Place, Time, and Situation  Reliability:  good  Insight:  Good  Judgement:  Good  Impulse Control:  Good    PHQ-9 Depression Screening  Little interest or pleasure in doing things? (P) 1-->several days   Feeling down, depressed, or hopeless? (P) 1-->several days   Trouble falling or staying asleep, or sleeping too much? (P) 1-->several days   Feeling tired or having little energy? (P) 2-->more than half the days   Poor appetite or overeating? (P) 0-->not at all   Feeling bad about yourself - or that you are a failure or have let  yourself or your family down? (P) 2-->more than half the days   Trouble concentrating on things, such as reading the newspaper or watching television? (P) 2-->more than half the days   Moving or speaking so slowly that other people could have noticed? Or the opposite - being so fidgety or restless that you have been moving around a lot more than usual? (P) 2-->more than half the days   Thoughts that you would be better off dead, or of hurting yourself in some way? (P) 0-->not at all   PHQ-9 Total Score (P) 11   If you checked off any problems, how difficult have these problems made it for you to do your work, take care of things at home, or get along with other people? (P) somewhat difficult      Reviewed FORREST # [id number]     Assessment/Plan:  Diagnoses and all orders for this visit:    1. Attention deficit hyperactivity disorder (ADHD), combined type (Primary)  -     amphetamine-dextroamphetamine (Adderall) 20 MG tablet; Take 20 mg orally every afternoon.  Dispense: 30 tablet; Refill: 0  -     amphetamine-dextroamphetamine XR (Adderall XR) 30 MG 24 hr capsule; Take 1 capsule by mouth Every Morning  Dispense: 30 capsule; Refill: 0  -     Ambulatory Referral to Psychiatry    2. Generalized anxiety disorder  -     escitalopram (Lexapro) 20 MG tablet; Take 1 tablet by mouth Daily.  Dispense: 30 tablet; Refill: 3  -     Ambulatory Referral to Psychiatry      Patient feels anxiety as well as focus and task completion are well managed.  Patient does feel she would benefit from therapy.  Referral made.    A psychological evaluation was conducted in order to assess past and current level of functioning. Areas assessed included, but were not limited to: perception of social support, perception of ability to face and deal with challenges in life (positive functioning), anxiety symptoms, depressive symptoms, perspective on beliefs/belief system, coping skills for stress, intelligence level,  Therapeutic rapport was  established. Interventions conducted today were geared towards incorporating medication management along with support for continued therapy. Education was also provided as to the med management with this provider and what to expect in subsequent sessions.    We discussed risks, benefits,goals and side effects of the above medication and the patient was agreeable with the plan.Patient was educated on the importance of compliance with treatment and follow-up appointments. Patient is aware to contact the [facility] [phone] with any worsening of symptoms. To call for questions or concerns and return early if necessary. Patent is agreeable to go to the Emergency Department or call 911 should they begin SI/HI.     Part of this note may be an electronic transcription/translation of spoken language to printed text using the Dragon Dictation System.    Return in about 3 months (around 1/25/2024) for Follow Up 30 min, Video visit.    QI Contreras

## 2023-11-14 ENCOUNTER — TELEMEDICINE (OUTPATIENT)
Dept: PSYCHIATRY | Facility: CLINIC | Age: 33
End: 2023-11-14
Payer: COMMERCIAL

## 2023-11-14 DIAGNOSIS — F90.2 ATTENTION DEFICIT HYPERACTIVITY DISORDER, COMBINED TYPE: Primary | ICD-10-CM

## 2023-11-14 DIAGNOSIS — F41.1 GENERALIZED ANXIETY DISORDER: ICD-10-CM
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
## 2023-12-04 ENCOUNTER — TELEMEDICINE (OUTPATIENT)
Dept: PSYCHIATRY | Facility: CLINIC | Age: 33
End: 2023-12-04
Payer: COMMERCIAL

## 2023-12-04 DIAGNOSIS — F41.1 GENERALIZED ANXIETY DISORDER: ICD-10-CM

## 2023-12-04 DIAGNOSIS — F90.2 ATTENTION DEFICIT HYPERACTIVITY DISORDER, COMBINED TYPE: Primary | ICD-10-CM

## 2023-12-04 DIAGNOSIS — F90.2 ATTENTION DEFICIT HYPERACTIVITY DISORDER (ADHD), COMBINED TYPE: ICD-10-CM

## 2023-12-04 PROCEDURE — 90837 PSYTX W PT 60 MINUTES: CPT | Performed by: COUNSELOR

## 2023-12-04 NOTE — TREATMENT PLAN
Multi-Disciplinary Problems (from Behavioral Health Treatment Plan)      Active Problems       Problem: Anxiety  Start Date: 12/04/23      Problem Details: The patient self-scales this problem as a 6 with 10 being the worst.          Goal Priority Start Date Expected End Date End Date    Patient will develop and implement behavioral and cognitive strategies to reduce anxiety and irrational fears. -- 12/04/23 -- --    Goal Details: Progress toward goal:  Not appropriate to rate progress toward goal since this is the initial treatment plan.          Goal Intervention Frequency Start Date End Date    Help patient explore past emotional issues in relation to present anxiety. PRN 12/04/23 --    Intervention Details: Duration of treatment until until remission of symptoms.          Goal Intervention Frequency Start Date End Date    Help patient develop an awareness of their cognitive and physical responses to anxiety. PRN 12/04/23 --    Intervention Details: Duration of treatment until until remission of symptoms.                  Problem: ADHD (Adult)  Start Date: 12/04/23      Problem Details: The patient self-scales this problem as a 8 with 10 being the worst.        Goal Priority Start Date Expected End Date End Date    Patient will sustain attention and concentration to complete chores, and work responsibilites and increase positive interaction in all relationships. -- 12/04/23 -- --    Goal Details: Progress toward goal:  Not appropriate to rate progress toward goal since this is the initial treatment plan.        Goal Intervention Frequency Start Date End Date    Assist patient in setting responsible goals and breaking down large tasks. PRN 12/04/23 --    Intervention Details: Duration of treatment until until remission of symptoms.        Goal Intervention Frequency Start Date End Date    Assist patient in using self monitoring checklist to improve attention, work performance, and social skills. PRN 12/04/23 --     Intervention Details: Duration of treatment until until remission of symptoms.                        Reviewed By       Kathy Zamora, [facility] 12/04/23 5071                     I have discussed and reviewed this treatment plan with the patient.
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
## 2023-12-05 DIAGNOSIS — F90.2 ATTENTION DEFICIT HYPERACTIVITY DISORDER (ADHD), COMBINED TYPE: Primary | ICD-10-CM

## 2023-12-05 RX ORDER — DEXTROAMPHETAMINE SACCHARATE, AMPHETAMINE ASPARTATE MONOHYDRATE, DEXTROAMPHETAMINE SULFATE AND AMPHETAMINE SULFATE 7.5; 7.5; 7.5; 7.5 MG/1; MG/1; MG/1; MG/1
30 CAPSULE, EXTENDED RELEASE ORAL EVERY MORNING
Qty: 30 CAPSULE | Refills: 0 | Status: SHIPPED | OUTPATIENT
Start: 2023-12-05

## 2023-12-12 ENCOUNTER — PRIOR AUTHORIZATION (OUTPATIENT)
Dept: PSYCHIATRY | Facility: CLINIC | Age: 33
End: 2023-12-12
Payer: COMMERCIAL

## 2024-01-02 ENCOUNTER — TELEMEDICINE (OUTPATIENT)
Dept: PSYCHIATRY | Facility: CLINIC | Age: 34
End: 2024-01-02
Payer: COMMERCIAL

## 2024-01-02 ENCOUNTER — PRIOR AUTHORIZATION (OUTPATIENT)
Dept: PSYCHIATRY | Facility: CLINIC | Age: 34
End: 2024-01-02

## 2024-01-02 DIAGNOSIS — F90.2 ATTENTION DEFICIT HYPERACTIVITY DISORDER (ADHD), COMBINED TYPE: Primary | ICD-10-CM

## 2024-01-02 DIAGNOSIS — F41.1 GENERALIZED ANXIETY DISORDER: ICD-10-CM

## 2024-01-02 PROCEDURE — 99214 OFFICE O/P EST MOD 30 MIN: CPT | Performed by: NURSE PRACTITIONER

## 2024-01-02 RX ORDER — ESCITALOPRAM OXALATE 20 MG/1
20 TABLET ORAL DAILY
Qty: 30 TABLET | Refills: 6 | Status: SHIPPED | OUTPATIENT
Start: 2024-01-02

## 2024-01-02 RX ORDER — METHYLPHENIDATE HYDROCHLORIDE 20 MG/1
20 CAPSULE ORAL EVERY EVENING
Qty: 30 CAPSULE | Refills: 0 | Status: SHIPPED | OUTPATIENT
Start: 2024-01-02

## 2024-01-02 NOTE — PROGRESS NOTES
Patient Name: Sima Booker  MRN: [id number]   :  1990     This provider is located at her home office through the [facility] (through [facility]), [address], [address] using a secure [employer] Video Visit through [employer]. Patient is being seen remotely via telehealth at their home address in [place], and stated they are in a secure environment for this session. The patient's condition being diagnosed/treated is appropriate for telemedicine. The provider identified herself as well as her credentials.   The patient, and/or patients guardian, consent to be seen remotely, and when consent is given they understand that the consent allows for patient identifiable information to be sent to a third party as needed.   They may refuse to be seen remotely at any time. The electronic data is encrypted and password protected, and the patient and/or guardian has been advised of the potential risks to privacy not withstanding such measures.    You have chosen to receive care through a telehealth visit.  Do you consent to use a video/audio connection for your medical care today? Yes    Chief Complaint:      ICD-10-CM ICD-9-CM   1. Attention deficit hyperactivity disorder (ADHD), combined type  F90.2 314.01   2. Generalized anxiety disorder  F41.1 300.02       History of Present Illness: Sima Booker is a 33 y.o. female is here today for medication management follow up.  Patient states overall anxiety and mood are stable.  States Adderall does not work as well as it used to however patient often forgets to take it as she is used to taking her medications at night.  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    The following portions of the patient's history were reviewed and updated as appropriate: allergies, current medications, past family history, past medical history, past social history, past surgical history, and  problem list.    Review of Systems;;  Review of Systems   Constitutional:  Negative for activity change, appetite change, fatigue, unexpected weight gain and unexpected weight loss.   Respiratory:  Negative for shortness of breath and wheezing.    Gastrointestinal:  Negative for constipation, diarrhea, nausea and vomiting.   Musculoskeletal:  Negative for gait problem.   Skin:  Negative for dry skin and rash.   Neurological:  Negative for dizziness, speech difficulty, weakness, light-headedness, headache, memory problem and confusion.   Psychiatric/Behavioral:  Positive for decreased concentration. Negative for agitation, behavioral problems, dysphoric mood, hallucinations, self-injury, sleep disturbance, suicidal ideas, negative for hyperactivity, depressed mood and stress. The patient is not nervous/anxious.        Physical Exam;;  Physical Exam  Constitutional:       General: She is not in acute distress.     Appearance: She is well-developed. She is not diaphoretic.   HENT:      Head: Normocephalic and atraumatic.   Eyes:      Conjunctiva/sclera: Conjunctivae normal.   Pulmonary:      Effort: Pulmonary effort is normal. No respiratory distress.   Musculoskeletal:         General: Normal range of motion.      Cervical back: Full passive range of motion without pain and normal range of motion.   Neurological:      Mental Status: She is alert and oriented to person, place, and time.   Psychiatric:         Mood and Affect: Mood is not anxious or depressed. Affect is not labile, blunt, angry or inappropriate.         Speech: Speech is not rapid and pressured or tangential.         Behavior: Behavior normal. Behavior is not agitated, slowed, aggressive, withdrawn, hyperactive or combative. Behavior is cooperative.         Thought Content: Thought content normal. Thought content is not paranoid or delusional. Thought content does not include homicidal or suicidal ideation. Thought content does not include homicidal or  suicidal plan.         Judgment: Judgment normal.       not currently breastfeeding.  There is no height or weight on file to calculate BMI. Video appt unable to obtain.     Current Medications;;    Current Outpatient Medications:     escitalopram (Lexapro) 20 MG tablet, Take 1 tablet by mouth Daily., Disp: 30 tablet, Rfl: 6    ALLERGY INJECTION SERUM OFFICE ADMINISTERED, Inject 2 each under the skin into the appropriate area as directed 1 (One) Time Per Week., Disp: , Rfl:     hydrOXYzine (ATARAX) 25 MG tablet, Take 1 tablet by mouth 3 (Three) Times a Day As Needed for Anxiety., Disp: 90 tablet, Rfl: 0    levocetirizine (XYZAL) 5 MG tablet, Take 5 mg by mouth Daily., Disp: , Rfl:     Methylphenidate HCl ER, PM, (Jornay PM) 20 MG capsule sustained-release 24 hr, Take 1 capsule by mouth Every Evening., Disp: 30 capsule, Rfl: 0    promethazine-dextromethorphan (PROMETHAZINE-DM) 6.25-15 MG/5ML syrup, Take 5 to 10 mL at bedtime as needed for cough, Disp: 60 mL, Rfl: 0    Lab Results:   Admission on 11/22/2023, Discharged on 11/22/2023   Component Date Value Ref Range Status    Rapid Influenza A Ag 11/22/2023 Negative  Negative Final    Rapid Influenza B Ag 11/22/2023 Negative  Negative Final    Internal Control 11/22/2023 Passed  Passed Final    Lot Number 11/22/2023 [id number]   Final    Expiration Date 11/22/2023 11/30/2025   Final    Rapid Strep A Screen 11/22/2023 Positive (A)   Final    Internal Control 11/22/2023 Passed   Final    Lot Number 11/22/2023 [id number]   Final    Expiration Date 11/22/2023 6,[id number]   Final    SARS Antigen 11/22/2023 Not Detected  Not Detected, Presumptive Negative Final    Internal Control 11/22/2023 Passed  Passed Final    Lot Number 11/22/2023 [id number]   Final    Expiration Date 11/22/2023 04/02/2024   Final       Mental Status Exam:   Hygiene:   good  Cooperation:  Cooperative  Eye Contact:  Good  Psychomotor Behavior:  Appropriate  Mood:within normal limits  Affect:   Appropriate  Hopelessness: Denies  Speech:  Normal  Thought Process:  Goal directed  Thought Content:  Normal  Suicidal:  None  Homicidal:  None  Hallucinations:  None  Delusion:  None  Memory:  Intact  Orientation:  Person, Place, Time, and Situation  Reliability:  good  Insight:  Good  Judgement:  Good  Impulse Control:  Good    PHQ-9 Depression Screening  Little interest or pleasure in doing things? (P) 1-->several days   Feeling down, depressed, or hopeless? (P) 2-->more than half the days   Trouble falling or staying asleep, or sleeping too much? (P) 3-->nearly every day   Feeling tired or having little energy? (P) 2-->more than half the days   Poor appetite or overeating? (P) 0-->not at all   Feeling bad about yourself - or that you are a failure or have let yourself or your family down? (P) 0-->not at all   Trouble concentrating on things, such as reading the newspaper or watching television? (P) 2-->more than half the days   Moving or speaking so slowly that other people could have noticed? Or the opposite - being so fidgety or restless that you have been moving around a lot more than usual? (P) 0-->not at all   Thoughts that you would be better off dead, or of hurting yourself in some way? (P) 0-->not at all   PHQ-9 Total Score (P) 10   If you checked off any problems, how difficult have these problems made it for you to do your work, take care of things at home, or get along with other people? (P) somewhat difficult        Assessment/Plan:  Diagnoses and all orders for this visit:    1. Attention deficit hyperactivity disorder (ADHD), combined type (Primary)  -     Methylphenidate HCl ER, PM, (Jornay PM) 20 MG capsule sustained-release 24 hr; Take 1 capsule by mouth Every Evening.  Dispense: 30 capsule; Refill: 0    2. Generalized anxiety disorder  -     escitalopram (Lexapro) 20 MG tablet; Take 1 tablet by mouth Daily.  Dispense: 30 tablet; Refill: 6      Patient has a very difficult time remembering to  take medication during the day.  We will try Jornay PM 20 mg every evening.  We will follow-up in 3 months secondary to finances.    A psychological evaluation was conducted in order to assess past and current level of functioning. Areas assessed included, but were not limited to: perception of social support, perception of ability to face and deal with challenges in life (positive functioning), anxiety symptoms, depressive symptoms, perspective on beliefs/belief system, coping skills for stress, intelligence level,  Therapeutic rapport was established. Interventions conducted today were geared towards incorporating medication management along with support for continued therapy. Education was also provided as to the med management with this provider and what to expect in subsequent sessions.    We discussed risks, benefits,goals and side effects of the above medication and the patient was agreeable with the plan.Patient was educated on the importance of compliance with treatment and follow-up appointments. Patient is aware to contact the [facility] [phone] with any worsening of symptoms. To call for questions or concerns and return early if necessary. Patent is agreeable to go to the Emergency Department or call 911 should they begin SI/HI.     Part of this note may be an electronic transcription/translation of spoken language to printed text using the Dragon Dictation System.    Return in about 3 months (around 4/2/2024) for Follow Up 30 min, Video visit.    QI Contreras

## 2024-01-09 ENCOUNTER — TELEMEDICINE (OUTPATIENT)
Dept: PSYCHIATRY | Facility: CLINIC | Age: 34
End: 2024-01-09
Payer: COMMERCIAL

## 2024-01-09 DIAGNOSIS — F41.1 GENERALIZED ANXIETY DISORDER: Primary | ICD-10-CM

## 2024-01-09 DIAGNOSIS — F90.2 ATTENTION DEFICIT HYPERACTIVITY DISORDER, COMBINED TYPE: ICD-10-CM
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
## 2024-01-16 DIAGNOSIS — F90.2 ATTENTION DEFICIT HYPERACTIVITY DISORDER (ADHD), COMBINED TYPE: ICD-10-CM

## 2024-01-16 RX ORDER — METHYLPHENIDATE HYDROCHLORIDE 20 MG/1
20 CAPSULE ORAL EVERY EVENING
Qty: 30 CAPSULE | Refills: 0 | Status: CANCELLED | OUTPATIENT
Start: 2024-01-16

## 2024-01-18 RX ORDER — METHYLPHENIDATE HYDROCHLORIDE 40 MG/1
40 CAPSULE ORAL EVERY EVENING
Qty: 30 CAPSULE | Refills: 0 | Status: SHIPPED | OUTPATIENT
Start: 2024-01-18

## 2024-01-22 ENCOUNTER — OFFICE VISIT (OUTPATIENT)
Dept: INTERNAL MEDICINE | Facility: CLINIC | Age: 34
End: 2024-01-22
Payer: COMMERCIAL

## 2024-01-22 VITALS
TEMPERATURE: 98.6 F | RESPIRATION RATE: 18 BRPM | BODY MASS INDEX: 31.99 KG/M2 | DIASTOLIC BLOOD PRESSURE: 84 MMHG | HEART RATE: 64 BPM | SYSTOLIC BLOOD PRESSURE: 118 MMHG | WEIGHT: 192 LBS | HEIGHT: 65 IN

## 2024-01-22 DIAGNOSIS — F98.8 ATTENTION DEFICIT DISORDER, UNSPECIFIED HYPERACTIVITY PRESENCE: ICD-10-CM

## 2024-01-22 DIAGNOSIS — Z13.29 THYROID DISORDER SCREEN: ICD-10-CM

## 2024-01-22 DIAGNOSIS — E55.9 VITAMIN D DEFICIENCY: ICD-10-CM

## 2024-01-22 DIAGNOSIS — Z00.00 ANNUAL PHYSICAL EXAM: Primary | ICD-10-CM

## 2024-01-22 DIAGNOSIS — Z13.220 LIPID SCREENING: ICD-10-CM

## 2024-01-22 DIAGNOSIS — Z13.1 ENCOUNTER FOR SCREENING EXAMINATION FOR IMPAIRED GLUCOSE REGULATION AND DIABETES MELLITUS: ICD-10-CM

## 2024-01-22 DIAGNOSIS — D50.9 IRON DEFICIENCY ANEMIA, UNSPECIFIED IRON DEFICIENCY ANEMIA TYPE: ICD-10-CM

## 2024-01-22 DIAGNOSIS — E66.09 CLASS 1 OBESITY DUE TO EXCESS CALORIES WITH SERIOUS COMORBIDITY AND BODY MASS INDEX (BMI) OF 32.0 TO 32.9 IN ADULT: ICD-10-CM

## 2024-01-22 DIAGNOSIS — Z79.899 HIGH RISK MEDICATION USE: ICD-10-CM

## 2024-01-22 DIAGNOSIS — J30.9 ALLERGIC RHINITIS, UNSPECIFIED SEASONALITY, UNSPECIFIED TRIGGER: ICD-10-CM

## 2024-01-22 DIAGNOSIS — F41.9 ANXIETY: ICD-10-CM

## 2024-01-22 LAB
25(OH)D3 SERPL-MCNC: 33.9 NG/ML (ref 30–100)
ALBUMIN SERPL-MCNC: 4.6 G/DL (ref 3.5–5.2)
ALBUMIN/GLOB SERPL: 2.1 G/DL
ALP SERPL-CCNC: 76 U/L (ref 39–117)
ALT SERPL W P-5'-P-CCNC: 21 U/L (ref 1–33)
ANION GAP SERPL CALCULATED.3IONS-SCNC: 11 MMOL/L (ref 5–15)
AST SERPL-CCNC: 17 U/L (ref 1–32)
BASOPHILS # BLD AUTO: 0.05 10*3/MM3 (ref 0–0.2)
BASOPHILS NFR BLD AUTO: 0.8 % (ref 0–1.5)
BILIRUB BLD-MCNC: NEGATIVE MG/DL
BILIRUB SERPL-MCNC: 0.2 MG/DL (ref 0–1.2)
BUN SERPL-MCNC: 12 MG/DL (ref 6–20)
BUN/CREAT SERPL: 12.6 (ref 7–25)
CALCIUM SPEC-SCNC: 9.4 MG/DL (ref 8.6–10.5)
CHLORIDE SERPL-SCNC: 104 MMOL/L (ref 98–107)
CHOLEST SERPL-MCNC: 172 MG/DL (ref 0–200)
CLARITY, POC: CLEAR
CO2 SERPL-SCNC: 27 MMOL/L (ref 22–29)
COLOR UR: YELLOW
CREAT SERPL-MCNC: 0.95 MG/DL (ref 0.57–1)
DEPRECATED RDW RBC AUTO: 42 FL (ref 37–54)
EGFRCR SERPLBLD CKD-EPI 2021: 80.8 ML/MIN/1.73
EOSINOPHIL # BLD AUTO: 0.13 10*3/MM3 (ref 0–0.4)
EOSINOPHIL NFR BLD AUTO: 2.1 % (ref 0.3–6.2)
ERYTHROCYTE [DISTWIDTH] IN BLOOD BY AUTOMATED COUNT: 13.8 % (ref 12.3–15.4)
EXPIRATION DATE: NORMAL
GLOBULIN UR ELPH-MCNC: 2.2 GM/DL
GLUCOSE SERPL-MCNC: 90 MG/DL (ref 65–99)
GLUCOSE UR STRIP-MCNC: NEGATIVE MG/DL
HCT VFR BLD AUTO: 39.1 % (ref 34–46.6)
HDLC SERPL-MCNC: 47 MG/DL (ref 40–60)
HGB BLD-MCNC: 13.1 G/DL (ref 12–15.9)
IMM GRANULOCYTES # BLD AUTO: 0.02 10*3/MM3 (ref 0–0.05)
IMM GRANULOCYTES NFR BLD AUTO: 0.3 % (ref 0–0.5)
KETONES UR QL: NEGATIVE
LDLC SERPL CALC-MCNC: 110 MG/DL (ref 0–100)
LDLC/HDLC SERPL: 2.33 {RATIO}
LEUKOCYTE EST, POC: NEGATIVE
LYMPHOCYTES # BLD AUTO: 1.99 10*3/MM3 (ref 0.7–3.1)
LYMPHOCYTES NFR BLD AUTO: 32.5 % (ref 19.6–45.3)
Lab: NORMAL
MCH RBC QN AUTO: 28.1 PG (ref 26.6–33)
MCHC RBC AUTO-ENTMCNC: 33.5 G/DL (ref 31.5–35.7)
MCV RBC AUTO: 83.9 FL (ref 79–97)
MONOCYTES # BLD AUTO: 0.65 10*3/MM3 (ref 0.1–0.9)
MONOCYTES NFR BLD AUTO: 10.6 % (ref 5–12)
NEUTROPHILS NFR BLD AUTO: 3.28 10*3/MM3 (ref 1.7–7)
NEUTROPHILS NFR BLD AUTO: 53.7 % (ref 42.7–76)
NITRITE UR-MCNC: NEGATIVE MG/ML
NRBC BLD AUTO-RTO: 0 /100 WBC (ref 0–0.2)
PH UR: 7 [PH] (ref 5–8)
PLATELET # BLD AUTO: 299 10*3/MM3 (ref 140–450)
PMV BLD AUTO: 9.7 FL (ref 6–12)
POTASSIUM SERPL-SCNC: 3.9 MMOL/L (ref 3.5–5.2)
PROT SERPL-MCNC: 6.8 G/DL (ref 6–8.5)
PROT UR STRIP-MCNC: NEGATIVE MG/DL
RBC # BLD AUTO: 4.66 10*6/MM3 (ref 3.77–5.28)
RBC # UR STRIP: NEGATIVE /UL
SODIUM SERPL-SCNC: 142 MMOL/L (ref 136–145)
SP GR UR: 1.01 (ref 1–1.03)
TRIGL SERPL-MCNC: 78 MG/DL (ref 0–150)
TSH SERPL DL<=0.05 MIU/L-ACNC: 2.89 UIU/ML (ref 0.27–4.2)
UROBILINOGEN UR QL: NORMAL
VLDLC SERPL-MCNC: 15 MG/DL (ref 5–40)
WBC NRBC COR # BLD AUTO: 6.12 10*3/MM3 (ref 3.4–10.8)

## 2024-01-22 PROCEDURE — 80050 GENERAL HEALTH PANEL: CPT | Performed by: NURSE PRACTITIONER

## 2024-01-22 PROCEDURE — 82306 VITAMIN D 25 HYDROXY: CPT | Performed by: NURSE PRACTITIONER

## 2024-01-22 PROCEDURE — 36415 COLL VENOUS BLD VENIPUNCTURE: CPT | Performed by: NURSE PRACTITIONER

## 2024-01-22 PROCEDURE — 80061 LIPID PANEL: CPT | Performed by: NURSE PRACTITIONER

## 2024-01-22 PROCEDURE — 99395 PREV VISIT EST AGE 18-39: CPT | Performed by: NURSE PRACTITIONER

## 2024-01-22 PROCEDURE — 81003 URINALYSIS AUTO W/O SCOPE: CPT | Performed by: NURSE PRACTITIONER

## 2024-01-22 NOTE — PROGRESS NOTES
Chief Complaint  Annual Exam    Subjective          Sima Booker presents to [facility]  History of Present Illness    The patient presents to the clinic for an annual physical examination.    Allergies  She receives allergy injections for her allergies. She is currently taking Xyzal.    Attention deficit hyperactivity disorder  She is currently taking methylphenidate and Jornay. She is seeing behavioral health for her ADHD.    Anxiety  She is currently taking Lexapro 20 mg for her anxiety.    Vitamin D deficiency  She was taking vitamin D supplements, but since she left her full-time job, she has not been taking her vitamin D supplements.    Headaches  She experiences headaches occasionally.    Gestational hypertension  She has a history of gestational hypertension. She has a wrist blood pressure cuff at home, but she has not been using it.    Menorrhagia  She experiences heavier menstrual cycles since she had her baby. Her menstrual cycles last approximately 5 days. She uses a menstrual cup, but she will not use it on the first 2 days because she is constantly having menstrual cycles. She would go from using regular tampons to a super tampon to ultra and super plus tampon.    Health maintenance  She saw her dentist and ophthalmologist within the last year. She has gained 12 pounds since her last visit. She got an elliptical for [employer], and she has been using it every other day to get her legs a break. She has not been using it since Thursday, 01/18/2024. She has been taking care of her baby, and sitting on the couch. She denies any chest pain, dyspnea, abdominal pain, or reflux. She has not noticed any blood in her urine or blood in her bowels. She denies any new lumps, bumps, or skin changes. She has received 2 doses of the COVID-19 vaccine. She had a breast examination in the fall of 2021.      Current Outpatient Medications:     ALLERGY INJECTION SERUM  "OFFICE ADMINISTERED, Inject 2 each under the skin into the appropriate area as directed 1 (One) Time Per Week., Disp: , Rfl:     escitalopram (Lexapro) 20 MG tablet, Take 1 tablet by mouth Daily., Disp: 30 tablet, Rfl: 6    hydrOXYzine (ATARAX) 25 MG tablet, Take 1 tablet by mouth 3 (Three) Times a Day As Needed for Anxiety., Disp: 90 tablet, Rfl: 0    levocetirizine (XYZAL) 5 MG tablet, Take 1 tablet by mouth Daily., Disp: , Rfl:     Methylphenidate HCl ER, PM, (Jornay PM) 40 MG capsule sustained-release 24 hr, Take 40 mg by mouth Every Evening., Disp: 30 capsule, Rfl: 0    promethazine-dextromethorphan (PROMETHAZINE-DM) 6.25-15 MG/5ML syrup, Take 5 to 10 mL at bedtime as needed for cough (Patient not taking: Reported on 1/22/2024), Disp: 60 mL, Rfl: 0         Objective   Vital Signs:   /84 (BP Location: Right arm, Patient Position: Sitting, Cuff Size: Adult)   Pulse 64   Temp 98.6 °F (37 °C) (Infrared)   Resp 18   Ht 165.1 cm (65\")   Wt 87.1 kg (192 lb)   BMI 31.95 kg/m²     Physical Exam  Vitals and nursing note reviewed.   Constitutional:       General: She is not in acute distress.     Appearance: Normal appearance. She is well-developed. She is not ill-appearing.   HENT:      Head: Normocephalic and atraumatic.      Right Ear: Tympanic membrane and external ear normal.      Left Ear: Tympanic membrane and external ear normal.      Nose: Nose normal.      Mouth/Throat:      Mouth: Mucous membranes are moist.      Pharynx: Oropharynx is clear. No oropharyngeal exudate or posterior oropharyngeal erythema.   Eyes:      General: No scleral icterus.        Right eye: No discharge.         Left eye: No discharge.      Conjunctiva/sclera: Conjunctivae normal.      Pupils: Pupils are equal, round, and reactive to light.   Neck:      Thyroid: No thyromegaly.      Vascular: No carotid bruit.   Cardiovascular:      Rate and Rhythm: Normal rate and regular rhythm.      Heart sounds: Normal heart sounds. No " murmur heard.     No friction rub. No gallop.   Pulmonary:      Effort: Pulmonary effort is normal.      Breath sounds: Normal breath sounds.   Chest:   Breasts:     Right: Normal.      Left: Normal.   Abdominal:      General: Bowel sounds are normal. There is no distension.      Palpations: Abdomen is soft. There is no mass.      Tenderness: There is no abdominal tenderness. There is no guarding or rebound.      Hernia: No hernia is present.   Musculoskeletal:         General: Normal range of motion.      Cervical back: Normal range of motion and neck supple.   Lymphadenopathy:      Head:      Right side of head: No submental, submandibular, tonsillar, preauricular, posterior auricular or occipital adenopathy.      Left side of head: No submental, submandibular, tonsillar, preauricular, posterior auricular or occipital adenopathy.      Cervical: No cervical adenopathy.      Right cervical: No superficial, deep or posterior cervical adenopathy.     Left cervical: No superficial, deep or posterior cervical adenopathy.      Upper Body:      Right upper body: No supraclavicular, axillary, pectoral or epitrochlear adenopathy.      Left upper body: No supraclavicular, axillary, pectoral or epitrochlear adenopathy.      Lower Body: No right inguinal adenopathy. No left inguinal adenopathy.   Skin:     General: Skin is warm and dry.      Capillary Refill: Capillary refill takes 2 to 3 seconds.   Neurological:      Mental Status: She is alert and oriented to person, place, and time.      Deep Tendon Reflexes: Reflexes normal.   Psychiatric:         Behavior: Behavior normal.         Thought Content: Thought content normal.         Judgment: Judgment normal.        Result Review :                 Assessment and Plan    Diagnoses and all orders for this visit:    1. Annual physical exam (Primary)  -     CBC & Differential; Future    2. Vitamin D deficiency  -     CBC & Differential; Future    3. Iron deficiency anemia,  unspecified iron deficiency anemia type  -     CBC & Differential; Future    4. Anxiety  -     CBC & Differential; Future    5. Attention deficit disorder, unspecified hyperactivity presence  -     CBC & Differential; Future  -     ToxAssure Flex 23, Ur -; Future    6. Thyroid disorder screen  -     CBC & Differential; Future  -     TSH; Future    7. Lipid screening  -     CBC & Differential; Future  -     Lipid Panel; Future    8. Encounter for screening examination for impaired glucose regulation and diabetes mellitus  -     CBC & Differential; Future  -     Comprehensive Metabolic Panel; Future  -     POC Urinalysis Dipstick, Automated; Future    9. Allergic rhinitis, unspecified seasonality, unspecified trigger  -     CBC & Differential; Future    10. High risk medication use  -     ToxAssure Flex 23, Ur -; Future      Plan:  Annual physical examination.   - I will order laboratory results and discuss those results with the patient once obtained and reviewed.           BMI is >= 30 and <35. (Class 1 Obesity). The following options were offered after discussion;: weight loss educational material (shared in after visit summary) and exercise counseling/recommendations    Patient education regarding the importance of avoidance of texting while driving and the need for wearing seatbelt.  Use of sunscreen, use of helmets while on bikes.  The appropriate amounts of sleep and H20 consumption per day was reviewed with pt.  The need for healthy well-balanced diet based off the food guide pyramid and avoidance of skipping meals along with following a NCS diet with appropriate amounts of fats, protein, and carbohydrate and low sodium diet. Encouraging 30minutes of cardio 5d weekly and muscle strengthening 3x weekly.         Depression: PHQ-2/9 Depression Screening  Little interest or pleasure in doing things?     Feeling down, depressed, or hopeless?     PHQ-2 Total Score     PHQ-9 Total Score  0        AWV: na  A1C: No  "results found for: \"HGBA1C\"   ACP: Advance Care Planning   ACP discussion was held with the patient during this visit. Patient does not have an advance directive, information provided.   Mammogram: na  Colonoscopy: na      Follow Up   No follow-ups on file.  Patient was given instructions and counseling regarding her condition or for health maintenance advice. Please see specific information pulled into the AVS if appropriate.     RTC/call  If symptoms worsen  Meds MOA and SE's reviewed and pt v/u    [facility]  [address] [address]  Fax [phone]  Phone [phone]      Transcribed from ambient dictation for [facility] by Gaye Recio.  01/22/24   09:29 EST    Patient or patient representative verbalized consent to the visit recording.  I have personally performed the services described in this document as transcribed by the above individual, and it is both accurate and complete.   "

## 2024-01-22 NOTE — PATIENT INSTRUCTIONS
MyPlate from USDA    MyPlate is an outline of a general healthy diet based on the Dietary Guidelines for Americans, 2957-8812, from the U.S. Department of Agriculture (USDA). It sets guidelines for how much food you should eat from each food group based on your age, sex, and level of physical activity.  What are tips for following MyPlate?  To follow MyPlate recommendations:  Eat a wide variety of fruits and vegetables, grains, and protein foods.  Serve smaller portions and eat less food throughout the day.  Limit portion sizes to avoid overeating.  Enjoy your food.  Get at least 150 minutes of exercise every week. This is about 30 minutes each day, 5 or more days per week.  It can be difficult to have every meal look like MyPlate. Think about MyPlate as eating guidelines for an entire day, rather than each individual meal.  Fruits and vegetables  Make one half of your plate fruits and vegetables.  Eat many different colors of fruits and vegetables each day.  For a 2,000-calorie daily food plan, eat:  2½ cups of vegetables every day.  2 cups of fruit every day.  1 cup is equal to:  1 cup raw or cooked vegetables.  1 cup raw fruit.  1 medium-sized orange, apple, or banana.  1 cup 100% fruit or vegetable juice.  2 cups raw leafy greens, such as lettuce, spinach, or kale.  ½ cup dried fruit.  Grains  One fourth of your plate should be grains.  Make at least half of the grains you eat each day whole grains.  For a 2,000-calorie daily food plan, eat 6 oz of grains every day.  1 oz is equal to:  1 slice bread.  1 cup cereal.  ½ cup cooked rice, cereal, or pasta.  Protein  One fourth of your plate should be protein.  Eat a wide variety of protein foods, including meat, poultry, fish, eggs, beans, nuts, and tofu.  For a 2,000-calorie daily food plan, eat 5½ oz of protein every day.  1 oz is equal to:  1 oz meat, poultry, or fish.  ¼ cup cooked beans.  1 egg.  ½ oz nuts or seeds.  1 Tbsp peanut butter.  Dairy  Drink fat-free  or low-fat (1%) milk.  Eat or drink dairy as a side to meals.  For a 2,000-calorie daily food plan, eat or drink 3 cups of dairy every day.  1 cup is equal to:  1 cup milk, yogurt, cottage cheese, or soy milk (soy beverage).  2 oz processed cheese.  1½ oz natural cheese.  Fats, oils, salt, and sugars  Only small amounts of oils are recommended.  Avoid foods that are high in calories and low in nutritional value (empty calories), like foods high in fat or added sugars.  Choose foods that are low in salt (sodium). Choose foods that have less than 140 milligrams (mg) of sodium per serving.  Drink water instead of sugary drinks. Drink enough fluid to keep your urine pale yellow.  Where to find support  Work with your health care provider or a dietitian to develop a customized eating plan that is right for you.  Download an antonieta (mobile application) to help you track your daily food intake.  Where to find more information  USDA: ChooseMyPlate.gov  Summary  MyPlate is a general guideline for healthy eating from the USDA. It is based on the Dietary Guidelines for Americans, 8206-8643.  In general, fruits and vegetables should take up one half of your plate, grains should take up one fourth of your plate, and protein should take up one fourth of your plate.  This information is not intended to replace advice given to you by your health care provider. Make sure you discuss any questions you have with your health care provider.  Document Revised: 11/08/2021 Document Reviewed: 11/08/2021  [employer] Patient Education © 2023 [employer].    Advance Care Planning and Advance Directives     You make decisions on a daily basis - decisions about where you want to live, your career, your home, your life. Perhaps one of the most important decisions you face is your choice for future medical care. Take time to talk with your family and your healthcare team and start planning today.  Advance Care Planning is a process that can help  you:  Understand possible future healthcare decisions in light of your own experiences  Reflect on those decision in light of your goals and values  Discuss your decisions with those closest to you and the healthcare professionals that care for you  Make a plan by creating a document that reflects your wishes    Surrogate Decision Maker  In the event of a medical emergency, which has left you unable to communicate or to make your own decisions, you would need someone to make decisions for you.  It is important to discuss your preferences for medical treatment with this person while you are in good health.     Qualities of a surrogate decision maker:  Willing to take on this role and responsibility  Knows what you want for future medical care  Willing to follow your wishes even if they don't agree with them  Able to make difficult medical decisions under stressful circumstances    Advance Directives  These are legal documents you can create that will guide your healthcare team and decision maker(s) when needed. These documents can be stored in the electronic medical record.    Living Will - a legal document to guide your care if you have a terminal condition or a serious illness and are unable to communicate. States vary by statute in document names/types, but most forms may include one or more of the following:        -  Directions regarding life-prolonging treatments        -  Directions regarding artificially provided nutrition/hydration        -  Choosing a healthcare decision maker        -  Direction regarding organ/tissue donation    Durable Power of  for Healthcare - this document names an -in-fact to make medical decisions for you, but it may also allow this person to make personal and financial decisions for you. Please seek the advice of an  if you need this type of document.    **Advance Directives are not required and no one may discriminate against you if you do not sign  one.    Medical Orders  Many states allow specific forms/orders signed by your physician to record your wishes for medical treatment in your current state of health. This form, signed in personal communication with your physician, addresses resuscitation and other medical interventions that you may or may not want.      For more information or to schedule a time with a [facility] Advance Care Planning Facilitator contact: [facility]/ACP or call [phone] and someone will contact you directly.

## 2024-01-25 LAB
1OH-MIDAZOLAM UR QL SCN: NOT DETECTED NG/MG CREAT
6MAM UR QL SCN: NEGATIVE NG/ML
7AMINOCLONAZEPAM/CREAT UR: NOT DETECTED NG/MG CREAT
A-OH ALPRAZ/CREAT UR: NOT DETECTED NG/MG CREAT
A-OH-TRIAZOLAM/CREAT UR CFM: NOT DETECTED NG/MG CREAT
ACP UR QL CFM: NOT DETECTED
ALPRAZ/CREAT UR CFM: NOT DETECTED NG/MG CREAT
AMPHETAMINES UR QL SCN: NEGATIVE NG/ML
APAP UR QL SCN: NEGATIVE UG/ML
BARBITURATES UR QL SCN: NEGATIVE NG/ML
BENZODIAZ SCN METH UR: NEGATIVE
BUPRENORPHINE UR QL SCN: NEGATIVE
BUPRENORPHINE/CREAT UR: NOT DETECTED NG/MG CREAT
CANNABINOIDS UR QL SCN: NEGATIVE NG/ML
CARISOPRODOL UR QL: NEGATIVE NG/ML
CLONAZEPAM/CREAT UR CFM: NOT DETECTED NG/MG CREAT
COCAINE+BZE UR QL SCN: NEGATIVE NG/ML
CREAT UR-MCNC: 119 MG/DL
D-METHORPHAN UR-MCNC: NOT DETECTED NG/ML
D-METHORPHAN+LEVORPHANOL UR QL: NOT DETECTED
DESALKYLFLURAZ/CREAT UR: NOT DETECTED NG/MG CREAT
DIAZEPAM/CREAT UR: NOT DETECTED NG/MG CREAT
ETHANOL UR QL SCN: NEGATIVE G/DL
ETHANOL UR QL SCN: NEGATIVE NG/ML
FENTANYL CTO UR SCN-MCNC: NEGATIVE NG/ML
FENTANYL/CREAT UR: NOT DETECTED NG/MG CREAT
FLUNITRAZEPAM UR QL SCN: NOT DETECTED NG/MG CREAT
GABAPENTIN UR-MCNC: NEGATIVE UG/ML
HYPNOTICS UR QL SCN: NEGATIVE
KETAMINE UR QL: NOT DETECTED
LORAZEPAM/CREAT UR: NOT DETECTED NG/MG CREAT
MEPERIDINE UR QL SCN: NEGATIVE NG/ML
METHADONE UR QL SCN: NEGATIVE NG/ML
METHADONE+METAB UR QL SCN: NEGATIVE NG/ML
MIDAZOLAM/CREAT UR CFM: NOT DETECTED NG/MG CREAT
MISCELLANEOUS, UR: NEGATIVE
NORBUPRENORPHINE/CREAT UR: NOT DETECTED NG/MG CREAT
NORDIAZEPAM/CREAT UR: NOT DETECTED NG/MG CREAT
NORFENTANYL/CREAT UR: NOT DETECTED NG/MG CREAT
NORFLUNITRAZEPAM UR-MCNC: NOT DETECTED NG/MG CREAT
NORKETAMINE UR-MCNC: NOT DETECTED UG/ML
OPIATES UR SCN-MCNC: NEGATIVE NG/ML
OTHER HALLUCINOGENS UR: NEGATIVE
OXAZEPAM/CREAT UR: NOT DETECTED NG/MG CREAT
OXYCODONE CTO UR SCN-MCNC: NEGATIVE NG/ML
PCP UR QL SCN: NEGATIVE NG/ML
PRESCRIBED MEDICATIONS: NORMAL
PROPOXYPH UR QL SCN: NEGATIVE NG/ML
TAPENTADOL CTO UR SCN-MCNC: NEGATIVE NG/ML
TEMAZEPAM/CREAT UR: NOT DETECTED NG/MG CREAT
TRAMADOL UR QL SCN: NEGATIVE NG/ML
ZALEPLON UR-MCNC: NOT DETECTED NG/ML
ZOLPIDEM PHENYL-4-CARB UR QL SCN: NOT DETECTED
ZOLPIDEM UR QL SCN: NOT DETECTED
ZOPICLONE-N-OXIDE UR-MCNC: NOT DETECTED NG/ML

## 2024-01-29 ENCOUNTER — TELEPHONE (OUTPATIENT)
Dept: PSYCHIATRY | Facility: CLINIC | Age: 34
End: 2024-01-29
Payer: COMMERCIAL

## 2024-01-30 ENCOUNTER — PRIOR AUTHORIZATION (OUTPATIENT)
Dept: PSYCHIATRY | Facility: CLINIC | Age: 34
End: 2024-01-30
Payer: COMMERCIAL

## 2024-01-30 ENCOUNTER — TELEMEDICINE (OUTPATIENT)
Dept: PSYCHIATRY | Facility: CLINIC | Age: 34
End: 2024-01-30
Payer: COMMERCIAL

## 2024-01-30 DIAGNOSIS — F90.2 ATTENTION DEFICIT HYPERACTIVITY DISORDER (ADHD), COMBINED TYPE: ICD-10-CM

## 2024-01-30 DIAGNOSIS — F41.1 GENERALIZED ANXIETY DISORDER: Primary | ICD-10-CM

## 2024-01-30 PROCEDURE — 90791 PSYCH DIAGNOSTIC EVALUATION: CPT
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
## 2024-02-27 ENCOUNTER — TELEMEDICINE (OUTPATIENT)
Dept: PSYCHIATRY | Facility: CLINIC | Age: 34
End: 2024-02-27
Payer: COMMERCIAL

## 2024-02-27 DIAGNOSIS — F41.1 GENERALIZED ANXIETY DISORDER: ICD-10-CM

## 2024-02-27 DIAGNOSIS — F90.2 ATTENTION DEFICIT HYPERACTIVITY DISORDER (ADHD), COMBINED TYPE: Primary | ICD-10-CM

## 2024-02-27 DIAGNOSIS — F90.2 ATTENTION DEFICIT HYPERACTIVITY DISORDER (ADHD), COMBINED TYPE: ICD-10-CM

## 2024-02-27 RX ORDER — METHYLPHENIDATE HYDROCHLORIDE 40 MG/1
40 CAPSULE ORAL EVERY EVENING
Qty: 30 CAPSULE | Refills: 0 | Status: SHIPPED | OUTPATIENT
Start: 2024-02-27

## 2024-02-27 NOTE — PROGRESS NOTES
Date: 2024  Time In: 10:05 EST  Time out: 11:08 AM    This provider is located at [facility], [address] [place] [address], [facility], using a secure [employer] Video Visit through [employer]. Patient is being seen remotely via telehealth at their home address is located in [place]. Patient stated they are in a secure environment for this session. The patient's condition being diagnosed and treated is appropriate for telemedicine. The provider identified themself as well as their credentials. The patient, or  patient's legal guardian consent to be seen remotely, and when consent is given they understand that the consent allows for patient identifiable information to be sent to a third party as needed. They may refuse to be seen remotely at any time. The electronic data is encrypted and password protected, and the patient's or  legal guardian has been advised of the potential risks to privacy not withstanding such measures.   PT Identifiers used: Name and .    You have chosen to receive care through a telehealth visit.  Do you consent to use a video/audio connection for your medical care today? Yes    Subjective   Sima Booker is a 34 y.o. female who presents today for follow up    Chief Complaint:   Chief Complaint   Patient presents with    ADHD    Anxiety    Depression       Clinical Maneuvering/Intervention: DBT techniques, strength based approach, MI        Assisted patient in processing above session content; acknowledged and normalized patient’s thoughts, feelings, and concerns.  Rationalized patient thought process regarding concerns presented at session.  Discussed triggers associated with patient's  anxiety , depression , and ADHD Also discussed coping skills for patient to implement such as mindfulness , self care , and communication/expectations, radical acceptance.    Allowed patient to freely discuss issues without interruption or judgment. Provided safe,  confidential environment to facilitate the development of positive therapeutic relationship and encourage open, honest communication. Assisted patient in identifying risk factors which would indicate the need for higher level of care including thoughts to harm self or others and/or self-harming behavior and encouraged patient to contact this office, call 911, or present to the nearest emergency room should any of these events occur. Discussed crisis intervention services and means to access. Patient adamantly and convincingly denies current suicidal or homicidal ideation or perceptual disturbance.    Assessment: Pt was alert and oriented x3. Pt reflected on passed month and barriers faced. Pt identified contributing factors to mental health. ADHD appears mostly manageable through medication management. Pt reflected on how her perspective has changed through life experiences. Pt identifies as a nurturer and empath, taking care of others. Pt discussed her values and their importance as well. Pt appeared receptive to DBT techniques during session.     Patient appears to maintain relative stability as compared to their baseline.  However, patient continues to struggle with   Chief Complaint   Patient presents with    ADHD    Anxiety    Depression    which continues to cause impairment in important areas of functioning.  A result, they can be reasonably expected to continue to benefit from treatment and would likely be at increased risk for decompensation otherwise.      Mental Status Exam:   Hygiene:   good  Cooperation:  Cooperative  Eye Contact:  Good  Psychomotor Behavior:  Appropriate  Affect:  Appropriate  Mood: normal  Speech:  Normal  Thought Process:  Linear  Thought Content:  Normal  Suicidal:  None  Homicidal:  None  Hallucinations:  None  Delusion:  None  Memory:  Intact  Orientation:  Person, Place, and Time  Reliability:  fair  Insight:  Fair  Judgement:  Fair  Impulse Control:  Fair  Physical/Medical  Issues:  No        Patient's Support Network Includes:   and extended family    Functional Status: No impairment    Progress toward goal: Not at goal    Prognosis: Fair with Ongoing Treatment         Plan: Continue to process how relationships, experiences effect mental health.    Patient will continue in individual outpatient therapy with focus on improved functioning and coping skills, maintaining stability, and avoiding decompensation and the need for higher level of care.    Patient will adhere to medication regimen as prescribed and report any side effects. Patient will contact this office, call 911 or present to the nearest emergency room should suicidal or homicidal ideations occur. Provide Cognitive Behavioral Therapy and Solution Focused Therapy to improve functioning, maintain stability, and avoid decompensation and the need for higher level of care.     Return in about 4 weeks (around 3/26/2024).      VISIT DIAGNOSIS:    Diagnosis Plan   1. Attention deficit hyperactivity disorder (ADHD), combined type        2. Generalized anxiety disorder         10:05 EST         This document has been electronically signed by Ninfa Hwang LCSW  February 27, 2024      Part of this note may be an electronic transcription/translation of spoken language to printed text using the Dragon Dictation System.

## 2024-03-26 ENCOUNTER — TELEMEDICINE (OUTPATIENT)
Dept: PSYCHIATRY | Facility: CLINIC | Age: 34
End: 2024-03-26
Payer: COMMERCIAL

## 2024-03-26 DIAGNOSIS — F90.2 ATTENTION DEFICIT HYPERACTIVITY DISORDER (ADHD), COMBINED TYPE: Primary | ICD-10-CM

## 2024-03-26 DIAGNOSIS — F41.1 GENERALIZED ANXIETY DISORDER: ICD-10-CM

## 2024-03-26 NOTE — PROGRESS NOTES
Date: 2024  Time In: 13:59 EDT  Time out: 3:00 PM EST    This provider is [facility] at [facility] [place], [place], [facility] [place]note 2], using a secure Tagoohart Video Visit through [employer]. Patient is being seen remotely via telehealth at their home address is located in [place]. Patient stated they are in a secure environment for this session. The patient's condition being diagnosed and treated is appropriate for telemedicine. The provider identified themself as well as their credentials. The patient, or  patient's legal guardian consent to be seen remotely, and when consent is given they understand that the consent allows for patient identifiable information to be sent to a third party as needed. They may refuse to be seen remotely at any time. The electronic data is encrypted and password protected, and the patient's or  legal guardian has been advised of the potential risks to privacy not withstanding such measures.   PT Identifiers used: Name and .    You have chosen to receive care through a telehealth visit.  Do you consent to use a video/audio connection for your medical care today? Yes    Subjective   Sima Booker is a 34 y.o. female who presents today for follow up    Chief Complaint:   Chief Complaint   Patient presents with    Anxiety    ADHD    Depression     Clinical Maneuvering/Intervention: MI, strength based approach, CBT techniques    Assisted patient in processing above session content; acknowledged and normalized patient’s thoughts, feelings, and concerns.  Rationalized patient thought process regarding concerns presented at session.  Discussed triggers associated with patient's  anxiety , depression , and ADHD Also discussed coping skills for patient to implement such as mindfulness , self care , and self help book, gratitude journal, socialization.    Allowed patient to freely discuss issues without interruption or judgment. Provided safe,  confidential environment to facilitate the development of positive therapeutic relationship and encourage open, honest communication. Assisted patient in identifying risk factors which would indicate the need for higher level of care including thoughts to harm self or others and/or self-harming behavior and encouraged patient to contact this office, call 911, or present to the nearest emergency room should any of these events occur. Discussed crisis intervention services and means to access. Patient adamantly and convincingly denies current suicidal or homicidal ideation or perceptual disturbance.    Assessment: Pt arrived on time to session on time, she was alert and oriented x3. Pt identified depression increased over the past month. Pt was unable to identify contributing factors to increased depression. Pt identified lack of motivation as a barrier. Pt appears motivated to improve mental health. Pt is considering increasing medication, encouraged to discuss with provider. Pt appeared receptive to coping skills discussed in session to aid with mental health symptoms.    Patient appears to maintain relative stability as compared to their baseline.  However, patient continues to struggle with   Chief Complaint   Patient presents with    Anxiety    ADHD    Depression    which continues to cause impairment in important areas of functioning.  A result, they can be reasonably expected to continue to benefit from treatment and would likely be at increased risk for decompensation otherwise.      Mental Status Exam:   Hygiene:   good  Cooperation:  Cooperative  Eye Contact:  Good  Psychomotor Behavior:  Appropriate   Affect:  Appropriate  Mood: depressed  Speech:  Normal  Thought Process:  Linear  Thought Content:  Normal  Suicidal:  None  Homicidal:  None  Hallucinations:  None  Delusion:  None  Memory:  Intact  Orientation:  Person, Place, and Time  Reliability:  fair  Insight:  Fair  Judgement:  Fair  Impulse Control:   Fair  Physical/Medical Issues:  No        Patient's Support Network Includes:   and extended family    Functional Status: No impairment    Progress toward goal: Not at goal    Prognosis: Fair with Ongoing Treatment         Plan: Continue to build rapport, encourage coping skills, and process life events.    Patient will continue in individual outpatient therapy with focus on improved functioning and coping skills, maintaining stability, and avoiding decompensation and the need for higher level of care.    Patient will adhere to medication regimen as prescribed and report any side effects. Patient will contact this office, call 911 or present to the nearest emergency room should suicidal or homicidal ideations occur. Provide Cognitive Behavioral Therapy and Solution Focused Therapy to improve functioning, maintain stability, and avoid decompensation and the need for higher level of care.     Return in about 4 weeks (around 4/23/2024).      VISIT DIAGNOSIS:    Diagnosis Plan   1. Attention deficit hyperactivity disorder (ADHD), combined type        2. Generalized anxiety disorder         13:59 EDT         This document has been electronically signed by Ninfa Hwang LCSW  March 26, 2024      Part of this note may be an electronic transcription/translation of spoken language to printed text using the Dragon Dictation System.

## 2024-03-28 DIAGNOSIS — F90.2 ATTENTION DEFICIT HYPERACTIVITY DISORDER (ADHD), COMBINED TYPE: ICD-10-CM

## 2024-04-01 RX ORDER — METHYLPHENIDATE HYDROCHLORIDE 40 MG/1
40 CAPSULE ORAL EVERY EVENING
Qty: 30 CAPSULE | Refills: 0 | Status: SHIPPED | OUTPATIENT
Start: 2024-04-01

## 2024-04-01 NOTE — TELEPHONE ENCOUNTER
Regular provider is out of the office. UDS is on file and appropriate. [facility] #[id number] was reviewed. Sending a 30 day supply on provider's behalf.

## 2024-04-08 ENCOUNTER — TELEMEDICINE (OUTPATIENT)
Dept: PSYCHIATRY | Facility: CLINIC | Age: 34
End: 2024-04-08
Payer: COMMERCIAL

## 2024-04-08 DIAGNOSIS — F90.2 ATTENTION DEFICIT HYPERACTIVITY DISORDER (ADHD), COMBINED TYPE: Primary | ICD-10-CM

## 2024-04-08 DIAGNOSIS — F32.1 CURRENT MODERATE EPISODE OF MAJOR DEPRESSIVE DISORDER WITHOUT PRIOR EPISODE: ICD-10-CM

## 2024-04-08 DIAGNOSIS — F41.1 GENERALIZED ANXIETY DISORDER: ICD-10-CM

## 2024-04-08 PROCEDURE — 99214 OFFICE O/P EST MOD 30 MIN: CPT | Performed by: NURSE PRACTITIONER

## 2024-04-08 RX ORDER — BUPROPION HYDROCHLORIDE 150 MG/1
150 TABLET ORAL EVERY MORNING
Qty: 30 TABLET | Refills: 2 | Status: SHIPPED | OUTPATIENT
Start: 2024-04-08 | End: 2025-04-08

## 2024-04-08 NOTE — PROGRESS NOTES
Patient Name: Sima Booker  MRN: [id number]   :  1990     This provider is located at her home office through the [facility] (through [facility]), [address], [address] using a secure [employer] Video Visit through [employer]. Patient is being seen remotely via telehealth at their home address in [place], and stated they are in a secure environment for this session. The patient's condition being diagnosed/treated is appropriate for telemedicine. The provider identified herself as well as her credentials.   The patient, and/or patients guardian, consent to be seen remotely, and when consent is given they understand that the consent allows for patient identifiable information to be sent to a third party as needed.   They may refuse to be seen remotely at any time. The electronic data is encrypted and password protected, and the patient and/or guardian has been advised of the potential risks to privacy not withstanding such measures.    You have chosen to receive care through a telehealth visit.  Do you consent to use a video/audio connection for your medical care today? Yes    Chief Complaint:      ICD-10-CM ICD-9-CM   1. Attention deficit hyperactivity disorder (ADHD), combined type  F90.2 314.01   2. Generalized anxiety disorder  F41.1 300.02   3. Current moderate episode of major depressive disorder without prior episode  F32.1 296.22       History of Present Illness: Sima Booker is a 34 y.o. female is here today for medication management follow up.  Patient states she loves the Jornay PM.  States she is much more compliant being able to take it at night and it definitely lasts long enough throughout the day.  States she has been struggling a little bit with sleep.  Feels Lexapro is helping however still having feelings of being very low and depressed.  States when she has time at home to do household chores she finds herself sitting and  doing nothing instead.    The following portions of the patient's history were reviewed and updated as appropriate: allergies, current medications, past family history, past medical history, past social history, past surgical history, and problem list.    Review of Systems;;  Review of Systems   Constitutional:  Negative for activity change, appetite change, fatigue, unexpected weight gain and unexpected weight loss.   Respiratory:  Negative for shortness of breath and wheezing.    Gastrointestinal:  Negative for constipation, diarrhea, nausea and vomiting.   Musculoskeletal:  Negative for gait problem.   Skin:  Negative for dry skin and rash.   Neurological:  Negative for dizziness, speech difficulty, weakness, light-headedness, headache, memory problem and confusion.   Psychiatric/Behavioral:  Positive for depressed mood. Negative for agitation, behavioral problems, decreased concentration, dysphoric mood, hallucinations, self-injury, sleep disturbance, suicidal ideas, negative for hyperactivity and stress. The patient is not nervous/anxious.        Physical Exam;;  Physical Exam  Constitutional:       General: She is not in acute distress.     Appearance: She is well-developed. She is not diaphoretic.   HENT:      Head: Normocephalic and atraumatic.   Eyes:      Conjunctiva/sclera: Conjunctivae normal.   Pulmonary:      Effort: Pulmonary effort is normal. No respiratory distress.   Musculoskeletal:         General: Normal range of motion.      Cervical back: Full passive range of motion without pain and normal range of motion.   Neurological:      Mental Status: She is alert and oriented to person, place, and time.   Psychiatric:         Mood and Affect: Mood is depressed. Mood is not anxious. Affect is not labile, blunt, angry or inappropriate.         Speech: Speech is not rapid and pressured or tangential.         Behavior: Behavior normal. Behavior is not agitated, slowed, aggressive, withdrawn, hyperactive  or combative. Behavior is cooperative.         Thought Content: Thought content normal. Thought content is not paranoid or delusional. Thought content does not include homicidal or suicidal ideation. Thought content does not include homicidal or suicidal plan.         Judgment: Judgment normal.       not currently breastfeeding.  There is no height or weight on file to calculate BMI. Video appt unable to obtain.     Current Medications;;    Current Outpatient Medications:     ALLERGY INJECTION SERUM OFFICE ADMINISTERED, Inject 2 each under the skin into the appropriate area as directed 1 (One) Time Per Week., Disp: , Rfl:     buPROPion XL (Wellbutrin XL) 150 MG 24 hr tablet, Take 1 tablet by mouth Every Morning., Disp: 30 tablet, Rfl: 2    escitalopram (Lexapro) 20 MG tablet, Take 1 tablet by mouth Daily., Disp: 30 tablet, Rfl: 6    hydrOXYzine (ATARAX) 25 MG tablet, Take 1 tablet by mouth 3 (Three) Times a Day As Needed for Anxiety., Disp: 90 tablet, Rfl: 0    levocetirizine (XYZAL) 5 MG tablet, Take 1 tablet by mouth Daily., Disp: , Rfl:     Methylphenidate HCl ER, PM, (Jornay PM) 40 MG capsule sustained-release 24 hr, Take 40 mg by mouth Every Evening., Disp: 30 capsule, Rfl: 0    Lab Results:   Office Visit on 01/22/2024   Component Date Value Ref Range Status    Report Summary 01/22/2024 FINAL   Final    Comment: ====================================================================  ToxAssure Flex 23, Ur  ====================================================================  Test                             Result       Flag       Units    NO DRUGS DETECTED.  ====================================================================  Test                      Result    Flag   Units      Ref Range    Creatinine              119              mg/dL      >=20  ====================================================================  Declared Medications:   The flagging and interpretation on this report are based on the    following declared medications.  Unexpected results may arise from   inaccuracies in the declared medications.   **Note: The testing scope of this panel does not include following   reported medications:   Escitalopram   Hydroxyzine   Levocetirizine   Methylphenidate  ====================================================================  For clinical consultation, please call [phone].  =                           ===================================================================      CREATININE 01/22/2024 119  mg/dL Final    REFERENCE RANGE: Ref Range>=20    Amphetamines, IA 01/22/2024 Negative  CUTOFF:300 ng/mL Final    Benzodiazepines 01/22/2024 Negative   Final    Diazepam Urine, Qualitative 01/22/2024 Not Detected  ng/mg creat Final    Desmethyldiazepam 01/22/2024 Not Detected  ng/mg creat Final    Oxazepam, urine 01/22/2024 Not Detected  ng/mg creat Final    Temazepam 01/22/2024 Not Detected  ng/mg creat Final    Comment: Expected metabolism of benzodiazepine class drugs:   Parent Drug       Detected Metabolites   -----------       --------------------   Diazepam:         Desmethyldiazepam, Temazepam, Oxazepam   Chlordiazepoxide: Desmethyldiazepam, Oxazepam   Clorazepate:      Desmethyldiazepam, Oxazepam   Halazepam:        Desmethyldiazepam, Oxazepam   Temazepam:        Oxazepam   Oxazepam:         None      Alprazolam Urine, Conf 01/22/2024 Not Detected  ng/mg creat Final    Alpha-hydroxyalprazolam, Urine 01/22/2024 Not Detected  ng/mg creat Final    Desalkylflurazepam, Urine 01/22/2024 Not Detected  ng/mg creat Final    Lorazepam, Urine 01/22/2024 Not Detected  ng/mg creat Final    Alpha-hydroxytriazolam, Urine 01/22/2024 Not Detected  ng/mg creat Final    Clonazepam 01/22/2024 Not Detected  ng/mg creat Final    7- AMINOCLONAZEPAM 01/22/2024 Not Detected  ng/mg creat Final    Midazolam, Urine 01/22/2024 Not Detected  ng/mg creat Final    Alpha-hydroxymidazolam, Urine 01/22/2024 Not Detected  ng/mg  creat Final    Flunitrazepam 01/22/2024 Not Detected  ng/mg creat Final    DESMETHYLFLUNITRAZEPAM 01/22/2024 Not Detected  ng/mg creat Final    COCAINE / METABOLITE, IA 01/22/2024 Negative  CUTOFF:150 ng/mL Final    Ethyl Alcohol, Enz 01/22/2024 Negative  CUTOFF:0.020 g/dL Final    Ethanol and Ethanol Biomarkers 01/22/2024 Negative  CUTOFF:500 ng/mL Final    Cannavinoids IA 01/22/2024 Negative  CUTOFF:20 ng/mL Final    6-Acetylmorphine IA 01/22/2024 Negative  CUTOFF:10 ng/mL Final    Opiate Class IA 01/22/2024 Negative  CUTOFF:100 ng/mL Final    Oxycodone Class IA 01/22/2024 Negative  CUTOFF:100 ng/mL Final    METHADONE, IA 01/22/2024 Negative  CUTOFF:100 ng/mL Final    Methadone MTB IA 01/22/2024 Negative  CUTOFF:100 ng/mL Final    Buprenorphine, Urine 01/22/2024 Negative   Final    Buprenorphine, Urine 01/22/2024 Not Detected  ng/mg creat Final    Norbuprenorphine 01/22/2024 Not Detected  ng/mg creat Final    Fentanyl 01/22/2024 Negative   Final    Fentanyl, Urine 01/22/2024 Not Detected  ng/mg creat Final    Norfentanyl Urine 01/22/2024 Not Detected  ng/mg creat Final    Tapentadol, IA 01/22/2024 Negative  CUTOFF:200 ng/mL Final    MEPERIDINE, IA 01/22/2024 Negative  CUTOFF:200 ng/mL Final    PROPOXYPHENE, IA 01/22/2024 Negative  CUTOFF:300 ng/mL Final    TRAMADOL, IA 01/22/2024 Negative  CUTOFF:200 ng/mL Final    Barbiturates, IA 01/22/2024 Negative  CUTOFF:200 ng/mL Final    PHENCYCLIDINE, IA 01/22/2024 Negative  CUTOFF:25 ng/mL Final    Other Hallucinogens Ur 01/22/2024 Negative   Final    Ketamine 01/22/2024 Not Detected   Final    Norketamine 01/22/2024 Not Detected   Final    Gabapentin, IA 01/22/2024 Negative  CUTOFF:1.0 ug/mL Final    Carisoprodol, IA 01/22/2024 Negative  CUTOFF:100 ng/mL Final    SEDATIVES/HYPNOTICS 01/22/2024 Negative   Final    Zolpidem(Ambien) Urine 01/22/2024 Not Detected   Final    Zolpidem Acid 01/22/2024 Not Detected   Final    Eszopiclone/Zopiclone 01/22/2024 Not Detected   Final     Amino Chloropyridine 01/22/2024 Not Detected   Final    Zaleplon, Ur 01/22/2024 Not Detected   Final    Comment: Expected metabolism of Sedatives/Hypnotics:   Parent Drug                 Detected Metabolites   -----------                 --------------------   Zolpidem:                   Zolpidem Acid   Zopiclone/Eszopiclone:      Amino Chloropyridine   Zaleplon:                   None      Acetaminophen, IA 01/22/2024 Negative  CUTOFF:5.0 ug/mL Final    Miscellaneous, Ur 01/22/2024 Negative   Final    DEXTROMETHORPHAN 01/22/2024 Not Detected   Final    Dextrorphan/Levorphanol 01/22/2024 Not Detected   Final    Comment: Expected metabolism of Dextromethorphan and  Dextrorphan/Levorphanol:   Parent Drug                 Detected Metabolites   -----------                 --------------------   Dextromethorphan:           Dextrorphan   Dextrorphan/Levorphanol:    None    Dextrophan cannot be distinguished from Levorphanol    by the method used for analysis.      Glucose 01/22/2024 90  65 - 99 mg/dL Final    BUN 01/22/2024 12  6 - 20 mg/dL Final    Creatinine 01/22/2024 0.95  0.57 - 1.00 mg/dL Final    Sodium 01/22/2024 142  136 - 145 mmol/L Final    Potassium 01/22/2024 3.9  3.5 - 5.2 mmol/L Final    Chloride 01/22/2024 104  98 - 107 mmol/L Final    CO2 01/22/2024 27.0  22.0 - 29.0 mmol/L Final    Calcium 01/22/2024 9.4  8.6 - 10.5 mg/dL Final    Total Protein 01/22/2024 6.8  6.0 - 8.5 g/dL Final    Albumin 01/22/2024 4.6  3.5 - 5.2 g/dL Final    ALT (SGPT) 01/22/2024 21  1 - 33 U/L Final    AST (SGOT) 01/22/2024 17  1 - 32 U/L Final    Alkaline Phosphatase 01/22/2024 76  39 - 117 U/L Final    Total Bilirubin 01/22/2024 0.2  0.0 - 1.2 mg/dL Final    Globulin 01/22/2024 2.2  gm/dL Final    A/G Ratio 01/22/2024 2.1  g/dL Final    BUN/Creatinine Ratio 01/22/2024 12.6  7.0 - 25.0 Final    Anion Gap 01/22/2024 11.0  5.0 - 15.0 mmol/L Final    eGFR 01/22/2024 80.8  >60.0 mL/min/1.73 Final    TSH 01/22/2024 2.890  0.270 -  4.200 uIU/mL Final    Total Cholesterol 01/22/2024 172  0 - 200 mg/dL Final    Triglycerides 01/22/2024 78  0 - 150 mg/dL Final    HDL Cholesterol 01/22/2024 47  40 - 60 mg/dL Final    LDL Cholesterol  01/22/2024 110 (H)  0 - 100 mg/dL Final    VLDL Cholesterol 01/22/2024 15  5 - 40 mg/dL Final    LDL/HDL Ratio 01/22/2024 2.33   Final    25 Hydroxy, Vitamin D 01/22/2024 33.9  30.0 - 100.0 ng/ml Final    WBC 01/22/2024 6.12  3.40 - 10.80 10*3/mm3 Final    RBC 01/22/2024 4.66  3.77 - 5.28 10*6/mm3 Final    Hemoglobin 01/22/2024 13.1  12.0 - 15.9 g/dL Final    Hematocrit 01/22/2024 39.1  34.0 - 46.6 % Final    MCV 01/22/2024 83.9  79.0 - 97.0 fL Final    MCH 01/22/2024 28.1  26.6 - 33.0 pg Final    MCHC 01/22/2024 33.5  31.5 - 35.7 g/dL Final    RDW 01/22/2024 13.8  12.3 - 15.4 % Final    RDW-SD 01/22/2024 42.0  37.0 - 54.0 fl Final    MPV 01/22/2024 9.7  6.0 - 12.0 fL Final    Platelets 01/22/2024 299  140 - 450 10*3/mm3 Final    Neutrophil % 01/22/2024 53.7  42.7 - 76.0 % Final    Lymphocyte % 01/22/2024 32.5  19.6 - 45.3 % Final    Monocyte % 01/22/2024 10.6  5.0 - 12.0 % Final    Eosinophil % 01/22/2024 2.1  0.3 - 6.2 % Final    Basophil % 01/22/2024 0.8  0.0 - 1.5 % Final    Immature Grans % 01/22/2024 0.3  0.0 - 0.5 % Final    Neutrophils, Absolute 01/22/2024 3.28  1.70 - 7.00 10*3/mm3 Final    Lymphocytes, Absolute 01/22/2024 1.99  0.70 - 3.10 10*3/mm3 Final    Monocytes, Absolute 01/22/2024 0.65  0.10 - 0.90 10*3/mm3 Final    Eosinophils, Absolute 01/22/2024 0.13  0.00 - 0.40 10*3/mm3 Final    Basophils, Absolute 01/22/2024 0.05  0.00 - 0.20 10*3/mm3 Final    Immature Grans, Absolute 01/22/2024 0.02  0.00 - 0.05 10*3/mm3 Final    nRBC 01/22/2024 0.0  0.0 - 0.2 /100 WBC Final    Color 01/22/2024 Yellow  Yellow, Straw, Dark Yellow, Isabela Final    Clarity, UA 01/22/2024 Clear  Clear Final    Specific Gravity  01/22/2024 1.015  1.005 - 1.030 Final    pH, Urine 01/22/2024 7.0  5.0 - 8.0 Final    Leukocytes  01/22/2024 Negative  Negative Final    Nitrite, UA 01/22/2024 Negative  Negative Final    Protein, POC 01/22/2024 Negative  Negative mg/dL Final    Glucose, UA 01/22/2024 Negative  Negative mg/dL Final    Ketones, UA 01/22/2024 Negative  Negative Final    Urobilinogen, UA 01/22/2024 Normal  Normal, 0.2 E.U./dL Final    Bilirubin 01/22/2024 Negative  Negative Final    Blood, UA 01/22/2024 Negative  Negative Final    Lot Number 01/22/2024 [id number]   Final    Expiration Date 01/22/2024 7/31/24   Final       Mental Status Exam:   Hygiene:   good  Cooperation:  Cooperative  Eye Contact:  Good  Psychomotor Behavior:  Slow  Mood:depressed and sad  Affect:  Appropriate  Hopelessness: Denies  Speech:  Normal  Thought Process:  Goal directed  Thought Content:  Normal  Suicidal:  None  Homicidal:  None  Hallucinations:  None  Delusion:  None  Memory:  Intact  Orientation:  Person, Place, Time, and Situation  Reliability:  good  Insight:  Good  Judgement:  Good  Impulse Control:  Good    PHQ-9 Depression Screening  Little interest or pleasure in doing things? (P) 2-->more than half the days   Feeling down, depressed, or hopeless? (P) 2-->more than half the days   Trouble falling or staying asleep, or sleeping too much? (P) 2-->more than half the days   Feeling tired or having little energy? (P) 2-->more than half the days   Poor appetite or overeating? (P) 1-->several days   Feeling bad about yourself - or that you are a failure or have let yourself or your family down? (P) 2-->more than half the days   Trouble concentrating on things, such as reading the newspaper or watching television? (P) 0-->not at all   Moving or speaking so slowly that other people could have noticed? Or the opposite - being so fidgety or restless that you have been moving around a lot more than usual? (P) 0-->not at all   Thoughts that you would be better off dead, or of hurting yourself in some way? (P) 0-->not at all   PHQ-9 Total Score (P) 11   If  you checked off any problems, how difficult have these problems made it for you to do your work, take care of things at home, or get along with other people? (P) very difficult        Assessment/Plan:  Diagnoses and all orders for this visit:    1. Attention deficit hyperactivity disorder (ADHD), combined type (Primary)    2. Generalized anxiety disorder    3. Current moderate episode of major depressive disorder without prior episode  -     buPROPion XL (Wellbutrin XL) 150 MG 24 hr tablet; Take 1 tablet by mouth Every Morning.  Dispense: 30 tablet; Refill: 2      Patient not having much anxiety however is having depression.  We will add Wellbutrin  mg daily.  Instructed patient it was fine to take this at night since she is more compliant with nighttime administration, however, there was a chance that it may cause insomnia.  If it does she is instructed to switch it to morning.    A psychological evaluation was conducted in order to assess past and current level of functioning. Areas assessed included, but were not limited to: perception of social support, perception of ability to face and deal with challenges in life (positive functioning), anxiety symptoms, depressive symptoms, perspective on beliefs/belief system, coping skills for stress, intelligence level,  Therapeutic rapport was established. Interventions conducted today were geared towards incorporating medication management along with support for continued therapy. Education was also provided as to the med management with this provider and what to expect in subsequent sessions.    We discussed risks, benefits,goals and side effects of the above medication and the patient was agreeable with the plan.Patient was educated on the importance of compliance with treatment and follow-up appointments. Patient is aware to contact the [facility] [phone] with any worsening of symptoms. To call for questions or concerns and  return early if necessary. Patent is agreeable to go to the Emergency Department or call 911 should they begin SI/HI.     Part of this note may be an electronic transcription/translation of spoken language to printed text using the Dragon Dictation System.    Return in about 4 weeks (around 5/6/2024) for Follow Up 30 min, Video visit.    Gaye Elizondo, APRN

## 2024-04-23 ENCOUNTER — TELEMEDICINE (OUTPATIENT)
Dept: PSYCHIATRY | Facility: CLINIC | Age: 34
End: 2024-04-23
Payer: COMMERCIAL

## 2024-04-23 DIAGNOSIS — F41.1 GENERALIZED ANXIETY DISORDER: ICD-10-CM

## 2024-04-23 DIAGNOSIS — F32.1 CURRENT MODERATE EPISODE OF MAJOR DEPRESSIVE DISORDER WITHOUT PRIOR EPISODE: ICD-10-CM

## 2024-04-23 DIAGNOSIS — F90.2 ATTENTION DEFICIT HYPERACTIVITY DISORDER (ADHD), COMBINED TYPE: Primary | ICD-10-CM

## 2024-04-23 PROCEDURE — 90837 PSYTX W PT 60 MINUTES: CPT

## 2024-04-23 NOTE — PROGRESS NOTES
Date: 2024  Time In: 09:54 EDT  Time out: 10:53 AM EST    This provider is located at [facility], [address] [place] [address], [facility], using a secure [employer] Video Visit through [employer]. Patient is being seen remotely via telehealth at their home address is located in [place]. Patient stated they are in a secure environment for this session. The patient's condition being diagnosed and treated is appropriate for telemedicine. The provider identified themself as well as their credentials. The patient, or  patient's legal guardian consent to be seen remotely, and when consent is given they understand that the consent allows for patient identifiable information to be sent to a third party as needed. They may refuse to be seen remotely at any time. The electronic data is encrypted and password protected, and the patient's or  legal guardian has been advised of the potential risks to privacy not withstanding such measures.   PT Identifiers used: Name and .    You have chosen to receive care through a telehealth visit.  Do you consent to use a video/audio connection for your medical care today? Yes    Subjective   Sima Booker is a 34 y.o. female who presents today for follow up    Chief Complaint:   Chief Complaint   Patient presents with    ADHD    Anxiety    Depression        Clinical Maneuvering/Intervention: validation, processing, strength based approach, CBT techniques.      Assisted patient in processing above session content; acknowledged and normalized patient’s thoughts, feelings, and concerns.  Rationalized patient thought process regarding concerns presented at session.  Discussed triggers associated with patient's  anxiety , depression , and ADHD Also discussed coping skills for patient to implement such as grounding , mindfulness , self care , and positive self talk     Allowed patient to freely discuss issues without interruption or judgment. Provided safe,  confidential environment to facilitate the development of positive therapeutic relationship and encourage open, honest communication. Assisted patient in identifying risk factors which would indicate the need for higher level of care including thoughts to harm self or others and/or self-harming behavior and encouraged patient to contact this office, call 911, or present to the nearest emergency room should any of these events occur. Discussed crisis intervention services and means to access. Patient adamantly and convincingly denies current suicidal or homicidal ideation or perceptual disturbance.    Assessment: Pt arrived early to session, she was alert and oriented. She was located at work, alone in a secure location. Pt identified a decrease in the 'numb' feeling that she was experiencing last month, medication management is helpful. Pt appears to have a better understanding of depression symptoms and different severities of depression. Pt appears aware of how mental health can effect behaviors. Pt appears to be making proactive changes to aid with mental health.    Patient appears to maintain relative stability as compared to their baseline.  However, patient continues to struggle with   Chief Complaint   Patient presents with    ADHD    Anxiety    Depression    which continues to cause impairment in important areas of functioning.  A result, they can be reasonably expected to continue to benefit from treatment and would likely be at increased risk for decompensation otherwise.    Mental Status Exam:   Hygiene:   good  Cooperation:  Cooperative  Eye Contact:  Good  Psychomotor Behavior:  Appropriate  Affect:  Appropriate  Mood: normal  Speech:  Normal  Thought Process:  Linear  Thought Content:  Normal  Suicidal:  None  Homicidal:  None  Hallucinations:  None  Delusion:  None  Memory:  Intact  Orientation:  Person, Place, and Time  Reliability:  fair  Insight:  Fair  Judgement:  Fair  Impulse Control:   Fair  Physical/Medical Issues:  No        Patient's Support Network Includes:  , parents, and extended family    Functional Status: No impairment    Progress toward goal: Not at goal    Prognosis: Fair with Ongoing Treatment         Plan: FU on goals set in session, monitor severity of depression symptoms.    Patient will continue in individual outpatient therapy with focus on improved functioning and coping skills, maintaining stability, and avoiding decompensation and the need for higher level of care.    Patient will adhere to medication regimen as prescribed and report any side effects. Patient will contact this office, call 911 or present to the nearest emergency room should suicidal or homicidal ideations occur. Provide Cognitive Behavioral Therapy and Solution Focused Therapy to improve functioning, maintain stability, and avoid decompensation and the need for higher level of care.     Return in about 4 weeks (around 5/21/2024).      VISIT DIAGNOSIS:    Diagnosis Plan   1. Attention deficit hyperactivity disorder (ADHD), combined type        2. Generalized anxiety disorder        3. Current moderate episode of major depressive disorder without prior episode         09:54 EDT         This document has been electronically signed by Ninfa Hwang LCSW  April 23, 2024      Part of this note may be an electronic transcription/translation of spoken language to printed text using the Dragon Dictation System.

## 2024-04-27 DIAGNOSIS — F90.2 ATTENTION DEFICIT HYPERACTIVITY DISORDER (ADHD), COMBINED TYPE: ICD-10-CM

## 2024-04-29 RX ORDER — METHYLPHENIDATE HYDROCHLORIDE 40 MG/1
40 CAPSULE ORAL EVERY EVENING
Qty: 30 CAPSULE | Refills: 0 | Status: SHIPPED | OUTPATIENT
Start: 2024-04-29

## 2024-05-06 ENCOUNTER — TELEMEDICINE (OUTPATIENT)
Dept: PSYCHIATRY | Facility: CLINIC | Age: 34
End: 2024-05-06
Payer: COMMERCIAL

## 2024-05-06 DIAGNOSIS — F90.2 ATTENTION DEFICIT HYPERACTIVITY DISORDER (ADHD), COMBINED TYPE: ICD-10-CM

## 2024-05-06 DIAGNOSIS — F32.1 CURRENT MODERATE EPISODE OF MAJOR DEPRESSIVE DISORDER WITHOUT PRIOR EPISODE: Primary | ICD-10-CM

## 2024-05-06 DIAGNOSIS — F41.1 GENERALIZED ANXIETY DISORDER: ICD-10-CM

## 2024-05-06 DIAGNOSIS — F41.9 ANXIETY: ICD-10-CM

## 2024-05-06 RX ORDER — ESCITALOPRAM OXALATE 20 MG/1
20 TABLET ORAL DAILY
Qty: 90 TABLET | Refills: 1 | Status: SHIPPED | OUTPATIENT
Start: 2024-05-06

## 2024-05-06 RX ORDER — BUPROPION HYDROCHLORIDE 150 MG/1
150 TABLET ORAL EVERY MORNING
Qty: 90 TABLET | Refills: 1 | Status: SHIPPED | OUTPATIENT
Start: 2024-05-06 | End: 2025-05-06

## 2024-05-06 RX ORDER — HYDROXYZINE HYDROCHLORIDE 25 MG/1
25 TABLET, FILM COATED ORAL 3 TIMES DAILY PRN
Qty: 90 TABLET | Refills: 6 | Status: SHIPPED | OUTPATIENT
Start: 2024-05-06

## 2024-05-21 ENCOUNTER — TELEMEDICINE (OUTPATIENT)
Dept: PSYCHIATRY | Facility: CLINIC | Age: 34
End: 2024-05-21
Payer: COMMERCIAL

## 2024-05-21 DIAGNOSIS — F90.2 ATTENTION DEFICIT HYPERACTIVITY DISORDER (ADHD), COMBINED TYPE: Primary | ICD-10-CM

## 2024-05-21 DIAGNOSIS — F32.1 CURRENT MODERATE EPISODE OF MAJOR DEPRESSIVE DISORDER WITHOUT PRIOR EPISODE: ICD-10-CM

## 2024-05-21 DIAGNOSIS — F41.1 GENERALIZED ANXIETY DISORDER: ICD-10-CM

## 2024-05-21 NOTE — PROGRESS NOTES
Date: May 21, 2024  Time In: 09:59 EDT  Time out: 10:56 AM EST    This provider is located at [facility], [employer] [place], [place], [facility], using a secure [employer] Video Visit through [email or web address]. Patient is being seen remotely via telehealth at their home address is located in [place]. Patient stated they are in a secure environment for this session. The patient's condition being diagnosed and treated is appropriate for telemedicine. The provider identified themself as well as their credentials. The patient, or  patient's legal guardian consent to be seen remotely, and when consent is given they understand that the consent allows for patient identifiable information to be sent to a third party as needed. They may refuse to be seen remotely at any time. The electronic data is encrypted and password protected, and the patient's or  legal guardian has been advised of the potential risks to privacy not withstanding such measures.   PT Identifiers used: Name and .    You have chosen to receive care through a telehealth visit.  Do you consent to use a video/audio connection for your medical care today? Yes    Subjective   Sima Booker is a 34 y.o. female who presents today for follow up    Chief Complaint:   Chief Complaint   Patient presents with    Anxiety    ADHD    Depression        Data: Pt reported a stressful week, pt reported increased stress with family health issues (aunt and mother). Pt reported some concerns with sons  and family issues, working with supervisor to get this resolved. Pt has some concerns with finances as she does not have consistent work. Pt reported struggling with lack of control of certain things, increasing anxiety.      Clinical Maneuvering/Intervention: Processing, validation, Cbt techniques, strength based approach. Assisted patient in processing above session content; acknowledged and normalized patient’s thoughts, feelings, and concerns.   Rationalized patient thought process regarding concerns presented at session.  Discussed triggers associated with patient's  anxiety , depression , and ADHD Also discussed coping skills for patient to implement such as grounding , mindfulness , increasing activity , self care , and timer for cleaning.    Allowed patient to freely discuss issues without interruption or judgment. Provided safe, confidential environment to facilitate the development of positive therapeutic relationship and encourage open, honest communication. Assisted patient in identifying risk factors which would indicate the need for higher level of care including thoughts to harm self or others and/or self-harming behavior and encouraged patient to contact this office, call 911, or present to the nearest emergency room should any of these events occur. Discussed crisis intervention services and means to access. Patient adamantly and convincingly denies current suicidal or homicidal ideation or perceptual disturbance.    Assessment: Pt arrived early to session, pt was alert and oriented x3. Pt reported medication management has aided with depression symptoms. Pt has some increased stress and anxiety with external factors. Pts lack of control over certain sceneries also increases anxiety. Pt appears open and honest re MH symptoms and their effects. Pt appeared receptive to encouragements and techniques discussed in session.    Patient appears to maintain relative stability as compared to their baseline.  However, patient continues to struggle with   Chief Complaint   Patient presents with    Anxiety    ADHD    Depression    which continues to cause impairment in important areas of functioning.  A result, they can be reasonably expected to continue to benefit from treatment and would likely be at increased risk for decompensation otherwise.      Mental Status Exam:   Hygiene:   good  Cooperation:  Cooperative  Eye Contact:  Good  Psychomotor  Behavior:  Appropriate  Affect:  Appropriate  Mood: normal  Speech:  Normal  Thought Process:  Linear  Thought Content:  Normal  Suicidal:  None  Homicidal:  None  Hallucinations:  None  Delusion:  None  Memory:  Intact  Orientation:  Person, Place, and Time  Reliability:  fair  Insight:  Fair  Judgement:  Fair  Impulse Control:  Fair  Physical/Medical Issues:  No        Patient's Support Network Includes:  , mother, and extended family    Functional Status: No impairment    Progress toward goal: Not at goal    Prognosis: Fair with Ongoing Treatment     Plan: monitor anxiety and stress. Coping skills for lack of motivation. Patient will continue in individual outpatient therapy with focus on improved functioning and coping skills, maintaining stability, and avoiding decompensation and the need for higher level of care.    Patient will adhere to medication regimen as prescribed and report any side effects. Patient will contact this office, call 911 or present to the nearest emergency room should suicidal or homicidal ideations occur. Provide Cognitive Behavioral Therapy and Solution Focused Therapy to improve functioning, maintain stability, and avoid decompensation and the need for higher level of care.     Return in about 4 weeks (around 6/18/2024).      VISIT DIAGNOSIS:    Diagnosis Plan   1. Attention deficit hyperactivity disorder (ADHD), combined type        2. Generalized anxiety disorder        3. Current moderate episode of major depressive disorder without prior episode         09:59 EDT         This document has been electronically signed by Ninfa Hwang LCSW  May 21, 2024      Part of this note may be an electronic transcription/translation of spoken language to printed text using the Dragon Dictation System.

## 2024-05-21 NOTE — TREATMENT PLAN
Multi-Disciplinary Problems (from Behavioral Health Treatment Plan)      Active Problems       Problem: Anxiety  Start Date: 05/21/24      Problem Details: The patient self-scales this problem as a 7 with 10 being the worst.          Goal Priority Start Date Expected End Date End Date    Patient will develop and implement behavioral and cognitive strategies to reduce anxiety and irrational fears. -- 05/21/24 -- --    Goal Details: Progress toward goal:  Not appropriate to rate progress toward goal since this is the initial treatment plan.          Goal Intervention Frequency Start Date End Date    Help patient explore past emotional issues in relation to present anxiety. PRN 05/21/24 --    Intervention Details: Duration of treatment until until discharged.          Goal Intervention Frequency Start Date End Date    Help patient develop an awareness of their cognitive and physical responses to anxiety. PRN 05/21/24 --    Intervention Details: Duration of treatment until until discharged.                  Problem: Depression  Start Date: 05/21/24      Problem Details: The patient self-scales this problem as a 4 with 10 being the worst.          Goal Priority Start Date Expected End Date End Date    Patient will demonstrate the ability to initiate new constructive life skills outside of sessions on a consistent basis. -- 05/21/24 -- --    Goal Details: Progress toward goal:  Not appropriate to rate progress toward goal since this is the initial treatment plan.          Goal Intervention Frequency Start Date End Date    Assist patient in setting attainable activities of daily living goals. PRN 05/21/24 --      Goal Intervention Frequency Start Date End Date    Provide education about depression PRN 05/21/24 --    Intervention Details: Duration of treatment until until discharged.          Goal Intervention Frequency Start Date End Date    Assist patient in developing healthy coping strategies. PRN 05/21/24 --     Intervention Details: Duration of treatment until until discharged.                  Problem: ADHD (Adult)  Start Date: 05/21/24      Problem Details: The patient self-scales this problem as a 3 with 10 being the worst.        Goal Priority Start Date Expected End Date End Date    Patient will sustain attention and concentration to complete chores, and work responsibilites and increase positive interaction in all relationships. -- 05/21/24 -- --    Goal Details: Progress toward goal:  Not appropriate to rate progress toward goal since this is the initial treatment plan.        Goal Intervention Frequency Start Date End Date    Assist patient in setting responsible goals and breaking down large tasks. PRN 05/21/24 --    Intervention Details: Duration of treatment until until discharged.        Goal Intervention Frequency Start Date End Date    Assist patient in using self monitoring checklist to improve attention, work performance, and social skills. PRN 05/21/24 --    Intervention Details: Duration of treatment until until discharged.                        Reviewed By       Ninfa Hwang LCSW 05/21/24 1040                     I have discussed and reviewed this treatment plan with the patient

## 2024-05-27 DIAGNOSIS — F90.2 ATTENTION DEFICIT HYPERACTIVITY DISORDER (ADHD), COMBINED TYPE: ICD-10-CM

## 2024-05-28 RX ORDER — METHYLPHENIDATE HYDROCHLORIDE 40 MG/1
40 CAPSULE ORAL EVERY EVENING
Qty: 30 CAPSULE | Refills: 0 | Status: SHIPPED | OUTPATIENT
Start: 2024-05-28

## 2024-06-19 ENCOUNTER — TELEMEDICINE (OUTPATIENT)
Dept: PSYCHIATRY | Facility: CLINIC | Age: 34
End: 2024-06-19
Payer: COMMERCIAL

## 2024-06-19 DIAGNOSIS — F32.1 CURRENT MODERATE EPISODE OF MAJOR DEPRESSIVE DISORDER WITHOUT PRIOR EPISODE: ICD-10-CM

## 2024-06-19 DIAGNOSIS — F41.1 GENERALIZED ANXIETY DISORDER: ICD-10-CM

## 2024-06-19 DIAGNOSIS — F90.2 ATTENTION DEFICIT HYPERACTIVITY DISORDER (ADHD), COMBINED TYPE: Primary | ICD-10-CM

## 2024-06-19 PROCEDURE — 90837 PSYTX W PT 60 MINUTES: CPT

## 2024-06-19 NOTE — PROGRESS NOTES
Date: 2024  Time In: 12:23 EDT  Time out: 1:32 PM EST    This provider is located at [facility] [place], [place], [facility], using a secure [employer] Video Visit through "[employer] Patient is being seen remotely via telehealth at their work address is located in [place]. Patient stated they are in a secure environment for this session. The patient's condition being diagnosed and treated is appropriate for telemedicine. The provider identified themself as well as their credentials. The patient, or  patient's legal guardian consent to be seen remotely, and when consent is given they understand that the consent allows for patient identifiable information to be sent to a third party as needed. They may refuse to be seen remotely at any time. The electronic data is encrypted and password protected, and the patient's or  legal guardian has been advised of the potential risks to privacy not withstanding such measures.   PT Identifiers used: Name and .    You have chosen to receive care through a telehealth visit.  Do you consent to use a video/audio connection for your medical care today? Yes    Subjective   Sima Booker is a 34 y.o. female who presents today for follow up    Chief Complaint:   Chief Complaint   Patient presents with    ADHD    Anxiety    Depression        Data: Pt was located at work, in her office away from peers. 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 Pt reported MH has improved, still some 'down days'. Pt reported challenging thoughts and considering long term effects of things before responding in some situations. Pt has started walking with a friend in the mornings to aid with socialization, time for self and organization.      Clinical Maneuvering/Intervention: Validation, strength based approach.  Assisted patient in processing above session content; acknowledged and  normalized patient’s thoughts, feelings, and concerns.  Rationalized patient thought process regarding concerns presented at session.  Discussed triggers associated with patient's  anxiety , depression , and ADHD Also discussed coping skills for patient to implement such as grounding , mindfulness , increasing activity , self care , and positive self talk     Allowed patient to freely discuss issues without interruption or judgment. Provided safe, confidential environment to facilitate the development of positive therapeutic relationship and encourage open, honest communication. Assisted patient in identifying risk factors which would indicate the need for higher level of care including thoughts to harm self or others and/or self-harming behavior and encouraged patient to contact this office, call 911, or present to the nearest emergency room should any of these events occur. Discussed crisis intervention services and means to access. Patient adamantly and convincingly denies current suicidal or homicidal ideation or perceptual disturbance.    Assessment: Pt arrived early to session, she was alert and oriented. Pt appears to be improving MH wise through self reports. Pt appears self aware and insightful. Pt appears to be interrupting thought process and challenging thoughts as needed. Pt appears motivated to continue improving MH and overall quality of life.    Patient appears to maintain relative stability as compared to their baseline.  However, patient continues to struggle with   Chief Complaint   Patient presents with    ADHD    Anxiety    Depression    which continues to cause impairment in important areas of functioning.  A result, they can be reasonably expected to continue to benefit from treatment and would likely be at increased risk for decompensation otherwise.      Mental Status Exam:   Hygiene:   good  Cooperation:  Cooperative  Eye Contact:  Good  Psychomotor Behavior:  Appropriate  Affect:   Appropriate  Mood: normal  Speech:  Normal  Thought Process:  Goal directed  Thought Content:  Normal  Suicidal:  None  Homicidal:  None  Hallucinations:  None  Delusion:  None  Memory:  Intact  Orientation:  Grossly intact  Reliability:  good  Insight:  Good  Judgement:  Good  Impulse Control:  Good  Physical/Medical Issues:  No        Patient's Support Network Includes:  significant other and extended family    Functional Status: No impairment    Progress toward goal: Not at goal    Prognosis: Good with Ongoing Treatment     Plan:     Patient will continue in individual outpatient therapy with focus on improved functioning and coping skills, maintaining stability, and avoiding decompensation and the need for higher level of care.    Patient will adhere to medication regimen as prescribed and report any side effects. Patient will contact this office, call 911 or present to the nearest emergency room should suicidal or homicidal ideations occur. Provide Cognitive Behavioral Therapy and Solution Focused Therapy to improve functioning, maintain stability, and avoid decompensation and the need for higher level of care.     Return in about 5 weeks (around 7/24/2024).      VISIT DIAGNOSIS:    Diagnosis Plan   1. Attention deficit hyperactivity disorder (ADHD), combined type        2. Generalized anxiety disorder        3. Current moderate episode of major depressive disorder without prior episode         12:23 EDT         This document has been electronically signed by Ninfa Hwang LCSW  June 19, 2024      Part of this note may be an electronic transcription/translation of spoken language to printed text using the Dragon Dictation System.

## 2024-06-30 DIAGNOSIS — F90.2 ATTENTION DEFICIT HYPERACTIVITY DISORDER (ADHD), COMBINED TYPE: ICD-10-CM

## 2024-07-01 RX ORDER — METHYLPHENIDATE HYDROCHLORIDE 40 MG/1
40 CAPSULE ORAL EVERY EVENING
Qty: 30 CAPSULE | Refills: 0 | Status: SHIPPED | OUTPATIENT
Start: 2024-07-01

## 2024-07-09 ENCOUNTER — TELEMEDICINE (OUTPATIENT)
Dept: FAMILY MEDICINE CLINIC | Facility: TELEHEALTH | Age: 34
End: 2024-07-09
Payer: COMMERCIAL

## 2024-07-09 DIAGNOSIS — J01.00 ACUTE NON-RECURRENT MAXILLARY SINUSITIS: Primary | ICD-10-CM

## 2024-07-09 PROCEDURE — 99213 OFFICE O/P EST LOW 20 MIN: CPT | Performed by: NURSE PRACTITIONER

## 2024-07-09 RX ORDER — AZITHROMYCIN 250 MG/1
TABLET, FILM COATED ORAL
Qty: 6 TABLET | Refills: 0 | Status: SHIPPED | OUTPATIENT
Start: 2024-07-09

## 2024-07-09 RX ORDER — PREDNISONE 20 MG/1
20 TABLET ORAL 2 TIMES DAILY
Qty: 10 TABLET | Refills: 0 | Status: SHIPPED | OUTPATIENT
Start: 2024-07-09 | End: 2024-07-14

## 2024-07-09 NOTE — PROGRESS NOTES
HPI  Sima Booker is a 34 y.o. female  presents with complaint of 10 day history of maxillary sinus pressure, congestion (yellow/green), left earache and sore throat. Denies fever, chills, sweats, SOA, CP. Has been taking allergy meds, nasal spray and mucinex with mild relief in symptoms. Also takes allergy shots weekly.    Review of Systems    Past Medical History:   Diagnosis Date    ADHD (attention deficit hyperactivity disorder)     Allergic     Anxiety     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 Not sure how old i was       Family History   Problem Relation Age of Onset    Thyroid disease Mother     Hyperlipidemia Father     Arthritis Maternal Grandmother     Miscarriages / Stillbirths Maternal Grandmother     Diabetes Maternal Grandfather     Miscarriages / Stillbirths Maternal Grandfather     Diabetes Paternal Grandmother     Breast cancer Other         great aunt       Social History     Socioeconomic History    Marital status:      Spouse name: Leslie    Highest education level: High school graduate   Tobacco Use    Smoking status: Never    Smokeless tobacco: Never   Vaping Use    Vaping status: Never Used   Substance and Sexual Activity    Alcohol use: Yes     Comment: OCCASIONALLY    Drug use: Never    Sexual activity: Yes     Partners: Male     Birth control/protection: None         There were no vitals taken for this visit.    PHYSICAL EXAM  Physical Exam   Constitutional: She appears well-developed and well-nourished.   HENT:   Head: Normocephalic.   Nose: Nose normal. Right sinus exhibits maxillary sinus tenderness. Left sinus exhibits maxillary sinus tenderness.   Neck: Neck normal appearance.  Pulmonary/Chest: Effort normal.   Neurological: She is alert.   Psychiatric: She has a  normal mood and affect. Her speech is normal.       Diagnoses and all orders for this visit:    1. Acute non-recurrent maxillary sinusitis (Primary)  -     azithromycin (Zithromax Z-Kendall) 250 MG tablet; Take 2 tablets the first day, then 1 tablet daily for 4 days.  Dispense: 6 tablet; Refill: 0  -     predniSONE (DELTASONE) 20 MG tablet; Take 1 tablet by mouth 2 (Two) Times a Day for 5 days.  Dispense: 10 tablet; Refill: 0          FOLLOW-UP  As discussed during visit with [facility], if symptoms worsen or fail to improve, follow-up with PCP/Urgent Care/Emergency Department.    Patient verbalizes understanding of medications, instructions for treatment and follow-up.    Dorothy Parikh, APRN  07/09/2024  10:49 EDT    The use of a video visit has been reviewed with the patient and verbal informed consent has been obtained. Myself and Sima Booker participated in this visit. The patient is located in [place], [place], and I am located in [place], [place]. [employer] and [employer] were utilized.

## 2024-07-18 ENCOUNTER — TELEMEDICINE (OUTPATIENT)
Dept: PSYCHIATRY | Facility: CLINIC | Age: 34
End: 2024-07-18
Payer: COMMERCIAL

## 2024-07-18 DIAGNOSIS — F41.1 GENERALIZED ANXIETY DISORDER: ICD-10-CM

## 2024-07-18 DIAGNOSIS — F32.1 CURRENT MODERATE EPISODE OF MAJOR DEPRESSIVE DISORDER WITHOUT PRIOR EPISODE: ICD-10-CM

## 2024-07-18 DIAGNOSIS — F90.2 ATTENTION DEFICIT HYPERACTIVITY DISORDER (ADHD), COMBINED TYPE: Primary | ICD-10-CM

## 2024-07-18 NOTE — PROGRESS NOTES
Patient Name: Sima Booker  MRN: [id number]   :  1990     This provider is located at her home office through the [facility] (through [facility]), [address], [address] using a secure [employer] Video Visit through "[employer]. Patient is being seen remotely via telehealth at their home address in [place], and stated they are in a secure environment for this session. The patient's condition being diagnosed/treated is appropriate for telemedicine. The provider identified herself as well as her credentials.   The patient, and/or patients guardian, consent to be seen remotely, and when consent is given they understand that the consent allows for patient identifiable information to be sent to a third party as needed.   They may refuse to be seen remotely at any time. The electronic data is encrypted and password protected, and the patient and/or guardian has been advised of the potential risks to privacy not withstanding such measures.    You have chosen to receive care through a telehealth visit.  Do you consent to use a video/audio connection for your medical care today? Yes    Chief Complaint:      ICD-10-CM ICD-9-CM   1. Attention deficit hyperactivity disorder (ADHD), combined type  F90.2 314.01   2. Generalized anxiety disorder  F41.1 300.02   3. Current moderate episode of major depressive disorder without prior episode  F32.1 296.22       History of Present Illness: Sima Booker is a 34 y.o. female is here today for medication management follow up.  Patient states she is going for a consult for IVF this week.  Will likely have to adjust or change medication.    The following portions of the patient's history were reviewed and updated as appropriate: allergies, current medications, past family history, past medical history, past social history, past surgical history, and problem list.    Review of Systems;;  Review of Systems    Constitutional:  Negative for activity change, appetite change, fatigue, unexpected weight gain and unexpected weight loss.   Respiratory:  Negative for shortness of breath and wheezing.    Gastrointestinal:  Negative for constipation, diarrhea, nausea and vomiting.   Musculoskeletal:  Negative for gait problem.   Skin:  Negative for dry skin and rash.   Neurological:  Negative for dizziness, speech difficulty, weakness, light-headedness, headache, memory problem and confusion.   Psychiatric/Behavioral:  Positive for depressed mood. Negative for agitation, behavioral problems, decreased concentration, dysphoric mood, hallucinations, self-injury, sleep disturbance, suicidal ideas, negative for hyperactivity and stress. The patient is not nervous/anxious.        Physical Exam;;  Physical Exam  Constitutional:       General: She is not in acute distress.     Appearance: She is well-developed. She is not diaphoretic.   HENT:      Head: Normocephalic and atraumatic.   Eyes:      Conjunctiva/sclera: Conjunctivae normal.   Pulmonary:      Effort: Pulmonary effort is normal. No respiratory distress.   Musculoskeletal:         General: Normal range of motion.      Cervical back: Full passive range of motion without pain and normal range of motion.   Neurological:      Mental Status: She is alert and oriented to person, place, and time.   Psychiatric:         Mood and Affect: Mood is depressed. Mood is not anxious. Affect is not labile, blunt, angry or inappropriate.         Speech: Speech is not rapid and pressured or tangential.         Behavior: Behavior normal. Behavior is not agitated, slowed, aggressive, withdrawn, hyperactive or combative. Behavior is cooperative.         Thought Content: Thought content normal. Thought content is not paranoid or delusional. Thought content does not include homicidal or suicidal ideation. Thought content does not include homicidal or suicidal plan.         Judgment: Judgment normal.        not currently breastfeeding.  There is no height or weight on file to calculate BMI. Video appt unable to obtain.     Current Medications;;    Current Outpatient Medications:     ALLERGY INJECTION SERUM OFFICE ADMINISTERED, Inject 2 each under the skin into the appropriate area as directed 1 (One) Time Per Week., Disp: , Rfl:     azithromycin (Zithromax Z-Kendall) 250 MG tablet, Take 2 tablets the first day, then 1 tablet daily for 4 days., Disp: 6 tablet, Rfl: 0    hydrOXYzine (ATARAX) 25 MG tablet, Take 1 tablet by mouth 3 (Three) Times a Day As Needed for Anxiety., Disp: 90 tablet, Rfl: 6    levocetirizine (XYZAL) 5 MG tablet, Take 1 tablet by mouth Daily., Disp: , Rfl:     Methylphenidate HCl ER, PM, (Jornay PM) 40 MG capsule sustained-release 24 hr, Take 40 mg by mouth Every Evening., Disp: 30 capsule, Rfl: 0    sertraline (Zoloft) 50 MG tablet, Take 1 tablet by mouth Daily., Disp: 30 tablet, Rfl: 2    Lab Results:   No visits with results within 3 Month(s) from this visit.   Latest known visit with results is:   Office Visit on 01/22/2024   Component Date Value Ref Range Status    Report Summary 01/22/2024 FINAL   Final    Comment: ====================================================================  ToxAssure Flex 23, Ur  ====================================================================  Test                             Result       Flag       Units    NO DRUGS DETECTED.  ====================================================================  Test                      Result    Flag   Units      Ref Range    Creatinine              119              mg/dL      >=20  ====================================================================  Declared Medications:   The flagging and interpretation on this report are based on the   following declared medications.  Unexpected results may arise from   inaccuracies in the declared medications.   **Note: The testing scope of this panel does not include following    reported medications:   Escitalopram   Hydroxyzine   Levocetirizine   Methylphenidate  ====================================================================  For clinical consultation, please call [phone].  =                           ===================================================================      CREATININE 01/22/2024 119  mg/dL Final    REFERENCE RANGE: Ref Range>=20    Amphetamines, IA 01/22/2024 Negative  CUTOFF:300 ng/mL Final    Benzodiazepines 01/22/2024 Negative   Final    Diazepam Urine, Qualitative 01/22/2024 Not Detected  ng/mg creat Final    Desmethyldiazepam 01/22/2024 Not Detected  ng/mg creat Final    Oxazepam, urine 01/22/2024 Not Detected  ng/mg creat Final    Temazepam 01/22/2024 Not Detected  ng/mg creat Final    Comment: Expected metabolism of benzodiazepine class drugs:   Parent Drug       Detected Metabolites   -----------       --------------------   Diazepam:         Desmethyldiazepam, Temazepam, Oxazepam   Chlordiazepoxide: Desmethyldiazepam, Oxazepam   Clorazepate:      Desmethyldiazepam, Oxazepam   Halazepam:        Desmethyldiazepam, Oxazepam   Temazepam:        Oxazepam   Oxazepam:         None      Alprazolam Urine, Conf 01/22/2024 Not Detected  ng/mg creat Final    Alpha-hydroxyalprazolam, Urine 01/22/2024 Not Detected  ng/mg creat Final    Desalkylflurazepam, Urine 01/22/2024 Not Detected  ng/mg creat Final    Lorazepam, Urine 01/22/2024 Not Detected  ng/mg creat Final    Alpha-hydroxytriazolam, Urine 01/22/2024 Not Detected  ng/mg creat Final    Clonazepam 01/22/2024 Not Detected  ng/mg creat Final    7- AMINOCLONAZEPAM 01/22/2024 Not Detected  ng/mg creat Final    Midazolam, Urine 01/22/2024 Not Detected  ng/mg creat Final    Alpha-hydroxymidazolam, Urine 01/22/2024 Not Detected  ng/mg creat Final    Flunitrazepam 01/22/2024 Not Detected  ng/mg creat Final    DESMETHYLFLUNITRAZEPAM 01/22/2024 Not Detected  ng/mg creat Final    COCAINE / METABOLITE, IA 01/22/2024  Negative  CUTOFF:150 ng/mL Final    Ethyl Alcohol, Enz 01/22/2024 Negative  CUTOFF:0.020 g/dL Final    Ethanol and Ethanol Biomarkers 01/22/2024 Negative  CUTOFF:500 ng/mL Final    Cannavinoids IA 01/22/2024 Negative  CUTOFF:20 ng/mL Final    6-Acetylmorphine IA 01/22/2024 Negative  CUTOFF:10 ng/mL Final    Opiate Class IA 01/22/2024 Negative  CUTOFF:100 ng/mL Final    Oxycodone Class IA 01/22/2024 Negative  CUTOFF:100 ng/mL Final    METHADONE, IA 01/22/2024 Negative  CUTOFF:100 ng/mL Final    Methadone MTB IA 01/22/2024 Negative  CUTOFF:100 ng/mL Final    Buprenorphine, Urine 01/22/2024 Negative   Final    Buprenorphine, Urine 01/22/2024 Not Detected  ng/mg creat Final    Norbuprenorphine 01/22/2024 Not Detected  ng/mg creat Final    Fentanyl 01/22/2024 Negative   Final    Fentanyl, Urine 01/22/2024 Not Detected  ng/mg creat Final    Norfentanyl Urine 01/22/2024 Not Detected  ng/mg creat Final    Tapentadol, IA 01/22/2024 Negative  CUTOFF:200 ng/mL Final    MEPERIDINE, IA 01/22/2024 Negative  CUTOFF:200 ng/mL Final    PROPOXYPHENE, IA 01/22/2024 Negative  CUTOFF:300 ng/mL Final    TRAMADOL, IA 01/22/2024 Negative  CUTOFF:200 ng/mL Final    Barbiturates, IA 01/22/2024 Negative  CUTOFF:200 ng/mL Final    PHENCYCLIDINE, IA 01/22/2024 Negative  CUTOFF:25 ng/mL Final    Other Hallucinogens Ur 01/22/2024 Negative   Final    Ketamine 01/22/2024 Not Detected   Final    Norketamine 01/22/2024 Not Detected   Final    Gabapentin, IA 01/22/2024 Negative  CUTOFF:1.0 ug/mL Final    Carisoprodol, IA 01/22/2024 Negative  CUTOFF:100 ng/mL Final    SEDATIVES/HYPNOTICS 01/22/2024 Negative   Final    Zolpidem(Ambien) Urine 01/22/2024 Not Detected   Final    Zolpidem Acid 01/22/2024 Not Detected   Final    Eszopiclone/Zopiclone 01/22/2024 Not Detected   Final    Amino Chloropyridine 01/22/2024 Not Detected   Final    Zaleplon, Ur 01/22/2024 Not Detected   Final    Comment: Expected metabolism of Sedatives/Hypnotics:   Parent Drug                  Detected Metabolites   -----------                 --------------------   Zolpidem:                   Zolpidem Acid   Zopiclone/Eszopiclone:      Amino Chloropyridine   Zaleplon:                   None      Acetaminophen, IA 01/22/2024 Negative  CUTOFF:5.0 ug/mL Final    Miscellaneous, Ur 01/22/2024 Negative   Final    DEXTROMETHORPHAN 01/22/2024 Not Detected   Final    Dextrorphan/Levorphanol 01/22/2024 Not Detected   Final    Comment: Expected metabolism of Dextromethorphan and  Dextrorphan/Levorphanol:   Parent Drug                 Detected Metabolites   -----------                 --------------------   Dextromethorphan:           Dextrorphan   Dextrorphan/Levorphanol:    None    Dextrophan cannot be distinguished from Levorphanol    by the method used for analysis.      Glucose 01/22/2024 90  65 - 99 mg/dL Final    BUN 01/22/2024 12  6 - 20 mg/dL Final    Creatinine 01/22/2024 0.95  0.57 - 1.00 mg/dL Final    Sodium 01/22/2024 142  136 - 145 mmol/L Final    Potassium 01/22/2024 3.9  3.5 - 5.2 mmol/L Final    Chloride 01/22/2024 104  98 - 107 mmol/L Final    CO2 01/22/2024 27.0  22.0 - 29.0 mmol/L Final    Calcium 01/22/2024 9.4  8.6 - 10.5 mg/dL Final    Total Protein 01/22/2024 6.8  6.0 - 8.5 g/dL Final    Albumin 01/22/2024 4.6  3.5 - 5.2 g/dL Final    ALT (SGPT) 01/22/2024 21  1 - 33 U/L Final    AST (SGOT) 01/22/2024 17  1 - 32 U/L Final    Alkaline Phosphatase 01/22/2024 76  39 - 117 U/L Final    Total Bilirubin 01/22/2024 0.2  0.0 - 1.2 mg/dL Final    Globulin 01/22/2024 2.2  gm/dL Final    A/G Ratio 01/22/2024 2.1  g/dL Final    BUN/Creatinine Ratio 01/22/2024 12.6  7.0 - 25.0 Final    Anion Gap 01/22/2024 11.0  5.0 - 15.0 mmol/L Final    eGFR 01/22/2024 80.8  >60.0 mL/min/1.73 Final    TSH 01/22/2024 2.890  0.270 - 4.200 uIU/mL Final    Total Cholesterol 01/22/2024 172  0 - 200 mg/dL Final    Triglycerides 01/22/2024 78  0 - 150 mg/dL Final    HDL Cholesterol 01/22/2024 47  40 - 60 mg/dL Final     LDL Cholesterol  01/22/2024 110 (H)  0 - 100 mg/dL Final    VLDL Cholesterol 01/22/2024 15  5 - 40 mg/dL Final    LDL/HDL Ratio 01/22/2024 2.33   Final    25 Hydroxy, Vitamin D 01/22/2024 33.9  30.0 - 100.0 ng/ml Final    WBC 01/22/2024 6.12  3.40 - 10.80 10*3/mm3 Final    RBC 01/22/2024 4.66  3.77 - 5.28 10*6/mm3 Final    Hemoglobin 01/22/2024 13.1  12.0 - 15.9 g/dL Final    Hematocrit 01/22/2024 39.1  34.0 - 46.6 % Final    MCV 01/22/2024 83.9  79.0 - 97.0 fL Final    MCH 01/22/2024 28.1  26.6 - 33.0 pg Final    MCHC 01/22/2024 33.5  31.5 - 35.7 g/dL Final    RDW 01/22/2024 13.8  12.3 - 15.4 % Final    RDW-SD 01/22/2024 42.0  37.0 - 54.0 fl Final    MPV 01/22/2024 9.7  6.0 - 12.0 fL Final    Platelets 01/22/2024 299  140 - 450 10*3/mm3 Final    Neutrophil % 01/22/2024 53.7  42.7 - 76.0 % Final    Lymphocyte % 01/22/2024 32.5  19.6 - 45.3 % Final    Monocyte % 01/22/2024 10.6  5.0 - 12.0 % Final    Eosinophil % 01/22/2024 2.1  0.3 - 6.2 % Final    Basophil % 01/22/2024 0.8  0.0 - 1.5 % Final    Immature Grans % 01/22/2024 0.3  0.0 - 0.5 % Final    Neutrophils, Absolute 01/22/2024 3.28  1.70 - 7.00 10*3/mm3 Final    Lymphocytes, Absolute 01/22/2024 1.99  0.70 - 3.10 10*3/mm3 Final    Monocytes, Absolute 01/22/2024 0.65  0.10 - 0.90 10*3/mm3 Final    Eosinophils, Absolute 01/22/2024 0.13  0.00 - 0.40 10*3/mm3 Final    Basophils, Absolute 01/22/2024 0.05  0.00 - 0.20 10*3/mm3 Final    Immature Grans, Absolute 01/22/2024 0.02  0.00 - 0.05 10*3/mm3 Final    nRBC 01/22/2024 0.0  0.0 - 0.2 /100 WBC Final    Color 01/22/2024 Yellow  Yellow, Straw, Dark Yellow, Isabela Final    Clarity, UA 01/22/2024 Clear  Clear Final    Specific Gravity  01/22/2024 1.015  1.005 - 1.030 Final    pH, Urine 01/22/2024 7.0  5.0 - 8.0 Final    Leukocytes 01/22/2024 Negative  Negative Final    Nitrite, UA 01/22/2024 Negative  Negative Final    Protein, POC 01/22/2024 Negative  Negative mg/dL Final    Glucose, UA 01/22/2024 Negative  Negative  mg/dL Final    Ketones, UA 01/22/2024 Negative  Negative Final    Urobilinogen, UA 01/22/2024 Normal  Normal, 0.2 E.U./dL Final    Bilirubin 01/22/2024 Negative  Negative Final    Blood, UA 01/22/2024 Negative  Negative Final    Lot Number 01/22/2024 [id number]   Final    Expiration Date 01/22/2024 7/31/24   Final       Mental Status Exam:   Hygiene:   good  Cooperation:  Cooperative  Eye Contact:  Good  Psychomotor Behavior:  Appropriate  Mood:depressed  Affect:  Appropriate  Hopelessness: Denies  Speech:  Normal  Thought Process:  Goal directed  Thought Content:  Normal  Suicidal:  None  Homicidal:  None  Hallucinations:  None  Delusion:  None  Memory:  Intact  Orientation:  Person, Place, Time, and Situation  Reliability:  good  Insight:  Good  Judgement:  Good  Impulse Control:  Good    PHQ-9 Depression Screening  Little interest or pleasure in doing things? (P) 1-->several days   Feeling down, depressed, or hopeless? (P) 1-->several days   Trouble falling or staying asleep, or sleeping too much? (P) 1-->several days   Feeling tired or having little energy? (P) 1-->several days   Poor appetite or overeating? (P) 0-->not at all   Feeling bad about yourself - or that you are a failure or have let yourself or your family down? (P) 0-->not at all   Trouble concentrating on things, such as reading the newspaper or watching television? (P) 1-->several days   Moving or speaking so slowly that other people could have noticed? Or the opposite - being so fidgety or restless that you have been moving around a lot more than usual? (P) 0-->not at all   Thoughts that you would be better off dead, or of hurting yourself in some way? (P) 0-->not at all   PHQ-9 Total Score (P) 5   If you checked off any problems, how difficult have these problems made it for you to do your work, take care of things at home, or get along with other people? (P) not difficult at all        Assessment/Plan:  Diagnoses and all orders for this  visit:    1. Attention deficit hyperactivity disorder (ADHD), combined type (Primary)    2. Generalized anxiety disorder    3. Current moderate episode of major depressive disorder without prior episode      Patient is taking part in IVF.  We may have to adjust medications.  We will follow-up after this appointment.    A psychological evaluation was conducted in order to assess past and current level of functioning. Areas assessed included, but were not limited to: perception of social support, perception of ability to face and deal with challenges in life (positive functioning), anxiety symptoms, depressive symptoms, perspective on beliefs/belief system, coping skills for stress, intelligence level,  Therapeutic rapport was established. Interventions conducted today were geared towards incorporating medication management along with support for continued therapy. Education was also provided as to the med management with this provider and what to expect in subsequent sessions.    We discussed risks, benefits,goals and side effects of the above medication and the patient was agreeable with the plan.Patient was educated on the importance of compliance with treatment and follow-up appointments. Patient is aware to contact the [facility] [phone] with any worsening of symptoms. To call for questions or concerns and return early if necessary. Patent is agreeable to go to the Emergency Department or call 911 should they begin SI/HI.     Part of this note may be an electronic transcription/translation of spoken language to printed text using the Dragon Dictation System.    Return in about 2 weeks (around 8/1/2024) for Follow Up 30 min, Video visit.    QI Contreras

## 2024-07-24 ENCOUNTER — TELEPHONE (OUTPATIENT)
Dept: PSYCHIATRY | Facility: CLINIC | Age: 34
End: 2024-07-24
Payer: COMMERCIAL

## 2024-07-24 NOTE — TELEPHONE ENCOUNTER
Patient called, she spoke to her fertility doctor and he advised for her to switch from Wellbutrin to Zoloft. Please send in new script.        Thank You

## 2024-07-24 NOTE — TELEPHONE ENCOUNTER
Patient said yes she does want to do this, please give her instructions to wean off meds and a new script to start the new med.      Please Advise ?      Thank You

## 2024-07-25 DIAGNOSIS — F41.1 GENERALIZED ANXIETY DISORDER: Primary | ICD-10-CM

## 2024-07-25 DIAGNOSIS — F32.1 CURRENT MODERATE EPISODE OF MAJOR DEPRESSIVE DISORDER WITHOUT PRIOR EPISODE: ICD-10-CM

## 2024-07-25 NOTE — TELEPHONE ENCOUNTER
Called patient with instructions from provider, patient said she understood and agreed.        Thank You

## 2024-07-31 ENCOUNTER — TELEMEDICINE (OUTPATIENT)
Dept: PSYCHIATRY | Facility: CLINIC | Age: 34
End: 2024-07-31
Payer: COMMERCIAL

## 2024-07-31 DIAGNOSIS — Z53.21 PATIENT LEFT WITHOUT BEING SEEN: Primary | ICD-10-CM

## 2024-07-31 NOTE — PROGRESS NOTES
The patient left the office before care was provided and did not complete the visit  Patient arrived to session at 10:13 AM EST, clinician started video visit at 10:13 AM EST. Upon clinicians arrival patient had left meeting room. Clinician waited until 10:20 AM EST for patient to return to meeting room, patient did not return.

## 2024-08-05 ENCOUNTER — TELEMEDICINE (OUTPATIENT)
Dept: PSYCHIATRY | Facility: CLINIC | Age: 34
End: 2024-08-05
Payer: COMMERCIAL

## 2024-08-05 DIAGNOSIS — F32.1 CURRENT MODERATE EPISODE OF MAJOR DEPRESSIVE DISORDER WITHOUT PRIOR EPISODE: ICD-10-CM

## 2024-08-05 DIAGNOSIS — F41.1 GENERALIZED ANXIETY DISORDER: Primary | ICD-10-CM

## 2024-08-05 DIAGNOSIS — F90.2 ATTENTION DEFICIT HYPERACTIVITY DISORDER (ADHD), COMBINED TYPE: ICD-10-CM

## 2024-08-05 PROCEDURE — 99214 OFFICE O/P EST MOD 30 MIN: CPT | Performed by: NURSE PRACTITIONER

## 2024-08-05 NOTE — PROGRESS NOTES
Patient Name: Sima Booker  MRN: [id number]   :  1990     This provider is located at her home office through the [facility] (through [facility]), [address], [address] using a secure [employer] Video Visit through [employer]. Patient is being seen remotely via telehealth at their home address in [place], and stated they are in a secure environment for this session. The patient's condition being diagnosed/treated is appropriate for telemedicine. The provider identified herself as well as her credentials.   The patient, and/or patients guardian, consent to be seen remotely, and when consent is given they understand that the consent allows for patient identifiable information to be sent to a third party as needed.   They may refuse to be seen remotely at any time. The electronic data is encrypted and password protected, and the patient and/or guardian has been advised of the potential risks to privacy not withstanding such measures.    You have chosen to receive care through a telehealth visit.  Do you consent to use a video/audio connection for your medical care today? Yes    Chief Complaint:      ICD-10-CM ICD-9-CM   1. Generalized anxiety disorder  F41.1 300.02   2. Current moderate episode of major depressive disorder without prior episode  F32.1 296.22   3. Attention deficit hyperactivity disorder (ADHD), combined type  F90.2 314.01       History of Present Illness: Sima Booker is a 34 y.o. female is here today for medication management follow up.  Patient states focus is poor without Jornay PM however is not unmanageable.  Getting ready to have embryo implanted.    The following portions of the patient's history were reviewed and updated as appropriate: allergies, current medications, past family history, past medical history, past social history, past surgical history, and problem list.    Review of Systems;;  Review of Systems    Constitutional:  Negative for activity change, appetite change, fatigue, unexpected weight gain and unexpected weight loss.   Respiratory:  Negative for shortness of breath and wheezing.    Gastrointestinal:  Negative for constipation, diarrhea, nausea and vomiting.   Musculoskeletal:  Negative for gait problem.   Skin:  Negative for dry skin and rash.   Neurological:  Negative for dizziness, speech difficulty, weakness, light-headedness, headache, memory problem and confusion.   Psychiatric/Behavioral:  Positive for decreased concentration. Negative for agitation, behavioral problems, dysphoric mood, hallucinations, self-injury, sleep disturbance, suicidal ideas, negative for hyperactivity, depressed mood and stress. The patient is not nervous/anxious.        Physical Exam;;  Physical Exam  Constitutional:       General: She is not in acute distress.     Appearance: She is well-developed. She is not diaphoretic.   HENT:      Head: Normocephalic and atraumatic.   Eyes:      Conjunctiva/sclera: Conjunctivae normal.   Pulmonary:      Effort: Pulmonary effort is normal. No respiratory distress.   Musculoskeletal:         General: Normal range of motion.      Cervical back: Full passive range of motion without pain and normal range of motion.   Neurological:      Mental Status: She is alert and oriented to person, place, and time.   Psychiatric:         Mood and Affect: Mood is not anxious or depressed. Affect is not labile, blunt, angry or inappropriate.         Speech: Speech is not rapid and pressured or tangential.         Behavior: Behavior normal. Behavior is not agitated, slowed, aggressive, withdrawn, hyperactive or combative. Behavior is cooperative.         Thought Content: Thought content normal. Thought content is not paranoid or delusional. Thought content does not include homicidal or suicidal ideation. Thought content does not include homicidal or suicidal plan.         Judgment: Judgment normal.        not currently breastfeeding.  There is no height or weight on file to calculate BMI. Video appt unable to obtain.     Current Medications;;    Current Outpatient Medications:     ALLERGY INJECTION SERUM OFFICE ADMINISTERED, Inject 2 each under the skin into the appropriate area as directed 1 (One) Time Per Week., Disp: , Rfl:     azithromycin (Zithromax Z-Kendall) 250 MG tablet, Take 2 tablets the first day, then 1 tablet daily for 4 days., Disp: 6 tablet, Rfl: 0    hydrOXYzine (ATARAX) 25 MG tablet, Take 1 tablet by mouth 3 (Three) Times a Day As Needed for Anxiety., Disp: 90 tablet, Rfl: 6    levocetirizine (XYZAL) 5 MG tablet, Take 1 tablet by mouth Daily., Disp: , Rfl:     Methylphenidate HCl ER, PM, (Jornay PM) 40 MG capsule sustained-release 24 hr, Take 40 mg by mouth Every Evening., Disp: 30 capsule, Rfl: 0    sertraline (Zoloft) 50 MG tablet, Take 1 tablet by mouth Daily., Disp: 30 tablet, Rfl: 2    Lab Results:   No visits with results within 3 Month(s) from this visit.   Latest known visit with results is:   Office Visit on 01/22/2024   Component Date Value Ref Range Status    Report Summary 01/22/2024 FINAL   Final    Comment: ====================================================================  Tox[employer] Flex 23, Ur  ====================================================================  Test                             Result       Flag       Units    NO DRUGS DETECTED.  ====================================================================  Test                      Result    Flag   Units      Ref Range    Creatinine              119              mg/dL      >=20  ====================================================================  Declared Medications:   The flagging and interpretation on this report are based on the   following declared medications.  Unexpected results may arise from   inaccuracies in the declared medications.   **Note: The testing scope of this panel does not include following    reported medications:   Escitalopram   Hydroxyzine   Levocetirizine   Methylphenidate  ====================================================================  For clinical consultation, please call [phone].  =                           ===================================================================      CREATININE 01/22/2024 119  mg/dL Final    REFERENCE RANGE: Ref Range>=20    Amphetamines, IA 01/22/2024 Negative  CUTOFF:300 ng/mL Final    Benzodiazepines 01/22/2024 Negative   Final    Diazepam Urine, Qualitative 01/22/2024 Not Detected  ng/mg creat Final    Desmethyldiazepam 01/22/2024 Not Detected  ng/mg creat Final    Oxazepam, urine 01/22/2024 Not Detected  ng/mg creat Final    Temazepam 01/22/2024 Not Detected  ng/mg creat Final    Comment: Expected metabolism of benzodiazepine class drugs:   Parent Drug       Detected Metabolites   -----------       --------------------   Diazepam:         Desmethyldiazepam, Temazepam, Oxazepam   Chlordiazepoxide: Desmethyldiazepam, Oxazepam   Clorazepate:      Desmethyldiazepam, Oxazepam   Halazepam:        Desmethyldiazepam, Oxazepam   Temazepam:        Oxazepam   Oxazepam:         None      Alprazolam Urine, Conf 01/22/2024 Not Detected  ng/mg creat Final    Alpha-hydroxyalprazolam, Urine 01/22/2024 Not Detected  ng/mg creat Final    Desalkylflurazepam, Urine 01/22/2024 Not Detected  ng/mg creat Final    Lorazepam, Urine 01/22/2024 Not Detected  ng/mg creat Final    Alpha-hydroxytriazolam, Urine 01/22/2024 Not Detected  ng/mg creat Final    Clonazepam 01/22/2024 Not Detected  ng/mg creat Final    7- AMINOCLONAZEPAM 01/22/2024 Not Detected  ng/mg creat Final    Midazolam, Urine 01/22/2024 Not Detected  ng/mg creat Final    Alpha-hydroxymidazolam, Urine 01/22/2024 Not Detected  ng/mg creat Final    Flunitrazepam 01/22/2024 Not Detected  ng/mg creat Final    DESMETHYLFLUNITRAZEPAM 01/22/2024 Not Detected  ng/mg creat Final    COCAINE / METABOLITE, IA 01/22/2024  Negative  CUTOFF:150 ng/mL Final    Ethyl Alcohol, Enz 01/22/2024 Negative  CUTOFF:0.020 g/dL Final    Ethanol and Ethanol Biomarkers 01/22/2024 Negative  CUTOFF:500 ng/mL Final    Cannavinoids IA 01/22/2024 Negative  CUTOFF:20 ng/mL Final    6-Acetylmorphine IA 01/22/2024 Negative  CUTOFF:10 ng/mL Final    Opiate Class IA 01/22/2024 Negative  CUTOFF:100 ng/mL Final    Oxycodone Class IA 01/22/2024 Negative  CUTOFF:100 ng/mL Final    METHADONE, IA 01/22/2024 Negative  CUTOFF:100 ng/mL Final    Methadone MTB IA 01/22/2024 Negative  CUTOFF:100 ng/mL Final    Buprenorphine, Urine 01/22/2024 Negative   Final    Buprenorphine, Urine 01/22/2024 Not Detected  ng/mg creat Final    Norbuprenorphine 01/22/2024 Not Detected  ng/mg creat Final    Fentanyl 01/22/2024 Negative   Final    Fentanyl, Urine 01/22/2024 Not Detected  ng/mg creat Final    Norfentanyl Urine 01/22/2024 Not Detected  ng/mg creat Final    Tapentadol, IA 01/22/2024 Negative  CUTOFF:200 ng/mL Final    MEPERIDINE, IA 01/22/2024 Negative  CUTOFF:200 ng/mL Final    PROPOXYPHENE, IA 01/22/2024 Negative  CUTOFF:300 ng/mL Final    TRAMADOL, IA 01/22/2024 Negative  CUTOFF:200 ng/mL Final    Barbiturates, IA 01/22/2024 Negative  CUTOFF:200 ng/mL Final    PHENCYCLIDINE, IA 01/22/2024 Negative  CUTOFF:25 ng/mL Final    Other Hallucinogens Ur 01/22/2024 Negative   Final    Ketamine 01/22/2024 Not Detected   Final    Norketamine 01/22/2024 Not Detected   Final    Gabapentin, IA 01/22/2024 Negative  CUTOFF:1.0 ug/mL Final    Carisoprodol, IA 01/22/2024 Negative  CUTOFF:100 ng/mL Final    SEDATIVES/HYPNOTICS 01/22/2024 Negative   Final    Zolpidem(Ambien) Urine 01/22/2024 Not Detected   Final    Zolpidem Acid 01/22/2024 Not Detected   Final    Eszopiclone/Zopiclone 01/22/2024 Not Detected   Final    Amino Chloropyridine 01/22/2024 Not Detected   Final    Zaleplon, Ur 01/22/2024 Not Detected   Final    Comment: Expected metabolism of Sedatives/Hypnotics:   Parent Drug                  Detected Metabolites   -----------                 --------------------   Zolpidem:                   Zolpidem Acid   Zopiclone/Eszopiclone:      Amino Chloropyridine   Zaleplon:                   None      Acetaminophen, IA 01/22/2024 Negative  CUTOFF:5.0 ug/mL Final    Miscellaneous, Ur 01/22/2024 Negative   Final    DEXTROMETHORPHAN 01/22/2024 Not Detected   Final    Dextrorphan/Levorphanol 01/22/2024 Not Detected   Final    Comment: Expected metabolism of Dextromethorphan and  Dextrorphan/Levorphanol:   Parent Drug                 Detected Metabolites   -----------                 --------------------   Dextromethorphan:           Dextrorphan   Dextrorphan/Levorphanol:    None    Dextrophan cannot be distinguished from Levorphanol    by the method used for analysis.      Glucose 01/22/2024 90  65 - 99 mg/dL Final    BUN 01/22/2024 12  6 - 20 mg/dL Final    Creatinine 01/22/2024 0.95  0.57 - 1.00 mg/dL Final    Sodium 01/22/2024 142  136 - 145 mmol/L Final    Potassium 01/22/2024 3.9  3.5 - 5.2 mmol/L Final    Chloride 01/22/2024 104  98 - 107 mmol/L Final    CO2 01/22/2024 27.0  22.0 - 29.0 mmol/L Final    Calcium 01/22/2024 9.4  8.6 - 10.5 mg/dL Final    Total Protein 01/22/2024 6.8  6.0 - 8.5 g/dL Final    Albumin 01/22/2024 4.6  3.5 - 5.2 g/dL Final    ALT (SGPT) 01/22/2024 21  1 - 33 U/L Final    AST (SGOT) 01/22/2024 17  1 - 32 U/L Final    Alkaline Phosphatase 01/22/2024 76  39 - 117 U/L Final    Total Bilirubin 01/22/2024 0.2  0.0 - 1.2 mg/dL Final    Globulin 01/22/2024 2.2  gm/dL Final    A/G Ratio 01/22/2024 2.1  g/dL Final    BUN/Creatinine Ratio 01/22/2024 12.6  7.0 - 25.0 Final    Anion Gap 01/22/2024 11.0  5.0 - 15.0 mmol/L Final    eGFR 01/22/2024 80.8  >60.0 mL/min/1.73 Final    TSH 01/22/2024 2.890  0.270 - 4.200 uIU/mL Final    Total Cholesterol 01/22/2024 172  0 - 200 mg/dL Final    Triglycerides 01/22/2024 78  0 - 150 mg/dL Final    HDL Cholesterol 01/22/2024 47  40 - 60 mg/dL Final     LDL Cholesterol  01/22/2024 110 (H)  0 - 100 mg/dL Final    VLDL Cholesterol 01/22/2024 15  5 - 40 mg/dL Final    LDL/HDL Ratio 01/22/2024 2.33   Final    25 Hydroxy, Vitamin D 01/22/2024 33.9  30.0 - 100.0 ng/ml Final    WBC 01/22/2024 6.12  3.40 - 10.80 10*3/mm3 Final    RBC 01/22/2024 4.66  3.77 - 5.28 10*6/mm3 Final    Hemoglobin 01/22/2024 13.1  12.0 - 15.9 g/dL Final    Hematocrit 01/22/2024 39.1  34.0 - 46.6 % Final    MCV 01/22/2024 83.9  79.0 - 97.0 fL Final    MCH 01/22/2024 28.1  26.6 - 33.0 pg Final    MCHC 01/22/2024 33.5  31.5 - 35.7 g/dL Final    RDW 01/22/2024 13.8  12.3 - 15.4 % Final    RDW-SD 01/22/2024 42.0  37.0 - 54.0 fl Final    MPV 01/22/2024 9.7  6.0 - 12.0 fL Final    Platelets 01/22/2024 299  140 - 450 10*3/mm3 Final    Neutrophil % 01/22/2024 53.7  42.7 - 76.0 % Final    Lymphocyte % 01/22/2024 32.5  19.6 - 45.3 % Final    Monocyte % 01/22/2024 10.6  5.0 - 12.0 % Final    Eosinophil % 01/22/2024 2.1  0.3 - 6.2 % Final    Basophil % 01/22/2024 0.8  0.0 - 1.5 % Final    Immature Grans % 01/22/2024 0.3  0.0 - 0.5 % Final    Neutrophils, Absolute 01/22/2024 3.28  1.70 - 7.00 10*3/mm3 Final    Lymphocytes, Absolute 01/22/2024 1.99  0.70 - 3.10 10*3/mm3 Final    Monocytes, Absolute 01/22/2024 0.65  0.10 - 0.90 10*3/mm3 Final    Eosinophils, Absolute 01/22/2024 0.13  0.00 - 0.40 10*3/mm3 Final    Basophils, Absolute 01/22/2024 0.05  0.00 - 0.20 10*3/mm3 Final    Immature Grans, Absolute 01/22/2024 0.02  0.00 - 0.05 10*3/mm3 Final    nRBC 01/22/2024 0.0  0.0 - 0.2 /100 WBC Final    Color 01/22/2024 Yellow  Yellow, Straw, Dark Yellow, Isabela Final    Clarity, UA 01/22/2024 Clear  Clear Final    Specific Gravity  01/22/2024 1.015  1.005 - 1.030 Final    pH, Urine 01/22/2024 7.0  5.0 - 8.0 Final    Leukocytes 01/22/2024 Negative  Negative Final    Nitrite, UA 01/22/2024 Negative  Negative Final    Protein, POC 01/22/2024 Negative  Negative mg/dL Final    Glucose, UA 01/22/2024 Negative  Negative  mg/dL Final    Ketones, UA 01/22/2024 Negative  Negative Final    Urobilinogen, UA 01/22/2024 Normal  Normal, 0.2 E.U./dL Final    Bilirubin 01/22/2024 Negative  Negative Final    Blood, UA 01/22/2024 Negative  Negative Final    Lot Number 01/22/2024 [id number]   Final    Expiration Date 01/22/2024 7/31/24   Final       Mental Status Exam:   Hygiene:   good  Cooperation:  Cooperative  Eye Contact:  Good  Psychomotor Behavior:  Appropriate  Mood:decreased range  Affect:  Appropriate  Hopelessness: Denies  Speech:  Normal  Thought Process:  Goal directed  Thought Content:  Normal  Suicidal:  None  Homicidal:  None  Hallucinations:  None  Delusion:  None  Memory:  Intact  Orientation:  Person, Place, Time, and Situation  Reliability:  good  Insight:  Good  Judgement:  Good  Impulse Control:  Good    PHQ-9 Depression Screening  Little interest or pleasure in doing things? (P) 1-->several days   Feeling down, depressed, or hopeless? (P) 0-->not at all   Trouble falling or staying asleep, or sleeping too much? (P) 2-->more than half the days   Feeling tired or having little energy? (P) 1-->several days   Poor appetite or overeating? (P) 0-->not at all   Feeling bad about yourself - or that you are a failure or have let yourself or your family down? (P) 0-->not at all   Trouble concentrating on things, such as reading the newspaper or watching television? (P) 2-->more than half the days   Moving or speaking so slowly that other people could have noticed? Or the opposite - being so fidgety or restless that you have been moving around a lot more than usual? (P) 0-->not at all   Thoughts that you would be better off dead, or of hurting yourself in some way? (P) 0-->not at all   PHQ-9 Total Score (P) 6   If you checked off any problems, how difficult have these problems made it for you to do your work, take care of things at home, or get along with other people? (P) somewhat difficult      REVIEWED FORREST # [id number]      Assessment/Plan:  Diagnoses and all orders for this visit:    1. Generalized anxiety disorder (Primary)    2. Current moderate episode of major depressive disorder without prior episode    3. Attention deficit hyperactivity disorder (ADHD), combined type      We will continue Zoloft and keep Jornay PM on hold.    A psychological evaluation was conducted in order to assess past and current level of functioning. Areas assessed included, but were not limited to: perception of social support, perception of ability to face and deal with challenges in life (positive functioning), anxiety symptoms, depressive symptoms, perspective on beliefs/belief system, coping skills for stress, intelligence level,  Therapeutic rapport was established. Interventions conducted today were geared towards incorporating medication management along with support for continued therapy. Education was also provided as to the med management with this provider and what to expect in subsequent sessions.    We discussed risks, benefits,goals and side effects of the above medication and the patient was agreeable with the plan.Patient was educated on the importance of compliance with treatment and follow-up appointments. Patient is aware to contact the [facility] [phone] with any worsening of symptoms. To call for questions or concerns and return early if necessary. Patent is agreeable to go to the Emergency Department or call 911 should they begin SI/HI.     Part of this note may be an electronic transcription/translation of spoken language to printed text using the Dragon Dictation System.    Return in about 2 weeks (around 8/19/2024) for Follow Up 30 min, Video visit.    QI Contreras

## 2024-08-21 ENCOUNTER — TELEMEDICINE (OUTPATIENT)
Dept: PSYCHIATRY | Facility: CLINIC | Age: 34
End: 2024-08-21
Payer: COMMERCIAL

## 2024-08-21 DIAGNOSIS — F41.1 GENERALIZED ANXIETY DISORDER: Primary | ICD-10-CM

## 2024-08-21 DIAGNOSIS — F90.2 ATTENTION DEFICIT HYPERACTIVITY DISORDER (ADHD), COMBINED TYPE: ICD-10-CM

## 2024-08-21 DIAGNOSIS — F32.1 CURRENT MODERATE EPISODE OF MAJOR DEPRESSIVE DISORDER WITHOUT PRIOR EPISODE: ICD-10-CM

## 2024-08-21 PROCEDURE — 90837 PSYTX W PT 60 MINUTES: CPT

## 2024-08-21 NOTE — PROGRESS NOTES
Date: 2024  Time In: 10:00 EDT  Time out: 10:55 AM EST    This provider is located at [facility] [employer], [place], [facility], using a secure [employer] Video Visit through [employer]. Patient is being seen remotely via telehealth at their home address is located in [place]. Patient stated they are in a secure environment for this session. The patient's condition being diagnosed and treated is appropriate for telemedicine. The provider identified themself as well as their credentials. The patient, or  patient's legal guardian consent to be seen remotely, and when consent is given they understand that the consent allows for patient identifiable information to be sent to a third party as needed. They may refuse to be seen remotely at any time. The electronic data is encrypted and password protected, and the patient's or  legal guardian has been advised of the potential risks to privacy not withstanding such measures.   PT Identifiers used: Name and .    You have chosen to receive care through a telehealth visit.  Do you consent to use a video/audio connection for your medical care today? Yes    Subjective   Sima Booker is a 34 y.o. female who presents today for follow up    Chief Complaint:   Chief Complaint   Patient presents with    Anxiety    ADHD        Data: Pt reported toddler being sick the last two days, feeling tired. Pt reported toddler will start  next week. Pt also reported she will be having an embryo transfer the same day. Pt reported having a lot of emotions re embryo transfer. Pt reported feeling increased stress with such a busy day. Pt reported not feeling overwhelmed with upcoming situations. Pt reported stopping ADHD medication due to attempting to become pregnant. Pt reported adjusting to life without medication. Pt reflected on her first childbirth experience and complications experienced. 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      Clinical Maneuvering/Intervention: Processing, validation. Assisted patient in processing above session content; acknowledged and normalized patient’s thoughts, feelings, and concerns.  Rationalized patient thought process regarding concerns presented at session.  Discussed triggers associated with patient's  anxiety  and ADHD Also discussed coping skills for patient to implement such as grounding , mindfulness , and self care     Allowed patient to freely discuss issues without interruption or judgment. Provided safe, confidential environment to facilitate the development of positive therapeutic relationship and encourage open, honest communication. Assisted patient in identifying risk factors which would indicate the need for higher level of care including thoughts to harm self or others and/or self-harming behavior and encouraged patient to contact this office, call 911, or present to the nearest emergency room should any of these events occur. Discussed crisis intervention services and means to access. Patient adamantly and convincingly denies current suicidal or homicidal ideation or perceptual disturbance.    Assessment: Pt was located at home for session, she was alert and oriented x3. Pt appears to be doing well overall. Pt appears to be handling no medication re MH symptoms. Pt appears to be mindful of changes with this change. Pt appears to be compartmentalizing things well, manageable. Pt appears to have some concerns with embryo transfer taking -normal amount of concern. Pt appears motivated to continue improving MH and overall quality of life.    Patient appears to maintain relative stability as compared to their baseline.  However, patient continues to struggle with   Chief Complaint   Patient presents with    Anxiety    ADHD    which continues to cause impairment in important areas of functioning.  A result, they can be reasonably expected to continue to  benefit from treatment and would likely be at increased risk for decompensation otherwise.      Mental Status Exam:   Hygiene:   good  Cooperation:  Cooperative  Eye Contact:  Good  Psychomotor Behavior:  Appropriate  Affect:  Appropriate  Mood: normal  Speech:  Normal  Thought Process:  Linear  Thought Content:  Normal  Suicidal:  None  Homicidal:  None  Hallucinations:  None  Delusion:  None  Memory:  Intact  Orientation:  Person, Place, and Time  Reliability:  good  Insight:  Good  Judgement:  Good  Impulse Control:  Good  Physical/Medical Issues:  No        Patient's Support Network Includes:  significant other and extended family    Functional Status: No impairment    Progress toward goal: Not at goal    Prognosis: Fair with Ongoing Treatment     Plan:     Patient will continue in individual outpatient therapy with focus on improved functioning and coping skills, maintaining stability, and avoiding decompensation and the need for higher level of care.    Patient will adhere to medication regimen as prescribed and report any side effects. Patient will contact this office, call 911 or present to the nearest emergency room should suicidal or homicidal ideations occur. Provide Cognitive Behavioral Therapy and Solution Focused Therapy to improve functioning, maintain stability, and avoid decompensation and the need for higher level of care.     Return in about 6 weeks (around 10/2/2024).      VISIT DIAGNOSIS:    Diagnosis Plan   1. Generalized anxiety disorder        2. Current moderate episode of major depressive disorder without prior episode        3. Attention deficit hyperactivity disorder (ADHD), combined type         10:00 EDT         This document has been electronically signed by Ninfa Hwang LCSW  August 21, 2024      Part of this note may be an electronic transcription/translation of spoken language to printed text using the Dragon Dictation System.

## 2024-09-05 ENCOUNTER — TELEMEDICINE (OUTPATIENT)
Dept: PSYCHIATRY | Facility: CLINIC | Age: 34
End: 2024-09-05
Payer: COMMERCIAL

## 2024-09-05 DIAGNOSIS — F41.1 GENERALIZED ANXIETY DISORDER: Primary | ICD-10-CM

## 2024-09-05 DIAGNOSIS — F90.2 ATTENTION DEFICIT HYPERACTIVITY DISORDER (ADHD), COMBINED TYPE: ICD-10-CM

## 2024-09-05 DIAGNOSIS — F32.1 CURRENT MODERATE EPISODE OF MAJOR DEPRESSIVE DISORDER WITHOUT PRIOR EPISODE: ICD-10-CM

## 2024-10-03 ENCOUNTER — TELEMEDICINE (OUTPATIENT)
Dept: PSYCHIATRY | Facility: CLINIC | Age: 34
End: 2024-10-03
Payer: COMMERCIAL

## 2024-10-03 DIAGNOSIS — F32.1 CURRENT MODERATE EPISODE OF MAJOR DEPRESSIVE DISORDER WITHOUT PRIOR EPISODE: ICD-10-CM

## 2024-10-03 DIAGNOSIS — F41.1 GENERALIZED ANXIETY DISORDER: Primary | ICD-10-CM

## 2024-10-03 DIAGNOSIS — F90.2 ATTENTION DEFICIT HYPERACTIVITY DISORDER (ADHD), COMBINED TYPE: ICD-10-CM

## 2024-10-03 NOTE — PROGRESS NOTES
Date: October 3, 2024  Time In: 14:00 EDT  Time out: 2:55 PM EST    This provider is located at [facility], [facility] [place], [place], [facility], using a secure [employer] Video Visit through [employer]. Patient is being seen remotely via telehealth at their home address is located in [place]. Patient stated they are in a secure environment for this session. The patient's condition being diagnosed and treated is appropriate for telemedicine. The provider identified themself as well as their credentials. The patient, or  patient's legal guardian consent to be seen remotely, and when consent is given they understand that the consent allows for patient identifiable information to be sent to a third party as needed. They may refuse to be seen remotely at any time. The electronic data is encrypted and password protected, and the patient's or  legal guardian has been advised of the potential risks to privacy not withstanding such measures.   PT Identifiers used: Name and .    You have chosen to receive care through a telehealth visit.  Do you consent to use a video/audio connection for your medical care today? Yes    Subjective   Sima Booker is a 34 y.o. female who presents today for follow up    Chief Complaint:   Chief Complaint   Patient presents with    Anxiety    ADHD        Data: Pt reported that she is about 8 weeks pregnant. Embryo transfer was successful although sad  couldn't attend.  Pt reported that she has been having morning sickness. Pt reported that she is struggling to eat and feeling grossed out by food. Pt reported some concerns with this labor in the future due to her first labor going as planned. 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 Pt reported that she is doing okay without her Lexapro or ADHD medication. Pt reported depression symptoms are manageable.       Clinical Maneuvering/Intervention: Assisted patient in  processing above session content; acknowledged and normalized patient’s thoughts, feelings, and concerns.  Rationalized patient thought process regarding concerns presented at session.  Discussed triggers associated with patient's  anxiety  and ADHD Also discussed coping skills for patient to implement such as grounding , mindfulness , and self care     Allowed patient to freely discuss issues without interruption or judgment. Provided safe, confidential environment to facilitate the development of positive therapeutic relationship and encourage open, honest communication. Assisted patient in identifying risk factors which would indicate the need for higher level of care including thoughts to harm self or others and/or self-harming behavior and encouraged patient to contact this office, call 911, or present to the nearest emergency room should any of these events occur. Discussed crisis intervention services and means to access. Patient adamantly and convincingly denies current suicidal or homicidal ideation or perceptual disturbance.    Assessment: Pt was alert and oriented x3. Pt was located in a secure location for confidentiality/privacy. Pt appears motivated to improve MH and overall quality of life. Pt appears to have some concerns with future labor and delivery due to first delivery not going as planned. Pt appears to be handling anxiety and ADHD symptoms. Pt appears to be doing well overall.    Patient appears to maintain relative stability as compared to their baseline.  However, patient continues to struggle with   Chief Complaint   Patient presents with    Anxiety    ADHD    which continues to cause impairment in important areas of functioning.  A result, they can be reasonably expected to continue to benefit from treatment and would likely be at increased risk for decompensation otherwise.    Mental Status Exam:   Hygiene:   good  Cooperation:  Cooperative  Eye Contact:  Good  Psychomotor Behavior:   Appropriate  Affect:  Appropriate  Mood: normal  Speech:  Normal  Thought Process:  Linear  Thought Content:  Normal  Suicidal:  None  Homicidal:  None  Hallucinations:  None  Delusion:  None  Memory:  Intact  Orientation:  Person, Place, and Time  Reliability:  good  Insight:  Good  Judgement:  Good  Impulse Control:  Good  Physical/Medical Issues:  No        Patient's Support Network Includes:  significant other, parents, and extended family    Functional Status: No impairment    Progress toward goal: Not at goal    Prognosis: Good with Ongoing Treatment     Plan:     Patient will continue in individual outpatient therapy with focus on improved functioning and coping skills, maintaining stability, and avoiding decompensation and the need for higher level of care.    Patient will adhere to medication regimen as prescribed and report any side effects. Patient will contact this office, call 911 or present to the nearest emergency room should suicidal or homicidal ideations occur. Provide Cognitive Behavioral Therapy and Solution Focused Therapy to improve functioning, maintain stability, and avoid decompensation and the need for higher level of care.     Return in about 9 weeks (around 12/5/2024).      VISIT DIAGNOSIS:    Diagnosis Plan   1. Generalized anxiety disorder        2. Current moderate episode of major depressive disorder without prior episode        3. Attention deficit hyperactivity disorder (ADHD), combined type         14:00 EDT         This document has been electronically signed by Ninfa Hwang LCSW  October 3, 2024      Part of this note may be an electronic transcription/translation of spoken language to printed text using the Dragon Dictation System.

## 2024-11-05 ENCOUNTER — INITIAL PRENATAL (OUTPATIENT)
Dept: OBSTETRICS AND GYNECOLOGY | Facility: CLINIC | Age: 34
End: 2024-11-05
Payer: COMMERCIAL

## 2024-11-05 VITALS — BODY MASS INDEX: 31.09 KG/M2 | DIASTOLIC BLOOD PRESSURE: 78 MMHG | SYSTOLIC BLOOD PRESSURE: 122 MMHG | WEIGHT: 186.8 LBS

## 2024-11-05 DIAGNOSIS — O09.819 PREGNANCY RESULTING FROM IN VITRO FERTILIZATION, ANTEPARTUM: ICD-10-CM

## 2024-11-05 DIAGNOSIS — Z3A.12 12 WEEKS GESTATION OF PREGNANCY: ICD-10-CM

## 2024-11-05 DIAGNOSIS — Z86.32 HISTORY OF GESTATIONAL DIABETES IN PRIOR PREGNANCY, CURRENTLY PREGNANT: ICD-10-CM

## 2024-11-05 DIAGNOSIS — Z87.59 HISTORY OF PREGNANCY INDUCED HYPERTENSION: ICD-10-CM

## 2024-11-05 DIAGNOSIS — O09.299 HISTORY OF GESTATIONAL DIABETES IN PRIOR PREGNANCY, CURRENTLY PREGNANT: ICD-10-CM

## 2024-11-05 DIAGNOSIS — Z98.891 PREVIOUS CESAREAN SECTION: ICD-10-CM

## 2024-11-05 DIAGNOSIS — Z34.91 PRENATAL CARE IN FIRST TRIMESTER: Primary | ICD-10-CM

## 2024-11-05 DIAGNOSIS — O09.529 ANTEPARTUM MULTIGRAVIDA OF ADVANCED MATERNAL AGE: ICD-10-CM

## 2024-11-05 PROBLEM — E55.9 VITAMIN D INSUFFICIENCY: Status: RESOLVED | Noted: 2022-09-17 | Resolved: 2024-11-05

## 2024-11-05 PROBLEM — Z34.90 PREGNANCY: Status: ACTIVE | Noted: 2024-11-05

## 2024-11-05 PROCEDURE — 0501F PRENATAL FLOW SHEET: CPT | Performed by: OBSTETRICS & GYNECOLOGY

## 2024-11-05 RX ORDER — PRENATAL VIT/IRON FUM/FOLIC AC 27MG-0.8MG
TABLET ORAL DAILY
COMMUNITY

## 2024-11-05 NOTE — PROGRESS NOTES
Initial ob visit     CC- Here for care of pregnancy        Sima Booker is a 34 y.o. female, , who presents for her first obstetrical visit.  No LMP recorded (approximate). Patient is pregnant.. Her ANI is 2025, by Other Basis. Current GA is 12w3d.     Patient had embryo transfer on 2024 at [facility] in [place]. She is currently taking Baby Aspirin.     IVF 2024       Her periods are every 4 days.  Prior obstetric issues: GHTN, A1DM  Patient's past medical history is significant for:  GHTN .  Family history of genetic issues (includes FOB): Denies  Prior infections concerning in pregnancy (Rash, fever in last 2 weeks): No  Varicella Hx - history of chicken pox  Prior testing for Cystic Fibrosis Carrier or Sickle Cell Trait- Denies  Prepregnancy BMI - Body mass index is 31.09 kg/m².  History of STD: no  Hx of HSV for patient or partner: no  Ultrasound Today: yes    OB History    Para Term  AB Living   2 1 1 0 0 1   SAB IAB Ectopic Molar Multiple Live Births   0 0 0 0 0 1      # Outcome Date GA Lbr Russell/2nd Weight Sex Type Anes PTL Lv   2 Current            1 Term 21 37w4d  3530 g (7 lb 12.5 oz) M CS-LTranv EPI, Spinal N SUJATA      Complications: Intraamniotic Infection, Failure to Progress in Second Stage      Obstetric Comments   FOB #1 : Pregnancy #1(IVF, frozen cycle retrieved 2020, no donors)       Additional Pertinent History   Last Pap :  Result: negative HPV: negative     Last Completed Pap Smear       This patient has no relevant Health Maintenance data.          History of abnormal Pap smear: no  Family history of uterine, colon, breast, or ovarian cancer: yes - Maternal Great Grandmother-breast  Feelings of Anxiety or Depression: no  Tobacco Usage?: No   Alcohol/Drug Use?: NO  Over the age of 35 at delivery: yes  Genetic Screening: desires to discuss in the future  Flu Status: Already given in current flu  season    PMH    Current Outpatient Medications:     levocetirizine (XYZAL) 5 MG tablet, Take 1 tablet by mouth Daily., Disp: , Rfl:     Prenatal Vit-Fe Fumarate-FA (prenatal vitamin 27-0.8) 27-0.8 MG tablet tablet, Take  by mouth Daily., Disp: , Rfl:     sertraline (Zoloft) 50 MG tablet, Take 1 tablet by mouth Daily., Disp: 30 tablet, Rfl: 3     Past Medical History:   Diagnosis Date    ADHD (attention deficit hyperactivity disorder)     Allergic     Anxiety     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 Not sure how old i was        Past Surgical History:   Procedure Laterality Date     SECTION N/A 2021    Procedure:  SECTION PRIMARY;  Surgeon: Stef Lindsey MD;  Location: [facility] LABOR DELIVERY;  Service: Obstetrics/Gynecology;  Laterality: N/A;    COLONOSCOPY  10/01/2017    FERTILITY SURGERY  2020    Oocyte retrieval for IVF    NASAL SINUS SURGERY  2016    WISDOM TOOTH EXTRACTION         Review of Systems   Review of Systems    Patient Reports:  Nausea  Patient Denies:excessive nausea , excessive vomiting, and vaginal bleeding  All systems reviewed and otherwise normal.    I have reviewed and agree with the HPI, ROS, and historical information as entered above. Hue Patel MD      /78   Wt 84.7 kg (186 lb 12.8 oz)   LMP  (Approximate)   BMI 31.09 kg/m²     The additional following portions of the patient's history were reviewed and updated as appropriate: allergies, current medications, past family history, past medical history, past social history, past surgical history, and problem list.    Physical Exam  General:  well developed; well nourished  no acute distress  mentation appropriate   Chest/Respiratory: No labored breathing, normal respiratory effort,  normal appearance, no respiratory noises noted   Heart:  not examined   Thyroid: not examined   Breasts:  Not performed.   Abdomen: Not performed.   Pelvis: Not performed.        Assessment and Plan    Problem List Items Addressed This Visit       Pregnancy    Overview     G1 1 c/s, arrest, chorio, induced GHTN         AMA (advanced maternal age) multigravida 35+    Previous  section    Overview      vs repeat?         Pregnancy resulting from in vitro fertilization, antepartum    Overview     Fetal echo         History of gestational diabetes in prior pregnancy, currently pregnant    Overview     Early glucola         History of pregnancy induced hypertension    Overview     With G1          Other Visit Diagnoses       Prenatal care in first trimester    -  Primary    Relevant Orders    LIQUID-BASED PAP SMEAR WITH HPV GENOTYPING REGARDLESS OF INTERPRETATION (GRACIE,COR,MAD)    Obstetric Panel    HIV-1 / O / 2 Ag / Antibody    Urine Culture - Urine, Urine, Clean Catch    Chlamydia trachomatis, Neisseria gonorrhoeae, PCR - Urine, Urine, Clean Catch    Urine Drug Screen - Urine, Clean Catch    Preeclampsia Panel    Protein, Urine, 24 Hour - Urine, Clean Catch    Gestational Diabetes Screen 1 Hour            Pregnancy at 12w3d  Reviewed routine prenatal care with the office and educational materials given  Lab(s) Ordered  Discussed options for genetic testing including first trimester nuchal translucency screen, genetic disease carrier testing, quadruple screen, and NIPT  Patient is on Prenatal vitamins  Activity recommendation : 150 minutes/week of moderate intensity aerobic activity unless we limit for bleeding, hypertension or other pregnancy complication   Discussed carbohydrate control.   Recommended early 1 hr gtt next visit  discussed baby aspirin from 12 weeks to delivery for prevention of preeclampsia   Return in about 1 month (around 2024) for F/U Prenatal, and glucola.      Hue Patel,  MD  11/05/2024

## 2024-11-06 LAB
ABO GROUP BLD: NORMAL
ALT SERPL-CCNC: 12 IU/L (ref 0–32)
AMPHETAMINES UR QL SCN: NEGATIVE NG/ML
AST SERPL-CCNC: 17 IU/L (ref 0–40)
BARBITURATES UR QL SCN: NEGATIVE NG/ML
BASOPHILS # BLD AUTO: 0 X10E3/UL (ref 0–0.2)
BASOPHILS NFR BLD AUTO: 0 %
BENZODIAZ UR QL SCN: NEGATIVE NG/ML
BLD GP AB SCN SERPL QL: NEGATIVE
BUN SERPL-MCNC: 8 MG/DL (ref 6–20)
BZE UR QL SCN: NEGATIVE NG/ML
CANNABINOIDS UR QL SCN: NEGATIVE NG/ML
CREAT SERPL-MCNC: 0.71 MG/DL (ref 0.57–1)
CREAT UR-MCNC: 81.4 MG/DL (ref 20–300)
CREAT UR-MCNC: ABNORMAL MG/DL
EGFRCR SERPLBLD CKD-EPI 2021: 114 ML/MIN/1.73
EOSINOPHIL # BLD AUTO: 0.1 X10E3/UL (ref 0–0.4)
EOSINOPHIL NFR BLD AUTO: 1 %
ERYTHROCYTE [DISTWIDTH] IN BLOOD BY AUTOMATED COUNT: 17.2 % (ref 11.7–15.4)
HBV SURFACE AG SERPL QL IA: NEGATIVE
HCT VFR BLD AUTO: 41.3 % (ref 34–46.6)
HCV IGG SERPL QL IA: NON REACTIVE
HGB BLD-MCNC: 13.3 G/DL (ref 11.1–15.9)
HIV 1+2 AB+HIV1 P24 AG SERPL QL IA: NON REACTIVE
IMM GRANULOCYTES # BLD AUTO: 0 X10E3/UL (ref 0–0.1)
IMM GRANULOCYTES NFR BLD AUTO: 0 %
LABORATORY COMMENT REPORT: NORMAL
LDH SERPL L TO P-CCNC: 206 IU/L (ref 119–226)
LYMPHOCYTES # BLD AUTO: 1.9 X10E3/UL (ref 0.7–3.1)
LYMPHOCYTES NFR BLD AUTO: 20 %
MCH RBC QN AUTO: 28.2 PG (ref 26.6–33)
MCHC RBC AUTO-ENTMCNC: 32.2 G/DL (ref 31.5–35.7)
MCV RBC AUTO: 88 FL (ref 79–97)
METHADONE UR QL SCN: NEGATIVE NG/ML
MICROALBUMIN UR-MCNC: ABNORMAL
MONOCYTES # BLD AUTO: 0.9 X10E3/UL (ref 0.1–0.9)
MONOCYTES NFR BLD AUTO: 9 %
NEUTROPHILS # BLD AUTO: 6.8 X10E3/UL (ref 1.4–7)
NEUTROPHILS NFR BLD AUTO: 70 %
OPIATES UR QL SCN: NEGATIVE NG/ML
OXYCODONE+OXYMORPHONE UR QL SCN: NEGATIVE NG/ML
PCP UR QL: NEGATIVE NG/ML
PH UR: 6.5 [PH] (ref 4.5–8.9)
PLATELET # BLD AUTO: 273 X10E3/UL (ref 150–450)
PROPOXYPH UR QL SCN: NEGATIVE NG/ML
RBC # BLD AUTO: 4.72 X10E6/UL (ref 3.77–5.28)
REF LAB TEST METHOD: NORMAL
RH BLD: POSITIVE
RPR SER QL: NON REACTIVE
RUBV IGG SERPL IA-ACNC: 3.91 INDEX
SPECIMEN STATUS: NORMAL
URATE SERPL-MCNC: 3.6 MG/DL (ref 2.6–6.2)
WBC # BLD AUTO: 9.7 X10E3/UL (ref 3.4–10.8)

## 2024-11-07 LAB
BACTERIA UR CULT: NORMAL
BACTERIA UR CULT: NORMAL
C TRACH RRNA SPEC QL NAA+PROBE: NEGATIVE
N GONORRHOEA RRNA SPEC QL NAA+PROBE: NEGATIVE

## 2024-11-11 ENCOUNTER — TELEMEDICINE (OUTPATIENT)
Dept: FAMILY MEDICINE CLINIC | Facility: TELEHEALTH | Age: 34
End: 2024-11-11
Payer: COMMERCIAL

## 2024-11-11 VITALS — HEART RATE: 82 BPM | TEMPERATURE: 99.9 F

## 2024-11-11 DIAGNOSIS — J02.9 PHARYNGITIS, UNSPECIFIED ETIOLOGY: ICD-10-CM

## 2024-11-11 DIAGNOSIS — J06.9 ACUTE URI: Primary | ICD-10-CM

## 2024-11-11 PROCEDURE — 99213 OFFICE O/P EST LOW 20 MIN: CPT | Performed by: NURSE PRACTITIONER

## 2024-11-11 RX ORDER — AZITHROMYCIN 250 MG/1
TABLET, FILM COATED ORAL
Qty: 6 TABLET | Refills: 0 | Status: SHIPPED | OUTPATIENT
Start: 2024-11-11

## 2024-11-11 NOTE — PROGRESS NOTES
Mode of Visit: Video  Location of patient: -HOME-  Location of provider: +HOME+  You have chosen to receive care through a telehealth visit.  The patient has signed the video visit consent form.  The visit included audio and video interaction. No technical issues occurred during this visit.    CEDRICK Booker is a 34 y.o. female  presents with complaint of sore throat, cough.  She reports some shortness of breath with with exertion and a cough that is persistent at times and barky sounding.  She also reports a sore throat, postnasal drainage and maxillary sinus pressure.  Additional symptoms are noted in the ROS portion of this visit her son was diagnosed with pneumonia and put on a Z-Kendall.  She has had her symptoms for 5 days.  She is pregnant with a due date of 05/17/2024.    Review of Systems   Constitutional:  Negative for chills, fatigue and fever.   HENT:  Positive for postnasal drip, sinus pressure (maxillary), sinus pain (maxillary), sneezing and sore throat. Negative for ear pain.    Respiratory:  Positive for cough and shortness of breath (with exertion). Negative for chest tightness and wheezing.         Productive at times, yellow, green   Gastrointestinal:  Negative for diarrhea.   Musculoskeletal:  Negative for myalgias.   Neurological:  Positive for headaches.       Past Medical History:   Diagnosis Date    ADHD (attention deficit hyperactivity disorder)     Allergic     Anxiety     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 Not sure how old i was       Family History   Problem Relation Age of Onset    Thyroid disease Mother     Hyperlipidemia Father     Arthritis Maternal Grandmother     Miscarriages / Stillbirths Maternal Grandmother     Diabetes Maternal Grandfather     Miscarriages /  Stillbirths Maternal Grandfather     Diabetes Paternal Grandmother     Breast cancer Other         great aunt       Social History     Socioeconomic History    Marital status:      Spouse name: Leslie    Highest education level: High school graduate   Tobacco Use    Smoking status: Never    Smokeless tobacco: Never   Vaping Use    Vaping status: Never Used   Substance and Sexual Activity    Alcohol use: Not Currently     Comment: OCCASIONALLY    Drug use: Never    Sexual activity: Yes     Partners: Male     Birth control/protection: None       Sima Booker  reports that she has never smoked. She has never used smokeless tobacco.       Pulse 82   Temp 99.9 °F (37.7 °C)   LMP  (Approximate)   Breastfeeding No     PHYSICAL EXAM  Physical Exam   Constitutional: She is oriented to person, place, and time. She appears well-developed.   HENT:   Head: Normocephalic and atraumatic.   Nose: Nose normal.   Mouth/Throat: Mucous membranes are erythematous.   Eyes: Lids are normal. Right eye exhibits no discharge. Left eye exhibits no discharge. Right conjunctiva is not injected. Left conjunctiva is not injected.   Cardiovascular:       Heart sounds normal   Pulmonary/Chest:  No respiratory distress (.  Rhonchi noted with patient with patient cough, croupy sounding cough noted, cough is persistent at times,).  Neurological: She is alert and oriented to person, place, and time. No cranial nerve deficit.   Psychiatric: She has a normal mood and affect. Her speech is normal and behavior is normal. Judgment and thought content normal.       Results for orders placed or performed in visit on 11/05/24   Urine Culture - Urine, Urine, Clean Catch    Collection Time: 11/05/24  2:12 PM    Specimen: Urine, Clean Catch         CD- [id number]   Result Value Ref Range    Urine Culture Final report     Result 1 Comment    Chlamydia trachomatis, Neisseria gonorrhoeae, PCR - Urine, Urine, Clean Catch    Collection Time:  11/05/24  2:12 PM    Specimen: Urine, Clean Catch    LM     CD- [id number]   Result Value Ref Range    Chlamydia trachomatis, LARS Negative Negative    Neisseria gonorrhoeae, LARS Negative Negative   Obstetric Panel    Collection Time: 11/05/24  2:12 PM    Specimen: Blood   Result Value Ref Range    Hepatitis B Surface Ag Negative Negative    Hep C Virus Ab Non Reactive Non Reactive    RPR Non Reactive Non Reactive    Rubella Antibodies, IgG 3.91 Immune >0.99 index    ABO Type A     Rh Factor Positive     Antibody Screen Negative Negative   HIV-1 / O / 2 Ag / Antibody    Collection Time: 11/05/24  2:12 PM    Specimen: Blood   Result Value Ref Range    HIV Screen 4th Gen w/RFX (Reference) Non Reactive Non Reactive   Urine Drug Screen - Urine, Clean Catch    Collection Time: 11/05/24  2:12 PM    Specimen: Urine, Clean Catch   Result Value Ref Range    Amphetamine, Urine Qual Negative Pjvriv=8647 ng/mL    Barbiturates Screen, Urine Negative Exypgi=954 ng/mL    Benzodiazepine Screen, Urine Negative Eektnx=241 ng/mL    THC Screen, Urine Negative Cutoff=20 ng/mL    Cocaine Screen, Urine Negative Svzyai=429 ng/mL    Opiate Screen, Urine Negative Aovqzy=224 ng/mL    Oxycodone/Oxymorphone, Urine Negative Ayocww=038 ng/mL    Phencyclidine (PCP), Urine Negative Cutoff=25 ng/mL    Methadone Screen, Urine Negative Jlxzjn=895 ng/mL    Propoxyphene Screen Negative Lgzdjw=721 ng/mL    Creatinine, Urine 81.4 20.0 - 300.0 mg/dL    pH, UA 6.5 4.5 - 8.9    Please note Comment    Specimen Status Report    Collection Time: 11/05/24  2:12 PM   Result Value Ref Range    Specimen Status Comment    Preeclampsia Panel    Collection Time: 11/05/24  2:12 PM   Result Value Ref Range    Uric Acid 3.6 2.6 - 6.2 mg/dL    BUN 8 6 - 20 mg/dL    Creatinine 0.71 0.57 - 1.00 mg/dL    EGFR Result 114 >59 mL/min/1.73     119 - 226 IU/L    AST (SGOT) 17 0 - 40 IU/L    ALT (SGPT) 12 0 - 32 IU/L    Creatinine, Urine CANCELED     Microalbumin, Urine  CANCELED     WBC 9.7 3.4 - 10.8 x10E3/uL    RBC 4.72 3.77 - 5.28 x10E6/uL    Hemoglobin 13.3 11.1 - 15.9 g/dL    Hematocrit 41.3 34.0 - 46.6 %    MCV 88 79 - 97 fL    MCH 28.2 26.6 - 33.0 pg    MCHC 32.2 31.5 - 35.7 g/dL    RDW 17.2 (H) 11.7 - 15.4 %    Platelets 273 150 - 450 x10E3/uL    Neutrophil Rel % 70 Not Estab. %    Lymphocyte Rel % 20 Not Estab. %    Monocyte Rel % 9 Not Estab. %    Eosinophil Rel % 1 Not Estab. %    Basophil Rel % 0 Not Estab. %    Neutrophils Absolute 6.8 1.4 - 7.0 x10E3/uL    Lymphocytes Absolute 1.9 0.7 - 3.1 x10E3/uL    Monocytes Absolute 0.9 0.1 - 0.9 x10E3/uL    Eosinophils Absolute 0.1 0.0 - 0.4 x10E3/uL    Basophils Absolute 0.0 0.0 - 0.2 x10E3/uL    Immature Granulocyte Rel % 0 Not Estab. %    Immature Grans Absolute 0.0 0.0 - 0.1 x10E3/uL   LIQUID-BASED PAP SMEAR WITH HPV GENOTYPING REGARDLESS OF INTERPRETATION (GRACIE,COR,MAD)    Collection Time: 24  4:20 PM    Specimen: ThinPrep Vial   Result Value Ref Range    Reference Lab Report       [facility]  [address] [place] [address]  Phone: [phone] or [phone]  Fax: [phone]  Petros Graves M.D., Medical Director    PATIENT NAME                                     LABORATORY NO.  [id number]   SHANICE LLANOS                           [id number]  [id number]                                 AGE                    SEX   SSN              CLIENT REF #  [facility]                                 34        1990      F     [id number]      [id number]    [id number]                 REQUESTING M.D.           [place] [address]                        HEAVENLY KHANNA  DATE COLLECTED            DATE RECEIVED          DATE REPORTED  2024    ThinPrep Pap with Cytyc Imaging    DIAGNOSIS:  Epithelial cell abnormality. (ASC) Atypical squamous cells of  undetermined  significance.    Professional interpretation rendered by Robert Dockery M.D.,ESTRELLA.AAngeliqueP. at [facility], [place], [address] [place] [address].  SPECIMEN ADEQUACY:  SATISFACTORY FOR EVALUATION  Transformation zone is present.  SOURCE OF SPECIMEN:       CERVICAL  SLIDES:  1  CLINICAL HISTORY:  A Routine  Prenatal care in first trimester  Previous  section  Antepartum multigravida of advanced maternal age  12 weeks gestation of pregnancy  History of gestational diabetes in prior pregnancy, currently pregnant  History of pregnancy induced hypertension    HPV  HR-HPV POOL: Negative    The Aptima HPV assay is an in vitro nucleic acid amplification test for the  qualitative detection of E6/E7 viral messenger RNA from 14 high risk types of  HPV in cervical specimens. The high risk HPV types detected include: 16, 18,  31, 33, 35, 39, 45, 51, 52, 56, 58, 59, 66, 68    Chlamydia / Gonorrhea  CHLAMYDIA TRACHOMATIS: Negative  NEISSERIA GONORRHOEAE: Negative    The Aptima Combo 2 assay is a target amplification nucleic acid probe test that  utilizes  target capture for the in vitro qualitative detection and differentiation of  ribosomal RNA from Chlamydia trachomatis and Neisseria gonorrhoeae to aid  in the diagnosis of chlamydial and gonococcal disease using the Russell system.    CYTOTECHNOLOGIST:             [place] (ASCP)                                      REVIEWED, DIAGNOSED AND  ELECTRONICALLY SIGNED BY:      Robert Dockery M.D.,F.C.A.P.    CPT CODES:  82704, 30917, 95896, 40961, 56288         Diagnoses and all orders for this visit:    1. Acute URI (Primary)  -     Cancel: KHOA FLU + SARS PCR; Future  -     POC Strep A, PCR; Future    2. Pharyngitis, unspecified etiology    May take Tylenol for pain or fever  Hydrate well  COVID, flu and strep test results pending    FOLLOW-UP  If symptoms worsen or persist follow up with PCP, Virtual Care or Urgent Care    Patient verbalizes understanding of  medication dosage, comfort measures, instructions for treatment and follow-up.    Hue Barrios, APRN  11/11/2024  09:44 EST    The use of a video visit has been reviewed with the patient and verbal informed consent has been obtained. Myself and Sima Booker participated in this visit. The patient is located in [address] [place] [address].    I am located in [place], [place]. Mychart and TYTO were utilized. I spent 25 minutes in the patient's chart for this visit.    Note: clinic out of Enriqueta tests,  clinic will do point-of-care test for COVID, flu.  Addendum:  COVID, flu, strep results called to patient.  Due to patient's son having pneumonia and antibiotic will be sent over for this patient.  Risk and benefits of Z-Kendall discussed with patient  Patient would like to be prescribed the antibiotic  Advised patient if her chest feels congested she may take Mucinex but to hydrate really well especially being pregnant

## 2024-11-11 NOTE — PATIENT INSTRUCTIONS
Upper Respiratory Infection, Adult  An upper respiratory infection (URI) is a common viral infection of the nose, throat, and upper air passages that lead to the lungs. The most common type of URI is the common cold. URIs usually get better on their own, without medical treatment.  What are the causes?  A URI is caused by a virus. You may catch a virus by:  Breathing in droplets from an infected person's cough or sneeze.  Touching something that has been exposed to the virus (is contaminated) and then touching your mouth, nose, or eyes.  What increases the risk?  You are more likely to get a URI if:  You are very young or very old.  You have close contact with others, such as at work, school, or a health care facility.  You smoke.  You have long-term (chronic) heart or lung disease.  You have a weakened disease-fighting system (immune system).  You have nasal allergies or asthma.  You are experiencing a lot of stress.  You have poor nutrition.  What are the signs or symptoms?  A URI usually involves some of the following symptoms:  Runny or stuffy (congested) nose.  Cough.  Sneezing.  Sore throat.  Headache.  Fatigue.  Fever.  Loss of appetite.  Pain in your forehead, behind your eyes, and over your cheekbones (sinus pain).  Muscle aches.  Redness or irritation of the eyes.  Pressure in the ears or face.  How is this diagnosed?  This condition may be diagnosed based on your medical history and symptoms, and a physical exam. Your health care provider may use a swab to take a mucus sample from your nose (nasal swab). This sample can be tested to determine what virus is causing the illness.  How is this treated?  URIs usually get better on their own within 7-10 days. Medicines cannot cure URIs, but your health care provider may recommend certain medicines to help relieve symptoms, such as:  Over-the-counter cold medicines.  Cough suppressants. Coughing is a type of defense against infection that helps to clear the  respiratory system, so take these medicines only as recommended by your health care provider.  Fever-reducing medicines.  Follow these instructions at home:  Activity  Rest as needed.  If you have a fever, stay home from work or school until your fever is gone or until your health care provider says your URI cannot spread to other people (is no longer contagious). Your health care provider may have you wear a face mask to prevent your infection from spreading.  Relieving symptoms  Gargle with a mixture of salt and water 3-4 times a day or as needed. To make salt water, completely dissolve ½-1 tsp (3-6 g) of salt in 1 cup (237 mL) of warm water.  Use a cool-mist humidifier to add moisture to the air. This can help you breathe more easily.  Eating and drinking    Drink enough fluid to keep your urine pale yellow.  Eat soups and other clear broths.  General instructions    Take over-the-counter and prescription medicines only as told by your health care provider. These include cold medicines, fever reducers, and cough suppressants.  Do not use any products that contain nicotine or tobacco. These products include cigarettes, chewing tobacco, and vaping devices, such as e-cigarettes. If you need help quitting, ask your health care provider.  Stay away from secondhand smoke.  Stay up to date on all immunizations, including the yearly (annual) flu vaccine.  Keep all follow-up visits. This is important.  How to prevent the spread of infection to others  URIs can be contagious. To prevent the infection from spreading:  Wash your hands with soap and water for at least 20 seconds. If soap and water are not available, use hand .  Avoid touching your mouth, face, eyes, or nose.  Cough or sneeze into a tissue or your sleeve or elbow instead of into your hand or into the air.    Contact a health care provider if:  You are getting worse instead of better.  You have a fever or chills.  Your mucus is brown or red.  You have  yellow or brown discharge coming from your nose.  You have pain in your face, especially when you bend forward.  You have swollen neck glands.  You have pain while swallowing.  You have white areas in the back of your throat.  Get help right away if:  You have shortness of breath that gets worse.  You have severe or persistent:  Headache.  Ear pain.  Sinus pain.  Chest pain.  You have chronic lung disease along with any of the following:  Making high-pitched whistling sounds when you breathe, most often when you breathe out (wheezing).  Prolonged cough (more than 14 days).  Coughing up blood.  A change in your usual mucus.  You have a stiff neck.  You have changes in your:  Vision.  Hearing.  Thinking.  Mood.  These symptoms may be an emergency. Get help right away. Call 911.  Do not wait to see if the symptoms will go away.  Do not drive yourself to the hospital.  Summary  An upper respiratory infection (URI) is a common infection of the nose, throat, and upper air passages that lead to the lungs.  A URI is caused by a virus.  URIs usually get better on their own within 7-10 days.  Medicines cannot cure URIs, but your health care provider may recommend certain medicines to help relieve symptoms.  This information is not intended to replace advice given to you by your health care provider. Make sure you discuss any questions you have with your health care provider.  Document Revised: 07/20/2022 Document Reviewed: 07/20/2022  [employer] Patient Education © 2024 [employer]

## 2024-12-02 ENCOUNTER — TELEMEDICINE (OUTPATIENT)
Dept: PSYCHIATRY | Facility: CLINIC | Age: 34
End: 2024-12-02
Payer: COMMERCIAL

## 2024-12-02 DIAGNOSIS — F41.1 GENERALIZED ANXIETY DISORDER: Primary | ICD-10-CM

## 2024-12-02 DIAGNOSIS — F90.2 ATTENTION DEFICIT HYPERACTIVITY DISORDER (ADHD), COMBINED TYPE: ICD-10-CM

## 2024-12-02 DIAGNOSIS — F32.1 CURRENT MODERATE EPISODE OF MAJOR DEPRESSIVE DISORDER WITHOUT PRIOR EPISODE: ICD-10-CM

## 2024-12-02 PROCEDURE — 90837 PSYTX W PT 60 MINUTES: CPT

## 2024-12-02 NOTE — PROGRESS NOTES
Date: 2024  Time In: 10:57 EST  Time out: 12:00 PM EST    This provider is located at [facility], [facility] [place], [place], [facility], using a secure [employer] Video Visit through [email or web address]. Patient is being seen remotely via telehealth at their home address is located in [place]. Patient stated they are in a secure environment for this session. The patient's condition being diagnosed and treated is appropriate for telemedicine. The provider identified themself as well as their credentials. The patient, or  patient's legal guardian consent to be seen remotely, and when consent is given they understand that the consent allows for patient identifiable information to be sent to a third party as needed. They may refuse to be seen remotely at any time. The electronic data is encrypted and password protected, and the patient's or  legal guardian has been advised of the potential risks to privacy not withstanding such measures.   PT Identifiers used: Name and .    You have chosen to receive care through a telehealth visit.  Do you consent to use a video/audio connection for your medical care today? Yes    Subjective   Sima Booker is a 34 y.o. female who presents today for follow up    Chief Complaint:   Chief Complaint   Patient presents with    ADHD    Anxiety    Depression        Data: Pt reported she is doing good, 16w pregnant. Pt reflected on some stress with insurance not covering her visits. Pt reported she is doing okay without her ADHD medication as she cannot take it during pregnancy. Pt reported some issues with focus and memory. 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 Pt reported  that she has been experiencing with tiredness and was experiencing sickness with this pregnancy. Pt reported that she has been struggling with some depression symptoms, sleep issues. Pt reported anxiety and self doubt.      Clinical  Maneuvering/Intervention: Assisted patient in processing above session content; acknowledged and normalized patient’s thoughts, feelings, and concerns.  Rationalized patient thought process regarding concerns presented at session.  Discussed triggers associated with patient's  anxiety , depression , and ADHD Also discussed coping skills for patient to implement such as grounding , mindfulness , self care , and positive self talk     Allowed patient to freely discuss issues without interruption or judgment. Provided safe, confidential environment to facilitate the development of positive therapeutic relationship and encourage open, honest communication. Assisted patient in identifying risk factors which would indicate the need for higher level of care including thoughts to harm self or others and/or self-harming behavior and encouraged patient to contact this office, call 911, or present to the nearest emergency room should any of these events occur. Discussed crisis intervention services and means to access. Patient adamantly and convincingly denies current suicidal or homicidal ideation or perceptual disturbance.    Assessment: Pt was alert and oriented x3. Pt was located in a secure location for confidentiality/privacy. Pt appears motivated to improve MH and overall quality of life. ADHD symptoms are present, aware of these and mindful. Pt appears to be adjusting to pregnancy changes and side effects. Pt appears to advocate for herself and needs to family and work peers. Pt appears to be struggling with some depression more anxiety with thinking of raising two young children. Pt appears to be struggling with self doubt. Pt appeared receptive to techniques and encouragements discussed in session.    Patient appears to maintain relative stability as compared to their baseline.  However, patient continues to struggle with   Chief Complaint   Patient presents with    ADHD    Anxiety    Depression    which continues to  cause impairment in important areas of functioning.  A result, they can be reasonably expected to continue to benefit from treatment and would likely be at increased risk for decompensation otherwise.    Mental Status Exam:   Hygiene:   good  Cooperation:  Cooperative  Eye Contact:  Good  Psychomotor Behavior:  Appropriate  Affect:  Appropriate  Mood: normal  Speech:  Normal  Thought Process:  Linear  Thought Content:  Normal  Suicidal:  None  Homicidal:  None  Hallucinations:  None  Delusion:  None  Memory:  Intact  Orientation:  Person, Place, and Time  Reliability:  good  Insight:  Fair  Judgement:  Fair  Impulse Control:  Fair  Physical/Medical Issues:  No        Patient's Support Network Includes:  significant other and parents    Functional Status: No impairment    Progress toward goal: Not at goal    Prognosis: Fair with Ongoing Treatment     Plan:     Patient will continue in individual outpatient therapy with focus on improved functioning and coping skills, maintaining stability, and avoiding decompensation and the need for higher level of care.    Patient will adhere to medication regimen as prescribed and report any side effects. Patient will contact this office, call 911 or present to the nearest emergency room should suicidal or homicidal ideations occur. Provide Cognitive Behavioral Therapy and Solution Focused Therapy to improve functioning, maintain stability, and avoid decompensation and the need for higher level of care.     Return in about 8 weeks (around 1/27/2025).      VISIT DIAGNOSIS:    Diagnosis Plan   1. Generalized anxiety disorder        2. Current moderate episode of major depressive disorder without prior episode        3. Attention deficit hyperactivity disorder (ADHD), combined type         10:57 EST         This document has been electronically signed by Ninfa Hwang LCSW  December 2, 2024      Part of this note may be an electronic transcription/translation of spoken language  to printed text using the Dragon Dictation System.

## 2024-12-04 ENCOUNTER — TELEPHONE (OUTPATIENT)
Dept: OBSTETRICS AND GYNECOLOGY | Facility: CLINIC | Age: 34
End: 2024-12-04
Payer: COMMERCIAL

## 2024-12-04 NOTE — TELEPHONE ENCOUNTER
PT CALLED AND STATES SHE IS 16 WKS, AND IS HAVING A LOT OF LOWER ABDOMINAL PRESSURE AND WOULD LIKE TO SPEAK WITH A NURSE

## 2024-12-04 NOTE — TELEPHONE ENCOUNTER
Patient of Dr. Patel;  @ 16w 4d, IVF conception. LOV 24 for NOB visit; NOV is tomorrow.   Returned patient's call.   Reports onset this morning of pressure in vaginal area and constant feeling like she needs to void. States urine is darker in color.   Denies any bleeding, cramping, or back pain. Denies any pain or burning with urination.   Discussed with QI Huang. May be due to increased blood flow in the area. Patient to monitor and call if symptoms worsen. Keep appointment tomorrow; will do CCUA.   Informed patient; she v/u and agreed.

## 2024-12-05 ENCOUNTER — ROUTINE PRENATAL (OUTPATIENT)
Dept: OBSTETRICS AND GYNECOLOGY | Facility: CLINIC | Age: 34
End: 2024-12-05
Payer: COMMERCIAL

## 2024-12-05 VITALS — DIASTOLIC BLOOD PRESSURE: 70 MMHG | WEIGHT: 184.6 LBS | BODY MASS INDEX: 30.72 KG/M2 | SYSTOLIC BLOOD PRESSURE: 130 MMHG

## 2024-12-05 DIAGNOSIS — Z3A.16 16 WEEKS GESTATION OF PREGNANCY: ICD-10-CM

## 2024-12-05 DIAGNOSIS — Z34.90 PRENATAL CARE, ANTEPARTUM: Primary | ICD-10-CM

## 2024-12-05 DIAGNOSIS — O09.299 HISTORY OF GESTATIONAL DIABETES IN PRIOR PREGNANCY, CURRENTLY PREGNANT: ICD-10-CM

## 2024-12-05 DIAGNOSIS — O09.819 PREGNANCY RESULTING FROM IN VITRO FERTILIZATION, ANTEPARTUM: ICD-10-CM

## 2024-12-05 DIAGNOSIS — Z87.59 HISTORY OF PREGNANCY INDUCED HYPERTENSION: ICD-10-CM

## 2024-12-05 DIAGNOSIS — O09.529 ANTEPARTUM MULTIGRAVIDA OF ADVANCED MATERNAL AGE: ICD-10-CM

## 2024-12-05 DIAGNOSIS — Z98.891 PREVIOUS CESAREAN SECTION: ICD-10-CM

## 2024-12-05 DIAGNOSIS — R39.89 SENSATION OF PRESSURE IN BLADDER AREA: ICD-10-CM

## 2024-12-05 DIAGNOSIS — Z86.32 HISTORY OF GESTATIONAL DIABETES IN PRIOR PREGNANCY, CURRENTLY PREGNANT: ICD-10-CM

## 2024-12-05 LAB
BILIRUB BLD-MCNC: NEGATIVE MG/DL
CLARITY, POC: CLEAR
COLOR UR: YELLOW
GLUCOSE UR STRIP-MCNC: NEGATIVE MG/DL
KETONES UR QL: NEGATIVE
LEUKOCYTE EST, POC: NEGATIVE
NITRITE UR-MCNC: NEGATIVE MG/ML
PH UR: 7.5 [PH] (ref 5–8)
PROT UR STRIP-MCNC: NEGATIVE MG/DL
RBC # UR STRIP: NEGATIVE /UL
SP GR UR: 1.01 (ref 1–1.03)
UROBILINOGEN UR QL: NORMAL

## 2024-12-05 NOTE — PROGRESS NOTES
OB FOLLOW UP  CC- Here for care of pregnancy        Sima Booker is a 34 y.o.  16w5d patient being seen today for her obstetrical follow up visit. Patient reports consistent bladder pressure and the feeling like she needs to urinate. States she feels she constantly has to change positions for comfort. Denies any dysuria or urgency. CCUA negative.   Also reports nausea and still vomiting 2-3 days per week.     Her prenatal care is complicated by (and status) :   Patient Active Problem List   Diagnosis    Family history of melanoma    Family history of colonic polyps    Tremors of nervous system    Pregnancy    AMA (advanced maternal age) multigravida 35+    Previous  section    Pregnancy resulting from in vitro fertilization, antepartum    History of gestational diabetes in prior pregnancy, currently pregnant    History of pregnancy induced hypertension       Flu Status: Already given in current flu season  Ultrasound Today: No    AFP: is undecided about    ROS -   Patient Denies: leaking of fluid, vaginal bleeding, dysuria, excessive vomiting, and more than 6 contractions per hour  All other systems reviewed and are negative.       The additional following portions of the patient's history were reviewed and updated as appropriate: allergies, current medications, past family history, past medical history, past social history, past surgical history, and problem list.      I have reviewed and agree with the HPI, ROS, and historical information as entered above. Hue Patel MD          EXAM:     Prenatal Vitals  BP: 130/70  Weight: 83.7 kg (184 lb 9.6 oz)   Fetal Heart Rate: +         Urine Glucose Read-only: Negative  Urine Protein Read-only: Negative           Assessment and Plan    Problem List Items Addressed This Visit       Pregnancy    Overview     G1 1 c/s, arrest, chorio, induced GHTN         AMA (advanced maternal age) multigravida 35+    Previous  section    Overview       vs repeat?         Pregnancy resulting from in vitro fertilization, antepartum    Overview     Fetal echo         Relevant Orders    [facility]    History of gestational diabetes in prior pregnancy, currently pregnant    Overview     Early glucola         History of pregnancy induced hypertension    Overview     With G1          Other Visit Diagnoses       Prenatal care, antepartum    -  Primary    Relevant Orders    POC Urinalysis Dipstick (Completed)    Urine Culture - Urine, Urine, Clean Catch    Sensation of pressure in bladder area        Relevant Orders    Urine Culture - Urine, Urine, Clean Catch            Pregnancy at 16w5d  Urine culture today  Fetal status reassuring.   Counseled on MSAFP alone in relation to OTD and placental issues. Declines today   Anatomy scan next visit.   Activity and Exercise discussed.  Patient is on Prenatal vitamins  Return in about 1 month (around 2025) for F/U Prenatal, Refer - [facility].    Hue Patel MD  2024

## 2024-12-07 LAB
BACTERIA UR CULT: NO GROWTH
BACTERIA UR CULT: NORMAL

## 2024-12-09 ENCOUNTER — LAB (OUTPATIENT)
Dept: OBSTETRICS AND GYNECOLOGY | Facility: CLINIC | Age: 34
End: 2024-12-09
Payer: COMMERCIAL

## 2024-12-09 DIAGNOSIS — Z34.92 PRENATAL CARE IN SECOND TRIMESTER: Primary | ICD-10-CM

## 2024-12-10 LAB — GLUCOSE 1H P 50 G GLC PO SERPL-MCNC: 142 MG/DL (ref 65–139)

## 2024-12-12 ENCOUNTER — LAB (OUTPATIENT)
Dept: OBSTETRICS AND GYNECOLOGY | Facility: CLINIC | Age: 34
End: 2024-12-12
Payer: COMMERCIAL

## 2024-12-12 DIAGNOSIS — O99.810 ABNORMAL GLUCOSE IN PREGNANCY, ANTEPARTUM: Primary | ICD-10-CM

## 2024-12-13 LAB
GLUCOSE 1H P 100 G GLC PO SERPL-MCNC: 185 MG/DL (ref 65–179)
GLUCOSE 2H P 100 G GLC PO SERPL-MCNC: 207 MG/DL (ref 65–154)
GLUCOSE 3H P 100 G GLC PO SERPL-MCNC: 142 MG/DL (ref 65–139)
GLUCOSE P FAST SERPL-MCNC: 73 MG/DL (ref 65–94)

## 2024-12-13 RX ORDER — LANCETS 28 GAUGE
EACH MISCELLANEOUS
Qty: 100 EACH | Refills: 3 | Status: SHIPPED | OUTPATIENT
Start: 2024-12-13

## 2024-12-16 ENCOUNTER — TELEPHONE (OUTPATIENT)
Dept: OBSTETRICS AND GYNECOLOGY | Facility: CLINIC | Age: 34
End: 2024-12-16

## 2024-12-16 ENCOUNTER — PATIENT MESSAGE (OUTPATIENT)
Dept: OBSTETRICS AND GYNECOLOGY | Facility: CLINIC | Age: 34
End: 2024-12-16
Payer: COMMERCIAL

## 2024-12-16 NOTE — TELEPHONE ENCOUNTER
I called the pt and the [facility]ist at [facility] said to reach out to Dr. Patel. Normally the PA is only approved if the pt uses insulin. Either the free style Lalita or Dexcom. She is aware I will talk to Dr. Patel and do the PA and there is a place on [employer] that offers a discount coupon price if we send the rx there and she has an android and should not need the .

## 2024-12-18 ENCOUNTER — DOCUMENTATION (OUTPATIENT)
Dept: OBSTETRICS AND GYNECOLOGY | Facility: CLINIC | Age: 34
End: 2024-12-18
Payer: COMMERCIAL

## 2024-12-18 ENCOUNTER — TELEPHONE (OUTPATIENT)
Dept: OBSTETRICS AND GYNECOLOGY | Facility: CLINIC | Age: 34
End: 2024-12-18
Payer: COMMERCIAL

## 2024-12-18 DIAGNOSIS — O24.410 DIET CONTROLLED GESTATIONAL DIABETES MELLITUS (GDM) IN SECOND TRIMESTER: Primary | ICD-10-CM

## 2024-12-18 RX ORDER — HYDROCHLOROTHIAZIDE 12.5 MG/1
CAPSULE ORAL
Qty: 2 EACH | Refills: 6 | OUTPATIENT
Start: 2024-12-18

## 2024-12-18 NOTE — TELEPHONE ENCOUNTER
Free style Lalita 3 change sensor every 15 days, 2/month with 6 refills. I called [facility] and her copay is $25 and the pt is happy. She is going to use her Android phone antonieta for now.

## 2024-12-18 NOTE — PROGRESS NOTES
Spoke to pharmacist at [facility]. He stated that patient picked up a sample of Freestyle Lalita since. Gave verbal for prescription after she finishes the sample glucometer. He is going to run it through patients insurance to see the cost and if it is too expensive, he will call our office to change.

## 2024-12-19 ENCOUNTER — HOSPITAL ENCOUNTER (OUTPATIENT)
Dept: DIABETES SERVICES | Facility: HOSPITAL | Age: 34
Setting detail: RECURRING SERIES
Discharge: HOME | End: 2024-12-19
Payer: COMMERCIAL

## 2024-12-19 PROCEDURE — G0109 DIAB MANAGE TRN IND/GROUP: HCPCS

## 2024-12-23 ENCOUNTER — HOSPITAL ENCOUNTER (OUTPATIENT)
Dept: DIABETES SERVICES | Facility: HOSPITAL | Age: 34
Setting detail: RECURRING SERIES
Discharge: HOME | End: 2024-12-23
Payer: COMMERCIAL

## 2024-12-23 NOTE — PLAN OF CARE
Patient attended the scheduled 30 minute gestational diabetes education follow up class. Please see media tab for assessment and notes if you use [employer]. If you are not an [employer] user a copy of patient's assessment and notes will be sent per routine. Thank you.

## 2025-01-08 ENCOUNTER — TELEPHONE (OUTPATIENT)
Dept: OBSTETRICS AND GYNECOLOGY | Facility: CLINIC | Age: 35
End: 2025-01-08
Payer: COMMERCIAL

## 2025-01-08 DIAGNOSIS — O09.819 PREGNANCY RESULTING FROM IN VITRO FERTILIZATION, ANTEPARTUM: Primary | ICD-10-CM

## 2025-01-08 NOTE — TELEPHONE ENCOUNTER
"  Caller: Sima Booker \"Epifanio Olmos\"    Relationship to patient: Self    Best call back number: [phone]    Chief complaint: OB FOLLOW UP    Type of visit: OB F/U    Requested date: 1/21/25     If rescheduling, when is the original appointment: 1/6/25     Additional notes:PLEASE CALL PT TO RESCHEDULE     "

## 2025-01-08 NOTE — TELEPHONE ENCOUNTER
Patient of Dr. Patel;  @ 21w 4d. AMA; IVF conception. Patient was scheduled at [facility] 25 but was rescheduled due to weather. Their first available was 25.   Discussed with QI Huang. Patient will be almost 24 weeks when she sees [facility]; she recommends patient have anatomy scan at her visit here tomorrow.   Called patient and informed her. Patient was very pleased with that plan. Ultrasound appointment scheduled.

## 2025-01-08 NOTE — TELEPHONE ENCOUNTER
This PT was scheduled for 01/6 at [facility] and here after due to weather she has to cancel - she is back on schedule with [facility] for 01/21 and F/U here with Jorge. I am just sending this over incase she would need an US here for her 20 weeks scan to go with her apt we have rescheduled for tomorrow.

## 2025-01-09 ENCOUNTER — ROUTINE PRENATAL (OUTPATIENT)
Dept: OBSTETRICS AND GYNECOLOGY | Facility: CLINIC | Age: 35
End: 2025-01-09
Payer: COMMERCIAL

## 2025-01-09 VITALS — SYSTOLIC BLOOD PRESSURE: 138 MMHG | WEIGHT: 187 LBS | DIASTOLIC BLOOD PRESSURE: 84 MMHG | BODY MASS INDEX: 31.12 KG/M2

## 2025-01-09 DIAGNOSIS — Z86.32 HISTORY OF GESTATIONAL DIABETES IN PRIOR PREGNANCY, CURRENTLY PREGNANT: ICD-10-CM

## 2025-01-09 DIAGNOSIS — O09.529 ANTEPARTUM MULTIGRAVIDA OF ADVANCED MATERNAL AGE: ICD-10-CM

## 2025-01-09 DIAGNOSIS — O09.819 PREGNANCY RESULTING FROM IN VITRO FERTILIZATION, ANTEPARTUM: ICD-10-CM

## 2025-01-09 DIAGNOSIS — O09.299 HISTORY OF GESTATIONAL DIABETES IN PRIOR PREGNANCY, CURRENTLY PREGNANT: ICD-10-CM

## 2025-01-09 DIAGNOSIS — Z98.891 PREVIOUS CESAREAN SECTION: ICD-10-CM

## 2025-01-09 DIAGNOSIS — Z87.59 HISTORY OF PREGNANCY INDUCED HYPERTENSION: ICD-10-CM

## 2025-01-09 DIAGNOSIS — Z34.90 PRENATAL CARE, ANTEPARTUM: Primary | ICD-10-CM

## 2025-01-09 LAB
GLUCOSE UR STRIP-MCNC: NEGATIVE MG/DL
PROT UR STRIP-MCNC: NEGATIVE MG/DL

## 2025-01-09 RX ORDER — PRENATAL VIT/IRON FUM/FOLIC AC 27MG-0.8MG
1 TABLET ORAL DAILY
Qty: 90 TABLET | Refills: 3 | Status: SHIPPED | OUTPATIENT
Start: 2025-01-09

## 2025-01-09 NOTE — PROGRESS NOTES
"    OB FOLLOW UP  CC- Here for care of pregnancy        Sima Booker is a 35 y.o.  21w5d patient being seen today for her obstetrical follow up visit. Patient reports her average fasting BG is \"in the 70's\". Her average 2hr PP BG is \"in the 90's\". She reports that she is having more low readings and struggling to keep glucose in normal levels.     Her prenatal care is complicated by (and status) : see below.  Patient Active Problem List   Diagnosis    Family history of melanoma    Family history of colonic polyps    Tremors of nervous system    Pregnancy    AMA (advanced maternal age) multigravida 35+    Previous  section    Pregnancy resulting from in vitro fertilization, antepartum    History of gestational diabetes in prior pregnancy, currently pregnant    History of pregnancy induced hypertension       Flu Status: Already given in current flu season  Ultrasound Today: Yes  AFP was declined.    ROS -     Patient Denies: leaking of fluid, vaginal bleeding, dysuria, excessive vomiting, and more than 6 contractions per hour  Fetal Movement : Yes  All other systems reviewed and are negative.       The additional following portions of the patient's history were reviewed and updated as appropriate: allergies, current medications, past medical history, past social history, past surgical history, and problem list.      I have reviewed and agree with the HPI, ROS, and historical information as entered above. Rosalie Olmos Jadyn, APRN      /84   Wt 84.8 kg (187 lb)   LMP  (Approximate)   BMI 31.12 kg/m²       EXAM:     Prenatal Vitals  BP: 138/84  Weight: 84.8 kg (187 lb)   Fetal Heart Rate: 153          Urine Glucose Read-only: Negative  Urine Protein Read-only: Negative       Assessment and Plan    Problem List Items Addressed This Visit          Gravid and     AMA (advanced maternal age) multigravida 35+    Previous  section    Overview      vs repeat?         Pregnancy " resulting from in vitro fertilization, antepartum    Overview     Fetal echo         History of gestational diabetes in prior pregnancy, currently pregnant    Overview     Early glucola         History of pregnancy induced hypertension    Overview     With G1          Other Visit Diagnoses       Prenatal care, antepartum    -  Primary    Relevant Orders    POC Urinalysis Dipstick (Completed)            Pregnancy at 21w5d  Anatomy scan today is incomplete, follow up in 2 weeks for additional views with [facility]. Anatomy that was visualized was within normal limits.  Fetal status reassuring.   Activity and Exercise discussed.  Patient is on Prenatal vitamins  Discussed bASA for PIH prevention from 12 to 36wk  Return in about 2 weeks (around 1/23/2025) for after [facility].      Rosalie Salamanca, APRN  01/09/2025

## 2025-01-15 ENCOUNTER — TELEMEDICINE (OUTPATIENT)
Dept: PSYCHIATRY | Facility: CLINIC | Age: 35
End: 2025-01-15
Payer: COMMERCIAL

## 2025-01-15 DIAGNOSIS — F41.1 GENERALIZED ANXIETY DISORDER: ICD-10-CM

## 2025-01-15 DIAGNOSIS — F32.1 CURRENT MODERATE EPISODE OF MAJOR DEPRESSIVE DISORDER WITHOUT PRIOR EPISODE: Primary | ICD-10-CM

## 2025-01-15 PROBLEM — O43.199 MARGINAL INSERTION OF UMBILICAL CORD AFFECTING MANAGEMENT OF MOTHER: Status: ACTIVE | Noted: 2025-01-15

## 2025-01-15 RX ORDER — SERTRALINE HYDROCHLORIDE 100 MG/1
100 TABLET, FILM COATED ORAL DAILY
Qty: 30 TABLET | Refills: 2 | Status: SHIPPED | OUTPATIENT
Start: 2025-01-15 | End: 2026-01-15

## 2025-01-15 NOTE — PROGRESS NOTES
Patient Name: Sima Booker  MRN: [id number]   :  1990     This provider is located at her home office through the [facility] (through [facility]), [address], [address] using a secure [employer] Video Visit through [employer]. Patient is being seen remotely via telehealth at their home address in [place], and stated they are in a secure environment for this session. The patient's condition being diagnosed/treated is appropriate for telemedicine. The provider identified herself as well as her credentials.   The patient, and/or patients guardian, consent to be seen remotely, and when consent is given they understand that the consent allows for patient identifiable information to be sent to a third party as needed.   They may refuse to be seen remotely at any time. The electronic data is encrypted and password protected, and the patient and/or guardian has been advised of the potential risks to privacy not withstanding such measures.    You have chosen to receive care through a telehealth visit.  Do you consent to use a video/audio connection for your medical care today? Yes    Chief Complaint:      ICD-10-CM ICD-9-CM   1. Current moderate episode of major depressive disorder without prior episode  F32.1 296.22   2. Generalized anxiety disorder  F41.1 300.02       History of Present Illness: Sima Booker is a 35 y.o. female who presents today for medication management follow up. She reports an increase in depressive symptoms over the last few weeks. She states she is currently 22 weeks pregnant with her second child and has been diagnosed with gestational diabetes and hypertension. She states this has been stressful for her. She states that although she is excited about this pregnancy she is also nervous about how she will manage two children.  She states her sleep has been back and forth. She says some nights she sleeps well, other nights she  can't sleep. She says she is struggling without her ADHD medication but overall she is managing.     The following portions of the patient's history were reviewed and updated as appropriate: allergies, current medications, past family history, past medical history, past social history, past surgical history, and problem list.    Review of Systems;;  Review of Systems   Constitutional:  Negative for activity change, appetite change, fatigue, unexpected weight gain and unexpected weight loss.   Respiratory:  Negative for shortness of breath and wheezing.    Gastrointestinal:  Negative for constipation, diarrhea, nausea and vomiting.   Musculoskeletal:  Negative for gait problem.   Skin:  Negative for dry skin and rash.   Neurological:  Negative for dizziness, speech difficulty, weakness, light-headedness, headache, memory problem and confusion.   Psychiatric/Behavioral:  Negative for agitation, behavioral problems, decreased concentration, dysphoric mood, hallucinations, self-injury, sleep disturbance, suicidal ideas, negative for hyperactivity, depressed mood and stress. The patient is not nervous/anxious.        Physical Exam;;  Physical Exam  Constitutional:       General: She is not in acute distress.     Appearance: She is well-developed. She is not diaphoretic.   HENT:      Head: Normocephalic and atraumatic.   Eyes:      Conjunctiva/sclera: Conjunctivae normal.   Pulmonary:      Effort: Pulmonary effort is normal. No respiratory distress.   Musculoskeletal:         General: Normal range of motion.      Cervical back: Full passive range of motion without pain and normal range of motion.   Neurological:      Mental Status: She is alert and oriented to person, place, and time.   Psychiatric:         Mood and Affect: Mood is not anxious or depressed. Affect is not labile, blunt, angry or inappropriate.         Speech: Speech is not rapid and pressured or tangential.         Behavior: Behavior normal. Behavior is  not agitated, slowed, aggressive, withdrawn, hyperactive or combative. Behavior is cooperative.         Thought Content: Thought content normal. Thought content is not paranoid or delusional. Thought content does not include homicidal or suicidal ideation. Thought content does not include homicidal or suicidal plan.         Judgment: Judgment normal.       not currently breastfeeding.  There is no height or weight on file to calculate BMI.    Current Medications;;    Current Outpatient Medications:     ASPIRIN 81 PO, Take  by mouth., Disp: , Rfl:     Continuous Glucose Sensor (FreeStyle Lalita 3 Plus Sensor), Use Every 15 (Fifteen) Days. Called to [facility] in [place] 12/17/24, Disp: 2 each, Rfl: 6    glucose blood test strip, Use as instructed, Disp: 100 each, Rfl: 3    Lancets (freestyle) lancets, Use as directed., Disp: 100 each, Rfl: 3    levocetirizine (XYZAL) 5 MG tablet, Take 1 tablet by mouth Daily., Disp: , Rfl:     Prenatal Vit-Fe Fumarate-FA (prenatal vitamin 27-0.8) 27-0.8 MG tablet tablet, Take 1 tablet by mouth Daily., Disp: 90 tablet, Rfl: 3    sertraline (Zoloft) 100 MG tablet, Take 1 tablet by mouth Daily., Disp: 30 tablet, Rfl: 2    Lab Results:   Routine Prenatal on 01/09/2025   Component Date Value Ref Range Status    Glucose, UA 01/09/2025 Negative  Negative mg/dL Final    Protein, POC 01/09/2025 Negative  Negative mg/dL Final   Lab on 12/12/2024   Component Date Value Ref Range Status    Glucose - Fasting 12/12/2024 73  65 - 94 mg/dL Final    Glucose - 1 Hour 12/12/2024 185 (H)  65 - 179 mg/dL Final    Glucose - 2 Hour 12/12/2024 207 (H)  65 - 154 mg/dL Final    Glucose - 3 Hour 12/12/2024 142 (H)  65 - 139 mg/dL Final   Lab on 12/09/2024   Component Date Value Ref Range Status    Gestational Diabetes Screen 12/09/2024 142 (H)  65 - 139 mg/dL Final   Routine Prenatal on 12/05/2024   Component Date Value Ref Range Status    Color 12/05/2024 Yellow  Yellow, Straw, Dark Yellow, Isabela  Final    Clarity, UA 12/05/2024 Clear  Clear Final    Glucose, UA 12/05/2024 Negative  Negative mg/dL Final    Bilirubin 12/05/2024 Negative  Negative Final    Ketones, UA 12/05/2024 Negative  Negative Final    Specific Gravity  12/05/2024 1.015  1.005 - 1.030 Final    Blood, UA 12/05/2024 Negative  Negative Final    pH, Urine 12/05/2024 7.5  5.0 - 8.0 Final    Protein, POC 12/05/2024 Negative  Negative mg/dL Final    Urobilinogen, UA 12/05/2024 0.2 E.U./dL  Normal, 0.2 E.U./dL Final    Leukocytes 12/05/2024 Negative  Negative Final    Nitrite, UA 12/05/2024 Negative  Negative Final    Urine Culture 12/05/2024 Final report   Final    Result 1 12/05/2024 No growth   Final   Admission on 11/11/2024, Discharged on 11/11/2024   Component Date Value Ref Range Status    STREP A PCR 11/11/2024 Not Detected  Not Detected Final    Internal Control 11/11/2024 Passed  Passed Final    Lot Number 11/11/2024 [id number]   Final    Expiration Date 11/11/2024 5-31-26   Final    SARS Antigen 11/11/2024 Not Detected  Not Detected, Presumptive Negative Final    Influenza A Antigen ISABELLA 11/11/2024 Not Detected  Not Detected Final    Influenza B Antigen ISABELLA 11/11/2024 Not Detected  Not Detected Final    Internal Control 11/11/2024 Passed  Passed Final    Lot Number 11/11/2024 [id number]   Final    Expiration Date 11/11/2024 11-30-25   Final   Initial Prenatal on 11/05/2024   Component Date Value Ref Range Status    Hepatitis B Surface Ag 11/05/2024 Negative  Negative Final    Hep C Virus Ab 11/05/2024 Non Reactive  Non Reactive Final    Comment: HCV antibody alone does not differentiate between previously  resolved infection and active infection. Equivocal and Reactive  HCV antibody results should be followed up with an HCV RNA test  to support the diagnosis of active HCV infection.      RPR 11/05/2024 Non Reactive  Non Reactive Final    Rubella Antibodies, IgG 11/05/2024 3.91  Immune >0.99 index Final    Comment:                                  Non-immune       <0.90                                  Equivocal  0.90 - 0.99                                  Immune           >0.99      ABO Type 11/05/2024 A   Final    Rh Factor 11/05/2024 Positive   Final    Comment: Please note: Prior records for this patient's ABO / Rh type are not  available for additional verification.      Antibody Screen 11/05/2024 Negative  Negative Final    HIV Screen 4th Gen w/RFX (Referenc* 11/05/2024 Non Reactive  Non Reactive Final    Comment: HIV-1/HIV-2 antibodies and HIV-1 p24 antigen were NOT detected.  There is no laboratory evidence of HIV infection.  HIV Negative      Urine Culture 11/05/2024 Final report   Final    Result 1 11/05/2024 Comment   Final    Comment: Mixed urogenital arianna  Less than 10,000 colonies/mL      Chlamydia trachomatis, LARS 11/05/2024 Negative  Negative Final    Neisseria gonorrhoeae, LARS 11/05/2024 Negative  Negative Final    Amphetamine, Urine Qual 11/05/2024 Negative  Zcfvqb=1425 ng/mL Final    Barbiturates Screen, Urine 11/05/2024 Negative  Oyxkyh=028 ng/mL Final    Benzodiazepine Screen, Urine 11/05/2024 Negative  Oamdjy=842 ng/mL Final    THC Screen, Urine 11/05/2024 Negative  Cutoff=20 ng/mL Final    Cocaine Screen, Urine 11/05/2024 Negative  Qvujbr=936 ng/mL Final    Opiate Screen, Urine 11/05/2024 Negative  Lmyncp=452 ng/mL Final    Opiate test includes Codeine, Morphine, Hydromorphone, Hydrocodone.    Oxycodone/Oxymorphone, Urine 11/05/2024 Negative  Pkybax=894 ng/mL Final    Test includes Oxycodone and Oxymorphone    Phencyclidine (PCP), Urine 11/05/2024 Negative  Cutoff=25 ng/mL Final    Methadone Screen, Urine 11/05/2024 Negative  Wuuzgm=365 ng/mL Final    Propoxyphene Screen 11/05/2024 Negative  Rzhtzv=929 ng/mL Final    Creatinine, Urine 11/05/2024 81.4  20.0 - 300.0 mg/dL Final    pH, UA 11/05/2024 6.5  4.5 - 8.9 Final    Please note 11/05/2024 Comment   Final    Comment: This assay provides a preliminary unconfirmed analytical  test  result that may be suitable for clinical management of patients  in certain situations. Drug-test results should be interpreted  in the context of clinical information. Patient metabolic  variables, specific drug chemistry, and specimen  characteristics can affect test outcome. Technical consultation  is available if a test result is inconsistent with an expected  outcome.    Email:  [email or web address]    Phone:  [phone]      Reference Lab Report 2024    Final                    Value:[facility]  [address] [place] [address]  Phone: [phone] or [phone]  Fax: [phone]  Petros Graves M.D., Medical Director    PATIENT NAME                                     LABORATORY NO.  [id number]   SHANICE LLANOS                           [id number]  [id number]                                 AGE                    SEX   SSN              CLIENT REF #  [facility]                                 34        1990      F     [id number]      [id number]    [address]                 REQUESTING M.NE.           ATTENDING [place] [address]                        HEAVENLY KHANNA  DATE COLLECTED            DATE RECEIVED          DATE REPORTED  2024    ThinPrep Pap with [facility]    DIAGNOSIS:  Epithelial cell abnormality. (ASC) Atypical squamous cells of undetermined  significance.    Professional interpretation rendered by Robert Dockery M.D.,F.C.A.P. at [facility]  [employer], [facility], [address] [place] [address].  SPECIMEN ADEQUACY:  SATISFACTORY FOR EVALUATION  Transformation zone is present.  SOURCE OF SPECIMEN:       CERVICAL  SLIDES:  1  CLINICAL HISTORY:  A Routine  Prenatal care in first trimester  Previous  section  Antepartum multigravida of advanced maternal age  12 weeks gestation of pregnancy  History of gestational  diabetes in prior pregnancy, currently pregnant  History of pregnancy induced hypertension    HPV  HR-HPV POOL: Negative    The Aptima HPV assay is an in vitro nucleic acid amplification test for the  qualitative detection of E6/E7 viral messenger RNA from 14 high risk types of  HPV in cervical specimens. The high risk HPV types detected include: 16, 18,  31, 33, 35, 39, 45, 51, 52, 56, 58, 59, 66, 68    Chlamydia / Gonorrhea  CHLAMYDIA TRACHOMATIS: Negative  NEISSERIA GONORRHOEAE: Negative    The Aptima Combo 2 assay is a target amplification nucleic acid probe test that  utilizes                           target capture for the in vitro qualitative detection and differentiation of  ribosomal RNA from Chlamydia trachomatis and Neisseria gonorrhoeae to aid  in the diagnosis of chlamydial and gonococcal disease using the [place] system.    CYTOTECHNOLOGIST:             [place] (ASCP)                                      REVIEWED, DIAGNOSED AND  ELECTRONICALLY SIGNED BY:      Robert Dockery M.D.,F.C.A.P.    CPT CODES:  24708, 19206, 87623, 23051, 56291      Specimen Status 11/05/2024 Comment   Final    Comment: Unable to Void  Unable to Void  The patient was not able to render a urine sample and has been  instructed to return for a urine collection at their earliest  convenience.  The urine testing that you have requested has  been deleted from this report.  When the patient returns and  provides a urine specimen, the urine testing will be performed  and separately reported.  Please note  Please note  The date and/or time of collection was not indicated on the  requisition as required by state and federal law.  The date of  receipt of the specimen was used as the collection date if not  supplied.      Uric Acid 11/05/2024 3.6  2.6 - 6.2 mg/dL Final               Therapeutic target for gout patients: <6.0    BUN 11/05/2024 8  6 - 20 mg/dL Final    Creatinine 11/05/2024 0.71  0.57 - 1.00 mg/dL Final    EGFR Result  11/05/2024 114  >59 mL/min/1.73 Final    LDH 11/05/2024 206  119 - 226 IU/L Final    AST (SGOT) 11/05/2024 17  0 - 40 IU/L Final    ALT (SGPT) 11/05/2024 12  0 - 32 IU/L Final    Creatinine, Urine 11/05/2024 CANCELED   Final-Edited    Comment: Test not performed    Result canceled by the ancillary.      Microalbumin, Urine 11/05/2024 CANCELED   Final-Edited    Comment: Test not performed    Result canceled by the ancillary.      WBC 11/05/2024 9.7  3.4 - 10.8 x10E3/uL Final    RBC 11/05/2024 4.72  3.77 - 5.28 x10E6/uL Final    Hemoglobin 11/05/2024 13.3  11.1 - 15.9 g/dL Final    Hematocrit 11/05/2024 41.3  34.0 - 46.6 % Final    MCV 11/05/2024 88  79 - 97 fL Final    MCH 11/05/2024 28.2  26.6 - 33.0 pg Final    MCHC 11/05/2024 32.2  31.5 - 35.7 g/dL Final    RDW 11/05/2024 17.2 (H)  11.7 - 15.4 % Final    Platelets 11/05/2024 273  150 - 450 x10E3/uL Final    Neutrophil Rel % 11/05/2024 70  Not Estab. % Final    Lymphocyte Rel % 11/05/2024 20  Not Estab. % Final    Monocyte Rel % 11/05/2024 9  Not Estab. % Final    Eosinophil Rel % 11/05/2024 1  Not Estab. % Final    Basophil Rel % 11/05/2024 0  Not Estab. % Final    Neutrophils Absolute 11/05/2024 6.8  1.4 - 7.0 x10E3/uL Final    Lymphocytes Absolute 11/05/2024 1.9  0.7 - 3.1 x10E3/uL Final    Monocytes Absolute 11/05/2024 0.9  0.1 - 0.9 x10E3/uL Final    Eosinophils Absolute 11/05/2024 0.1  0.0 - 0.4 x10E3/uL Final    Basophils Absolute 11/05/2024 0.0  0.0 - 0.2 x10E3/uL Final    Immature Granulocyte Rel % 11/05/2024 0  Not Estab. % Final    Immature Grans Absolute 11/05/2024 0.0  0.0 - 0.1 x10E3/uL Final       Mental Status Exam:   Hygiene:   good  Cooperation:  Cooperative  Eye Contact:  Good  Psychomotor Behavior:  Appropriate  Mood:depressed  Affect:  Appropriate  Hopelessness: Denies  Speech:  Normal  Thought Process:  Goal directed  Thought Content:  Normal  Suicidal:  None  Homicidal:  None  Hallucinations:  None  Delusion:  None  Memory:   Intact  Orientation:  Person, Place, Time, and Situation  Reliability:  good  Insight:  Good  Judgement:  Good  Impulse Control:  Good    PHQ-9 Depression Screening  Little interest or pleasure in doing things? (Patient-Rptd) Over half   Feeling down, depressed, or hopeless? (Patient-Rptd) Several days   PHQ-2 Total Score (Patient-Rptd) 3   Trouble falling or staying asleep, or sleeping too much? (Patient-Rptd) Over half   Feeling tired or having little energy? (Patient-Rptd) Over half   Poor appetite or overeating? (Patient-Rptd) Several days   Feeling bad about yourself - or that you are a failure or have let yourself or your family down? (Patient-Rptd) Not at all   Trouble concentrating on things, such as reading the newspaper or watching television? (Patient-Rptd) Several days   Moving or speaking so slowly that other people could have noticed? Or the opposite - being so fidgety or restless that you have been moving around a lot more than usual? (Patient-Rptd) Not at all   Thoughts that you would be better off dead, or of hurting yourself in some way? (Patient-Rptd) Not at all   PHQ-9 Total Score (Patient-Rptd) 9   If you checked off any problems, how difficult have these problems made it for you to do your work, take care of things at home, or get along with other people? (Patient-Rptd) Somewhat difficult         Assessment/Plan:  Diagnoses and all orders for this visit:    1. Current moderate episode of major depressive disorder without prior episode (Primary)  -     sertraline (Zoloft) 100 MG tablet; Take 1 tablet by mouth Daily.  Dispense: 30 tablet; Refill: 2    2. Generalized anxiety disorder  -     sertraline (Zoloft) 100 MG tablet; Take 1 tablet by mouth Daily.  Dispense: 30 tablet; Refill: 2      Patient has had a recent increase in anxiety and depression related to the holidays and current medical issues related to her pregnancy. She is 22 weeks pregnant and was recently diagnosed with gestational  diabetes and hypertension. She denies any suicidal ideation at this time but reports her depression and anxiety has worsened. We will increase Zoloft today to 100 mg and follow up in 1 month.     A psychological evaluation was conducted in order to assess past and current level of functioning. Areas assessed included, but were not limited to: perception of social support, perception of ability to face and deal with challenges in life (positive functioning), anxiety symptoms, depressive symptoms, perspective on beliefs/belief system, coping skills for stress, intelligence level,  Therapeutic rapport was established. Interventions conducted today were geared towards incorporating medication management along with support for continued therapy. Education was also provided as to the med management with this provider and what to expect in subsequent sessions.    We discussed risks, benefits,goals and side effects of the above medication and the patient was agreeable with the plan.Patient was educated on the importance of compliance with treatment and follow-up appointments. Patient is aware to contact the [facility] [phone] with any worsening of symptoms. To call for questions or concerns and return early if necessary. Patent is agreeable to go to the Emergency Department or call 911 should they begin SI/HI.     Part of this note may be an electronic transcription/translation of spoken language to printed text using the Dragon Dict[employer] System.    Return in about 4 weeks (around 2/12/2025) for Follow Up 30 min, Video visit.    QI Contreras    This note has been transcribed by Ashtyn MCKOY, RN [facility] NP student. I have reviewed the note and concur with the transcription.

## 2025-01-21 ENCOUNTER — OFFICE VISIT (OUTPATIENT)
Dept: OBSTETRICS AND GYNECOLOGY | Facility: HOSPITAL | Age: 35
End: 2025-01-21
Payer: COMMERCIAL

## 2025-01-21 ENCOUNTER — HOSPITAL ENCOUNTER (OUTPATIENT)
Dept: WOMENS IMAGING | Facility: HOSPITAL | Age: 35
Discharge: HOME | End: 2025-01-21
Admitting: OBSTETRICS & GYNECOLOGY
Payer: COMMERCIAL

## 2025-01-21 ENCOUNTER — DOCUMENTATION (OUTPATIENT)
Dept: OBSTETRICS AND GYNECOLOGY | Facility: HOSPITAL | Age: 35
End: 2025-01-21
Payer: COMMERCIAL

## 2025-01-21 ENCOUNTER — ROUTINE PRENATAL (OUTPATIENT)
Dept: OBSTETRICS AND GYNECOLOGY | Facility: CLINIC | Age: 35
End: 2025-01-21
Payer: COMMERCIAL

## 2025-01-21 VITALS — BODY MASS INDEX: 31.78 KG/M2 | SYSTOLIC BLOOD PRESSURE: 130 MMHG | DIASTOLIC BLOOD PRESSURE: 78 MMHG | WEIGHT: 191 LBS

## 2025-01-21 VITALS — SYSTOLIC BLOOD PRESSURE: 140 MMHG | WEIGHT: 190 LBS | DIASTOLIC BLOOD PRESSURE: 88 MMHG | BODY MASS INDEX: 31.62 KG/M2

## 2025-01-21 DIAGNOSIS — Z34.92 PRENATAL CARE IN SECOND TRIMESTER: Primary | ICD-10-CM

## 2025-01-21 DIAGNOSIS — Z86.32 HISTORY OF GESTATIONAL DIABETES IN PRIOR PREGNANCY, CURRENTLY PREGNANT: ICD-10-CM

## 2025-01-21 DIAGNOSIS — O09.819 PREGNANCY RESULTING FROM IN VITRO FERTILIZATION, ANTEPARTUM: ICD-10-CM

## 2025-01-21 DIAGNOSIS — O09.299 HISTORY OF GESTATIONAL DIABETES IN PRIOR PREGNANCY, CURRENTLY PREGNANT: ICD-10-CM

## 2025-01-21 DIAGNOSIS — O24.410 DIET CONTROLLED GESTATIONAL DIABETES MELLITUS (GDM), ANTEPARTUM: ICD-10-CM

## 2025-01-21 DIAGNOSIS — Z3A.23 23 WEEKS GESTATION OF PREGNANCY: ICD-10-CM

## 2025-01-21 DIAGNOSIS — Z98.891 PREVIOUS CESAREAN SECTION: ICD-10-CM

## 2025-01-21 DIAGNOSIS — O09.529 ANTEPARTUM MULTIGRAVIDA OF ADVANCED MATERNAL AGE: Primary | ICD-10-CM

## 2025-01-21 DIAGNOSIS — O09.529 ANTEPARTUM MULTIGRAVIDA OF ADVANCED MATERNAL AGE: ICD-10-CM

## 2025-01-21 DIAGNOSIS — Z87.59 HISTORY OF PREGNANCY INDUCED HYPERTENSION: ICD-10-CM

## 2025-01-21 DIAGNOSIS — O44.40 LOW-LYING PLACENTA: ICD-10-CM

## 2025-01-21 LAB
GLUCOSE UR STRIP-MCNC: NEGATIVE MG/DL
PROT UR STRIP-MCNC: NEGATIVE MG/DL

## 2025-01-21 PROCEDURE — 93325 DOPPLER ECHO COLOR FLOW MAPG: CPT

## 2025-01-21 PROCEDURE — 76811 OB US DETAILED SNGL FETUS: CPT

## 2025-01-21 PROCEDURE — 76825 ECHO EXAM OF FETAL HEART: CPT

## 2025-01-21 NOTE — PROGRESS NOTES
Spoke with patient in office.  Patient is a GDM and PDC is now following, Folder given, explained process of running logs weekly and Dr. Dave reviewing.  Patient has a Lalita 3 plus, email has been sent and patient accepted.  Explained will run report next week and reach out at that time.  Briefly covered diet, patient states she has been to the diabetes education class and voices understanding.  Evon Gaming RN

## 2025-01-21 NOTE — PROGRESS NOTES
"Documentation of the ultrasound findings, images, and interpretations will be available in the patient's Viewpoint report which is located in the imaging tab in chart review.    Maternal/Fetal Medicine Consult Note     Name: Sima Booker    : 1990     MRN: [id number]     Referring Provider: Hue Patel MD    Chief Complaint  AMA; IVF; GDM; Hx GHTN; Prev c/s x1    Subjective     History of Present Illness:  Sima Booker is a 35 y.o.  23w3d who presents today for AMA, GDM    ANI: Estimated Date of Delivery: 25     ROS:   As noted in HPI.     Past Medical History:   Diagnosis Date    ADHD (attention deficit hyperactivity disorder)     Allergic     Anxiety     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 Not sure how old i was      Past Surgical History:   Procedure Laterality Date     SECTION N/A 2021    Procedure:  SECTION PRIMARY;  Surgeon: Stef Lindsey MD;  Location: [facility] LABOR DELIVERY;  Service: Obstetrics/Gynecology;  Laterality: N/A;    COLONOSCOPY  10/01/2017    FERTILITY SURGERY  2020    Oocyte retrieval for IVF    NASAL SINUS SURGERY  2016    WISDOM TOOTH EXTRACTION        OB History          2    Para   1    Term   1       0    AB   0    Living   1         SAB   0    IAB   0    Ectopic   0    Molar   0    Multiple   0    Live Births   1          Obstetric Comments   FOB #1 : Pregnancy #1(IVF, frozen cycle retrieved 2020, no donors)               Objective     Vital Signs  /88   Wt 86.2 kg (190 lb)   LMP  (Approximate)   Estimated body mass index is 31.62 kg/m² as calculated from the following:    Height as of 24: 165.1 cm (65\").    Weight as of this encounter: 86.2 kg (190 lb).    Physical " Exam    Ultrasound Impression:   See Viewpoint    Assessment and Plan     Sima Booker is a 35 y.o.  23w3d who presents today for AMA, GDM    Diagnoses and all orders for this visit:    1. Antepartum multigravida of advanced maternal age (Primary)  Assessment & Plan:  The patient will be 35 years old at the time of delivery.  She was quoted the following age-related risks at term:  Risk for Down syndrome 1 in 380, risk for any chromosomal abnormality 1 in 190.  Options in genetic testing and genetic screening were discussed with the patient.  I noted an option of genetic testing in the 2nd trimester of transabdominal amniocentesis for the determination of fetal chromosomal complement as well as amniotic fluid alpha-fetoprotein for the evaluation of a fetal open neural tube defect.  A procedure-related fetal loss rate of 1 in 500 would be quoted with midtrimester amniocentesis.  I also discussed the option of analysis of cell-free fetal DNA in maternal blood.  I noted this directed analysis measures the relative proportion of chromosomes with the detection rate of fetal Down syndrome quoted as 99% with a false positive rate of less than .1%.  Screening for other fetal aneuploidies is also possible but the detection rate is lower with cell-free fetal DNA technology.  I then discussed the clinical utility of ultrasound and/or biochemical screening for the detection of fetal aneuploidy as well as fetal open neural tube defects.  Further, I have reviewed her self-reported family history and made suggestions as appropriate based on my review.      Patient notes that this pregnancy was conceived with eggs that were harvested at the age of 30.  As such her risk of chromosomal abnormalities is not significantly increased.      Patient was informed that the ultrasound appeared entirely normal today.  We discussed that this may decrease her risk of chromosomal abnormalities by 50%.  Patient was once again  offered invasive genetic testing both amniocentesis or cell free DNA screening.  Patient again declines all genetic genetic testing.    Orders:  -     [facility]; Future    2. Previous  section  -     [facility]; Future    3. Pregnancy resulting from in vitro fertilization, antepartum  Assessment & Plan:  Patient was informed of the increased risk of congenital anomalies, especially cardiac anomalies with pregnancies resulting from in vitro fertilization.    Ultrasound today demonstrates a normally grown fetus with no abnormality seen.  Fetal echocardiogram was performed and fetal heart appeared structurally normal.    Patient understands that all cardiac anomalies cannot be ruled out but that her risk of congenital heart disease in this fetus is significantly decreased.    Orders:  -     [facility]; Future    4. History of pregnancy induced hypertension  -     [facility] [place]; Future    5. Diet controlled gestational diabetes mellitus (GDM), antepartum  Assessment & Plan:  Patient noted to have abnormal glucose testing early in pregnancy.  Her fastings were normal however on her 3-hour test her 1 hour to hour and 3 are results were abnormally elevated.  Patient is seen diabetes education.  She currently has a continuous glucose monitor and is following diabetic diet.  She notes that her fastings are generally in the 70s to 80s range and 2 hours after meals her glucoses are 1 15-1 20.    Ultrasound today demonstrates a normally grown fetus with no abnormality seen.  In particular were no fetal markers for trisomy.  Amniotic fluid volume and cervical length were normal.    Patient currently has diet-controlled gestational diabetes.  We will be happy to continue to follow this patient.  We will check the readings from her glucometer weekly and make therapy adjustments as needed.  Currently her diabetes appears to be  well-controlled on diet and no alterations are necessary.    If the patient remains diet-controlled we will institute  testing beginning at 36 weeks gestation with delivery at 38 to 39 weeks gestation.  If the patient's gestational diabetes worsens and she will require therapy then we would recommend  testing beginning at 32 weeks gestation with delivery timing determined by the degree of control in the third trimester but not likely to be later than 38 weeks gestation.    We will scan the patient monthly to assess fetal growth and wellbeing.    Orders:  -     [facility]; Future    6. Low-lying placenta  -     [facility]; Future         Follow Up  Return in about 4 weeks (around 2025).    I spent ____ minutes caring for the patient on the day of service. This included: obtaining or reviewing a separately obtained medical history, reviewing patient records, performing a medically appropriate exam and/or evaluation, counseling or educating the patient/family/caregiver, ordering medications, labs, and/or procedures and documenting such in the medical record. This does not include time spent on review and interpretation of other tests such as fetal ultrasound or the performance of other procedures such as amniocentesis or CVS.      Douglas A. Milligan, MD  Maternal Fetal Medicine, [facility]    Diagnostic Center     2025

## 2025-01-21 NOTE — PROGRESS NOTES
"    OB FOLLOW UP  CC- Here for care of pregnancy        Sima Booker is a 35 y.o.  23w3d patient being seen today for her obstetrical follow up visit. Patient reports {lexob pregnancy problems before 24 weeks:60022}.     Her prenatal care is complicated by (and status) : see below. and GDM diet controlled. Her fasting BG range has been ***, with an average of ***. Her 2hr PP BG range has been ***, her average has been ***.  Patient Active Problem List   Diagnosis    Family history of melanoma    Family history of colonic polyps    Tremors of nervous system    Pregnancy    AMA (advanced maternal age) multigravida 35+    Previous  section    Pregnancy resulting from in vitro fertilization, antepartum    History of gestational diabetes in prior pregnancy, currently pregnant    History of pregnancy induced hypertension    Marginal insertion of umbilical cord affecting management of mother    Diet controlled gestational diabetes mellitus (GDM), antepartum    Low-lying placenta       Flu Status: Already given in current flu season  Ultrasound Today: Yes  Size consistent with dates.  No fetal anomalies were identified.  No fetal markers for trisomy.  The cervical length appears normal.  Reviewed 1 hr glucose testing and TDAP next visit.    ROS -   Patient Denies: {lexob denies before 24 weeks:24169}  Fetal Movement : {desc; normal/decreased:26030}  All other systems reviewed and are negative.       The additional following portions of the patient's history were reviewed and updated as appropriate: {history reviewed:::\"allergies\",\"current medications\",\"past family history\",\"past medical history\",\"past social history\",\"past surgical history\",\"problem list\"}.      I have reviewed and agree with the HPI, ROS, and historical information as entered above. ***    LMP  (Approximate)       EXAM:                                 Assessment and Plan    Problem List Items Addressed This Visit  " "  None      Pregnancy at 23w3d  Fetal status reassuring.  {follow up anatomy:70906}  {28wk labs [facility] (Optional):98683::\"1 hour gtt\",\"CBC\",\"Antibody screen\",\"TDAP\",\"RPR\"} next visit. Instructions given  {pregnancy plan 24w lexob:13914::\"Discussed/encouraged TDAP vaccination after 28 weeks\"}  Activity and Exercise discussed.  No follow-ups on file.      Kim Meek RN  01/21/2025    "

## 2025-01-21 NOTE — ASSESSMENT & PLAN NOTE
Patient noted to have abnormal glucose testing early in pregnancy.  Her fastings were normal however on her 3-hour test her 1 hour to hour and 3 are results were abnormally elevated.  Patient is seen diabetes education.  She currently has a continuous glucose monitor and is following diabetic diet.  She notes that her fastings are generally in the 70s to 80s range and 2 hours after meals her glucoses are 1 15-1 20.    Ultrasound today demonstrates a normally grown fetus with no abnormality seen.  In particular were no fetal markers for trisomy.  Amniotic fluid volume and cervical length were normal.    Patient currently has diet-controlled gestational diabetes.  We will be happy to continue to follow this patient.  We will check the readings from her glucometer weekly and make therapy adjustments as needed.  Currently her diabetes appears to be well-controlled on diet and no alterations are necessary.    If the patient remains diet-controlled we will institute  testing beginning at 36 weeks gestation with delivery at 38 to 39 weeks gestation.  If the patient's gestational diabetes worsens and she will require therapy then we would recommend  testing beginning at 32 weeks gestation with delivery timing determined by the degree of control in the third trimester but not likely to be later than 38 weeks gestation.    We will scan the patient monthly to assess fetal growth and wellbeing.

## 2025-01-21 NOTE — LETTER
2025     Hue Patel MD  [address] [facility]  [place] [place] [address] [place] [id number]    Patient: Sima Booker   YOB: 1990   Date of Visit: 2025     Dear Hue Patel MD:       Thank you for referring Sima Booker to me for evaluation. Below are the relevant portions of my assessment and plan of care.    If you have questions, please do not hesitate to call me. I look forward to following Sima along with you.         Sincerely,        Douglas A. Milligan, MD        CC: No Recipients    Milligan, Douglas A, MD  25 1006  Sign when Signing Visit  Documentation of the ultrasound findings, images, and interpretations will be available in the patient's Viewpoint report which is located in the imaging tab in chart review.    Maternal/Fetal Medicine Consult Note     Name: Sima Booker    : 1990     MRN: [id number]     Referring Provider: Hue Patel MD    Chief Complaint  AMA; IVF; GDM; Hx GHTN; Prev c/s x1    Subjective     History of Present Illness:  Sima Booker is a 35 y.o.  23w3d who presents today for AMA, GDM    ANI: Estimated Date of Delivery: 25     ROS:   As noted in HPI.     Past Medical History:   Diagnosis Date   • ADHD (attention deficit hyperactivity disorder) -   • Allergic    • Anxiety    • 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    • Gestational diabetes    • Gestational hypertension    • Infectious mononucleosis    • Kidney stone    • Tooth fractures     porcelan tooth   • Urinary tract infection    • Varicella     Childhood. Not sure how old i was      Past Surgical History:   Procedure Laterality Date   •  SECTION N/A 2021    Procedure:  SECTION PRIMARY;  Surgeon: Stef Lindsey MD;  Location: [facility] LABOR DELIVERY;  Service: Obstetrics/Gynecology;  Laterality: N/A;   • COLONOSCOPY  10/01/2017   • FERTILITY SURGERY  2020    Oocyte retrieval for IVF   • NASAL SINUS SURGERY   "2016   • WISDOM TOOTH EXTRACTION        OB History          2    Para   1    Term   1       0    AB   0    Living   1         SAB   0    IAB   0    Ectopic   0    Molar   0    Multiple   0    Live Births   1          Obstetric Comments   FOB #1 : Pregnancy #1(IVF, frozen cycle retrieved 2020, no donors)               Objective     Vital Signs  /88   Wt 86.2 kg (190 lb)   LMP  (Approximate)   Estimated body mass index is 31.62 kg/m² as calculated from the following:    Height as of 24: 165.1 cm (65\").    Weight as of this encounter: 86.2 kg (190 lb).    Physical Exam    Ultrasound Impression:   See Viewpoint    Assessment and Plan     Sima Booker is a 35 y.o.  23w3d who presents today for AMA, GDM    Diagnoses and all orders for this visit:    1. Antepartum multigravida of advanced maternal age (Primary)  Assessment & Plan:  The patient will be 35 years old at the time of delivery.  She was quoted the following age-related risks at term:  Risk for Down syndrome 1 in 380, risk for any chromosomal abnormality 1 in 190.  Options in genetic testing and genetic screening were discussed with the patient.  I noted an option of genetic testing in the 2nd trimester of transabdominal amniocentesis for the determination of fetal chromosomal complement as well as amniotic fluid alpha-fetoprotein for the evaluation of a fetal open neural tube defect.  A procedure-related fetal loss rate of 1 in 500 would be quoted with midtrimester amniocentesis.  I also discussed the option of analysis of cell-free fetal DNA in maternal blood.  I noted this directed analysis measures the relative proportion of chromosomes with the detection rate of fetal Down syndrome quoted as 99% with a false positive rate of less than .1%.  Screening for other fetal aneuploidies is also possible but the detection rate is lower with cell-free fetal DNA technology.  I then discussed the clinical utility of " ultrasound and/or biochemical screening for the detection of fetal aneuploidy as well as fetal open neural tube defects.  Further, I have reviewed her self-reported family history and made suggestions as appropriate based on my review.      Patient notes that this pregnancy was conceived with eggs that were harvested at the age of 30.  As such her risk of chromosomal abnormalities is not significantly increased.      Patient was informed that the ultrasound appeared entirely normal today.  We discussed that this may decrease her risk of chromosomal abnormalities by 50%.  Patient was once again offered invasive genetic testing both amniocentesis or cell free DNA screening.  Patient again declines all genetic genetic testing.    Or[facility]:  -      [facility]    2. Previous  section  -      [facility]; Future    3. Pregnancy resulting from in vitro fertilization, antepartum  Assessment & Plan:  Patient was informed of the increased risk of congenital anomalies, especially cardiac anomalies with pregnancies resulting from in vitro fertilization.    Ultrasound today demonstrates a normally grown fetus with no abnormality seen.  Fetal echocardiogram was performed and fetal heart appeared structurally normal.    Patient understands that all cardiac anomalies cannot be ruled out but that her risk of congenital heart disease in this fetus is significantly decreased.    Orders:  -      [facility]; Future    4. History of pregnancy induced hypertension  -      [facility]; Future    5. Diet controlled gestational diabetes mellitus (GDM), antepartum  Assessment & Plan:  Patient noted to have abnormal glucose testing early in pregnancy.  Her fastings were normal however on her 3-hour test her 1 hour to hour and 3 are results were abnormally elevated.  Patient is seen diabetes education.  She currently has a continuous glucose  monitor and is following diabetic diet.  She notes that her fastings are generally in the 70s to 80s range and 2 hours after meals her glucoses are 1 15-1 20.    Ultrasound today demonstrates a normally grown fetus with no abnormality seen.  In particular were no fetal markers for trisomy.  Amniotic fluid volume and cervical length were normal.    Patient currently has diet-controlled gestational diabetes.  We will be happy to continue to follow this patient.  We will check the readings from her glucometer weekly and make therapy adjustments as needed.  Currently her diabetes appears to be well-controlled on diet and no alterations are necessary.    If the patient remains diet-controlled we will institute  testing beginning at 36 weeks gestation with delivery at 38 to 39 weeks gestation.  If the patient's gestational diabetes worsens and she will require therapy then we would recommend  testing beginning at 32 weeks gestation with delivery timing determined by the degree of control in the third trimester but not likely to be later than 38 weeks gestation.    We will scan the patient monthly to assess fetal growth and wellbeing.    [facility]:  [facility]    6. Low-lying placenta  -     [facility]; Future         Follow Up  Return in about 4 weeks (around 2025).    I spent ____ minutes caring for the patient on the day of service. This included: obtaining or reviewing a separately obtained medical history, reviewing patient records, performing a medically appropriate exam and/or evaluation, counseling or educating the patient/family/caregiver, ordering medications, labs, and/or procedures and documenting such in the medical record. This does not include time spent on review and interpretation of other tests such as fetal ultrasound or the performance of other procedures such as amniocentesis or CVS.      Douglas A. Milligan, MD  Maternal  Fetal Medicine, [facility]    Diagnostic Center     2025

## 2025-01-21 NOTE — ASSESSMENT & PLAN NOTE
The patient will be 35 years old at the time of delivery.  She was quoted the following age-related risks at term:  Risk for Down syndrome 1 in 380, risk for any chromosomal abnormality 1 in 190.  Options in genetic testing and genetic screening were discussed with the patient.  I noted an option of genetic testing in the 2nd trimester of transabdominal amniocentesis for the determination of fetal chromosomal complement as well as amniotic fluid alpha-fetoprotein for the evaluation of a fetal open neural tube defect.  A procedure-related fetal loss rate of 1 in 500 would be quoted with midtrimester amniocentesis.  I also discussed the option of analysis of cell-free fetal DNA in maternal blood.  I noted this directed analysis measures the relative proportion of chromosomes with the detection rate of fetal Down syndrome quoted as 99% with a false positive rate of less than .1%.  Screening for other fetal aneuploidies is also possible but the detection rate is lower with cell-free fetal DNA technology.  I then discussed the clinical utility of ultrasound and/or biochemical screening for the detection of fetal aneuploidy as well as fetal open neural tube defects.  Further, I have reviewed her self-reported family history and made suggestions as appropriate based on my review.      Patient notes that this pregnancy was conceived with eggs that were harvested at the age of 30.  As such her risk of chromosomal abnormalities is not significantly increased.      Patient was informed that the ultrasound appeared entirely normal today.  We discussed that this may decrease her risk of chromosomal abnormalities by 50%.  Patient was once again offered invasive genetic testing both amniocentesis or cell free DNA screening.  Patient again declines all genetic genetic testing.

## 2025-01-21 NOTE — ASSESSMENT & PLAN NOTE
Patient was informed of the increased risk of congenital anomalies, especially cardiac anomalies with pregnancies resulting from in vitro fertilization.    Ultrasound today demonstrates a normally grown fetus with no abnormality seen.  Fetal echocardiogram was performed and fetal heart appeared structurally normal.    Patient understands that all cardiac anomalies cannot be ruled out but that her risk of congenital heart disease in this fetus is significantly decreased.

## 2025-01-21 NOTE — PROGRESS NOTES
OB FOLLOW UP  CC- Here for care of pregnancy        Sima Booker is a 35 y.o.  23w3d patient being seen today for her obstetrical follow up visit. Patient reports no complaints. Pt feels well, good fm, FBS=77, 4jv=[id number]. Seen by [facility] tdy and they told pt that they would monitor GDM. She is RTO @ [facility] 25    Her prenatal care is complicated by (and status) : see below. and GDM diet controlled. Her fasting BG range has been 77, with an average of  . Her 2hr PP BG range has been 117-120, her average has been  .  Patient Active Problem List   Diagnosis    Family history of melanoma    Family history of colonic polyps    Tremors of nervous system    Pregnancy    AMA (advanced maternal age) multigravida 35+    Previous  section    Pregnancy resulting from in vitro fertilization, antepartum    History of gestational diabetes in prior pregnancy, currently pregnant    History of pregnancy induced hypertension    Marginal insertion of umbilical cord affecting management of mother    Diet controlled gestational diabetes mellitus (GDM), antepartum       Flu Status: Declines  Ultrasound Today: Yes @ [facility]  Reviewed TDAP next visit.    ROS -   Patient Denies: leaking of fluid, vaginal bleeding, dysuria, excessive vomiting, and more than 6 contractions per hour  Fetal Movement : normal  All other systems reviewed and are negative.       The additional following portions of the patient's history were reviewed and updated as appropriate: allergies, current medications, past family history, past medical history, past social history, past surgical history, and problem list.      I have reviewed and agree with the HPI, ROS, and historical information as entered above. Hue Patel MD      /78   Wt 86.6 kg (191 lb)   LMP  (Approximate)   BMI 31.78 kg/m²       EXAM:     Prenatal Vitals  BP: 130/78  Weight: 86.6 kg (191 lb)   Fetal Heart Rate: [facility] US               Urine Glucose Read-only:  Negative  Urine Protein Read-only: Negative       Assessment and Plan    Problem List Items Addressed This Visit       Pregnancy    Overview     G1 1 c/s, arrest, chorio, induced GHTN         AMA (advanced maternal age) multigravida 35+    Previous  section    Overview      vs repeat?         Pregnancy resulting from in vitro fertilization, antepartum    Overview     Fetal echo         History of gestational diabetes in prior pregnancy, currently pregnant    Overview     Early glucola         History of pregnancy induced hypertension    Overview     With G1         Diet controlled gestational diabetes mellitus (GDM), antepartum     Other Visit Diagnoses       Prenatal care in second trimester    -  Primary    Relevant Orders    POC Urinalysis Dipstick (Completed)            Pregnancy at 23w3d. [facility] US today, normal growth and fluid, normal faith. She has FU sched there in 4 wks.   Fetal status reassuring.  [facility] scan reviewed today.  CBC, Antibody screen, TDAP, and RPR next visit. Instructions given  Discussed/encouraged TDAP vaccination after 28 weeks  Activity and Exercise discussed.  Return in about 1 month (around 2025) for F/U Prenatal.      Hue Patel MD  2025

## 2025-01-21 NOTE — PROGRESS NOTES
Patient denies any leaking fluid, vaginal bleeding, or contractions.  NIPT declined.  Patient reports next follow-up appointment with Dr. Patel's office is today.

## 2025-01-23 ENCOUNTER — TELEPHONE (OUTPATIENT)
Dept: OBSTETRICS AND GYNECOLOGY | Facility: CLINIC | Age: 35
End: 2025-01-23
Payer: COMMERCIAL

## 2025-01-23 NOTE — TELEPHONE ENCOUNTER
Patient of Dr. Patel;  @ 23w 5d. LOV 25; NOV 25.  Returned patient's call.   Reports onset this morning of intermittent moderate sharp pain in left side of her abdomen; lasts about 45-60 seconds.  Reports it occurs more when she is standing or moving than when she is sitting.   States her uterus feels soft when the pain occurs. Denies any cramping or contractions.   Denies s/s of UTI.  Denies any bleeding or leaking fluid.  Has felt fetal movement today.  Discussed round ligament pain; resting on side that hurts; wearing pregnancy support belt; and changing positions slowly.  Advised to call if symptom worsen or fail to improve. She v/u and agreed.

## 2025-01-23 NOTE — TELEPHONE ENCOUNTER
"Hub staff attempted to follow warm transfer process and was unsuccessful     Caller: Sima Booker \"Epifanio Olmos\"    Relationship to patient: Self    Best call back number: [phone]     Patient is needing: PATIENT STATES SHE IS CURRENTLY 24 WEEKS PREGNANT AND STATES THIS MORNING SHE STARTED EXPERIENCING LEFT SIDED PAIN (ABOUT MIDWAY UP HER ABDOMEN).  SHE STATES THE PAIN COME AND GOES LASTING ABOUT 1 MINUTE OR SO AND THEN RETURNS ABOUT 5-10 MINUTES LATER.  PATIENT STATES BABY WAS MOVING GOOD THIS MORNING BEFORE PATIENT STARTED MOVING AROUND AND HAS SLOWED MOVEMENT DOWN SINCE PATIENT HAS BEGUN MOVING.  PATIENT DENIES ANY VAGINAL BLEEDING, NO FLUID LEAKAGE.        "

## 2025-01-30 ENCOUNTER — TELEMEDICINE (OUTPATIENT)
Dept: PSYCHIATRY | Facility: CLINIC | Age: 35
End: 2025-01-30
Payer: COMMERCIAL

## 2025-01-30 DIAGNOSIS — F32.1 CURRENT MODERATE EPISODE OF MAJOR DEPRESSIVE DISORDER WITHOUT PRIOR EPISODE: ICD-10-CM

## 2025-01-30 DIAGNOSIS — F90.2 ATTENTION DEFICIT HYPERACTIVITY DISORDER (ADHD), COMBINED TYPE: Primary | ICD-10-CM

## 2025-01-30 DIAGNOSIS — F41.1 GENERALIZED ANXIETY DISORDER: ICD-10-CM

## 2025-01-30 NOTE — PROGRESS NOTES
Date: 2025  Time In: 09:00 EST  Time out: 9:53 AM eST    This provider is located at [facility], [facility] [place], [place], [facility] [place], using a secure [employer] Video Visit through [employer]. Patient is being seen remotely via telehealth at their home address is located in [place]. Patient stated they are in a secure environment for this session. The patient's condition being diagnosed and treated is appropriate for telemedicine. The provider identified themself as well as their credentials. The patient, or  patient's legal guardian consent to be seen remotely, and when consent is given they understand that the consent allows for patient identifiable information to be sent to a third party as needed. They may refuse to be seen remotely at any time. The electronic data is encrypted and password protected, and the patient's or  legal guardian has been advised of the potential risks to privacy not withstanding such measures.   PT Identifiers used: Name and .    You have chosen to receive care through a telehealth visit.  Do you consent to use a video/audio connection for your medical care today? Yes    Subjective   Sima Booker is a 35 y.o. female who presents today for follow up    Chief Complaint:   Chief Complaint   Patient presents with    ADHD    Anxiety    Depression        Data: Pt reported that she has been exhausted. Pt reported that she believes with is due to pregnant and gestational diabetes. Pt reported she has not been put on medication for this but believes that this may be the next step with her doctor. Pt reported that she is having some trouble with fear of upcoming birth due to so many complications with her first birth. Pt reported that her son is working on [employer] training. Pt reported ADHD symptoms are present without medication but manageable. Pt reported anxiety and depression are increased.      Clinical Maneuvering/Intervention: Assisted patient in  processing above session content; acknowledged and normalized patient’s thoughts, feelings, and concerns.  Rationalized patient thought process regarding concerns presented at session.  Discussed triggers associated with patient's  anxiety , depression , and ADHD Also discussed coping skills for patient to implement such as grounding , mindfulness , increasing activity , self care , and positive self talk     Allowed patient to freely discuss issues without interruption or judgment. Provided safe, confidential environment to facilitate the development of positive therapeutic relationship and encourage open, honest communication. Assisted patient in identifying risk factors which would indicate the need for higher level of care including thoughts to harm self or others and/or self-harming behavior and encouraged patient to contact this office, call 911, or present to the nearest emergency room should any of these events occur. Discussed crisis intervention services and means to access. Patient adamantly and convincingly denies current suicidal or homicidal ideation or perceptual disturbance.    Assessment: Pt was alert and oriented x3.  Pt appears open and honest re MH symptoms that have affected life in a negative way. Pt appears motivated to improve MH symptoms and overall quality of life. Pt appears to struggle with over generalizing her previous birth vs upcoming birth. Pt appears aware of contributing factors to increased anxiety and depression. Pt appears to be struggling with motivation, low energy, compartmentalizing. Pt appeared receptive to techniques and encouragements discussed in session.     Patient appears to maintain relative stability as compared to their baseline.  However, patient continues to struggle with   Chief Complaint   Patient presents with    ADHD    Anxiety    Depression    which continues to cause impairment in important areas of functioning.  A result, they can be reasonably expected to  continue to benefit from treatment and would likely be at increased risk for decompensation otherwise.    Mental Status Exam:   Hygiene:   good  Cooperation:  Cooperative  Eye Contact:  Good  Psychomotor Behavior:  Appropriate  Affect:  Appropriate  Mood: normal  Speech:  Normal  Thought Process:  Goal directed  Thought Content:  Normal  Suicidal:  None  Homicidal:  None  Hallucinations:  None  Delusion:  None  Memory:  Intact  Orientation:  Person, Place, and Time  Reliability:  good  Insight:  Good  Judgement:  Fair  Impulse Control:  Fair  Physical/Medical Issues:  No        Patient's Support Network Includes:  significant other, parents, and extended family    Functional Status: No impairment    Progress toward goal: Not at goal    Prognosis: Fair with Ongoing Treatment     Plan: tx plan    Patient will continue in individual outpatient therapy with focus on improved functioning and coping skills, maintaining stability, and avoiding decompensation and the need for higher level of care.    Patient will adhere to medication regimen as prescribed and report any side effects. Patient will contact this office, call 911 or present to the nearest emergency room should suicidal or homicidal ideations occur. Provide Cognitive Behavioral Therapy and Solution Focused Therapy to improve functioning, maintain stability, and avoid decompensation and the need for higher level of care.     Return in about 4 weeks (around 2/27/2025).      VISIT DIAGNOSIS:    Diagnosis Plan   1. Attention deficit hyperactivity disorder (ADHD), combined type        2. Generalized anxiety disorder        3. Current moderate episode of major depressive disorder without prior episode         09:00 EST         This document has been electronically signed by Ninfa Hwang LCSW  January 30, 2025      Part of this note may be an electronic transcription/translation of spoken language to printed text using the Dragon Dictation System.

## 2025-02-05 ENCOUNTER — TELEPHONE (OUTPATIENT)
Dept: OBSTETRICS AND GYNECOLOGY | Facility: CLINIC | Age: 35
End: 2025-02-05
Payer: COMMERCIAL

## 2025-02-05 NOTE — TELEPHONE ENCOUNTER
"Provider: DR KHANNA  Caller: Sima Booker \"Epifanio Olmos\"    Relationship to Patient: Self    Pharmacy: [facility]     Phone Number: [phone]    Reason for Call: PT HAD A COUPLE EPISODES YESTERDAY WHERE SHE GOT REALLY HOT AND DIZZY AND FELT AS IF SHE WAS GOING TO PASS OUT BUT SHE DID NOT SHE ALSO HAD WHAT SHE THINKS IS ROUND LIGAMENT PAIN (ABDOMINAL PAIN) THAT KEPT HER UP MOST OF THE NIGHT PT WOULD LIKE TO SPEAK TO SOMEONE AND FIND OUT IF SHE NEEDS TO BE SEEN PLEASE CALL         "

## 2025-02-05 NOTE — TELEPHONE ENCOUNTER
Pt had two episodes of feeling dizzy and lightheaded yesterday. Each episode lasted a couple minutes and was resolved by sitting down. She reports her blood sugars were normal according to her monitor and she had eaten meals. She was not able to check her BP during these episodes.    Pt also reports upper abdominal pain that she feels may be round ligament pain that is on both the right and left sides. The pain was sharp at times and kept her up for a few hours in the night. She did have a couple episodes of diarrhea this morning. Denies contractions, LOF, HA's. Reports good FM.     Advised pt to watch symptoms and call back if they worsen or continue throughout the day. Monitor BP and call if elevated >/140/90. If dizziness returns, check BP. If abdominal pain continues or worsens, call back. Be sure to eat enough protein with meals and snacks. Increase fluid intake to 80 ounces of water daily.

## 2025-02-18 ENCOUNTER — ROUTINE PRENATAL (OUTPATIENT)
Dept: OBSTETRICS AND GYNECOLOGY | Facility: CLINIC | Age: 35
End: 2025-02-18
Payer: COMMERCIAL

## 2025-02-18 ENCOUNTER — OFFICE VISIT (OUTPATIENT)
Dept: OBSTETRICS AND GYNECOLOGY | Facility: HOSPITAL | Age: 35
End: 2025-02-18
Payer: COMMERCIAL

## 2025-02-18 ENCOUNTER — HOSPITAL ENCOUNTER (OUTPATIENT)
Dept: WOMENS IMAGING | Facility: HOSPITAL | Age: 35
Discharge: HOME | End: 2025-02-18
Admitting: OBSTETRICS & GYNECOLOGY
Payer: COMMERCIAL

## 2025-02-18 VITALS — DIASTOLIC BLOOD PRESSURE: 80 MMHG | SYSTOLIC BLOOD PRESSURE: 128 MMHG | BODY MASS INDEX: 31.75 KG/M2 | WEIGHT: 190.8 LBS

## 2025-02-18 VITALS — BODY MASS INDEX: 31.78 KG/M2 | SYSTOLIC BLOOD PRESSURE: 149 MMHG | DIASTOLIC BLOOD PRESSURE: 81 MMHG | WEIGHT: 191 LBS

## 2025-02-18 DIAGNOSIS — O09.529 ANTEPARTUM MULTIGRAVIDA OF ADVANCED MATERNAL AGE: Primary | ICD-10-CM

## 2025-02-18 DIAGNOSIS — O09.819 PREGNANCY RESULTING FROM IN VITRO FERTILIZATION, ANTEPARTUM: ICD-10-CM

## 2025-02-18 DIAGNOSIS — O09.529 ANTEPARTUM MULTIGRAVIDA OF ADVANCED MATERNAL AGE: ICD-10-CM

## 2025-02-18 DIAGNOSIS — O24.410 DIET CONTROLLED GESTATIONAL DIABETES MELLITUS (GDM), ANTEPARTUM: ICD-10-CM

## 2025-02-18 DIAGNOSIS — Z98.891 PREVIOUS CESAREAN SECTION: ICD-10-CM

## 2025-02-18 DIAGNOSIS — O44.40 LOW-LYING PLACENTA: ICD-10-CM

## 2025-02-18 DIAGNOSIS — O43.199 MARGINAL INSERTION OF UMBILICAL CORD AFFECTING MANAGEMENT OF MOTHER: ICD-10-CM

## 2025-02-18 DIAGNOSIS — O09.299 HISTORY OF GESTATIONAL DIABETES IN PRIOR PREGNANCY, CURRENTLY PREGNANT: ICD-10-CM

## 2025-02-18 DIAGNOSIS — Z87.59 HISTORY OF PREGNANCY INDUCED HYPERTENSION: ICD-10-CM

## 2025-02-18 DIAGNOSIS — Z86.32 HISTORY OF GESTATIONAL DIABETES IN PRIOR PREGNANCY, CURRENTLY PREGNANT: ICD-10-CM

## 2025-02-18 DIAGNOSIS — Z3A.27 27 WEEKS GESTATION OF PREGNANCY: ICD-10-CM

## 2025-02-18 DIAGNOSIS — Z34.92 SECOND TRIMESTER PREGNANCY: Primary | ICD-10-CM

## 2025-02-18 DIAGNOSIS — E66.9 OBESITY (BMI 30-39.9): ICD-10-CM

## 2025-02-18 LAB
GLUCOSE UR STRIP-MCNC: NEGATIVE MG/DL
PROT UR STRIP-MCNC: NEGATIVE MG/DL

## 2025-02-18 PROCEDURE — 76816 OB US FOLLOW-UP PER FETUS: CPT

## 2025-02-18 NOTE — PROGRESS NOTES
Patient seen in Maternal Fetal Medicine clinic today. Please see full note in under imaging tab of patient chart in Epic (Viewpoint report).    Hue Dave MD

## 2025-02-18 NOTE — PROGRESS NOTES
OB FOLLOW UP  CC- Here for care of pregnancy        Sima Booker is a 35 y.o.  27w3d patient being seen today for her obstetrical follow up. Patient reports no complaints.     She reports glucose has been wnl.  PDC managing.    Taking bASA qd      MBT: A+  Rhogam: is not indicated.  28 week packet: reviewed with patient , counseled on fetal movement , pediatrician list reviewed, breast pump discussed, and childbirth classes reviewed  TDAP: will receive next visit  Flu Status: Declines  Ultrasound Today: Yes with [facility]    Her prenatal care is complicated by (and status) : see below.  Patient Active Problem List   Diagnosis    Family history of melanoma    Family history of colonic polyps    Tremors of nervous system    Pregnancy    AMA (advanced maternal age) multigravida 35+    Previous  section    Pregnancy resulting from in vitro fertilization, antepartum    History of gestational diabetes in prior pregnancy, currently pregnant    History of pregnancy induced hypertension    Marginal insertion of umbilical cord affecting management of mother    Diet controlled gestational diabetes mellitus (GDM), antepartum         ROS -   Patient Denies: Loss of Fluid, Vaginal Spotting, Vision Changes, Headaches, Nausea , Vomiting , Contractions, Epigastric pain, and skin itching  Fetal Movement : normal  Other than what is documented in the HPI, all other systems reviewed and are negative.     The additional following portions of the patient's history were reviewed and updated as appropriate: allergies and current medications.    I have reviewed and agree with the HPI, ROS, and historical information as entered above. Rosalie Olmos Jadyn, APRN      /80   Wt 86.5 kg (190 lb 12.8 oz)   LMP  (Approximate)   BMI 31.75 kg/m²         EXAM:     Prenatal Vitals  BP: 128/80  Weight: 86.5 kg (190 lb 12.8 oz)                   Urine Glucose Read-only: Negative  Urine Protein Read-only: Negative          Assessment and Plan    Problem List Items Addressed This Visit          Endocrine and Metabolic    Diet controlled gestational diabetes mellitus (GDM), antepartum       Gravid and     AMA (advanced maternal age) multigravida 35+    Previous  section    Overview      vs repeat?         Pregnancy resulting from in vitro fertilization, antepartum    Overview     Fetal echo         Marginal insertion of umbilical cord affecting management of mother     Other Visit Diagnoses       Second trimester pregnancy    -  Primary    Relevant Orders    POC Urinalysis Dipstick (Completed)    Obesity (BMI 30-39.9)                Pregnancy at 27w3d  TDAP next visit  Fetal movement/PTL or Labor precautions  Patient is on Prenatal vitamins  Reviewed FSBS goals: fasting <90, 2hr PP < 120  Reviewed Pre-eclampsia signs/symptoms  Activity and Exercise discussed.  Return in about 4 weeks (around 3/18/2025) for after [facility].        Rosalie Salamanca, APRN  2025

## 2025-02-19 ENCOUNTER — TELEMEDICINE (OUTPATIENT)
Dept: PSYCHIATRY | Facility: CLINIC | Age: 35
End: 2025-02-19
Payer: COMMERCIAL

## 2025-02-19 DIAGNOSIS — F41.1 GENERALIZED ANXIETY DISORDER: ICD-10-CM

## 2025-02-19 DIAGNOSIS — F32.1 CURRENT MODERATE EPISODE OF MAJOR DEPRESSIVE DISORDER WITHOUT PRIOR EPISODE: ICD-10-CM

## 2025-02-19 DIAGNOSIS — F90.2 ATTENTION DEFICIT HYPERACTIVITY DISORDER (ADHD), COMBINED TYPE: Primary | ICD-10-CM

## 2025-02-19 NOTE — PROGRESS NOTES
Patient Name: Sima Booker  MRN: [id number]   :  1990     This provider is located at her home office through the [facility] [address], [address] using a secure [employer] Video Visit through [employer]. Patient is being seen remotely via telehealth at their home address in [place], and stated they are in a secure environment for this session. The patient's condition being diagnosed/treated is appropriate for telemedicine. The provider identified herself as well as her credentials.   The patient, and/or patients guardian, consent to be seen remotely, and when consent is given they understand that the consent allows for patient identifiable information to be sent to a third party as needed.   They may refuse to be seen remotely at any time. The electronic data is encrypted and password protected, and the patient and/or guardian has been advised of the potential risks to privacy not withstanding such measures.    You have chosen to receive care through a telehealth visit.  Do you consent to use a video/audio connection for your medical care today? Yes    Chief Complaint:      ICD-10-CM ICD-9-CM   1. Attention deficit hyperactivity disorder (ADHD), combined type  F90.2 314.01   2. Generalized anxiety disorder  F41.1 300.02   3. Current moderate episode of major depressive disorder without prior episode  F32.1 296.22       History of Present Illness: Sima Booker is a 35 y.o. female who presents for medication management follow up. She states she is doing well on Zoloft and reports her mood is stable. She states she has been experiencing issues with sleep related to her RLS and pregnancy. She states she has recently started taking magnesium for this and hopes this will help.    The following portions of the patient's history were reviewed and updated as appropriate: allergies, current medications, past family history, past  medical history, past social history, past surgical history, and problem list.    Review of Systems;;  Review of Systems   Constitutional:  Negative for activity change, appetite change, fatigue, unexpected weight gain and unexpected weight loss.   Respiratory:  Negative for shortness of breath and wheezing.    Gastrointestinal:  Negative for constipation, diarrhea, nausea and vomiting.   Musculoskeletal:  Negative for gait problem.   Skin:  Negative for dry skin and rash.   Neurological:  Negative for dizziness, speech difficulty, weakness, light-headedness, headache, memory problem and confusion.   Psychiatric/Behavioral:  Negative for agitation, behavioral problems, decreased concentration, dysphoric mood, hallucinations, self-injury, sleep disturbance, suicidal ideas, negative for hyperactivity, depressed mood and stress. The patient is not nervous/anxious.        Physical Exam;;  Physical Exam  Constitutional:       General: She is not in acute distress.     Appearance: She is well-developed. She is not diaphoretic.   HENT:      Head: Normocephalic and atraumatic.   Eyes:      Conjunctiva/sclera: Conjunctivae normal.   Pulmonary:      Effort: Pulmonary effort is normal. No respiratory distress.   Musculoskeletal:         General: Normal range of motion.      Cervical back: Full passive range of motion without pain and normal range of motion.   Neurological:      Mental Status: She is alert and oriented to person, place, and time.   Psychiatric:         Mood and Affect: Mood is not anxious or depressed. Affect is not labile, blunt, angry or inappropriate.         Speech: Speech is not rapid and pressured or tangential.         Behavior: Behavior normal. Behavior is not agitated, slowed, aggressive, withdrawn, hyperactive or combative. Behavior is cooperative.         Thought Content: Thought content normal. Thought content is not paranoid or delusional. Thought content does not include homicidal or suicidal  ideation. Thought content does not include homicidal or suicidal plan.         Judgment: Judgment normal.       not currently breastfeeding.  There is no height or weight on file to calculate BMI. Video appt unable to obtain.     Current Medications;;    Current Outpatient Medications:     ASPIRIN 81 PO, Take  by mouth., Disp: , Rfl:     Continuous Glucose Sensor (FreeStyle Lalita 3 Plus Sensor), Use Every 15 (Fifteen) Days. Called [facility] in [place] 12/17/24, Disp: 2 each, Rfl: 6    glucose blood test strip, Use as instructed, Disp: 100 each, Rfl: 3    Lancets (freestyle) lancets, Use as directed., Disp: 100 each, Rfl: 3    levocetirizine (XYZAL) 5 MG tablet, Take 1 tablet by mouth Daily., Disp: , Rfl:     Prenatal Vit-Fe Fumarate-FA (prenatal vitamin 27-0.8) 27-0.8 MG tablet tablet, Take 1 tablet by mouth Daily., Disp: 90 tablet, Rfl: 3    sertraline (Zoloft) 100 MG tablet, Take 1 tablet by mouth Daily., Disp: 30 tablet, Rfl: 2    Lab Results:   Routine Prenatal on 02/18/2025   Component Date Value Ref Range Status    Glucose, UA 02/18/2025 Negative  Negative mg/dL Final    Protein, POC 02/18/2025 Negative  Negative mg/dL Final   Routine Prenatal on 01/21/2025   Component Date Value Ref Range Status    Glucose, UA 01/21/2025 Negative  Negative mg/dL Final    Protein, POC 01/21/2025 Negative  Negative mg/dL Final   Routine Prenatal on 01/09/2025   Component Date Value Ref Range Status    Glucose, UA 01/09/2025 Negative  Negative mg/dL Final    Protein, POC 01/09/2025 Negative  Negative mg/dL Final   Lab on 12/12/2024   Component Date Value Ref Range Status    Glucose - Fasting 12/12/2024 73  65 - 94 mg/dL Final    Glucose - 1 Hour 12/12/2024 185 (H)  65 - 179 mg/dL Final    Glucose - 2 Hour 12/12/2024 207 (H)  65 - 154 mg/dL Final    Glucose - 3 Hour 12/12/2024 142 (H)  65 - 139 mg/dL Final   Lab on 12/09/2024   Component Date Value Ref Range Status    Gestational Diabetes Screen 12/09/2024 142 (H)   65 - 139 mg/dL Final   Routine Prenatal on 12/05/2024   Component Date Value Ref Range Status    Color 12/05/2024 Yellow  Yellow, Straw, Dark Yellow, Isabela Final    Clarity, UA 12/05/2024 Clear  Clear Final    Glucose, UA 12/05/2024 Negative  Negative mg/dL Final    Bilirubin 12/05/2024 Negative  Negative Final    Ketones, UA 12/05/2024 Negative  Negative Final    Specific Gravity  12/05/2024 1.015  1.005 - 1.030 Final    Blood, UA 12/05/2024 Negative  Negative Final    pH, Urine 12/05/2024 7.5  5.0 - 8.0 Final    Protein, POC 12/05/2024 Negative  Negative mg/dL Final    Urobilinogen, UA 12/05/2024 0.2 E.U./dL  Normal, 0.2 E.U./dL Final    Leukocytes 12/05/2024 Negative  Negative Final    Nitrite, UA 12/05/2024 Negative  Negative Final    Urine Culture 12/05/2024 Final report   Final    Result 1 12/05/2024 No growth   Final       Mental Status Exam:   Hygiene:   good  Cooperation:  Cooperative  Eye Contact:  Good  Psychomotor Behavior:  Appropriate  Mood:dysthymic  Affect:  Appropriate  Hopelessness: Denies  Speech:  Normal  Thought Process:  Goal directed  Thought Content:  Normal  Suicidal:  None  Homicidal:  None  Hallucinations:  None  Delusion:  None  Memory:  Intact  Orientation:  Person, Place, Time, and Situation  Reliability:  good  Insight:  Good  Judgement:  Good  Impulse Control:  Good    PHQ-9 Depression Screening  Little interest or pleasure in doing things? (Patient-Rptd) Several days   Feeling down, depressed, or hopeless? (Patient-Rptd) Several days   PHQ-2 Total Score (Patient-Rptd) 2   Trouble falling or staying asleep, or sleeping too much? (Patient-Rptd) Over half   Feeling tired or having little energy? (Patient-Rptd) Over half   Poor appetite or overeating? (Patient-Rptd) Several days   Feeling bad about yourself - or that you are a failure or have let yourself or your family down? (Patient-Rptd) Not at all   Trouble concentrating on things, such as reading the newspaper or watching  television? (Patient-Rptd) Several days   Moving or speaking so slowly that other people could have noticed? Or the opposite - being so fidgety or restless that you have been moving around a lot more than usual? (Patient-Rptd) Not at all   Thoughts that you would be better off dead, or of hurting yourself in some way? (Patient-Rptd) Not at all   PHQ-9 Total Score (Patient-Rptd) 8   If you checked off any problems, how difficult have these problems made it for you to do your work, take care of things at home, or get along with other people? (Patient-Rptd) Somewhat difficult         Assessment/Plan:  Diagnoses and all orders for this visit:    1. Attention deficit hyperactivity disorder (ADHD), combined type (Primary)    2. Generalized anxiety disorder    3. Current moderate episode of major depressive disorder without prior episode      Patient is doing well on Zoloft and continues to see improvements in her mood. She has been struggling with sleep related to pregnancy and RLS. She has started taking magnesium for this and hopes this will help. We will continue with current medications and follow up in 3 months.    A psychological evaluation was conducted in order to assess past and current level of functioning. Areas assessed included, but were not limited to: perception of social support, perception of ability to face and deal with challenges in life (positive functioning), anxiety symptoms, depressive symptoms, perspective on beliefs/belief system, coping skills for stress, intelligence level,  Therapeutic rapport was established. Interventions conducted today were geared towards incorporating medication management along with support for continued therapy. Education was also provided as to the med management with this provider and what to expect in subsequent sessions.    We discussed risks, benefits,goals and side effects of the above medication and the patient was agreeable with the plan.Patient was educated on  the importance of compliance with treatment and follow-up appointments. Patient is aware to contact the [facility] [phone] with any worsening of symptoms. To call for questions or concerns and return early if necessary. Patent is agreeable to go to the Emergency Department or call 911 should they begin SI/HI.     Part of this note may be an electronic transcription/translation of spoken language to printed text using the Dragon Dictation System.    Return in about 3 months (around 5/19/2025) for Follow Up 30 min, Video visit.    QI Contreras    This note has been transcribed by Ashtyn MCKOY, RN [facility] NP student. I have reviewed the note and concur with the transcription.

## 2025-02-26 ENCOUNTER — MEDICATION THERAPY MANAGEMENT (OUTPATIENT)
Dept: OBSTETRICS AND GYNECOLOGY | Facility: HOSPITAL | Age: 35
End: 2025-02-26
Payer: COMMERCIAL

## 2025-02-26 ENCOUNTER — TELEPHONE (OUTPATIENT)
Dept: OBSTETRICS AND GYNECOLOGY | Facility: HOSPITAL | Age: 35
End: 2025-02-26
Payer: COMMERCIAL

## 2025-02-26 DIAGNOSIS — O24.410 DIET CONTROLLED GESTATIONAL DIABETES MELLITUS (GDM), ANTEPARTUM: Primary | ICD-10-CM

## 2025-02-26 RX ORDER — METFORMIN HYDROCHLORIDE 500 MG/1
500 TABLET, EXTENDED RELEASE ORAL
Qty: 30 TABLET | Refills: 4 | Status: SHIPPED | OUTPATIENT
Start: 2025-02-26

## 2025-02-26 NOTE — TELEPHONE ENCOUNTER
Spoke with patient over he phone.  Informed patient that Dr. Dave has reviewed her blood sugar log and states Having some elevated post-meal values.  Especially later in the day.  Can work on diet, start low dose insulin or Metformin.  Patient chooses to start Metformin.  Confirmed with patient she uses [facility] in [place].  Evon Gaming RN

## 2025-02-27 ENCOUNTER — TELEMEDICINE (OUTPATIENT)
Dept: PSYCHIATRY | Facility: CLINIC | Age: 35
End: 2025-02-27
Payer: COMMERCIAL

## 2025-02-27 DIAGNOSIS — F32.1 CURRENT MODERATE EPISODE OF MAJOR DEPRESSIVE DISORDER WITHOUT PRIOR EPISODE: ICD-10-CM

## 2025-02-27 DIAGNOSIS — F90.2 ATTENTION DEFICIT HYPERACTIVITY DISORDER (ADHD), COMBINED TYPE: ICD-10-CM

## 2025-02-27 DIAGNOSIS — F41.1 GENERALIZED ANXIETY DISORDER: Primary | ICD-10-CM

## 2025-02-27 NOTE — PLAN OF CARE
Discussed tx plan with patient. Tx plan was not printed due to meeting virtually. Pt was able to identify triggers relatively well. Pt has coping skills that could be implemented more in moments of increased emotions. Pt reported an understanding of how MH symptoms can affect behaviors/thought process. Current life stressors include; complications of pregnancy

## 2025-02-27 NOTE — TREATMENT PLAN
Multi-Disciplinary Problems (from Behavioral Health Treatment Plan)      Active Problems       Problem: Anxiety  Start Date: 02/27/25      Problem Details: The patient self-scales this problem as a 7 with 10 being the worst.          Goal Priority Start Date Expected End Date End Date    Patient will develop and implement behavioral and cognitive strategies to reduce anxiety and irrational fears. Low 02/27/25 08/28/25 --    Goal Details: Progress toward goal:  The patient self-scales their progress related to this goal as a 7 with 10 being the worst.          Goal Intervention Frequency Start Date End Date    Help patient explore past emotional issues in relation to present anxiety. Q Month 02/27/25 --    Intervention Details: Duration of treatment until discharged.          Goal Intervention Frequency Start Date End Date    Help patient develop an awareness of their cognitive and physical responses to anxiety. Q Month 02/27/25 --    Intervention Details: Duration of treatment until discharged.    Pt is able to identify contributing factors to anxiety. Pt can become overwhelmed with situations that can increase anxiety symptoms -progressing     Aid pt in prepping for triggers of anxiety to minimize reactions to more predictable situations.                 Problem: Depression  Start Date: 02/27/25      Problem Details: The patient self-scales this problem as a 5 with 10 being the worst.          Goal Priority Start Date Expected End Date End Date    Patient will demonstrate the ability to initiate new constructive life skills outside of sessions on a consistent basis. Low 02/27/25 08/28/25 --    Goal Details: Progress toward goal:  The patient self-scales their progress related to this goal as a 5 with 10 being the worst.          Goal Intervention Frequency Start Date End Date    Assist patient in setting attainable activities of daily living goals. Q Month 02/27/25 --    Intervention Details: Clinician will aid pt  in increasing structure, routine, focusing accomplishments instead of perceived failures.     Clinician will aid pt with implementing coping skills or techniques that aid with increasing natural dopamine, serotonin and endorphins.        Goal Intervention Frequency Start Date End Date    Provide education about depression Q Month 02/27/25 --    Intervention Details: Duration of treatment until discharged.          Goal Intervention Frequency Start Date End Date    Assist patient in developing healthy coping strategies. Q Month 02/27/25 --    Intervention Details: Duration of treatment until discharged.                  Problem: ADHD (Adult)  Start Date: 02/27/25      Problem Details: The patient self-scales this problem as a 7 with 10 being the worst.        Goal Priority Start Date Expected End Date End Date    Patient will sustain attention and concentration to complete chores, and work responsibilites and increase positive interaction in all relationships. Low 02/27/25 08/28/25 --    Goal Details: Progress toward goal:  The patient self-scales their progress related to this goal as a 7 with 10 being the worst.        Goal Intervention Frequency Start Date End Date    Assist patient in setting responsible goals and breaking down large tasks. Q Month 02/27/25 --    Intervention Details: Duration of treatment until discharged.        Goal Intervention Frequency Start Date End Date    Assist patient in using self monitoring checklist to improve attention, work performance, and social skills. Q Month 02/27/25 --    Intervention Details: Duration of treatment until discharged.                        Reviewed By       Ninfa Hwang LCSW 02/27/25 0944                     I have discussed and reviewed this treatment plan with the patient.

## 2025-02-27 NOTE — PROGRESS NOTES
Date: 2025  Time In: 08:57 EST  Time out: 9:50 AM EST    This provider is located at [facility], [address] [facility] [address], [place], [facility], using a secure [employer] Video Visit through [employer]. Patient is being seen remotely via telehealth at their home address is located in [place]. Patient stated they are in a secure environment for this session. The patient's condition being diagnosed and treated is appropriate for telemedicine. The provider identified themself as well as their credentials. The patient, or  patient's legal guardian consent to be seen remotely, and when consent is given they understand that the consent allows for patient identifiable information to be sent to a third party as needed. They may refuse to be seen remotely at any time. The electronic data is encrypted and password protected, and the patient's or  legal guardian has been advised of the potential risks to privacy not withstanding such measures.   PT Identifiers used: Name and .    You have chosen to receive care through a telehealth visit.  Do you consent to use a video/audio connection for your medical care today? Yes    Subjective   Sima Booker is a 35 y.o. female who presents today for follow up    Chief Complaint:   Chief Complaint   Patient presents with    Anxiety    ADHD    Depression        Data: Pt reported that she has not been sleeping well, experiencing restless legs. Pt reported hat she is starting a new medication for her diabetes today. Pt reported that she is struggling to get through the day due to the exhaustion. Pt reflected on her pregnancy and issues that she has been facing. 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 Pt reported that her sister is moving to her town, expressed excitement. Pt reflected on her want to have a vaginal birth, doctor is leaning towards a  due to complications.       Clinical Maneuvering/Intervention: Assisted  patient in processing above session content; acknowledged and normalized patient’s thoughts, feelings, and concerns.  Rationalized patient thought process regarding concerns presented at session.  Discussed triggers associated with patient's  anxiety , depression , and ADHD Also discussed coping skills for patient to implement such as grounding , mindfulness , self care , positive self talk , and facts vs feelings    Allowed patient to freely discuss issues without interruption or judgment. Provided safe, confidential environment to facilitate the development of positive therapeutic relationship and encourage open, honest communication. Assisted patient in identifying risk factors which would indicate the need for higher level of care including thoughts to harm self or others and/or self-harming behavior and encouraged patient to contact this office, call 911, or present to the nearest emergency room should any of these events occur. Discussed crisis intervention services and means to access. Patient adamantly and convincingly denies current suicidal or homicidal ideation or perceptual disturbance.    Assessment: Pt was alert and oriented x3.  Pt appears open and honest re MH symptoms that have affected life in a negative way. Pt appears motivated to improve MH symptoms and overall quality of life. Pt appears exhausted due to lack of sleep. Pt appears to have improved MH symptoms with medication management. Pt appears to be more accepting of the possibility of having a . Pt rated MH symptoms on a scale 1-10 (10 being the worst). Pt rated anxiety as 7/10, depression 5/10 and ADHD 7/10.    Patient appears to maintain relative stability as compared to their baseline.  However, patient continues to struggle with   Chief Complaint   Patient presents with    Anxiety    ADHD    Depression    which continues to cause impairment in important areas of functioning.  A result, they can be reasonably expected to  continue to benefit from treatment and would likely be at increased risk for decompensation otherwise.      Mental Status Exam:   Hygiene:   good  Cooperation:  Cooperative  Eye Contact:  Good  Psychomotor Behavior:  Appropriate  Affect:  Appropriate  Mood:  tired  Speech:  Normal  Thought Process:  Linear  Thought Content:  Mood congruent  Suicidal:  None  Homicidal:  None  Hallucinations:  None  Delusion:  None  Memory:  Intact  Orientation:  Person, Place, and Time  Reliability:  good  Insight:  Good  Judgement:  Good  Impulse Control:  Fair  Physical/Medical Issues:  No        Patient's Support Network Includes:  significant other and extended family    Functional Status: No impairment    Progress toward goal: Not at goal    Prognosis: Good with Ongoing Treatment     Plan:     Patient will continue in individual outpatient therapy with focus on improved functioning and coping skills, maintaining stability, and avoiding decompensation and the need for higher level of care.    Patient will adhere to medication regimen as prescribed and report any side effects. Patient will contact this office, call 911 or present to the nearest emergency room should suicidal or homicidal ideations occur. Provide Cognitive Behavioral Therapy and Solution Focused Therapy to improve functioning, maintain stability, and avoid decompensation and the need for higher level of care.     Return in about 4 weeks (around 3/27/2025).      VISIT DIAGNOSIS:    Diagnosis Plan   1. Generalized anxiety disorder        2. Attention deficit hyperactivity disorder (ADHD), combined type        3. Current moderate episode of major depressive disorder without prior episode         08:57 EST         This document has been electronically signed by Ninfa Hwang LCSW  February 27, 2025      Part of this note may be an electronic transcription/translation of spoken language to printed text using the Dragon Dictation System.

## 2025-03-05 ENCOUNTER — MEDICATION THERAPY MANAGEMENT (OUTPATIENT)
Dept: OBSTETRICS AND GYNECOLOGY | Facility: HOSPITAL | Age: 35
End: 2025-03-05
Payer: COMMERCIAL

## 2025-03-05 DIAGNOSIS — O24.410 DIET CONTROLLED GESTATIONAL DIABETES MELLITUS (GDM), ANTEPARTUM: ICD-10-CM

## 2025-03-05 RX ORDER — METFORMIN HYDROCHLORIDE 500 MG/1
1000 TABLET, EXTENDED RELEASE ORAL
Qty: 60 TABLET | Refills: 4 | Status: SHIPPED | OUTPATIENT
Start: 2025-03-05

## 2025-03-07 ENCOUNTER — TELEPHONE (OUTPATIENT)
Dept: OBSTETRICS AND GYNECOLOGY | Facility: CLINIC | Age: 35
End: 2025-03-07
Payer: COMMERCIAL

## 2025-03-07 NOTE — TELEPHONE ENCOUNTER
Patient reports she has continued to feel fatigued today. She is monitoring BP and other symptoms. She denies any headaches, vision changes, swelling. Advised patient to go to L&D if BP in 140/90 consistently.

## 2025-03-07 NOTE — TELEPHONE ENCOUNTER
"    Hub staff attempted to follow warm transfer process and was unsuccessful     Caller: Sima Booker \"Epifanio Olmos\"    Relationship to patient: Self    Best call back number: [phone] (home) [phone] (work)     Patient is needing: A CALL BACK FROM A NURSE. SHE IS 30 WEEKS  HAS HYPERTENSION AND CURRENTLY AT WORK, FEELS SOA, LIGHT HEADED AND HOT FLASH. UNSURE IF YOU NEED TO SEE HER. SHE DOESN'T HAVE A WAY OF CHECKING HER BP AT WORK.        "

## 2025-03-07 NOTE — TELEPHONE ENCOUNTER
SINCE LAST SPOKE W/BE, PT HAS CKD HER BP 5x    12:21 144/89    1:01 140/88    1:36 146/78 AFTER TOOK LOW DOSE ASPIRIN    2:11 137/81    3:02 142/81

## 2025-03-07 NOTE — TELEPHONE ENCOUNTER
Spoke with Librado, patient can rest as much as possible today, increase fluids, continue to monitor BP. If symptoms worsen call back. Patient v/u.

## 2025-03-07 NOTE — TELEPHONE ENCOUNTER
[facility] patient.   29w6d.   GDM      Patient reports for the past 3 days she has been having shortness of air. Patient reports while working this morning she got really hot and sweaty and felt like she was going to pass out. She was unable to check her BP but did check her blood sugar and it was in the 80s. She reports she did check her BP last night at home and it was 130-140/80s.     She is getting good baby movement. Denies any chest or abdominal pain.

## 2025-03-10 ENCOUNTER — TELEPHONE (OUTPATIENT)
Dept: OBSTETRICS AND GYNECOLOGY | Facility: CLINIC | Age: 35
End: 2025-03-10
Payer: COMMERCIAL

## 2025-03-10 NOTE — TELEPHONE ENCOUNTER
Patient of Dr. Patel;  @ 30w 2d. LOV 25; NOV 25.  Returned patient's call.   States she has had some elevated BP readings since 25. Was told to monitor it at home.   States /84; 140/86 today.   States her employer asked if they needed to make any accommodations for her due to blood pressure.   Informed her that no activity restrictions have been recommended at this time. Reviewed s/s of preeclampsia and advised to call for those symptoms or persistent /90 or higher.   Patient v/u and agreed.

## 2025-03-10 NOTE — TELEPHONE ENCOUNTER
"  Caller: Sima Booker \"Epifanio Olmos\"    Relationship: Self    Best call back number: [phone]      Who are you requesting to speak with (clinical staff, DR. 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  PLEASE UPLOAD NOTE TO [employer].     Is it okay if the provider responds through MyChart: YES      "

## 2025-03-13 ENCOUNTER — TELEPHONE (OUTPATIENT)
Dept: OBSTETRICS AND GYNECOLOGY | Facility: HOSPITAL | Age: 35
End: 2025-03-13
Payer: COMMERCIAL

## 2025-03-18 ENCOUNTER — HOSPITAL ENCOUNTER (OUTPATIENT)
Dept: WOMENS IMAGING | Facility: HOSPITAL | Age: 35
Discharge: HOME | End: 2025-03-18
Admitting: OBSTETRICS & GYNECOLOGY
Payer: COMMERCIAL

## 2025-03-18 ENCOUNTER — OFFICE VISIT (OUTPATIENT)
Dept: OBSTETRICS AND GYNECOLOGY | Facility: HOSPITAL | Age: 35
End: 2025-03-18
Payer: COMMERCIAL

## 2025-03-18 ENCOUNTER — ROUTINE PRENATAL (OUTPATIENT)
Dept: OBSTETRICS AND GYNECOLOGY | Facility: CLINIC | Age: 35
End: 2025-03-18
Payer: COMMERCIAL

## 2025-03-18 VITALS — SYSTOLIC BLOOD PRESSURE: 122 MMHG | BODY MASS INDEX: 31.95 KG/M2 | DIASTOLIC BLOOD PRESSURE: 70 MMHG | WEIGHT: 192 LBS

## 2025-03-18 VITALS — WEIGHT: 192.2 LBS | BODY MASS INDEX: 31.98 KG/M2 | SYSTOLIC BLOOD PRESSURE: 146 MMHG | DIASTOLIC BLOOD PRESSURE: 88 MMHG

## 2025-03-18 DIAGNOSIS — Z87.59 HISTORY OF PREGNANCY INDUCED HYPERTENSION: ICD-10-CM

## 2025-03-18 DIAGNOSIS — Z86.32 HISTORY OF GESTATIONAL DIABETES IN PRIOR PREGNANCY, CURRENTLY PREGNANT: ICD-10-CM

## 2025-03-18 DIAGNOSIS — O09.299 HISTORY OF GESTATIONAL DIABETES IN PRIOR PREGNANCY, CURRENTLY PREGNANT: ICD-10-CM

## 2025-03-18 DIAGNOSIS — O09.529 ANTEPARTUM MULTIGRAVIDA OF ADVANCED MATERNAL AGE: ICD-10-CM

## 2025-03-18 DIAGNOSIS — Z98.891 PREVIOUS CESAREAN SECTION: ICD-10-CM

## 2025-03-18 DIAGNOSIS — Z3A.27 27 WEEKS GESTATION OF PREGNANCY: ICD-10-CM

## 2025-03-18 DIAGNOSIS — Z87.59 HISTORY OF PREGNANCY INDUCED HYPERTENSION: Primary | ICD-10-CM

## 2025-03-18 DIAGNOSIS — O24.415 GESTATIONAL DIABETES MELLITUS (GDM) IN THIRD TRIMESTER CONTROLLED ON ORAL HYPOGLYCEMIC DRUG: ICD-10-CM

## 2025-03-18 DIAGNOSIS — O43.199 MARGINAL INSERTION OF UMBILICAL CORD AFFECTING MANAGEMENT OF MOTHER: ICD-10-CM

## 2025-03-18 DIAGNOSIS — O24.410 DIET CONTROLLED GESTATIONAL DIABETES MELLITUS (GDM), ANTEPARTUM: ICD-10-CM

## 2025-03-18 DIAGNOSIS — O09.819 PREGNANCY RESULTING FROM IN VITRO FERTILIZATION, ANTEPARTUM: ICD-10-CM

## 2025-03-18 DIAGNOSIS — Z34.92 SECOND TRIMESTER PREGNANCY: Primary | ICD-10-CM

## 2025-03-18 DIAGNOSIS — Z3A.31 31 WEEKS GESTATION OF PREGNANCY: ICD-10-CM

## 2025-03-18 LAB
BILIRUB BLD-MCNC: NEGATIVE MG/DL
CLARITY, POC: ABNORMAL
COLOR UR: YELLOW
GLUCOSE UR STRIP-MCNC: NEGATIVE MG/DL
GLUCOSE UR STRIP-MCNC: NEGATIVE MG/DL
KETONES UR QL: NEGATIVE
LEUKOCYTE EST, POC: NEGATIVE
NITRITE UR-MCNC: NEGATIVE MG/ML
PH UR: 7 [PH] (ref 5–8)
PROT UR STRIP-MCNC: NEGATIVE MG/DL
PROT UR STRIP-MCNC: NEGATIVE MG/DL
RBC # UR STRIP: NEGATIVE /UL
SP GR UR: 1.01 (ref 1–1.03)
UROBILINOGEN UR QL: ABNORMAL

## 2025-03-18 PROCEDURE — 0502F SUBSEQUENT PRENATAL CARE: CPT | Performed by: OBSTETRICS & GYNECOLOGY

## 2025-03-18 PROCEDURE — 76816 OB US FOLLOW-UP PER FETUS: CPT

## 2025-03-18 PROCEDURE — 76819 FETAL BIOPHYS PROFIL W/O NST: CPT

## 2025-03-18 RX ORDER — MAGNESIUM GLYCINATE 100 MG
200 CAPSULE ORAL 2 TIMES DAILY
COMMUNITY

## 2025-03-18 NOTE — PROGRESS NOTES
OB FOLLOW UP  CC- Here for care of pregnancy        Sima Booker is a 35 y.o.  31w3d patient being seen today for her obstetrical follow up visit. Patient reports lightheaded and SOB, no pain with deep breath. BP @ home tdy 153/93, BS wnl. Seen @ [facility] TDY RTO 4wks.      Her metformin has been increased per [facility]. They are following her sugars weekly.     Her prenatal care is complicated by (and status) :  see below.  Patient Active Problem List   Diagnosis    Family history of melanoma    Family history of colonic polyps    Tremors of nervous system    Pregnancy    AMA (advanced maternal age) multigravida 35+    Previous  section    Pregnancy resulting from in vitro fertilization, antepartum    History of gestational diabetes in prior pregnancy, currently pregnant    History of pregnancy induced hypertension    Marginal insertion of umbilical cord affecting management of mother    Gestational diabetes mellitus (GDM) in third trimester controlled on oral hypoglycemic drug       Flu Status: Already given in current flu season    RSV vaccine:  out of stock  Rhogam status: was not indicated  28 week labs: Reviewed and normal  Ultrasound Today: Yes [facility]  Non Stress Test: No.      ROS -   Patient Denies: Loss of Fluid, Vaginal Spotting, Vision Changes, Nausea , Vomiting , Contractions, Epigastric pain, and skin itching  Fetal Movement : normal  Other than what is documented in the HPI, all other systems reviewed and are negative.     The additional following portions of the patient's history were reviewed and updated as appropriate: allergies.    I have reviewed and agree with the HPI, ROS, and historical information as entered above. Hue Patel MD      /70   Wt 87.1 kg (192 lb)   LMP  (Approximate)   BMI 31.95 kg/m²         EXAM:     Prenatal Vitals  BP: 122/70  Weight: 87.1 kg (192 lb)   Fetal Heart Rate: [facility] US               Urine Glucose Read-only: Negative  Urine Protein  Read-only: Negative           Assessment and Plan    Problem List Items Addressed This Visit          Gynecologic and Obstetric Problems    Marginal insertion of umbilical cord affecting management of mother       Other    Pregnancy    Overview   G1 1 c/s, arrest, chorio, induced GHTN         AMA (advanced maternal age) multigravida 35+    Previous  section    Overview    vs repeat?         Pregnancy resulting from in vitro fertilization, antepartum    Overview   Fetal echo         History of pregnancy induced hypertension    Overview   With G1         Gestational diabetes mellitus (GDM) in third trimester controlled on oral hypoglycemic drug    Relevant Medications    metFORMIN ER (GLUCOPHAGE-XR) 500 MG 24 hr tablet    Other Relevant Orders    Fetal Nonstress Test     Other Visit Diagnoses         Second trimester pregnancy    -  Primary    Relevant Orders    POC Urinalysis Dipstick (Completed)            Pregnancy at 31w3d. [facility] US today normal growth and fluid. Has FU sched there 4 wks. They are monitoring her fsbs weekly, she is well controlled on the metformin.   Her BP was mild range today at [facility], normal here. Will start increased monitoring in the office.   Fetal status reassuring.  28 week labs reviewed.    Activity and Exercise discussed.  Fetal movement/PTL or Labor precautions  Reviewed FSBS goals: fasting <90, 2hr PP < 120  Reviewed Pre-eclampsia signs/symptoms  Return in about 1 week (around 3/25/2025) for F/U Prenatal, NST Next Visit.    Hue Patel MD  2025

## 2025-03-18 NOTE — PROGRESS NOTES
"Patient denies any leaking fluid, vaginal bleeding, or contractions.  NIPT declined.  Patient reports next follow-up appointment with Dr. Patel's office is today.    Informed patient Dr. Dave reviewed her blood sugars/report and says they \"look really good!\" Discussed with patient that Dr. Dave is not making any changes but does want her to watch food choices at lunch. Patient verbalized understanding and had no questions.     "

## 2025-03-22 NOTE — PROGRESS NOTES
[facility].    Please see ultrasound report under imaging tab of patient chart in Epic (Viewpoint report).    Jolynn Diaz MD

## 2025-03-27 ENCOUNTER — TELEMEDICINE (OUTPATIENT)
Dept: PSYCHIATRY | Facility: CLINIC | Age: 35
End: 2025-03-27
Payer: COMMERCIAL

## 2025-03-27 ENCOUNTER — ROUTINE PRENATAL (OUTPATIENT)
Dept: OBSTETRICS AND GYNECOLOGY | Facility: CLINIC | Age: 35
End: 2025-03-27
Payer: COMMERCIAL

## 2025-03-27 VITALS — BODY MASS INDEX: 32.28 KG/M2 | DIASTOLIC BLOOD PRESSURE: 82 MMHG | SYSTOLIC BLOOD PRESSURE: 122 MMHG | WEIGHT: 194 LBS

## 2025-03-27 DIAGNOSIS — F90.2 ATTENTION DEFICIT HYPERACTIVITY DISORDER (ADHD), COMBINED TYPE: ICD-10-CM

## 2025-03-27 DIAGNOSIS — Z3A.32 32 WEEKS GESTATION OF PREGNANCY: ICD-10-CM

## 2025-03-27 DIAGNOSIS — F32.1 CURRENT MODERATE EPISODE OF MAJOR DEPRESSIVE DISORDER WITHOUT PRIOR EPISODE: ICD-10-CM

## 2025-03-27 DIAGNOSIS — O09.819 PREGNANCY RESULTING FROM IN VITRO FERTILIZATION, ANTEPARTUM: ICD-10-CM

## 2025-03-27 DIAGNOSIS — O43.199 MARGINAL INSERTION OF UMBILICAL CORD AFFECTING MANAGEMENT OF MOTHER: ICD-10-CM

## 2025-03-27 DIAGNOSIS — Z98.891 PREVIOUS CESAREAN SECTION: ICD-10-CM

## 2025-03-27 DIAGNOSIS — Z34.93 PRENATAL CARE IN THIRD TRIMESTER: ICD-10-CM

## 2025-03-27 DIAGNOSIS — O09.529 ANTEPARTUM MULTIGRAVIDA OF ADVANCED MATERNAL AGE: ICD-10-CM

## 2025-03-27 DIAGNOSIS — Z87.59 HISTORY OF PREGNANCY INDUCED HYPERTENSION: ICD-10-CM

## 2025-03-27 DIAGNOSIS — O24.415 GESTATIONAL DIABETES MELLITUS (GDM) IN THIRD TRIMESTER CONTROLLED ON ORAL HYPOGLYCEMIC DRUG: Primary | ICD-10-CM

## 2025-03-27 DIAGNOSIS — O09.299 HISTORY OF GESTATIONAL DIABETES IN PRIOR PREGNANCY, CURRENTLY PREGNANT: ICD-10-CM

## 2025-03-27 DIAGNOSIS — F41.1 GENERALIZED ANXIETY DISORDER: Primary | ICD-10-CM

## 2025-03-27 DIAGNOSIS — Z86.32 HISTORY OF GESTATIONAL DIABETES IN PRIOR PREGNANCY, CURRENTLY PREGNANT: ICD-10-CM

## 2025-03-27 LAB
GLUCOSE UR STRIP-MCNC: NEGATIVE MG/DL
PROT UR STRIP-MCNC: NEGATIVE MG/DL
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
## 2025-03-27 NOTE — PROGRESS NOTES
Date: 2025  Time In: 10:00 EDT  Time out: 10:44 AM EST    This provider is located at [facility], [address] [place] [address], [facility], using a secure [employer] Video Visit through [employer]. Patient is being seen remotely via telehealth at their home address is located in [place]. Patient stated they are in a secure environment for this session. The patient's condition being diagnosed and treated is appropriate for telemedicine. The provider identified themself as well as their credentials. The patient, or  patient's legal guardian consent to be seen remotely, and when consent is given they understand that the consent allows for patient identifiable information to be sent to a third party as needed. They may refuse to be seen remotely at any time. The electronic data is encrypted and password protected, and the patient's or  legal guardian has been advised of the potential risks to privacy not withstanding such measures.   PT Identifiers used: Name and .    You have chosen to receive care through a telehealth visit.  Do you consent to use a video/audio connection for your medical care today? Yes    Subjective   Sima Booker is a 35 y.o. female who presents today for follow up    Chief Complaint:   Chief Complaint   Patient presents with    Anxiety    ADHD    Depression        Data: Pt has another appointment, shorter session. Pt reported that she has been exhausted. Pt reported that she hasn't been sleeping well due to restless legs. Pt reported that she struggled with high blood pressure that is effecting her ability to work normally. Pt reported that she may be delivering at 37 weeks due to being high risk, dependent on BP. Pt reflected on anxiety and ruminating thoughts that she has been experiencing.       Clinical Maneuvering/Intervention: Assisted patient in processing above session content; acknowledged and normalized patient’s thoughts, feelings, and concerns.   Rationalized patient thought process regarding concerns presented at session.  Discussed triggers associated with patient's  anxiety , depression , and ADHD Also discussed coping skills for patient to implement such as grounding , mindfulness , increasing activity , self care , positive self talk , and emotional vs logical brain    Allowed patient to freely discuss issues without interruption or judgment. Provided safe, confidential environment to facilitate the development of positive therapeutic relationship and encourage open, honest communication. Assisted patient in identifying risk factors which would indicate the need for higher level of care including thoughts to harm self or others and/or self-harming behavior and encouraged patient to contact this office, call 911, or present to the nearest emergency room should any of these events occur. Discussed crisis intervention services and means to access. Patient adamantly and convincingly denies current suicidal or homicidal ideation or perceptual disturbance.    Assessment: Pt was alert and oriented x3.  Pt appears open and honest re MH symptoms that have affected life in a negative way. Pt appears motivated to improve MH symptoms and overall quality of life. Pt appears to be having increased anxiety with current pregnancy and being high risk. Pt appears to have increased anxiety as she also had to deliver her first child at 34w. Pt appears to be struggling with rumination during this time. Pt appears to be utilizing her supports although sometimes can also increase her anxiety. Pt appears to struggle acknowledging all emotions at times as they can be difficult to process. Pt appeared receptive to techniques and encouragements discussed in session.     Patient appears to maintain relative stability as compared to their baseline.  However, patient continues to struggle with   Chief Complaint   Patient presents with    Anxiety    ADHD    Depression    which  continues to cause impairment in important areas of functioning.  A result, they can be reasonably expected to continue to benefit from treatment and would likely be at increased risk for decompensation otherwise.      Mental Status Exam:   Hygiene:   good  Cooperation:  Cooperative  Eye Contact:  Good  Psychomotor Behavior:  Appropriate  Affect:  Appropriate  Mood: normal  Speech:  Normal  Thought Process:  Linear  Thought Content:  Normal  Suicidal:  None  Homicidal:  None  Hallucinations:  None  Delusion:  None  Memory:  Intact  Orientation:  Person, Place, and Time  Reliability:  good  Insight:  Good  Judgement:  Fair  Impulse Control:  Fair  Physical/Medical Issues:  No        Patient's Support Network Includes:  significant other and extended family    Functional Status: No impairment    Progress toward goal: Not at goal    Prognosis: Good with Ongoing Treatment     Plan:     Patient will continue in individual outpatient therapy with focus on improved functioning and coping skills, maintaining stability, and avoiding decompensation and the need for higher level of care.    Patient will adhere to medication regimen as prescribed and report any side effects. Patient will contact this office, call 911 or present to the nearest emergency room should suicidal or homicidal ideations occur. Provide Cognitive Behavioral Therapy and Solution Focused Therapy to improve functioning, maintain stability, and avoid decompensation and the need for higher level of care.     Return in about 4 weeks (around 4/24/2025).      VISIT DIAGNOSIS:    Diagnosis Plan   1. Generalized anxiety disorder        2. Attention deficit hyperactivity disorder (ADHD), combined type        3. Current moderate episode of major depressive disorder without prior episode         10:00 EDT         This document has been electronically signed by Ninfa Hwang LCSW  March 27, 2025      Part of this note may be an electronic  transcription/translation of spoken language to printed text using the Dragon Dictation System.

## 2025-03-31 ENCOUNTER — ROUTINE PRENATAL (OUTPATIENT)
Dept: OBSTETRICS AND GYNECOLOGY | Facility: CLINIC | Age: 35
End: 2025-03-31
Payer: COMMERCIAL

## 2025-03-31 VITALS — BODY MASS INDEX: 32.12 KG/M2 | SYSTOLIC BLOOD PRESSURE: 124 MMHG | DIASTOLIC BLOOD PRESSURE: 78 MMHG | WEIGHT: 193 LBS

## 2025-03-31 DIAGNOSIS — Z87.59 HISTORY OF PREGNANCY INDUCED HYPERTENSION: ICD-10-CM

## 2025-03-31 DIAGNOSIS — O09.529 ANTEPARTUM MULTIGRAVIDA OF ADVANCED MATERNAL AGE: ICD-10-CM

## 2025-03-31 DIAGNOSIS — Z3A.33 33 WEEKS GESTATION OF PREGNANCY: ICD-10-CM

## 2025-03-31 DIAGNOSIS — O09.819 PREGNANCY RESULTING FROM IN VITRO FERTILIZATION, ANTEPARTUM: ICD-10-CM

## 2025-03-31 DIAGNOSIS — O24.415 GESTATIONAL DIABETES MELLITUS (GDM) IN THIRD TRIMESTER CONTROLLED ON ORAL HYPOGLYCEMIC DRUG: ICD-10-CM

## 2025-03-31 DIAGNOSIS — Z34.93 PRENATAL CARE IN THIRD TRIMESTER: Primary | ICD-10-CM

## 2025-03-31 DIAGNOSIS — Z98.891 PREVIOUS CESAREAN SECTION: ICD-10-CM

## 2025-03-31 DIAGNOSIS — O43.199 MARGINAL INSERTION OF UMBILICAL CORD AFFECTING MANAGEMENT OF MOTHER: ICD-10-CM

## 2025-03-31 LAB
GLUCOSE UR STRIP-MCNC: NEGATIVE MG/DL
PROT UR STRIP-MCNC: NEGATIVE MG/DL

## 2025-03-31 PROCEDURE — 0502F SUBSEQUENT PRENATAL CARE: CPT | Performed by: OBSTETRICS & GYNECOLOGY

## 2025-03-31 PROCEDURE — 59025 FETAL NON-STRESS TEST: CPT | Performed by: OBSTETRICS & GYNECOLOGY

## 2025-03-31 RX ORDER — FERROUS SULFATE 325(65) MG
325 TABLET ORAL
COMMUNITY

## 2025-03-31 NOTE — PROGRESS NOTES
OB FOLLOW UP  CC- Here for care of pregnancy        Sima Booker is a 35 y.o.  33w2d patient being seen today for her obstetrical follow up visit. Patient reports intermittent round ligament pains and SOA with activity.     Her prenatal care is complicated by (and status) : GDM. Her average fasting BG is 80s. Her average 2hr PP BG is <120.  Patient Active Problem List   Diagnosis    Family history of melanoma    Family history of colonic polyps    Tremors of nervous system    Pregnancy    AMA (advanced maternal age) multigravida 35+    Previous  section    Pregnancy resulting from in vitro fertilization, antepartum    History of gestational diabetes in prior pregnancy, currently pregnant    History of pregnancy induced hypertension    Marginal insertion of umbilical cord affecting management of mother    Gestational diabetes mellitus (GDM) in third trimester controlled on oral hypoglycemic drug       Ultrasound Today: No  Non Stress Test: Yes minutes 20  non-stress test: NST: Reactive  indication: Diabetes Mellitus Gestational      ROS -   Patient Denies: Loss of Fluid, Vaginal Spotting, Vision Changes, Headaches, Nausea , Vomiting , Contractions, Epigastric pain, and skin itching  Fetal Movement : normal  Other than what is documented in the HPI, all other systems reviewed and are negative.       The additional following portions of the patient's history were reviewed and updated as appropriate: allergies, current medications, past family history, past medical history, past social history, past surgical history, and problem list.    I have reviewed and agree with the HPI, ROS, and historical information as entered above. Hue Patel MD      /78   Wt 87.5 kg (193 lb)   LMP  (Approximate)   BMI 32.12 kg/m²       EXAM:     Prenatal Vitals  BP: 124/78  Weight: 87.5 kg (193 lb)   Fetal Heart Rate: NST               Urine Glucose Read-only: Negative  Urine Protein Read-only:  Negative           Assessment and Plan    Problem List Items Addressed This Visit          Gynecologic and Obstetric Problems    Marginal insertion of umbilical cord affecting management of mother       Other    Pregnancy    Overview   G1 1 c/s, arrest, chorio, induced GHTN         AMA (advanced maternal age) multigravida 35+    Previous  section    Overview    vs repeat?         Pregnancy resulting from in vitro fertilization, antepartum    Overview   Fetal echo         History of pregnancy induced hypertension    Overview   With G1         Gestational diabetes mellitus (GDM) in third trimester controlled on oral hypoglycemic drug    Relevant Medications    metFORMIN ER (GLUCOPHAGE-XR) 500 MG 24 hr tablet    Other Relevant Orders    Fetal Nonstress Test     Other Visit Diagnoses         Prenatal care in third trimester    -  Primary    Relevant Orders    POC Urinalysis Dipstick (Completed)            Pregnancy at 33w2d. NST reactive. Good glc control. NST 1 week, then start twice weekly.  Fetal status reassuring.   Activity and Exercise discussed.  Fetal movement/PTL or Labor precautions  Return in about 3 days (around 4/3/2025) for F/U Prenatal, NST Next Visit.    Hue Patel MD  2025

## 2025-04-07 ENCOUNTER — ROUTINE PRENATAL (OUTPATIENT)
Dept: OBSTETRICS AND GYNECOLOGY | Facility: CLINIC | Age: 35
End: 2025-04-07
Payer: COMMERCIAL

## 2025-04-07 VITALS — DIASTOLIC BLOOD PRESSURE: 82 MMHG | WEIGHT: 195.2 LBS | BODY MASS INDEX: 32.48 KG/M2 | SYSTOLIC BLOOD PRESSURE: 128 MMHG

## 2025-04-07 DIAGNOSIS — Z98.891 PREVIOUS CESAREAN SECTION: ICD-10-CM

## 2025-04-07 DIAGNOSIS — O43.199 MARGINAL INSERTION OF UMBILICAL CORD AFFECTING MANAGEMENT OF MOTHER: ICD-10-CM

## 2025-04-07 DIAGNOSIS — Z34.90 PRENATAL CARE, ANTEPARTUM, UNSPECIFIED GRAVIDITY: Primary | ICD-10-CM

## 2025-04-07 DIAGNOSIS — O09.529 ANTEPARTUM MULTIGRAVIDA OF ADVANCED MATERNAL AGE: ICD-10-CM

## 2025-04-07 DIAGNOSIS — O09.819 PREGNANCY RESULTING FROM IN VITRO FERTILIZATION, ANTEPARTUM: ICD-10-CM

## 2025-04-07 DIAGNOSIS — Z87.59 HISTORY OF PREGNANCY INDUCED HYPERTENSION: ICD-10-CM

## 2025-04-07 DIAGNOSIS — O24.415 GESTATIONAL DIABETES MELLITUS (GDM) IN THIRD TRIMESTER CONTROLLED ON ORAL HYPOGLYCEMIC DRUG: ICD-10-CM

## 2025-04-07 LAB
GLUCOSE UR STRIP-MCNC: NEGATIVE MG/DL
PROT UR STRIP-MCNC: NEGATIVE MG/DL

## 2025-04-07 PROCEDURE — 0502F SUBSEQUENT PRENATAL CARE: CPT | Performed by: NURSE PRACTITIONER

## 2025-04-07 PROCEDURE — 59025 FETAL NON-STRESS TEST: CPT | Performed by: NURSE PRACTITIONER

## 2025-04-07 RX ORDER — SENNOSIDES 8.6 MG
650 CAPSULE ORAL EVERY 8 HOURS PRN
COMMUNITY

## 2025-04-07 NOTE — PROGRESS NOTES
OB FOLLOW UP  CC- Here for care of pregnancy        Sima Booker is a 35 y.o.  34w2d patient being seen today for her obstetrical follow up visit. Patient reports that she had a headache this weekend that Tylenol helped some. Reports that her BP was elevated over the weekend mostly ranging 140's/85-90 but once it was 162/92. She thinks that she may have a sinus infection. Reports nausea without vomiting. Reports that fasting blood glucose level is usually in the 80's and postprandial usually under 120 but she did have one high reading after eating cake at her baby shower.      Her prenatal care is complicated by (and status) :   Patient Active Problem List   Diagnosis    Family history of melanoma    Family history of colonic polyps    Tremors of nervous system    Pregnancy    AMA (advanced maternal age) multigravida 35+    Previous  section    Pregnancy resulting from in vitro fertilization, antepartum    History of gestational diabetes in prior pregnancy, currently pregnant    History of pregnancy induced hypertension    Marginal insertion of umbilical cord affecting management of mother    Gestational diabetes mellitus (GDM) in third trimester controlled on oral hypoglycemic drug         Ultrasound Today: No  Non Stress Test: Yes minutes 20+  non-stress test: NST: Reactive  indication: Diabetes Mellitus Gestational  category: Category I      ROS -   Patient Denies: Loss of Fluid, Vaginal Spotting, Vision Changes, Vomiting , Contractions, Epigastric pain, and skin itching  Fetal Movement : normal  Other than what is documented in the HPI, all other systems reviewed and are negative.       The additional following portions of the patient's history were reviewed and updated as appropriate: allergies, current medications, past family history, past medical history, past social history, past surgical history, and problem list.    I have reviewed and agree with the HPI, ROS, and historical  information as entered above. Gerald Lebron, APRN      /82   Wt 88.5 kg (195 lb 3.2 oz)   LMP  (Approximate)   BMI 32.48 kg/m²       EXAM:     Prenatal Vitals  BP: 128/82  Weight: 88.5 kg (195 lb 3.2 oz)   Fetal Heart Rate: NST               Urine Glucose Read-only: Negative  Urine Protein Read-only: Negative           Assessment and Plan    Problem List Items Addressed This Visit       AMA (advanced maternal age) multigravida 35+    Previous  section    Overview    vs repeat?         Pregnancy resulting from in vitro fertilization, antepartum    Overview   Fetal echo         History of pregnancy induced hypertension    Overview   With G1         Marginal insertion of umbilical cord affecting management of mother    Gestational diabetes mellitus (GDM) in third trimester controlled on oral hypoglycemic drug    Relevant Medications    metFORMIN ER (GLUCOPHAGE-XR) 500 MG 24 hr tablet     Other Visit Diagnoses         Prenatal care, antepartum, unspecified     -  Primary    Relevant Orders    POC Urinalysis Dipstick (Completed)            Pregnancy at 34w2d. Pt reports some elevated BP's over the weekend but normal here today. She denies s/s preeclampsia. States she has had anxiety due to elevated BP and bedrest at 34 weeks with prior pregnancy.  Fetal status reassuring.   Activity and Exercise discussed.  Fetal movement/PTL or Labor precautions  Patient is on Prenatal vitamins  Reviewed FSBS goals: fasting <90, 2hr PP < 120  Reviewed Pre-eclampsia signs/symptoms  Discussed bASA for PIH prevention from 12 to 36wk  Return in about 3 days (around 4/10/2025) for NST.    Gerald Lebron, APRN  2025

## 2025-04-08 ENCOUNTER — TELEMEDICINE (OUTPATIENT)
Dept: FAMILY MEDICINE CLINIC | Facility: TELEHEALTH | Age: 35
End: 2025-04-08
Payer: COMMERCIAL

## 2025-04-08 DIAGNOSIS — J01.10 ACUTE NON-RECURRENT FRONTAL SINUSITIS: Primary | ICD-10-CM

## 2025-04-08 PROCEDURE — 99213 OFFICE O/P EST LOW 20 MIN: CPT | Performed by: NURSE PRACTITIONER

## 2025-04-08 RX ORDER — AZITHROMYCIN 250 MG/1
TABLET, FILM COATED ORAL
Qty: 6 TABLET | Refills: 0 | Status: SHIPPED | OUTPATIENT
Start: 2025-04-08

## 2025-04-08 NOTE — PROGRESS NOTES
CEDRICK Booker is a 35 y.o. female  presents with complaint of 8 day history of congestion, sore throat, green mucus, fatigue, sinus pressure and headaches. Denies fever, chills, sweats. Currently 34 weeks pregnant. She has tried using mucinex, flonase and allergy medicine but continues to feel worse.     Review of Systems    Past Medical History:   Diagnosis Date    ADHD (attention deficit hyperactivity disorder)     Allergic     Anxiety     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 Not sure how old i was       Family History   Problem Relation Age of Onset    Thyroid disease Mother     Hyperlipidemia Father     Arthritis Maternal Grandmother     Miscarriages / Stillbirths Maternal Grandmother     Diabetes Maternal Grandfather     Miscarriages / Stillbirths Maternal Grandfather     Diabetes Paternal Grandmother     Breast cancer Other         great aunt       Social History     Socioeconomic History    Marital status:      Spouse name: Leslie    Highest education level: High school graduate   Tobacco Use    Smoking status: Never    Smokeless tobacco: Never   Vaping Use    Vaping status: Never Used   Substance and Sexual Activity    Alcohol use: Not Currently     Comment: OCCASIONALLY    Drug use: Never    Sexual activity: Yes     Partners: Male     Birth control/protection: None         LMP  (Approximate)     PHYSICAL EXAM  Physical Exam   Constitutional: She appears well-developed and well-nourished.   HENT:   Head: Normocephalic.   Nose: Rhinorrhea present. Right sinus exhibits frontal sinus tenderness. Left sinus exhibits frontal sinus tenderness.   Neck: Neck normal appearance.  Pulmonary/Chest: Effort normal.   Neurological: She is alert.   Psychiatric: She has a normal mood and affect.  Her speech is normal.       Diagnoses and all orders for this visit:    1. Acute non-recurrent frontal sinusitis (Primary)  -     azithromycin (Zithromax Z-Kendall) 250 MG tablet; Take 2 tablets the first day, then 1 tablet daily for 4 days.  Dispense: 6 tablet; Refill: 0          FOLLOW-UP  As discussed during visit with [facility], if symptoms worsen or fail to improve, follow-up with PCP/Urgent Care/Emergency Department.    Patient verbalizes understanding of medications, instructions for treatment and follow-up.    Dorothy Parikh, QI  04/08/2025  10:14 EDT      Mode of Visit: Video  Location of patient: -HOME-  Location of provider: Home  You have chosen to receive care through a telehealth visit.  The patient has signed the video visit consent form.  The visit included audio and video interaction. No technical issues occurred during this visit.

## 2025-04-10 ENCOUNTER — HOSPITAL ENCOUNTER (OUTPATIENT)
Facility: HOSPITAL | Age: 35
Discharge: HOME | End: 2025-04-10
Attending: OBSTETRICS & GYNECOLOGY | Admitting: OBSTETRICS & GYNECOLOGY
Payer: COMMERCIAL

## 2025-04-10 ENCOUNTER — ROUTINE PRENATAL (OUTPATIENT)
Dept: OBSTETRICS AND GYNECOLOGY | Facility: CLINIC | Age: 35
End: 2025-04-10
Payer: COMMERCIAL

## 2025-04-10 VITALS
SYSTOLIC BLOOD PRESSURE: 144 MMHG | OXYGEN SATURATION: 97 % | WEIGHT: 194 LBS | DIASTOLIC BLOOD PRESSURE: 78 MMHG | BODY MASS INDEX: 32.28 KG/M2

## 2025-04-10 VITALS
BODY MASS INDEX: 33.3 KG/M2 | RESPIRATION RATE: 16 BRPM | DIASTOLIC BLOOD PRESSURE: 98 MMHG | SYSTOLIC BLOOD PRESSURE: 137 MMHG | HEIGHT: 64 IN

## 2025-04-10 DIAGNOSIS — O09.819 PREGNANCY RESULTING FROM IN VITRO FERTILIZATION, ANTEPARTUM: ICD-10-CM

## 2025-04-10 DIAGNOSIS — Z3A.34 34 WEEKS GESTATION OF PREGNANCY: ICD-10-CM

## 2025-04-10 DIAGNOSIS — O09.529 ANTEPARTUM MULTIGRAVIDA OF ADVANCED MATERNAL AGE: ICD-10-CM

## 2025-04-10 DIAGNOSIS — Z34.93 PRENATAL CARE, THIRD TRIMESTER: Primary | ICD-10-CM

## 2025-04-10 DIAGNOSIS — O24.415 GESTATIONAL DIABETES MELLITUS (GDM) IN THIRD TRIMESTER CONTROLLED ON ORAL HYPOGLYCEMIC DRUG: ICD-10-CM

## 2025-04-10 DIAGNOSIS — Z87.59 HISTORY OF PREGNANCY INDUCED HYPERTENSION: ICD-10-CM

## 2025-04-10 LAB
ALP SERPL-CCNC: 110 U/L (ref 39–117)
ALT SERPL W P-5'-P-CCNC: 14 U/L (ref 1–33)
AST SERPL-CCNC: 21 U/L (ref 1–32)
BILIRUB SERPL-MCNC: 0.2 MG/DL (ref 0–1.2)
CREAT SERPL-MCNC: 0.67 MG/DL (ref 0.57–1)
DEPRECATED RDW RBC AUTO: 48.1 FL (ref 37–54)
ERYTHROCYTE [DISTWIDTH] IN BLOOD BY AUTOMATED COUNT: 14.7 % (ref 12.3–15.4)
EXPIRATION DATE: 0
EXPIRATION DATE: ABNORMAL
GLUCOSE BLDC GLUCOMTR-MCNC: 108 MG/DL (ref 70–130)
GLUCOSE UR STRIP-MCNC: NEGATIVE MG/DL
HCT VFR BLD AUTO: 35.7 % (ref 34–46.6)
HGB BLD-MCNC: 12.2 G/DL (ref 12–15.9)
LDH SERPL-CCNC: 234 U/L (ref 135–214)
Lab: 0
Lab: ABNORMAL
MCH RBC QN AUTO: 30.9 PG (ref 26.6–33)
MCHC RBC AUTO-ENTMCNC: 34.2 G/DL (ref 31.5–35.7)
MCV RBC AUTO: 90.4 FL (ref 79–97)
PLATELET # BLD AUTO: 245 10*3/MM3 (ref 140–450)
PMV BLD AUTO: 9.9 FL (ref 6–12)
PROT UR STRIP-MCNC: ABNORMAL MG/DL
PROT UR STRIP-MCNC: NEGATIVE MG/DL
RBC # BLD AUTO: 3.95 10*6/MM3 (ref 3.77–5.28)
URATE SERPL-MCNC: 4.2 MG/DL (ref 2.4–5.7)
WBC NRBC COR # BLD AUTO: 8.48 10*3/MM3 (ref 3.4–10.8)

## 2025-04-10 PROCEDURE — 85027 COMPLETE CBC AUTOMATED: CPT | Performed by: OBSTETRICS & GYNECOLOGY

## 2025-04-10 PROCEDURE — 84450 TRANSFERASE (AST) (SGOT): CPT | Performed by: OBSTETRICS & GYNECOLOGY

## 2025-04-10 PROCEDURE — 83615 LACTATE (LD) (LDH) ENZYME: CPT | Performed by: OBSTETRICS & GYNECOLOGY

## 2025-04-10 PROCEDURE — 82247 BILIRUBIN TOTAL: CPT | Performed by: OBSTETRICS & GYNECOLOGY

## 2025-04-10 PROCEDURE — 59025 FETAL NON-STRESS TEST: CPT | Performed by: OBSTETRICS & GYNECOLOGY

## 2025-04-10 PROCEDURE — 82565 ASSAY OF CREATININE: CPT | Performed by: OBSTETRICS & GYNECOLOGY

## 2025-04-10 PROCEDURE — 99213 OFFICE O/P EST LOW 20 MIN: CPT | Performed by: OBSTETRICS & GYNECOLOGY

## 2025-04-10 PROCEDURE — 84460 ALANINE AMINO (ALT) (SGPT): CPT | Performed by: OBSTETRICS & GYNECOLOGY

## 2025-04-10 PROCEDURE — 84550 ASSAY OF BLOOD/URIC ACID: CPT | Performed by: OBSTETRICS & GYNECOLOGY

## 2025-04-10 PROCEDURE — 82948 REAGENT STRIP/BLOOD GLUCOSE: CPT

## 2025-04-10 PROCEDURE — 81002 URINALYSIS NONAUTO W/O SCOPE: CPT | Performed by: OBSTETRICS & GYNECOLOGY

## 2025-04-10 PROCEDURE — 84075 ASSAY ALKALINE PHOSPHATASE: CPT | Performed by: OBSTETRICS & GYNECOLOGY

## 2025-04-10 PROCEDURE — G0463 HOSPITAL OUTPT CLINIC VISIT: HCPCS

## 2025-04-10 PROCEDURE — 36415 COLL VENOUS BLD VENIPUNCTURE: CPT | Performed by: OBSTETRICS & GYNECOLOGY

## 2025-04-10 NOTE — PROGRESS NOTES
"      OB FOLLOW UP  CC- Here for care of pregnancy        Sima Booker is a 35 y.o.  34w5d patient being seen today for her obstetrical follow up visit. Patient reports she is currently being treated for a sinus infection; she had a telemed appointment on  and was prescribed azithromycin. She has also started taking mucinex. Patient reports recent sinus-related headaches (denies vision changes or RUQ pain; Tylenol helps), occasional mild nausea (x3 weekly; denies emeses), and round ligament pains. She reports her fasting glucoses to run in the 80s and her postprandials <120s (if she \"behaves\").     Her prenatal care is complicated by (and status): see below.  Patient Active Problem List   Diagnosis    Family history of melanoma    Family history of colonic polyps    Tremors of nervous system    Pregnancy    AMA (advanced maternal age) multigravida 35+    Previous  section    Pregnancy resulting from in vitro fertilization, antepartum    History of gestational diabetes in prior pregnancy, currently pregnant    History of pregnancy induced hypertension    Marginal insertion of umbilical cord affecting management of mother    Gestational diabetes mellitus (GDM) in third trimester controlled on oral hypoglycemic drug       Flu Status: Already given in current flu season  Ultrasound Today: No  Non Stress Test: Yes, 20+ minutes  non-stress test: NST: Reactive  indication: Diabetes Mellitus Gestational    ROS -   Patient Denies: Loss of Fluid, Vaginal Spotting, Vision Changes, Vomiting , Epigastric pain, and skin itching  Fetal Movement: normal  Other than what is documented in the HPI, all other systems reviewed and are negative.       The additional following portions of the patient's history were reviewed and updated as appropriate: allergies, current medications, past family history, past medical history, past social history, past surgical history, and problem list.    I have reviewed and " agree with the HPI, ROS, and historical information as entered above. Juanita Jeffrey, APRN      /78 Comment: 150/82 at 1432  Wt 88 kg (194 lb)   LMP  (Approximate)   SpO2 97%   BMI 32.28 kg/m²       EXAM:     Prenatal Vitals  BP: 144/78 (150/82 at 1432)  Weight: 88 kg (194 lb)   Fetal Heart Rate: NST+               Urine Glucose Read-only: Negative  Urine Protein Read-only: Negative           Assessment and Plan    Problem List Items Addressed This Visit          Endocrine and Metabolic    Gestational diabetes mellitus (GDM) in third trimester controlled on oral hypoglycemic drug    Relevant Medications    metFORMIN ER (GLUCOPHAGE-XR) 500 MG 24 hr tablet    Other Relevant Orders    Fetal Nonstress Test       Gravid and     Pregnancy    Overview   G1 1 c/s, arrest, chorio, induced GHTN         Relevant Orders    POC Glucose, Urine, Qualitative, Dipstick (Completed)    POC Protein, Urine, Qualitative, Dipstick (Completed)    Fetal Nonstress Test    AMA (advanced maternal age) multigravida 35+    Pregnancy resulting from in vitro fertilization, antepartum    Overview   Fetal echo         History of pregnancy induced hypertension    Overview   With G1          Other Visit Diagnoses         Prenatal care, third trimester    -  Primary    Relevant Orders    POC Glucose, Urine, Qualitative, Dipstick (Completed)    POC Protein, Urine, Qualitative, Dipstick (Completed)    Fetal Nonstress Test            Pregnancy at 34w5d  Fetal status reassuring. 20 min NST 15 x 15 reactive, Cat 1, No contractions or decelerations noted.   Activity and Exercise discussed.  Fetal movement/PTL or Labor precautions  Patient is on Prenatal vitamins  Reviewed Pre-eclampsia signs/symptoms  Discussed bASA for PIH prevention from 12 to 36wk  Headache, soa 97% ra, 144/78, 150/82 reviewed with OP-sent to triage for htn work up.     Juanita Jeffrey, APRN  04/10/2025

## 2025-04-10 NOTE — H&P
"[facility]  Obstetric History and Physical    Chief Complaint   Patient presents with    Elevated Blood Pressure       HPI:      Patient is a 35 y.o. female  currently at 34w5d, who presents from the office with elevated blood pressures in the office.   She was being seen for suspected GHTN.  She had a previous C/S with GHTN as well.    She reports that she had a headache, but it  has since resolved without intervention.  Denies RUQ pain and vision changes.   Denies other pregnancy complaints such as vaginal leaking and bleeding.  +FM.          The following portions of the patients history were reviewed and updated as appropriate: current medications, allergies, past medical history, past surgical history, past family history, past social history and problem list .       Prenatal Information:   Maternal Prenatal Labs  Blood Type No results found for: \"ABO\"   Rh Status No results found for: \"RH\"   Antibody Screen No results found for: \"ABSCRN\"   Gonnorhea No results found for: \"GCCX\"   Chlamydia No results found for: \"CLAMYDCU\"   RPR No results found for: \"RPR\"   Syphilis Antibody No results found for: \"SYPHILIS\"   Rubella No results found for: \"RUBELLAIGGIN\"   Hepatitis B Surface Antigen No results found for: \"HEPBSAG\"   HIV-1 Antibody No results found for: \"LABHIV1\"   Hepatitis C Antibody No results found for: \"HEPCAB\"   Rapid Urin Drug Screen No results found for: \"AMPMETHU\", \"BARBITSCNUR\", \"LABBENZSCN\", \"LABMETHSCN\", \"LABOPIASCN\", \"THCURSCR\", \"COCAINEUR\", \"AMPHETSCREEN\", \"PROPOXSCN\", \"BUPRENORSCNU\", \"METAMPSCNUR\", \"OXYCODONESCN\", \"TRICYCLICSCN\"   Group B Strep Culture No results found for: \"GBSANTIGEN\"           External Prenatal Results       Pregnancy Outside Results - Transcribed From Office Records - See Scanned Records For Details       Test Value Date Time    ABO  A  24 1412    Rh  Positive  24 1412    Antibody Screen  Negative  24 1412    Varicella IgG       Rubella  3.91 index " 24 1412    Hgb  12.2 g/dL 04/10/25 1543       13.3 g/dL 24 1412    Hct  35.7 % 04/10/25 1543       41.3 % 24 1412    HgB A1c        1h GTT  142 mg/dL 24 1051    3h GTT Fasting  73 mg/dL 24 1047    3h GTT 1 hour  185 mg/dL 24 1047    3h GTT 2 hour  207 mg/dL 24 1047    3h GTT 3 hour   142 mg/dL 24 1047    Gonorrhea (discrete)  Negative  24 1412    Chlamydia (discrete)  Negative  24 1412    RPR  Non Reactive  24 141    Syphils cascade: TP-Ab (FTA)       TP-Ab       TP-Ab (EIA)       TPPA       HBsAg  Negative  24 141    Herpes Simplex Virus PCR       Herpes Simplex VIrus Culture       HIV  Non Reactive  24 141    Hep C RNA Quant PCR       Hep C Antibody  Non Reactive  24 1412    AFP       NIPT       Cystic Fibrosis ([place]ly)       Cystic Fibroisis        Spinal Muscular atrophy       Fragile X       Group B Strep       GBS Susceptibility to Clindamycin       GBS Susceptibility to Erythromycin       Fetal Fibronectin       Genetic Testing, Maternal Blood                 Drug Screening       Test Value Date Time    Urine Drug Screen       Amphetamine Screen  Negative ng/mL 24 1412    Barbiturate Screen  Negative ng/mL 24 1412    Benzodiazepine Screen  Negative ng/mL 24 1412    Methadone Screen  Negative ng/mL 24 1412    Phencyclidine Screen  Negative ng/mL 24 1412    Opiates Screen       THC Screen       Cocaine Screen       Propoxyphene Screen  Negative ng/mL 24 1412    Buprenorphine Screen       Methamphetamine Screen       Oxycodone Screen       Tricyclic Antidepressants Screen                 Legend    ^: Historical                              Past OB History:     OB History    Para Term  AB Living   2 1 1 0 0 1   SAB IAB Ectopic Molar Multiple Live Births   0 0 0 0 0 1      # Outcome Date GA Lbr Russell/2nd Weight Sex Type Anes PTL Lv   2 Current            1 Term 21 37w4d  3530  g (7 lb 12.5 oz) M CS-LTranv EPI, Spinal N SUJATA      Complications: Intraamniotic Infection, Failure to Progress in Second Stage      Name: VINCENT LLANOS      Apgar1: 6  Apgar5: 7      Obstetric Comments   FOB #1 : Pregnancy #1(IVF, frozen cycle retrieved 2020, no donors)       Past Medical History: Past Medical History:   Diagnosis Date    ADHD (attention deficit hyperactivity disorder)     Allergic     Anxiety     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 Not sure how old i was      Past Surgical History Past Surgical History:   Procedure Laterality Date     SECTION N/A 2021    Procedure:  SECTION PRIMARY;  Surgeon: Stef Lindsey MD;  Location: [facility] LABOR DELIVERY;  Service: Obstetrics/Gynecology;  Laterality: N/A;    COLONOSCOPY  10/01/2017    FERTILITY SURGERY  2020    Oocyte retrieval for IVF    NASAL SINUS SURGERY  2016    WISDOM TOOTH EXTRACTION        Family History: Family History   Problem Relation Age of Onset    Thyroid disease Mother     Hyperlipidemia Father     Arthritis Maternal Grandmother     Miscarriages / Stillbirths Maternal Grandmother     Diabetes Maternal Grandfather     Miscarriages / Stillbirths Maternal Grandfather     Diabetes Paternal Grandmother     Breast cancer Other         great aunt      Social History:  reports that she has never smoked. She has never used smokeless tobacco.   reports that she does not currently use alcohol.   reports no history of drug use.              Objective     Vital Signs Range for the last 24 hours  Temperature:     Temp Source: Temp src: Oral   BP: BP: (137-164)/(78-98) 137/98   Pulse:     Respirations: Resp:  [16] 16   SPO2: SpO2:  [97 %] 97 %   O2 Amount (l/min):     O2 Devices     Weight: Weight:   [88 kg (194 lb)] 88 kg (194 lb)     Physical Examination: General appearance - alert, well appearing, and in no distress and oriented to person, place, and time  Chest - Breathing is unlaboued   Heart - Regular rate.    Abdomen - soft, nontender, nondistended, gravid uterus   Extremities - pedal edema +1      Fetal Heart Rate Assessment   Method: Fetal HR Assessment Method: external   Beats/min: Fetal HR (beats/min): 140   Baseline: Fetal HR Baseline: normal range   Varibility: Fetal HR Variability: moderate (amplitude range 6 to 25 bpm)   Accels: Fetal HR Accelerations: greater than/equal to 15 bpm, lasting at least 15 seconds   Decels:     Tracing Category:       Uterine Assessment   Method: Method: external tocotransducer   Frequency (min):     Ctx Count in 10 min:     Duration:     Intensity:     Intensity by IUPC:     Resting Tone: Uterine Resting Tone: soft by palpation   Resting Tone by IUPC:     [place] Units:      NST                     Indication: Hypertension   Start time: 1630                  End time: 1710  15 x 15 accels x 2  Yes  No decels  Baseline  140s  Reactive NST - Yes     Laboratory Results:   Lab Results   Component Value Date     04/10/2025    HGB 12.2 04/10/2025    HCT 35.7 04/10/2025    WBC 8.48 04/10/2025      Lab Results   Component Value Date    HGB 12.2 04/10/2025     04/10/2025    AST 21 04/10/2025    ALT 14 04/10/2025     (H) 04/10/2025    URICACID 4.2 04/10/2025    BUN 8 11/05/2024    CREATININE 0.67 04/10/2025    GLUCOSE 90 01/22/2024            Assessment/Plan  1. Intrauterine pregnancy at 34w5d gestation with reactive fetal status  2. Gestational hypertension without evidence of pre-eclampsia    3.  Reviewed S/S of pre-eclampsia for her to return   4.  Questions answered  5.  D/C home.       James Call MD  4/10/2025  17:26 EDT

## 2025-04-11 ENCOUNTER — HOSPITAL ENCOUNTER (OUTPATIENT)
Facility: HOSPITAL | Age: 35
Setting detail: OBSERVATION
Discharge: HOME | End: 2025-04-11
Attending: OBSTETRICS & GYNECOLOGY | Admitting: OBSTETRICS & GYNECOLOGY
Payer: COMMERCIAL

## 2025-04-11 VITALS
HEART RATE: 94 BPM | WEIGHT: 194 LBS | BODY MASS INDEX: 33.12 KG/M2 | DIASTOLIC BLOOD PRESSURE: 89 MMHG | RESPIRATION RATE: 16 BRPM | TEMPERATURE: 97.8 F | SYSTOLIC BLOOD PRESSURE: 132 MMHG | HEIGHT: 64 IN

## 2025-04-11 PROBLEM — Z34.93 THIRD TRIMESTER PREGNANCY: Status: ACTIVE | Noted: 2025-04-11

## 2025-04-11 LAB
EXPIRATION DATE: ABNORMAL
Lab: ABNORMAL
PROT UR STRIP-MCNC: ABNORMAL MG/DL

## 2025-04-11 PROCEDURE — 99221 1ST HOSP IP/OBS SF/LOW 40: CPT | Performed by: OBSTETRICS & GYNECOLOGY

## 2025-04-11 PROCEDURE — G0378 HOSPITAL OBSERVATION PER HR: HCPCS

## 2025-04-11 PROCEDURE — G0463 HOSPITAL OUTPT CLINIC VISIT: HCPCS

## 2025-04-11 PROCEDURE — 81002 URINALYSIS NONAUTO W/O SCOPE: CPT | Performed by: OBSTETRICS & GYNECOLOGY

## 2025-04-11 PROCEDURE — 59025 FETAL NON-STRESS TEST: CPT | Performed by: OBSTETRICS & GYNECOLOGY

## 2025-04-11 RX ORDER — BUTALBITAL, ACETAMINOPHEN AND CAFFEINE 50; 325; 40 MG/1; MG/1; MG/1
2 TABLET ORAL ONCE
Status: COMPLETED | OUTPATIENT
Start: 2025-04-11 | End: 2025-04-11

## 2025-04-11 RX ADMIN — BUTALBITAL, ACETAMINOPHEN, AND CAFFEINE 2 TABLET: 325; 50; 40 TABLET ORAL at 17:46

## 2025-04-11 NOTE — H&P
"[facility]  Obstetric History and Physical    Referring Provider: No Known Provider      Chief Complaint   Patient presents with    Headache    Elevated Blood Pressure       Subjective     Patient is a 35 y.o. female  currently at 34w6d, who presents with headache.  Patient reports a headache unrelieved with Tylenol in the setting of known gestational hypertension, prior , and AMA.  Patient seen yesterday in triage. normal preeclampsia panel without any symptoms.  Patient developed headache today, unrelieved with Tylenol.  Patient denies nausea, vomiting, leaking of fluid, vaginal bleeding, reports normal fetal activity.  Headache resolved after Fioricet.        The following portions of the patients history were reviewed and updated as appropriate: current medications, allergies, past medical history, past surgical history, past family history, past social history, and problem list .       Prenatal Information:   Maternal Prenatal Labs  Blood Type No results found for: \"ABO\"   Rh Status No results found for: \"RH\"   Antibody Screen No results found for: \"ABSCRN\"   Gonnorhea No results found for: \"GCCX\"   Chlamydia No results found for: \"CLAMYDCU\"   RPR No results found for: \"RPR\"   Syphilis Antibody No results found for: \"SYPHILIS\"   Rubella No results found for: \"RUBELLAIGGIN\"   Hepatitis B Surface Antigen No results found for: \"HEPBSAG\"   HIV-1 Antibody No results found for: \"LABHIV1\"   Hepatitis C Antibody No results found for: \"HEPCAB\"   Rapid Urin Drug Screen No results found for: \"AMPMETHU\", \"BARBITSCNUR\", \"LABBENZSCN\", \"LABMETHSCN\", \"LABOPIASCN\", \"THCURSCR\", \"COCAINEUR\", \"AMPHETSCREEN\", \"PROPOXSCN\", \"BUPRENORSCNU\", \"METAMPSCNUR\", \"OXYCODONESCN\", \"TRICYCLICSCN\"   Group B Strep Culture No results found for: \"GBSANTIGEN\"           External Prenatal Results       Pregnancy Outside Results - Transcribed From Office Records - See Scanned Records For Details       Test Value Date Time    ABO  A  " 24 1412    Rh  Positive  24 1412    Antibody Screen  Negative  24 1412    Varicella IgG       Rubella  3.91 index 24 1412    Hgb  12.2 g/dL 04/10/25 1543       13.3 g/dL 24 1412    Hct  35.7 % 04/10/25 1543       41.3 % 24 1412    HgB A1c        1h GTT  142 mg/dL 24 1051    3h GTT Fasting  73 mg/dL 24 1047    3h GTT 1 hour  185 mg/dL 24 1047    3h GTT 2 hour  207 mg/dL 24 1047    3h GTT 3 hour   142 mg/dL 24 1047    Gonorrhea (discrete)  Negative  24 1412    Chlamydia (discrete)  Negative  24 1412    RPR  Non Reactive  24 1412    Syphils cascade: TP-Ab (FTA)       TP-Ab       TP-Ab (EIA)       TPPA       HBsAg  Negative  24 141    Herpes Simplex Virus PCR       Herpes Simplex VIrus Culture       HIV  Non Reactive  24 1412    Hep C RNA Quant PCR       Hep C Antibody  Non Reactive  24 1412    AFP       NIPT       Cystic Fibrosis ([place])       Cystic Fibroisis        Spinal Muscular atrophy       Fragile X       Group B Strep       GBS Susceptibility to Clindamycin       GBS Susceptibility to Erythromycin       Fetal Fibronectin       Genetic Testing, Maternal Blood                 Drug Screening       Test Value Date Time    Urine Drug Screen       Amphetamine Screen  Negative ng/mL 24 1412    Barbiturate Screen  Negative ng/mL 24 1412    Benzodiazepine Screen  Negative ng/mL 24 1412    Methadone Screen  Negative ng/mL 24 1412    Phencyclidine Screen  Negative ng/mL 24 1412    Opiates Screen       THC Screen       Cocaine Screen       Propoxyphene Screen  Negative ng/mL 24 1412    Buprenorphine Screen       Methamphetamine Screen       Oxycodone Screen       Tricyclic Antidepressants Screen                 Legend    ^: Historical                              Past OB History:       OB History    Para Term  AB Living   2 1 1 0 0 1   SAB IAB Ectopic Molar Multiple Live  Births   0 0 0 0 0 1      # Outcome Date GA Lbr Russell/2nd Weight Sex Type Anes PTL Lv   2 Current            1 Term 21 37w4d  3530 g (7 lb 12.5 oz) M CS-LTranv EPI, Spinal N SUJATA      Complications: Intraamniotic Infection, Failure to Progress in Second Stage      Name: VINCENT LLANOS      Apgar1: 6  Apgar5: 7      Obstetric Comments   FOB #1 : Pregnancy #1(IVF, frozen cycle retrieved 2020, no donors)       Past Medical History: Past Medical History:   Diagnosis Date    ADHD (attention deficit hyperactivity disorder)     Allergic     Anxiety     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 Not sure how old i was      Past Surgical History Past Surgical History:   Procedure Laterality Date     SECTION N/A 2021    Procedure:  SECTION PRIMARY;  Surgeon: Stef Lindsey MD;  Location: [facility] LABOR DELIVERY;  Service: Obstetrics/Gynecology;  Laterality: N/A;    COLONOSCOPY  10/01/2017    FERTILITY SURGERY  2020    Oocyte retrieval for IVF    NASAL SINUS SURGERY  2016    WISDOM TOOTH EXTRACTION        Family History: Family History   Problem Relation Age of Onset    Thyroid disease Mother     Hyperlipidemia Father     Arthritis Maternal Grandmother     Miscarriages / Stillbirths Maternal Grandmother     Diabetes Maternal Grandfather     Miscarriages / Stillbirths Maternal Grandfather     Diabetes Paternal Grandmother     Breast cancer Other         great aunt      Social History:  reports that she has never smoked. She has never used smokeless tobacco.   reports that she does not currently use alcohol.   reports no history of drug use.                   General ROS Negative Findings:Visual Changes, Epigastric pain, Anorexia, Nausia/Vomiting, ROM, and Vaginal  Bleeding    ROS     All other systems have been reviewed and are neg  Objective       Vital Signs Range for the last 24 hours  Temperature: Temp:  [97.8 °F (36.6 °C)] 97.8 °F (36.6 °C)   Temp Source: Temp src: Oral   BP: BP: (128-143)/(83-99) 132/89   Pulse: Heart Rate:  [94] 94   Respirations: Resp:  [16] 16   SPO2:     O2 Amount (l/min):     O2 Devices     Weight: Weight:  [88 kg (194 lb)] 88 kg (194 lb)     Physical Examination:   General:   alert, appears stated age, and cooperative   Skin:   normal   HEENT:     Lungs:      Heart:      Gastrointestinal: Soft, gravid uterus, guarding benign exam   Lower Extremities: Trace edema, no calf tenderness   :    Musculoskeletal:     Neuro: No focal deficits noted               Fetal Heart Rate Assessment   Method: Fetal HR Assessment Method: external   Beats/min: Fetal HR (beats/min): 135   Baseline: Fetal HR Baseline: normal range   Varibility: Fetal HR Variability: moderate (amplitude range 6 to 25 bpm)   Accels: Fetal HR Accelerations: greater than/equal to 15 bpm, lasting at least 15 seconds   Decels: Fetal HR Decelerations: absent   Tracing Category:     ST-indications gestational hypertension, interpretation reactive, moderate variability, 15 x 15, no decelerations noted, onset 1640 endtime 1700 no ctx  Uterine Assessment   Method: Method: external tocotransducer   Frequency (min):     Ctx Count in 10 min:     Duration:     Intensity:     Intensity by IUPC:     Resting Tone: Uterine Resting Tone: soft by palpation   Resting Tone by IUPC:     [place] Units:       Laboratory Results:   Lab Results (last 24 hours)       Procedure Component Value Units Date/Time    POC Protein, Urine, Qualitative, Dipstick [[id number]]  (Abnormal) Collected: 04/11/25 1742    Specimen: Urine Updated: 04/11/25 1742     Protein, POC Trace mg/dL      Lot Number [id number]     Expiration Date 8/31/2025          Radiology Review:   Imaging Results (Last 24 Hours)       ** No results found  for the last 24 hours. **          Other Studies:    Assessment & Plan       Third trimester pregnancy    AMA (advanced maternal age) multigravida 35+    Previous  section    History of pregnancy induced hypertension    Gestational diabetes mellitus (GDM) in third trimester controlled on oral hypoglycemic drug        Assessment:  1.  Intrauterine pregnancy at 34w6d gestation with reactive fetal status.    2.  Gestational hypertension without severe features  3.  Headache resolved after Fioricet  4.      Plan:  1.  Discharge to home, kick count, preeclampsia instructions given, follow-up OB provider this coming week for twice-weekly testing until delivery at 37 weeks station or sooner if severe features develop.  2. Plan of care has been reviewed with patient.  3.  Risks, benefits of treatment plan have been discussed.  4.  All questions have been answered.  5      Kj Petit DO  2025  18:24 EDT

## 2025-04-15 ENCOUNTER — HOSPITAL ENCOUNTER (OUTPATIENT)
Dept: WOMENS IMAGING | Facility: HOSPITAL | Age: 35
Discharge: HOME | End: 2025-04-15
Admitting: OBSTETRICS & GYNECOLOGY
Payer: COMMERCIAL

## 2025-04-15 ENCOUNTER — HOSPITAL ENCOUNTER (OUTPATIENT)
Facility: HOSPITAL | Age: 35
Discharge: HOME | End: 2025-04-15
Attending: OBSTETRICS & GYNECOLOGY | Admitting: OBSTETRICS & GYNECOLOGY
Payer: COMMERCIAL

## 2025-04-15 ENCOUNTER — OFFICE VISIT (OUTPATIENT)
Dept: OBSTETRICS AND GYNECOLOGY | Facility: HOSPITAL | Age: 35
End: 2025-04-15
Payer: COMMERCIAL

## 2025-04-15 ENCOUNTER — ROUTINE PRENATAL (OUTPATIENT)
Dept: OBSTETRICS AND GYNECOLOGY | Facility: CLINIC | Age: 35
End: 2025-04-15
Payer: COMMERCIAL

## 2025-04-15 VITALS — SYSTOLIC BLOOD PRESSURE: 140 MMHG | WEIGHT: 195.8 LBS | BODY MASS INDEX: 33.61 KG/M2 | DIASTOLIC BLOOD PRESSURE: 82 MMHG

## 2025-04-15 VITALS — DIASTOLIC BLOOD PRESSURE: 86 MMHG | SYSTOLIC BLOOD PRESSURE: 140 MMHG | BODY MASS INDEX: 33.61 KG/M2 | WEIGHT: 195.8 LBS

## 2025-04-15 DIAGNOSIS — Z34.90 PREGNANCY, UNSPECIFIED GESTATIONAL AGE: ICD-10-CM

## 2025-04-15 DIAGNOSIS — O24.415 GESTATIONAL DIABETES MELLITUS (GDM) IN THIRD TRIMESTER CONTROLLED ON ORAL HYPOGLYCEMIC DRUG: ICD-10-CM

## 2025-04-15 DIAGNOSIS — Z87.59 HISTORY OF PREGNANCY INDUCED HYPERTENSION: ICD-10-CM

## 2025-04-15 DIAGNOSIS — Z98.891 PREVIOUS CESAREAN SECTION: ICD-10-CM

## 2025-04-15 DIAGNOSIS — O09.819 PREGNANCY RESULTING FROM IN VITRO FERTILIZATION, ANTEPARTUM: ICD-10-CM

## 2025-04-15 DIAGNOSIS — Z3A.35 35 WEEKS GESTATION OF PREGNANCY: ICD-10-CM

## 2025-04-15 DIAGNOSIS — O09.529 ANTEPARTUM MULTIGRAVIDA OF ADVANCED MATERNAL AGE: ICD-10-CM

## 2025-04-15 DIAGNOSIS — O13.3 GESTATIONAL HYPERTENSION, THIRD TRIMESTER: ICD-10-CM

## 2025-04-15 DIAGNOSIS — O09.523 MULTIGRAVIDA OF ADVANCED MATERNAL AGE IN THIRD TRIMESTER: ICD-10-CM

## 2025-04-15 DIAGNOSIS — O24.415 GESTATIONAL DIABETES MELLITUS (GDM) IN THIRD TRIMESTER CONTROLLED ON ORAL HYPOGLYCEMIC DRUG: Primary | ICD-10-CM

## 2025-04-15 DIAGNOSIS — Z34.90 PRENATAL CARE, ANTEPARTUM, UNSPECIFIED GRAVIDITY: Primary | ICD-10-CM

## 2025-04-15 DIAGNOSIS — R51.9 INTRACTABLE EPISODIC HEADACHE, UNSPECIFIED HEADACHE TYPE: Primary | ICD-10-CM

## 2025-04-15 PROBLEM — O13.9 GESTATIONAL HYPERTENSION: Status: ACTIVE | Noted: 2025-04-15

## 2025-04-15 PROBLEM — O09.299 HISTORY OF GESTATIONAL DIABETES IN PRIOR PREGNANCY, CURRENTLY PREGNANT: Status: RESOLVED | Noted: 2024-11-05 | Resolved: 2025-04-15

## 2025-04-15 PROBLEM — Z86.32 HISTORY OF GESTATIONAL DIABETES IN PRIOR PREGNANCY, CURRENTLY PREGNANT: Status: RESOLVED | Noted: 2024-11-05 | Resolved: 2025-04-15

## 2025-04-15 PROBLEM — Z34.93 THIRD TRIMESTER PREGNANCY: Status: RESOLVED | Noted: 2025-04-11 | Resolved: 2025-04-15

## 2025-04-15 LAB
ALBUMIN SERPL-MCNC: 3.3 G/DL (ref 3.5–5.2)
ALBUMIN/GLOB SERPL: 1.3 G/DL
ALP SERPL-CCNC: 91 U/L (ref 39–117)
ALT SERPL W P-5'-P-CCNC: 16 U/L (ref 1–33)
ANION GAP SERPL CALCULATED.3IONS-SCNC: 13 MMOL/L (ref 5–15)
AST SERPL-CCNC: 21 U/L (ref 1–32)
BILIRUB SERPL-MCNC: <0.2 MG/DL (ref 0–1.2)
BUN SERPL-MCNC: 13 MG/DL (ref 6–20)
BUN/CREAT SERPL: 22 (ref 7–25)
CALCIUM SPEC-SCNC: 9.1 MG/DL (ref 8.6–10.5)
CHLORIDE SERPL-SCNC: 105 MMOL/L (ref 98–107)
CO2 SERPL-SCNC: 19 MMOL/L (ref 22–29)
CREAT SERPL-MCNC: 0.59 MG/DL (ref 0.57–1)
DEPRECATED RDW RBC AUTO: 45.7 FL (ref 37–54)
EGFRCR SERPLBLD CKD-EPI 2021: 120.7 ML/MIN/1.73
ERYTHROCYTE [DISTWIDTH] IN BLOOD BY AUTOMATED COUNT: 14.6 % (ref 12.3–15.4)
GLOBULIN UR ELPH-MCNC: 2.5 GM/DL
GLUCOSE BLDC GLUCOMTR-MCNC: 86 MG/DL (ref 70–130)
GLUCOSE SERPL-MCNC: 107 MG/DL (ref 65–99)
GLUCOSE UR STRIP-MCNC: NEGATIVE MG/DL
HCT VFR BLD AUTO: 33.6 % (ref 34–46.6)
HGB BLD-MCNC: 11.6 G/DL (ref 12–15.9)
MCH RBC QN AUTO: 30.4 PG (ref 26.6–33)
MCHC RBC AUTO-ENTMCNC: 34.5 G/DL (ref 31.5–35.7)
MCV RBC AUTO: 88.2 FL (ref 79–97)
PLATELET # BLD AUTO: 243 10*3/MM3 (ref 140–450)
PMV BLD AUTO: 9.6 FL (ref 6–12)
POTASSIUM SERPL-SCNC: 3.6 MMOL/L (ref 3.5–5.2)
PROT SERPL-MCNC: 5.8 G/DL (ref 6–8.5)
PROT UR STRIP-MCNC: NEGATIVE MG/DL
RBC # BLD AUTO: 3.81 10*6/MM3 (ref 3.77–5.28)
SODIUM SERPL-SCNC: 137 MMOL/L (ref 136–145)
WBC NRBC COR # BLD AUTO: 10.41 10*3/MM3 (ref 3.4–10.8)

## 2025-04-15 PROCEDURE — 76816 OB US FOLLOW-UP PER FETUS: CPT

## 2025-04-15 PROCEDURE — 76819 FETAL BIOPHYS PROFIL W/O NST: CPT

## 2025-04-15 PROCEDURE — 85027 COMPLETE CBC AUTOMATED: CPT | Performed by: OBSTETRICS & GYNECOLOGY

## 2025-04-15 PROCEDURE — 82948 REAGENT STRIP/BLOOD GLUCOSE: CPT

## 2025-04-15 PROCEDURE — G0463 HOSPITAL OUTPT CLINIC VISIT: HCPCS

## 2025-04-15 PROCEDURE — 36415 COLL VENOUS BLD VENIPUNCTURE: CPT | Performed by: OBSTETRICS & GYNECOLOGY

## 2025-04-15 PROCEDURE — 80053 COMPREHEN METABOLIC PANEL: CPT | Performed by: OBSTETRICS & GYNECOLOGY

## 2025-04-15 RX ORDER — BUTALBITAL, ACETAMINOPHEN AND CAFFEINE 50; 325; 40 MG/1; MG/1; MG/1
2 TABLET ORAL ONCE
Status: COMPLETED | OUTPATIENT
Start: 2025-04-15 | End: 2025-04-15

## 2025-04-15 RX ORDER — BUTALBITAL, ACETAMINOPHEN AND CAFFEINE 300; 40; 50 MG/1; MG/1; MG/1
2 CAPSULE ORAL EVERY 4 HOURS PRN
Qty: 40 CAPSULE | Refills: 0 | Status: SHIPPED | OUTPATIENT
Start: 2025-04-15

## 2025-04-15 RX ADMIN — BUTALBITAL, ACETAMINOPHEN, AND CAFFEINE 2 TABLET: 325; 50; 40 TABLET ORAL at 21:02

## 2025-04-15 NOTE — ASSESSMENT & PLAN NOTE
Patient returns today for follow-up for pregnancy complicated by advanced maternal age gestational diabetes.  Patient notes that her fastings have been in the 80 mg percent range and 2-hour postprandials are all less than then 120 mg percent..  Patient noted movement but decreased movement for the last 5 days.  Patient notes no other complications.    Ultrasound today demonstrates a normally grown fetus at the 70th percentile but with abdominal circumference at the 95th percentile.  Amniotic fluid volume is normal as are umbilical artery Dopplers.  BPP is 8 out of 8.    Despite what appears to be excellent control of her diabetes the patient has a fetus with a very large abdominal circumference.  Fetal testing appears reassuring today and we would agree with Dr. Fernandez's plan to continue twice-weekly  testing until delivery.  We would recommend delivery at 37 weeks gestation for gestational diabetes in this case with large abdominal circumference.  Additionally the patient has some history of gestational hypertension and we would recommend delivery at 37 weeks if no other complications arise.    Patient was counseled extensively regarding fetal movement will contact her provider immediately if she notices any decreased fetal movement.

## 2025-04-15 NOTE — PROGRESS NOTES
OB FOLLOW UP  CC- Here for care of pregnancy        Sima Booker is a 35 y.o.  35w3d patient being seen today for her obstetrical follow up visit. Patient reports occasional nausea and headaches not relieved with Tylenol.  Pt does report some blurry vision with headaches. Pt denies RUQ pain.     Pt went to labor reyna on 4/10 and  for elevated BP's in office and headache at home.     Her prenatal care is complicated by (and status) : see below. GHTN (pt states she only checks BP at home when she is not feeling well). and GDM (on oral hypoglycemic). Her fasting BG range has been 80s. Her 2hr PP BG range has been <120.  Patient Active Problem List   Diagnosis    Family history of melanoma    Family history of colonic polyps    Tremors of nervous system    Pregnancy    AMA (advanced maternal age) multigravida 35+    Previous  section    Pregnancy resulting from in vitro fertilization, antepartum    Marginal insertion of umbilical cord affecting management of mother    Gestational diabetes mellitus (GDM) in third trimester controlled on oral hypoglycemic drug    Gestational hypertension         Ultrasound Today: Yes at [facility]. Per [facility] note:   General Evaluation  ================  Cardiac activity present.  bpm.  Fetal movements present.  Presentation cephalic.  Placenta Placental site: anterior.  Umbilical cord Cord vessels: 3 vessel cord.  Amniotic fluid Amount of AF: normal. MVP 6.5 cm. DIMA 16.1 cm. Q1 6.5 cm, Q2 0.0 cm, Q3 4.4 cm, Q4 5.2 cm.    Impression  =========  Size consistent with dates but with large AC.  No fetal anomalies were identified.  Amniotic fluid volume is normal.  Umbilical artery S/D ratio is normal.  BPP 8/8 today.  Patient counseled re fetal movement.        Recommendation  ===============  Follow-up as clinically indicated.  Continue  testing.  Recommend delivery at 37 weeks gestation for GDM and gestational hypertension.      Non Stress Test:  No.    ROS -   Patient Denies: Loss of Fluid, Vaginal Spotting, Vomiting , Contractions, Epigastric pain, and skin itching  Fetal Movement : normal  Other than what is documented in the HPI, all other systems reviewed and are negative.       The additional following portions of the patient's history were reviewed and updated as appropriate: allergies and current medications.    I have reviewed and agree with the HPI, ROS, and historical information as entered above. Hue Patel MD      /82   Wt 88.8 kg (195 lb 12.8 oz)   LMP  (Approximate)   BMI 33.61 kg/m²       EXAM:     Prenatal Vitals  BP: 140/82  Weight: 88.8 kg (195 lb 12.8 oz)   Fetal Heart Rate: [facility]               Urine Glucose Read-only: Negative  Urine Protein Read-only: Negative           Assessment and Plan    Problem List Items Addressed This Visit          Gynecologic and Obstetric Problems    Gestational hypertension    Relevant Orders    Fetal Nonstress Test       Other    Pregnancy    Overview   G1 1 c/s, arrest, chorio, induced GHTN         AMA (advanced maternal age) multigravida 35+    Previous  section    Overview    vs repeat?         Pregnancy resulting from in vitro fertilization, antepartum    Overview   Fetal echo         Gestational diabetes mellitus (GDM) in third trimester controlled on oral hypoglycemic drug    Relevant Medications    metFORMIN ER (GLUCOPHAGE-XR) 500 MG 24 hr tablet    Other Relevant Orders    Fetal Nonstress Test    RESOLVED: History of pregnancy induced hypertension    Overview   With G1          Other Visit Diagnoses         Prenatal care, antepartum, unspecified     -  Primary    Relevant Orders    POC Urinalysis Dipstick (Completed)            Pregnancy at 35w3d.  FSBS well controlled on metformin.   BP mild range without evidence of severe disease.   Will cont twice weekly monitoring, and sched her repeat c/s 37 wks.   Fetal status reassuring.   Activity and Exercise  discussed.  Fetal movement/PTL or Labor precautions  Return in about 3 days (around 4/18/2025) for F/U Prenatal, NST Next Visit.    Hue Patel MD  04/15/2025

## 2025-04-15 NOTE — PROGRESS NOTES
"Documentation of the ultrasound findings, images, and interpretations will be available in the patient's Viewpoint report which is located in the imaging tab in chart review.    Maternal/Fetal Medicine Follow Up  Note     Name: Sima Booker    : 1990     MRN: [id number]     Referring Provider: Hue Patel MD    Chief Complaint  GDM    Subjective     History of Present Illness:  Sima Booker is a 35 y.o.  35w3d who presents today for GDM    ANI: Estimated Date of Delivery: 25     ROS:   As noted in HPI.     Objective     Vital Signs  /86   Wt 88.8 kg (195 lb 12.8 oz)   LMP  (Approximate)   Estimated body mass index is 33.61 kg/m² as calculated from the following:    Height as of 25: 162.6 cm (64\").    Weight as of this encounter: 88.8 kg (195 lb 12.8 oz).    Physical Exam    Ultrasound Impression:   See Viewpoint    Assessment and Plan     Sima Booker is a 35 y.o.  35w3d who presents today for GDM    Diagnoses and all orders for this visit:    1. Gestational diabetes mellitus (GDM) in third trimester controlled on oral hypoglycemic drug (Primary)  Assessment & Plan:  Patient returns today for follow-up for pregnancy complicated by advanced maternal age gestational diabetes.  Patient notes that her fastings have been in the 80 mg percent range and 2-hour postprandials are all less than then 120 mg percent..  Patient noted movement but decreased movement for the last 5 days.  Patient notes no other complications.    Ultrasound today demonstrates a normally grown fetus at the 70th percentile but with abdominal circumference at the 95th percentile.  Amniotic fluid volume is normal as are umbilical artery Dopplers.  BPP is 8 out of 8.    Despite what appears to be excellent control of her diabetes the patient has a fetus with a very large abdominal circumference.  Fetal testing appears reassuring today and we would agree with Dr. Fernandez's plan to continue " twice-weekly  testing until delivery.  We would recommend delivery at 37 weeks gestation for gestational diabetes in this case with large abdominal circumference.  Additionally the patient has some history of gestational hypertension and we would recommend delivery at 37 weeks if no other complications arise.    Patient was counseled extensively regarding fetal movement will contact her provider immediately if she notices any decreased fetal movement.      2. Previous  section    3. Multigravida of advanced maternal age in third trimester    4. Pregnancy, unspecified gestational age         Follow Up  No follow-ups on file.    I spent 10 minutes caring for the patient on the day of service. This included: obtaining or reviewing a separately obtained medical history, reviewing patient records, performing a medically appropriate exam and/or evaluation, counseling or educating the patient/family/caregiver, ordering medications, labs, and/or procedures and documenting such in the medical record. This does not include time spent on review and interpretation of other tests such as fetal ultrasound or the performance of other procedures such as amniocentesis or CVS.      Douglas A. Milligan, MD  Maternal Fetal Medicine, [facility]     04/15/2025

## 2025-04-15 NOTE — PROGRESS NOTES
"Pt reports some decreased FM since last Friday when her BP was elevated. States fasting glucose is in 80's. 2hrsPP <120 if she \"eats right\". Sees OB today. Will have BP checked in OB office today when she has her scheduled NST.  "

## 2025-04-15 NOTE — LETTER
"April 15, 2025     Hue Patel MD  [address] [facility]  [address]  [facility] [address]    Patient: Sima Booker   YOB: 1990   Date of Visit: 4/15/2025     Dear Hue Patel MD:       Thank you for referring Sima Booker to me for evaluation. Below are the relevant portions of my assessment and plan of care.    If you have questions, please do not hesitate to call me. I look forward to following Sima along with you.         Sincerely,        Douglas A. Milligan, MD        CC: No Recipients    Milligan, Douglas A, MD  04/15/25 1248  Signed  Documentation of the ultrasound findings, images, and interpretations will be available in the patient's Viewpoint report which is located in the imaging tab in chart review.    Maternal/Fetal Medicine Follow Up  Note     Name: Sima Booker    : 1990     MRN: [id number]     Referring Provider: Hue Patel MD    Chief Complaint  GDM    Subjective     History of Present Illness:  Sima Booker is a 35 y.o.  35w3d who presents today for GDM    ANI: Estimated Date of Delivery: 25     ROS:   As noted in HPI.     Objective     Vital Signs  /86   Wt 88.8 kg (195 lb 12.8 oz)   LMP  (Approximate)   Estimated body mass index is 33.61 kg/m² as calculated from the following:    Height as of 25: 162.6 cm (64\").    Weight as of this encounter: 88.8 kg (195 lb 12.8 oz).    Physical Exam    Ultrasound Impression:   See Viewpoint    Assessment and Plan     Sima Booker is a 35 y.o.  35w3d who presents today for GDM    Diagnoses and all orders for this visit:    1. Gestational diabetes mellitus (GDM) in third trimester controlled on oral hypoglycemic drug (Primary)  Assessment & Plan:  Patient returns today for follow-up for pregnancy complicated by advanced maternal age gestational diabetes.  Patient notes that her fastings have been in the 80 mg percent range and 2-hour postprandials are all less than " then 120 mg percent..  Patient noted movement but decreased movement for the last 5 days.  Patient notes no other complications.    Ultrasound today demonstrates a normally grown fetus at the 70th percentile but with abdominal circumference at the 95th percentile.  Amniotic fluid volume is normal as are umbilical artery Dopplers.  BPP is 8 out of 8.    Despite what appears to be excellent control of her diabetes the patient has a fetus with a very large abdominal circumference.  Fetal testing appears reassuring today and we would agree with Dr. Fernandez's plan to continue twice-weekly  testing until delivery.  We would recommend delivery at 37 weeks gestation for gestational diabetes in this case with large abdominal circumference.  Additionally the patient has some history of gestational hypertension and we would recommend delivery at 37 weeks if no other complications arise.    Patient was counseled extensively regarding fetal movement will contact her provider immediately if she notices any decreased fetal movement.      2. Previous  section    3. Multigravida of advanced maternal age in third trimester    4. Pregnancy, unspecified gestational age         Follow Up  No follow-ups on file.    I spent 10 minutes caring for the patient on the day of service. This included: obtaining or reviewing a separately obtained medical history, reviewing patient records, performing a medically appropriate exam and/or evaluation, counseling or educating the patient/family/caregiver, ordering medications, labs, and/or procedures and documenting such in the medical record. This does not include time spent on review and interpretation of other tests such as fetal ultrasound or the performance of other procedures such as amniocentesis or CVS.      Douglas A. Milligan, MD  Maternal Fetal Medicine, [facility]     04/15/2025

## 2025-04-16 VITALS
SYSTOLIC BLOOD PRESSURE: 128 MMHG | HEART RATE: 86 BPM | HEIGHT: 65 IN | DIASTOLIC BLOOD PRESSURE: 92 MMHG | BODY MASS INDEX: 32.49 KG/M2 | TEMPERATURE: 98.2 F | RESPIRATION RATE: 16 BRPM | WEIGHT: 195 LBS

## 2025-04-16 NOTE — H&P
"[facility]  Obstetric History and Physical    Chief Complaint   Patient presents with    Headache    Abdominal Pain    Vision Disturbance       HPI:    Patient is a 35 y.o. female  currently at 35w3d, who presents with a headache that has not gotten better with Tylenol, vomiting and Upper abdominal pain.   She received Fioricet here which has significant;ly improved her headache.  She has had no further vomiting and her abdominal pain has improved.  It was likely due to the vomiting.   She denies vision changes.  No other pregnancy related concerns.          The following portions of the patients history were reviewed and updated as appropriate: current medications, allergies, past medical history, past surgical history, past family history, past social history and problem list .       Prenatal Information:   Maternal Prenatal Labs  Blood Type No results found for: \"ABO\"   Rh Status No results found for: \"RH\"   Antibody Screen No results found for: \"ABSCRN\"   Gonnorhea No results found for: \"GCCX\"   Chlamydia No results found for: \"CLAMYDCU\"   RPR No results found for: \"RPR\"   Syphilis Antibody No results found for: \"SYPHILIS\"   Rubella No results found for: \"RUBELLAIGGIN\"   Hepatitis B Surface Antigen No results found for: \"HEPBSAG\"   HIV-1 Antibody No results found for: \"LABHIV1\"   Hepatitis C Antibody No results found for: \"HEPCAB\"   Rapid Urin Drug Screen No results found for: \"AMPMETHU\", \"BARBITSCNUR\", \"LABBENZSCN\", \"LABMETHSCN\", \"LABOPIASCN\", \"THCURSCR\", \"COCAINEUR\", \"AMPHETSCREEN\", \"PROPOXSCN\", \"BUPRENORSCNU\", \"METAMPSCNUR\", \"OXYCODONESCN\", \"TRICYCLICSCN\"   Group B Strep Culture No results found for: \"GBSANTIGEN\"           External Prenatal Results       Pregnancy Outside Results - Transcribed From Office Records - See Scanned Records For Details       Test Value Date Time    ABO  A  24 1412    Rh  Positive  24 1412    Antibody Screen  Negative  24 1412    Varicella IgG       " Rubella  3.91 index 24 1412    Hgb  11.6 g/dL 04/15/25 2059       12.2 g/dL 04/10/25 1543       13.3 g/dL 24 1412    Hct  33.6 % 04/15/25 2059       35.7 % 04/10/25 1543       41.3 % 24 141    HgB A1c        1h GTT  142 mg/dL 24 1051    3h GTT Fasting  73 mg/dL 24 1047    3h GTT 1 hour  185 mg/dL 24 1047    3h GTT 2 hour  207 mg/dL 24 1047    3h GTT 3 hour   142 mg/dL 24 1047    Gonorrhea (discrete)  Negative  24 1412    Chlamydia (discrete)  Negative  24 1412    RPR  Non Reactive  24 1412    Syphils cascade: TP-Ab (FTA)       TP-Ab       TP-Ab (EIA)       TPPA       HBsAg  Negative  24 141    Herpes Simplex Virus PCR       Herpes Simplex VIrus Culture       HIV  Non Reactive  24 1412    Hep C RNA Quant PCR       Hep C Antibody  Non Reactive  24 1412    AFP       NIPT       Cystic Fibrosis ([place])       Cystic Fibroisis        Spinal Muscular atrophy       Fragile X       Group B Strep       GBS Susceptibility to Clindamycin       GBS Susceptibility to Erythromycin       Fetal Fibronectin       Genetic Testing, Maternal Blood                 Drug Screening       Test Value Date Time    Urine Drug Screen       Amphetamine Screen  Negative ng/mL 24 1412    Barbiturate Screen  Negative ng/mL 24 1412    Benzodiazepine Screen  Negative ng/mL 24 1412    Methadone Screen  Negative ng/mL 24 1412    Phencyclidine Screen  Negative ng/mL 24 1412    Opiates Screen       THC Screen       Cocaine Screen       Propoxyphene Screen  Negative ng/mL 24 1412    Buprenorphine Screen       Methamphetamine Screen       Oxycodone Screen       Tricyclic Antidepressants Screen                 Legend    ^: Historical                              Past OB History:     OB History    Para Term  AB Living   2 1 1 0 0 1   SAB IAB Ectopic Molar Multiple Live Births   0 0 0 0 0 1      # Outcome Date GA Lbr Russell/2nd  Weight Sex Type Anes PTL Lv   2 Current            1 Term 21 37w4d  3530 g (7 lb 12.5 oz) M CS-LTranv EPI, Spinal N SUJATA      Complications: Intraamniotic Infection, Failure to Progress in Second Stage      Name: VINCENT LLANOS      Apgar1: 6  Apgar5: 7      Obstetric Comments   FOB #1 : Pregnancy #1(IVF, frozen cycle retrieved 2020, no donors)       Past Medical History: Past Medical History:   Diagnosis Date    ADHD (attention deficit hyperactivity disorder)     Allergic     Anxiety     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     Gestational diabetes     Gestational hypertension     Infectious mononucleosis     Kidney stone     Tooth fractures     porcelan tooth    Varicella     Childhood. Not sure how old i was      Past Surgical History Past Surgical History:   Procedure Laterality Date     SECTION N/A 2021    Procedure:  SECTION PRIMARY;  Surgeon: Stef Lindsey MD;  Location[facility] LABOR DELIVERY;  Service: Obstetrics/Gynecology;  Laterality: N/A;    COLONOSCOPY  10/01/2017    FERTILITY SURGERY  2020    Oocyte retrieval for IVF; IVF for 2nd pregnancy    NASAL SINUS SURGERY  2016    WISDOM TOOTH EXTRACTION        Family History: Family History   Problem Relation Age of Onset    Thyroid disease Mother     Hyperlipidemia Father     Arthritis Maternal Grandmother     Miscarriages / Stillbirths Maternal Grandmother     Diabetes Maternal Grandfather     Miscarriages / Stillbirths Maternal Grandfather     Diabetes Paternal Grandmother     Breast cancer Other         great aunt      Social History:  reports that she has never smoked. She has never used smokeless tobacco.   reports that she does not currently use alcohol.   reports no history of drug use.              Objective     Vital Signs Range for the last 24 hours  Temperature: Temp:  [98.2 °F (36.8 °C)] 98.2 °F (36.8 °C)   Temp Source: Temp src: Oral   BP: BP: (121-140)/(76-86) 125/76    Pulse:  86   Respirations:  18   SPO2:     O2 Amount (l/min):     O2 Devices     Weight: Weight:  [88.5 kg (195 lb)-88.8 kg (195 lb 12.8 oz)] 88.5 kg (195 lb)     Physical Examination: General appearance - alert, well appearing, and in no distress and oriented to person, place, and time  Chest - Breathing is unlaboured   Heart - Regular rate.  Abdomen - soft, nontender, nondistended, gravid uterus  - non-tender  Extremities - pedal edema +1      Fetal Heart Rate Assessment   Method:     Beats/min:     Baseline:  130s   Varibility:  mod   Accels:  y   Decels:  n   Tracing Category:  1     Uterine Assessment   Method:     Frequency (min):  None    Ctx Count in 10 min:     Duration:     Intensity:     Intensity by IUPC:     Resting Tone:     Resting Tone by IUPC:     Sealy Units:      NST                     Indication: Headache   Start time: 2215                  End time: 2300  15 x 15 accels x 2  Yes  No decels  Baseline   130s  Reactive NST - Yes     Laboratory Results:   Lab Results   Component Value Date     04/15/2025    HGB 11.6 (L) 04/15/2025    HCT 33.6 (L) 04/15/2025    WBC 10.41 04/15/2025      Lab Results   Component Value Date    HGB 11.6 (L) 04/15/2025     04/15/2025    AST 21 04/15/2025    ALT 16 04/15/2025     (H) 04/10/2025    URICACID 4.2 04/10/2025    BUN 13 04/15/2025    CREATININE 0.59 04/15/2025    GLUCOSE 107 (H) 04/15/2025              Assessment/Plan  1. Intrauterine pregnancy at 35w3d gestation with reactive fetal status  2. Headache - resolved with Fioricet.  Rx sent to her pharmacy for Fioricet prn   3. N/V - resolved without intervention   4.  No evidence of pre-eclampsia   5.  Given education and reassurance   6.  Questions answered  7.  D/C home          James Call MD  4/15/2025  23:16 EDT

## 2025-04-18 ENCOUNTER — LAB (OUTPATIENT)
Dept: LAB | Facility: HOSPITAL | Age: 35
End: 2025-04-18
Payer: COMMERCIAL

## 2025-04-18 ENCOUNTER — ROUTINE PRENATAL (OUTPATIENT)
Dept: OBSTETRICS AND GYNECOLOGY | Facility: CLINIC | Age: 35
End: 2025-04-18
Payer: COMMERCIAL

## 2025-04-18 VITALS — WEIGHT: 196 LBS | DIASTOLIC BLOOD PRESSURE: 84 MMHG | BODY MASS INDEX: 32.62 KG/M2 | SYSTOLIC BLOOD PRESSURE: 136 MMHG

## 2025-04-18 DIAGNOSIS — R25.1 TREMORS OF NERVOUS SYSTEM: ICD-10-CM

## 2025-04-18 DIAGNOSIS — Z98.891 PREVIOUS CESAREAN SECTION: ICD-10-CM

## 2025-04-18 DIAGNOSIS — O09.819 PREGNANCY RESULTING FROM IN VITRO FERTILIZATION, ANTEPARTUM: ICD-10-CM

## 2025-04-18 DIAGNOSIS — O28.8 NST (NON-STRESS TEST) NONREACTIVE: ICD-10-CM

## 2025-04-18 DIAGNOSIS — Z34.93 PRENATAL CARE, THIRD TRIMESTER: Primary | ICD-10-CM

## 2025-04-18 DIAGNOSIS — O43.199 MARGINAL INSERTION OF UMBILICAL CORD AFFECTING MANAGEMENT OF MOTHER: ICD-10-CM

## 2025-04-18 DIAGNOSIS — O13.9 GESTATIONAL HYPERTENSION, ANTEPARTUM: ICD-10-CM

## 2025-04-18 DIAGNOSIS — Z3A.35 35 WEEKS GESTATION OF PREGNANCY: ICD-10-CM

## 2025-04-18 DIAGNOSIS — O09.523 MULTIGRAVIDA OF ADVANCED MATERNAL AGE IN THIRD TRIMESTER: ICD-10-CM

## 2025-04-18 DIAGNOSIS — Z34.93 PRENATAL CARE, THIRD TRIMESTER: ICD-10-CM

## 2025-04-18 DIAGNOSIS — O24.415 GESTATIONAL DIABETES MELLITUS (GDM) IN THIRD TRIMESTER CONTROLLED ON ORAL HYPOGLYCEMIC DRUG: ICD-10-CM

## 2025-04-18 LAB
EXPIRATION DATE: 0
GLUCOSE UR STRIP-MCNC: NEGATIVE MG/DL
Lab: 0
PROT UR STRIP-MCNC: NEGATIVE MG/DL

## 2025-04-18 PROCEDURE — 87081 CULTURE SCREEN ONLY: CPT

## 2025-04-18 NOTE — PROGRESS NOTES
OB FOLLOW UP  CC- Here for care of pregnancy        Sima Booker is a 35 y.o.  35w6d patient being seen today for her obstetrical follow up visit. Patient reports she went to L&D triage Tuesday for a headache that produced vomiting; she was diagnosed with migraines and was given Fioricet to treat. The Fioricet has been helping, and headaches are now every few days without n/v. No other complaints noted today. Fasting glucoses are reported to be in the 80s and postprandials in the 120s. Checking Bps only when not feeling good. When not feeling good 140-160/80-90s. Denies vision changes, upper abdominal pain, and facial swelling.     Her prenatal care is complicated by (and status): see below.  Patient Active Problem List   Diagnosis    Family history of melanoma    Family history of colonic polyps    Tremors of nervous system    Pregnancy    AMA (advanced maternal age) multigravida 35+    Previous  section    Pregnancy resulting from in vitro fertilization, antepartum    Marginal insertion of umbilical cord affecting management of mother    Gestational diabetes mellitus (GDM) in third trimester controlled on oral hypoglycemic drug    Gestational hypertension       GBS Status: Done Today. She is allergic to PCN.    Allergies   Allergen Reactions    Penicillins Hives     Tolerated cefazolin 21          Flu Status: Already given in current flu season  Her Delivery Plan is: Repeat . Scheduled      Non Stress Test: Yes, 20+ minutes  non-stress test: NST: Non-Reactive but reassuring.   indication:  AMA, GDM    US today: Yes. , DIMA 18.4cm, BPP . I have reviewed the preliminary US report. Final report pending physician review. QI Dale      ROS -   Patient Denies: Loss of Fluid, Vaginal Spotting, Vision Changes, Nausea , Vomiting , Contractions, Epigastric pain, and skin itching  Fetal Movement: normal  Other than what is documented in the HPI, all other  systems reviewed and are negative.       The additional following portions of the patient's history were reviewed and updated as appropriate: allergies, current medications, past family history, past medical history, past social history, past surgical history, and problem list.    I have reviewed and agree with the HPI, ROS, and historical information as entered above. Sabiha Ochoa, APRN        EXAM:     Prenatal Vitals  BP: 136/84  Weight: 88.9 kg (196 lb)   Fetal Heart Rate: NST+              Urine Glucose Read-only: Negative  Urine Protein Read-only: Negative           Assessment and Plan    Problem List Items Addressed This Visit       AMA (advanced maternal age) multigravida 35+    Relevant Orders    Fetal Nonstress Test    US Fetal Biophysical Profile;With Non-Stress Testing    Gestational diabetes mellitus (GDM) in third trimester controlled on oral hypoglycemic drug    Relevant Medications    metFORMIN ER (GLUCOPHAGE-XR) 500 MG 24 hr tablet    Other Relevant Orders    Fetal Nonstress Test    US Fetal Biophysical Profile;With Non-Stress Testing    Gestational hypertension    Marginal insertion of umbilical cord affecting management of mother    Pregnancy    Overview   G1 1 c/s, arrest, chorio, induced GHTN         Relevant Orders    POC Glucose, Urine, Qualitative, Dipstick (Completed)    POC Protein, Urine, Qualitative, Dipstick (Completed)    Group B Streptococcus Culture - Swab, Vaginal/Rectum    Fetal Nonstress Test    US Fetal Biophysical Profile;With Non-Stress Testing    Pregnancy resulting from in vitro fertilization, antepartum    Overview   Fetal echo         Previous  section    Overview    vs repeat?         Tremors of nervous system    Overview   Neuro eval-Dr. Turner plan for MRI and EEG 2020          Other Visit Diagnoses         Prenatal care, third trimester    -  Primary    Relevant Orders    POC Glucose, Urine, Qualitative, Dipstick (Completed)    POC Protein, Urine,  Qualitative, Dipstick (Completed)    Group B Streptococcus Culture - Swab, Vaginal/Rectum    Fetal Nonstress Test    US Fetal Biophysical Profile;With Non-Stress Testing      NST (non-stress test) nonreactive        Relevant Orders    US Fetal Biophysical Profile;With Non-Stress Testing            Pregnancy at 35w6d  Fetal status reassuring.   Discussed/encouraged social distancing/COVID19 precautions; encouraged vaccination when able  Reviewed FSBS goals: fasting <90, 2hr PP < 120  Reviewed Pre-eclampsia signs/symptoms. Notify provider if BP/=160/110 or if symptomatic.  Delivery options reviewed with patient  Signs of labor reviewed  Kick counts reviewed  Activity and Exercise discussed.  Return in about 4 days (around 4/22/2025) for FLOR bains/Jorge NST.    Sabiha Ochoa, APRN  04/18/2025

## 2025-04-21 LAB — BACTERIA SPEC AEROBE CULT: NORMAL

## 2025-04-22 ENCOUNTER — ROUTINE PRENATAL (OUTPATIENT)
Dept: OBSTETRICS AND GYNECOLOGY | Facility: CLINIC | Age: 35
End: 2025-04-22
Payer: COMMERCIAL

## 2025-04-22 ENCOUNTER — PREP FOR SURGERY (OUTPATIENT)
Dept: OTHER | Facility: HOSPITAL | Age: 35
End: 2025-04-22
Payer: COMMERCIAL

## 2025-04-22 VITALS — SYSTOLIC BLOOD PRESSURE: 158 MMHG | DIASTOLIC BLOOD PRESSURE: 86 MMHG | BODY MASS INDEX: 32.72 KG/M2 | WEIGHT: 196.6 LBS

## 2025-04-22 DIAGNOSIS — O09.819 PREGNANCY RESULTING FROM IN VITRO FERTILIZATION, ANTEPARTUM: ICD-10-CM

## 2025-04-22 DIAGNOSIS — O24.415 GESTATIONAL DIABETES MELLITUS (GDM) IN THIRD TRIMESTER CONTROLLED ON ORAL HYPOGLYCEMIC DRUG: ICD-10-CM

## 2025-04-22 DIAGNOSIS — Z34.90 PRENATAL CARE, ANTEPARTUM, UNSPECIFIED GRAVIDITY: Primary | ICD-10-CM

## 2025-04-22 DIAGNOSIS — Z3A.36 36 WEEKS GESTATION OF PREGNANCY: ICD-10-CM

## 2025-04-22 DIAGNOSIS — O13.3 GESTATIONAL HYPERTENSION, THIRD TRIMESTER: Primary | ICD-10-CM

## 2025-04-22 DIAGNOSIS — O13.3 GESTATIONAL HYPERTENSION, THIRD TRIMESTER: ICD-10-CM

## 2025-04-22 DIAGNOSIS — R10.9 RIGHT FLANK PAIN: ICD-10-CM

## 2025-04-22 DIAGNOSIS — Z98.891 PREVIOUS CESAREAN SECTION: ICD-10-CM

## 2025-04-22 DIAGNOSIS — O09.523 MULTIGRAVIDA OF ADVANCED MATERNAL AGE IN THIRD TRIMESTER: ICD-10-CM

## 2025-04-22 PROBLEM — O34.219 PREVIOUS CESAREAN DELIVERY AFFECTING PREGNANCY: Status: ACTIVE | Noted: 2025-04-22

## 2025-04-22 LAB
BILIRUB BLD-MCNC: NEGATIVE MG/DL
CLARITY, POC: CLEAR
COLOR UR: YELLOW
GLUCOSE UR STRIP-MCNC: NEGATIVE MG/DL
KETONES UR QL: ABNORMAL
LEUKOCYTE EST, POC: NEGATIVE
NITRITE UR-MCNC: NEGATIVE MG/ML
PH UR: 6.5 [PH] (ref 5–8)
PROT UR STRIP-MCNC: NEGATIVE MG/DL
RBC # UR STRIP: NEGATIVE /UL
SP GR UR: 1.02 (ref 1–1.03)
UROBILINOGEN UR QL: NORMAL

## 2025-04-22 RX ORDER — SODIUM CHLORIDE 0.9 % (FLUSH) 0.9 %
10 SYRINGE (ML) INJECTION AS NEEDED
OUTPATIENT
Start: 2025-04-22

## 2025-04-22 RX ORDER — CARBOPROST TROMETHAMINE 250 UG/ML
250 INJECTION, SOLUTION INTRAMUSCULAR AS NEEDED
OUTPATIENT
Start: 2025-04-22

## 2025-04-22 RX ORDER — KETOROLAC TROMETHAMINE 30 MG/ML
30 INJECTION, SOLUTION INTRAMUSCULAR; INTRAVENOUS ONCE
OUTPATIENT
Start: 2025-04-22 | End: 2025-04-22

## 2025-04-22 RX ORDER — SODIUM CHLORIDE 9 MG/ML
40 INJECTION, SOLUTION INTRAVENOUS AS NEEDED
OUTPATIENT
Start: 2025-04-22

## 2025-04-22 RX ORDER — OXYTOCIN/0.9 % SODIUM CHLORIDE 30/500 ML
85 PLASTIC BAG, INJECTION (ML) INTRAVENOUS ONCE
OUTPATIENT
Start: 2025-04-22 | End: 2025-04-22

## 2025-04-22 RX ORDER — METHYLERGONOVINE MALEATE 0.2 MG/ML
200 INJECTION INTRAVENOUS ONCE AS NEEDED
OUTPATIENT
Start: 2025-04-22

## 2025-04-22 RX ORDER — CITRIC ACID/SODIUM CITRATE 334-500MG
30 SOLUTION, ORAL ORAL ONCE
OUTPATIENT
Start: 2025-04-22 | End: 2025-04-22

## 2025-04-22 RX ORDER — ACETAMINOPHEN 500 MG
1000 TABLET ORAL ONCE
OUTPATIENT
Start: 2025-04-22 | End: 2025-04-22

## 2025-04-22 RX ORDER — SODIUM CHLORIDE 0.9 % (FLUSH) 0.9 %
10 SYRINGE (ML) INJECTION EVERY 12 HOURS SCHEDULED
OUTPATIENT
Start: 2025-04-22

## 2025-04-22 RX ORDER — BUPIVACAINE HCL/0.9 % NACL/PF 0.1 %
2 PLASTIC BAG, INJECTION (ML) EPIDURAL ONCE
OUTPATIENT
Start: 2025-04-22 | End: 2025-04-22

## 2025-04-22 RX ORDER — MISOPROSTOL 200 UG/1
800 TABLET ORAL AS NEEDED
OUTPATIENT
Start: 2025-04-22

## 2025-04-22 RX ORDER — OXYTOCIN/0.9 % SODIUM CHLORIDE 30/500 ML
650 PLASTIC BAG, INJECTION (ML) INTRAVENOUS ONCE
OUTPATIENT
Start: 2025-04-22 | End: 2025-04-22

## 2025-04-22 RX ORDER — LIDOCAINE HYDROCHLORIDE 10 MG/ML
0.5 INJECTION, SOLUTION EPIDURAL; INFILTRATION; INTRACAUDAL; PERINEURAL ONCE AS NEEDED
OUTPATIENT
Start: 2025-04-22

## 2025-04-22 NOTE — PROGRESS NOTES
OB FOLLOW UP  CC- Here for care of pregnancy        Sima Booker is a 35 y.o.  36w3d patient being seen today for her obstetrical follow up visit.  Having migraines, taking Fioricet and it helps. She reports having right upper back/flank pain for 2-3 days. Pain is constant and sharp, has increased in intensity. CCUA today neg.     Her prenatal care is complicated by (and status) : see below. Patient reports her average fasting BG is 80s. 2hrPP BG range <120. She spot checks BP at home when not feeling well.  Patient Active Problem List   Diagnosis    Family history of melanoma    Family history of colonic polyps    Tremors of nervous system    Pregnancy    AMA (advanced maternal age) multigravida 35+    Previous  section    Pregnancy resulting from in vitro fertilization, antepartum    Marginal insertion of umbilical cord affecting management of mother    Gestational diabetes mellitus (GDM) in third trimester controlled on oral hypoglycemic drug    Gestational hypertension       GBS Status: was already done and is negative.    Allergies   Allergen Reactions    Penicillins Hives     Tolerated cefazolin 21          Her Delivery Plan is: Repeat . Scheduled   @ 3:30pm  US today: no  Non Stress Test: Yes minutes 20  non-stress test: NST: Reactive  indication:  AMA, HTN, GDM    ROS -   Patient Denies: Loss of Fluid, Vaginal Spotting, Contractions, Epigastric pain, and skin itching  Fetal Movement : normal  Other than what is documented in the HPI, all other systems reviewed and are negative.       The additional following portions of the patient's history were reviewed and updated as appropriate: allergies and current medications.    I have reviewed and agree with the HPI, ROS, and historical information as entered above. Hue Patel MD        EXAM:     Prenatal Vitals  BP: 158/86 (Repeat 142/90)  Weight: 89.2 kg (196 lb 9.6 oz)   Fetal Heart Rate: NST              Urine  Glucose Read-only: Negative  Urine Protein Read-only: Negative           Assessment and Plan    Problem List Items Addressed This Visit          Gynecologic and Obstetric Problems    Gestational hypertension       Other    Pregnancy    Overview   G1 1 c/s, arrest, chorio, induced GHTN         AMA (advanced maternal age) multigravida 35+    Previous  section    Overview    vs repeat?         Pregnancy resulting from in vitro fertilization, antepartum    Overview   Fetal echo         Gestational diabetes mellitus (GDM) in third trimester controlled on oral hypoglycemic drug    Relevant Medications    metFORMIN ER (GLUCOPHAGE-XR) 500 MG 24 hr tablet     Other Visit Diagnoses         Prenatal care, antepartum, unspecified     -  Primary    Relevant Orders    POC Urinalysis Dipstick (Completed)      Right flank pain        Relevant Orders    POC Urinalysis Dipstick (Completed)            Pregnancy at 36w3d. Mild BPs, neg urine. Repeat labs today. Her headache responded to meds. Her c/s is sched for 37 wks. FU Friday for NSt.   Fetal status reassuring.   Reviewed Pre-eclampsia signs/symptoms. She will go to triage for any symptoms.   Delivery options reviewed with patient  Signs of labor reviewed  Kick counts reviewed  Activity and Exercise discussed.  Return in about 3 days (around 2025) for F/U Prenatal, NST Next Visit.    Hue Patel MD  2025

## 2025-04-23 ENCOUNTER — HOSPITAL ENCOUNTER (OUTPATIENT)
Facility: HOSPITAL | Age: 35
Discharge: HOME | End: 2025-04-23
Attending: OBSTETRICS & GYNECOLOGY | Admitting: OBSTETRICS & GYNECOLOGY
Payer: COMMERCIAL

## 2025-04-23 VITALS
TEMPERATURE: 97.9 F | DIASTOLIC BLOOD PRESSURE: 75 MMHG | HEIGHT: 65 IN | HEART RATE: 76 BPM | SYSTOLIC BLOOD PRESSURE: 134 MMHG | WEIGHT: 196 LBS | RESPIRATION RATE: 16 BRPM | BODY MASS INDEX: 32.65 KG/M2

## 2025-04-23 PROBLEM — O26.893 HEADACHE IN PREGNANCY, ANTEPARTUM, THIRD TRIMESTER: Status: ACTIVE | Noted: 2025-04-23

## 2025-04-23 PROBLEM — R51.9 HEADACHE IN PREGNANCY, ANTEPARTUM, THIRD TRIMESTER: Status: ACTIVE | Noted: 2025-04-23

## 2025-04-23 LAB
ALP SERPL-CCNC: 94 U/L (ref 39–117)
ALT SERPL W P-5'-P-CCNC: 11 U/L (ref 1–33)
ALT SERPL-CCNC: 16 IU/L (ref 0–32)
AST SERPL-CCNC: 18 U/L (ref 1–32)
AST SERPL-CCNC: 20 IU/L (ref 0–40)
BACTERIA UR QL AUTO: ABNORMAL /HPF
BASOPHILS # BLD AUTO: 0 X10E3/UL (ref 0–0.2)
BASOPHILS # BLD AUTO: 0.03 10*3/MM3 (ref 0–0.2)
BASOPHILS NFR BLD AUTO: 0 %
BASOPHILS NFR BLD AUTO: 0.4 % (ref 0–1.5)
BILIRUB SERPL-MCNC: <0.2 MG/DL (ref 0–1.2)
BILIRUB UR QL STRIP: NEGATIVE
BUN SERPL-MCNC: 8 MG/DL (ref 6–20)
CLARITY UR: ABNORMAL
COLOR UR: YELLOW
CREAT SERPL-MCNC: 0.56 MG/DL (ref 0.57–1)
CREAT SERPL-MCNC: 0.62 MG/DL (ref 0.57–1)
CREAT UR-MCNC: ABNORMAL MG/DL
DEPRECATED RDW RBC AUTO: 49 FL (ref 37–54)
EGFRCR SERPLBLD CKD-EPI 2021: 119 ML/MIN/1.73
EOSINOPHIL # BLD AUTO: 0.06 10*3/MM3 (ref 0–0.4)
EOSINOPHIL # BLD AUTO: 0.1 X10E3/UL (ref 0–0.4)
EOSINOPHIL NFR BLD AUTO: 0.7 % (ref 0.3–6.2)
EOSINOPHIL NFR BLD AUTO: 1 %
ERYTHROCYTE [DISTWIDTH] IN BLOOD BY AUTOMATED COUNT: 14.4 % (ref 11.7–15.4)
ERYTHROCYTE [DISTWIDTH] IN BLOOD BY AUTOMATED COUNT: 15 % (ref 12.3–15.4)
GLUCOSE UR STRIP-MCNC: NEGATIVE MG/DL
HCT VFR BLD AUTO: 35 % (ref 34–46.6)
HCT VFR BLD AUTO: 38.1 % (ref 34–46.6)
HGB BLD-MCNC: 11.6 G/DL (ref 12–15.9)
HGB BLD-MCNC: 12.8 G/DL (ref 11.1–15.9)
HGB UR QL STRIP.AUTO: NEGATIVE
HYALINE CASTS UR QL AUTO: ABNORMAL /LPF
IMM GRANULOCYTES # BLD AUTO: 0.06 10*3/MM3 (ref 0–0.05)
IMM GRANULOCYTES # BLD AUTO: 0.1 X10E3/UL (ref 0–0.1)
IMM GRANULOCYTES NFR BLD AUTO: 0.7 % (ref 0–0.5)
IMM GRANULOCYTES NFR BLD AUTO: 1 %
KETONES UR QL STRIP: NEGATIVE
LDH SERPL L TO P-CCNC: 198 IU/L (ref 119–226)
LDH SERPL-CCNC: 253 U/L (ref 135–214)
LEUKOCYTE ESTERASE UR QL STRIP.AUTO: NEGATIVE
LYMPHOCYTES # BLD AUTO: 1.7 X10E3/UL (ref 0.7–3.1)
LYMPHOCYTES # BLD AUTO: 2.41 10*3/MM3 (ref 0.7–3.1)
LYMPHOCYTES NFR BLD AUTO: 19 %
LYMPHOCYTES NFR BLD AUTO: 29.1 % (ref 19.6–45.3)
MCH RBC QN AUTO: 30.1 PG (ref 26.6–33)
MCH RBC QN AUTO: 30.8 PG (ref 26.6–33)
MCHC RBC AUTO-ENTMCNC: 33.1 G/DL (ref 31.5–35.7)
MCHC RBC AUTO-ENTMCNC: 33.6 G/DL (ref 31.5–35.7)
MCV RBC AUTO: 90.7 FL (ref 79–97)
MCV RBC AUTO: 92 FL (ref 79–97)
MICROALBUMIN UR-MCNC: ABNORMAL
MONOCYTES # BLD AUTO: 1 X10E3/UL (ref 0.1–0.9)
MONOCYTES # BLD AUTO: 1.44 10*3/MM3 (ref 0.1–0.9)
MONOCYTES NFR BLD AUTO: 11 %
MONOCYTES NFR BLD AUTO: 17.4 % (ref 5–12)
NEUTROPHILS # BLD AUTO: 6.1 X10E3/UL (ref 1.4–7)
NEUTROPHILS NFR BLD AUTO: 4.27 10*3/MM3 (ref 1.7–7)
NEUTROPHILS NFR BLD AUTO: 51.7 % (ref 42.7–76)
NEUTROPHILS NFR BLD AUTO: 68 %
NITRITE UR QL STRIP: NEGATIVE
NRBC BLD AUTO-RTO: 0 /100 WBC (ref 0–0.2)
PH UR STRIP.AUTO: 7.5 [PH] (ref 5–8)
PLATELET # BLD AUTO: 219 10*3/MM3 (ref 140–450)
PLATELET # BLD AUTO: 276 X10E3/UL (ref 150–450)
PMV BLD AUTO: 9.8 FL (ref 6–12)
PROT UR QL STRIP: NEGATIVE
RBC # BLD AUTO: 3.86 10*6/MM3 (ref 3.77–5.28)
RBC # BLD AUTO: 4.16 X10E6/UL (ref 3.77–5.28)
RBC # UR STRIP: ABNORMAL /HPF
REF LAB TEST METHOD: ABNORMAL
SP GR UR STRIP: 1.01 (ref 1–1.03)
SPECIMEN STATUS: NORMAL
SQUAMOUS #/AREA URNS HPF: ABNORMAL /HPF
URATE SERPL-MCNC: 4.2 MG/DL (ref 2.4–5.7)
URATE SERPL-MCNC: 4.5 MG/DL (ref 2.6–6.2)
UROBILINOGEN UR QL STRIP: ABNORMAL
WBC # BLD AUTO: 9 X10E3/UL (ref 3.4–10.8)
WBC # UR STRIP: ABNORMAL /HPF
WBC NRBC COR # BLD AUTO: 8.27 10*3/MM3 (ref 3.4–10.8)

## 2025-04-23 PROCEDURE — 59025 FETAL NON-STRESS TEST: CPT

## 2025-04-23 PROCEDURE — 96372 THER/PROPH/DIAG INJ SC/IM: CPT

## 2025-04-23 PROCEDURE — 84550 ASSAY OF BLOOD/URIC ACID: CPT | Performed by: OBSTETRICS & GYNECOLOGY

## 2025-04-23 PROCEDURE — 84075 ASSAY ALKALINE PHOSPHATASE: CPT | Performed by: OBSTETRICS & GYNECOLOGY

## 2025-04-23 PROCEDURE — G0378 HOSPITAL OBSERVATION PER HR: HCPCS

## 2025-04-23 PROCEDURE — 25010000002 MORPHINE PER 10 MG: Performed by: OBSTETRICS & GYNECOLOGY

## 2025-04-23 PROCEDURE — 82565 ASSAY OF CREATININE: CPT | Performed by: OBSTETRICS & GYNECOLOGY

## 2025-04-23 PROCEDURE — 83615 LACTATE (LD) (LDH) ENZYME: CPT | Performed by: OBSTETRICS & GYNECOLOGY

## 2025-04-23 PROCEDURE — 99222 1ST HOSP IP/OBS MODERATE 55: CPT | Performed by: OBSTETRICS & GYNECOLOGY

## 2025-04-23 PROCEDURE — 84450 TRANSFERASE (AST) (SGOT): CPT | Performed by: OBSTETRICS & GYNECOLOGY

## 2025-04-23 PROCEDURE — 59025 FETAL NON-STRESS TEST: CPT | Performed by: OBSTETRICS & GYNECOLOGY

## 2025-04-23 PROCEDURE — 63710000001 PROMETHAZINE PER 25 MG: Performed by: OBSTETRICS & GYNECOLOGY

## 2025-04-23 PROCEDURE — 85025 COMPLETE CBC W/AUTO DIFF WBC: CPT | Performed by: OBSTETRICS & GYNECOLOGY

## 2025-04-23 PROCEDURE — G0463 HOSPITAL OUTPT CLINIC VISIT: HCPCS

## 2025-04-23 PROCEDURE — 81001 URINALYSIS AUTO W/SCOPE: CPT | Performed by: OBSTETRICS & GYNECOLOGY

## 2025-04-23 PROCEDURE — 84460 ALANINE AMINO (ALT) (SGPT): CPT | Performed by: OBSTETRICS & GYNECOLOGY

## 2025-04-23 PROCEDURE — 82247 BILIRUBIN TOTAL: CPT | Performed by: OBSTETRICS & GYNECOLOGY

## 2025-04-23 RX ORDER — MORPHINE SULFATE 10 MG/ML
15 INJECTION, SOLUTION INTRAMUSCULAR; INTRAVENOUS ONCE
Status: COMPLETED | OUTPATIENT
Start: 2025-04-23 | End: 2025-04-23

## 2025-04-23 RX ORDER — PROMETHAZINE HYDROCHLORIDE 25 MG/1
25 TABLET ORAL ONCE
Status: COMPLETED | OUTPATIENT
Start: 2025-04-23 | End: 2025-04-23

## 2025-04-23 RX ADMIN — MORPHINE SULFATE 15 MG: 10 INJECTION INTRAVENOUS at 04:08

## 2025-04-23 RX ADMIN — PROMETHAZINE HYDROCHLORIDE 25 MG: 25 TABLET ORAL at 04:08

## 2025-04-23 NOTE — H&P
"Sima Olmos Ade  1990  [id number]  [id number]    CC: headache  HPI:  Patient is 35 y.o. female   currently at 36w4d presents with c/o a headache, onset yesterday (Tuesday) afternoon, fioricet helped initially (1600), then worsened again ~1900.  Fioricet taken again with some relief.  At 2300 HA worsened.  BP at home was 170/96.  Pt currently rates HA 9/10, right temporal, throbbing.  No assoc N, V.  Pt with occas contractions, no bleeding or leaking.  PNC comp by gest DM, prev aureliano and IVF preg    PMH:  Current meds: PNV, zoloft 100mg/d, xyzal 5mg/d, magnesium, Fe,   Metformin 1000mg/d  Illnesses: UTI, depression  Surgeries: sinus surg, oral surg,   Allergies: PCN- hives    Past OB History:       OB History    Para Term  AB Living   2 1 1 0 0 1   SAB IAB Ectopic Molar Multiple Live Births   0 0 0 0 0 1      # Outcome Date GA Lbr Russell/2nd Weight Sex Type Anes PTL Lv   2 Current            1 Term 21 37w4d  3530 g (7 lb 12.5 oz) M CS-LTranv EPI, Spinal N SUJATA      Complications: Intraamniotic Infection, Failure to Progress in Second Stage      Name: VINCENT LLANOS      Apgar1: 6  Apgar5: 7      Obstetric Comments   FOB #1 : Pregnancy #1(IVF, frozen cycle retrieved 2020, no donors)            SH: tob neg , EtOH neg, drugs neg      General ROS: headache.   All other systems reviewed and are negative.      Physical Examination: General appearance - alert, well appearing, and in no distress  Vital signs - /69   Pulse 64   Temp 97.9 °F (36.6 °C)   Resp 16   Ht 165.1 cm (65\")   Wt 88.9 kg (196 lb)   LMP  (Approximate)   BMI 32.62 kg/m²   Neuro: A and O X 3, Cr nn 2-12 intact, motor 5/5, sensory normal, gait not tested  HEENT: normocephalic, atraumatic,oropharynx clear, appearance of ears and nose normal  Neck - supple, no significant adenopathy, no thyromegaly  Lymphatics - no palpable lymphadenopathy in the neck or groin, no hepatosplenomegaly  Chest - clear " to auscultation, no wheezes, rales or rhonchi, respiratory effort non-labored  Heart - normal rate, regular rhythm, no murmurs, rubs, clicks or gallops, no JVD, no lower extremity edema  Abdomen - soft, nontender, nondistended, no masses, no hepatosplenomegaly  no rebound tenderness noted, bowel sounds normal  Extremities - no pedal edema noted, no calf tenderness  Skin -warm and dry, normal coloration and turgor, no rashes, no suspicious skin lesions noted    Fetal monitoring: indication headache, onset 0256, offset 0404, baseline 125, mod BTB variability, multiple accels (15 X 15), no decels, no contractions, interpretation reactive NST    Radiology     Assessment 1)IUP 36 4/7 weeks- fetal status reassuring   2)headache- all BP's here normal   3)gest DM- stable on metformin   4)IVF preg   5)prev aureliano    Plan 1)observe   2)labs   3)phenergan/morphine   4)home if HA improves.    Nba Bazzi MD  4/23/2025  03:59 EDT

## 2025-04-24 ENCOUNTER — TELEMEDICINE (OUTPATIENT)
Dept: PSYCHIATRY | Facility: CLINIC | Age: 35
End: 2025-04-24
Payer: COMMERCIAL

## 2025-04-24 DIAGNOSIS — F32.1 CURRENT MODERATE EPISODE OF MAJOR DEPRESSIVE DISORDER WITHOUT PRIOR EPISODE: ICD-10-CM

## 2025-04-24 DIAGNOSIS — F90.2 ATTENTION DEFICIT HYPERACTIVITY DISORDER (ADHD), COMBINED TYPE: ICD-10-CM

## 2025-04-24 DIAGNOSIS — F41.1 GENERALIZED ANXIETY DISORDER: Primary | ICD-10-CM

## 2025-04-24 NOTE — PROGRESS NOTES
Date: 2025  Time In: 09:00 EDT  Time out: 9:58 AM EST    This provider is located at [facility], [address] [place] [address], [facility] [place], using a secure [employer] Video Visit through [employer]. Patient is being seen remotely via telehealth at their home address is located in [place]. Patient stated they are in a secure environment for this session. The patient's condition being diagnosed and treated is appropriate for telemedicine. The provider identified themself as well as their credentials. The patient, or  patient's legal guardian consent to be seen remotely, and when consent is given they understand that the consent allows for patient identifiable information to be sent to a third party as needed. They may refuse to be seen remotely at any time. The electronic data is encrypted and password protected, and the patient's or  legal guardian has been advised of the potential risks to privacy not withstanding such measures.   PT Identifiers used: Name and .    You have chosen to receive care through a telehealth visit.  Do you consent to use a video/audio connection for your medical care today? Yes    Subjective   Sima Booker is a 35 y.o. female who presents today for follow up    Chief Complaint:   Chief Complaint   Patient presents with    Anxiety    ADHD    Depression        Data: Pt reported that she is tired. Pt reported that she has been to the hospital four times since last session due to migraines and high BP. Pt reported that she will be admitted on Tuesday for her . Pt reported that this is not what she wanted but the safest option for delivery. Pt reported an increase in anxiety with upcoming changes and delivery. Pt appears to have some stress with finances since she is unable to work at this time.       Clinical Maneuvering/Intervention:  Assisted patient in processing above session content; acknowledged and normalized patient’s thoughts, feelings, and  concerns.  Rationalized patient thought process regarding concerns presented at session.  Discussed triggers associated with patient's  anxiety , depression , and ADHD Also discussed coping skills for patient to implement such as grounding , mindfulness , increasing activity , self care , and positive self talk     Allowed patient to freely discuss issues without interruption or judgment. Provided safe, confidential environment to facilitate the development of positive therapeutic relationship and encourage open, honest communication. Assisted patient in identifying risk factors which would indicate the need for higher level of care including thoughts to harm self or others and/or self-harming behavior and encouraged patient to contact this office, call 911, or present to the nearest emergency room should any of these events occur. Discussed crisis intervention services and means to access. Patient adamantly and convincingly denies current suicidal or homicidal ideation or perceptual disturbance.    Assessment: Pt was alert and oriented x3.  Pt appears open and honest re MH symptoms that have affected life in a negative way. Pt appears motivated to improve MH symptoms and overall quality of life. Pt appears to maintain relative stability compared to their baseline. Although, pt continues to struggle with ADHD symptoms causing impairment in important areas of functioning. Pt anxiety appears to be increased with upcoming delivery of new baby.     Patient appears to maintain relative stability as compared to their baseline.  However, patient continues to struggle with   Chief Complaint   Patient presents with    Anxiety    ADHD    Depression    which continues to cause impairment in important areas of functioning.  A result, they can be reasonably expected to continue to benefit from treatment and would likely be at increased risk for decompensation otherwise.      Mental Status Exam:   Hygiene:   good  Cooperation:   Cooperative  Eye Contact:  Good  Psychomotor Behavior:  Appropriate  Affect:  Appropriate  Mood: anxious and happy  Speech:  Normal  Thought Process:  Linear  Thought Content:  Normal  Suicidal:  None  Homicidal:  None  Hallucinations:  None  Delusion:  None  Memory:  Intact  Orientation:  Person, Place, and Time  Reliability:  good  Insight:  Good  Judgement:  Good  Impulse Control:  Fair  Physical/Medical Issues:  No        Patient's Support Network Includes:  significant other and extended family    Functional Status: No impairment    Progress toward goal: Not at goal    Prognosis: Good with Ongoing Treatment     Plan:     Patient will continue in individual outpatient therapy with focus on improved functioning and coping skills, maintaining stability, and avoiding decompensation and the need for higher level of care.    Patient will adhere to medication regimen as prescribed and report any side effects. Patient will contact this office, call 911 or present to the nearest emergency room should suicidal or homicidal ideations occur. Provide Cognitive Behavioral Therapy and Solution Focused Therapy to improve functioning, maintain stability, and avoid decompensation and the need for higher level of care.     Return in about 4 weeks (around 5/22/2025).      VISIT DIAGNOSIS:    Diagnosis Plan   1. Generalized anxiety disorder        2. Current moderate episode of major depressive disorder without prior episode        3. Attention deficit hyperactivity disorder (ADHD), combined type         09:00 EDT         This document has been electronically signed by Ninfa Hwang LCSW  April 24, 2025      Part of this note may be an electronic transcription/translation of spoken language to printed text using the Dragon Dictation System.

## 2025-04-25 ENCOUNTER — ROUTINE PRENATAL (OUTPATIENT)
Dept: OBSTETRICS AND GYNECOLOGY | Facility: CLINIC | Age: 35
End: 2025-04-25
Payer: COMMERCIAL

## 2025-04-25 ENCOUNTER — PREP FOR SURGERY (OUTPATIENT)
Dept: OTHER | Facility: HOSPITAL | Age: 35
End: 2025-04-25
Payer: COMMERCIAL

## 2025-04-25 VITALS — WEIGHT: 197.6 LBS | SYSTOLIC BLOOD PRESSURE: 128 MMHG | DIASTOLIC BLOOD PRESSURE: 70 MMHG | BODY MASS INDEX: 32.88 KG/M2

## 2025-04-25 DIAGNOSIS — O09.819 PREGNANCY RESULTING FROM IN VITRO FERTILIZATION, ANTEPARTUM: ICD-10-CM

## 2025-04-25 DIAGNOSIS — Z80.8 FAMILY HISTORY OF MELANOMA: ICD-10-CM

## 2025-04-25 DIAGNOSIS — O43.199 MARGINAL INSERTION OF UMBILICAL CORD AFFECTING MANAGEMENT OF MOTHER: ICD-10-CM

## 2025-04-25 DIAGNOSIS — R25.1 TREMORS OF NERVOUS SYSTEM: ICD-10-CM

## 2025-04-25 DIAGNOSIS — F32.1 CURRENT MODERATE EPISODE OF MAJOR DEPRESSIVE DISORDER WITHOUT PRIOR EPISODE: ICD-10-CM

## 2025-04-25 DIAGNOSIS — F41.1 GENERALIZED ANXIETY DISORDER: ICD-10-CM

## 2025-04-25 DIAGNOSIS — H53.8 BLURRY VISION: ICD-10-CM

## 2025-04-25 DIAGNOSIS — R51.9 HEADACHE IN PREGNANCY, ANTEPARTUM, THIRD TRIMESTER: ICD-10-CM

## 2025-04-25 DIAGNOSIS — Z3A.36 36 WEEKS GESTATION OF PREGNANCY: ICD-10-CM

## 2025-04-25 DIAGNOSIS — Z83.719 FAMILY HISTORY OF COLONIC POLYPS: ICD-10-CM

## 2025-04-25 DIAGNOSIS — O26.893 HEADACHE IN PREGNANCY, ANTEPARTUM, THIRD TRIMESTER: ICD-10-CM

## 2025-04-25 DIAGNOSIS — O24.415 GESTATIONAL DIABETES MELLITUS (GDM) IN THIRD TRIMESTER CONTROLLED ON ORAL HYPOGLYCEMIC DRUG: ICD-10-CM

## 2025-04-25 DIAGNOSIS — O34.219 PREVIOUS CESAREAN DELIVERY AFFECTING PREGNANCY: ICD-10-CM

## 2025-04-25 DIAGNOSIS — O09.529 ANTEPARTUM MULTIGRAVIDA OF ADVANCED MATERNAL AGE: ICD-10-CM

## 2025-04-25 DIAGNOSIS — O28.8 NST (NON-STRESS TEST) NONREACTIVE: ICD-10-CM

## 2025-04-25 DIAGNOSIS — Z98.891 PREVIOUS CESAREAN SECTION: ICD-10-CM

## 2025-04-25 DIAGNOSIS — Z34.90 PRENATAL CARE, ANTEPARTUM, UNSPECIFIED GRAVIDITY: Primary | ICD-10-CM

## 2025-04-25 DIAGNOSIS — O13.9 GESTATIONAL HYPERTENSION, ANTEPARTUM: ICD-10-CM

## 2025-04-25 LAB
BACTERIA UR CULT: NORMAL
BACTERIA UR CULT: NORMAL
GLUCOSE UR STRIP-MCNC: NEGATIVE MG/DL
PROT UR STRIP-MCNC: NEGATIVE MG/DL

## 2025-04-25 RX ORDER — ONDANSETRON 4 MG/1
4 TABLET, FILM COATED ORAL EVERY 8 HOURS PRN
Qty: 30 TABLET | Refills: 1 | Status: SHIPPED | OUTPATIENT
Start: 2025-04-25

## 2025-04-25 NOTE — PROGRESS NOTES
OB FOLLOW UP  CC- Here for care of pregnancy        Sima Booker is a 35 y.o.  36w6d patient being seen today for her obstetrical follow up visit. Patient reports increased headaches since last visit on Tuesday (Fioricet dulls HA but does not resolve, blurred vision (since migraine started). Sometimes sleep will HA and vision changes. Nausea (not taking anything for management), vomiting x2 last night, intermittent lower back pain (UC Tuesday- Neg). Has a belly band but has not been wearing), and her average fasting BG is 80's. Her average 2hr PP BG is 120. and her BP's at home have been 130-140/80-90 she states is her normal, lately she has had readings of 150/80 range. Had one 170/96 Tuesday w/ migraine while in Triage. Morphine and Phenergan resolved HA while in triage. Denies history of migraines prior to pregnancy.     Her prenatal care is complicated by (and status) : see below.  Patient Active Problem List   Diagnosis    Family history of melanoma    Family history of colonic polyps    Tremors of nervous system    Pregnancy    AMA (advanced maternal age) multigravida 35+    Previous  section    Pregnancy resulting from in vitro fertilization, antepartum    Marginal insertion of umbilical cord affecting management of mother    Gestational diabetes mellitus (GDM) in third trimester controlled on oral hypoglycemic drug    Gestational hypertension    Previous  delivery affecting pregnancy    Headache in pregnancy, antepartum, third trimester       GBS Status: was already done and is negative. 25    Allergies   Allergen Reactions    Penicillins Hives     Tolerated cefazolin 21          Her Delivery Plan is:  PAT 2025 at 3:30 PM; Repeat C/S 2025 at 10:30 AM.      US today: Yes, , DIMA 23.1cm, BPP . I have reviewed the preliminary US report. Final report pending physician review. Sabiha Ochoa, QI    Non Stress Test: Yes minutes 20  non-stress  test: NST: Equivocal  indication:  AMA, HTN, GDM     ROS -   Patient Denies: Loss of Fluid, Vaginal Spotting, Epigastric pain, and skin itching  Fetal Movement : normal  Other than what is documented in the HPI, all other systems reviewed and are negative.       The additional following portions of the patient's history were reviewed and updated as appropriate: allergies and current medications.    I have reviewed and agree with the HPI, ROS, and historical information as entered above. Sabiha Ochoa, QI        EXAM:     Prenatal Vitals  BP: 128/70  Weight: 89.6 kg (197 lb 9.6 oz)   Fetal Heart Rate: NST/BPP            Urine Glucose Read-only: Negative  Urine Protein Read-only: Negative       Assessment and Plan    Problem List Items Addressed This Visit       AMA (advanced maternal age) multigravida 35+    Relevant Orders    US Fetal Biophysical Profile;With Non-Stress Testing    Family history of colonic polyps    Overview   Sister with polyps at 30 yr and goes q 5yr.          Family history of melanoma    Gestational diabetes mellitus (GDM) in third trimester controlled on oral hypoglycemic drug    Relevant Medications    metFORMIN ER (GLUCOPHAGE-XR) 500 MG 24 hr tablet    Gestational hypertension    Headache in pregnancy, antepartum, third trimester    Marginal insertion of umbilical cord affecting management of mother    Relevant Orders    US Fetal Biophysical Profile;With Non-Stress Testing    Pregnancy    Overview   G1 1 c/s, arrest, chorio, induced GHTN         Pregnancy resulting from in vitro fertilization, antepartum    Overview   Fetal echo         Previous  delivery affecting pregnancy    Previous  section    Overview    vs repeat?         Tremors of nervous system    Overview   Neuro eval-Dr. Turner plan for MRI and EEG 2020          Other Visit Diagnoses         Prenatal care, antepartum, unspecified     -  Primary    Relevant Medications    ondansetron (Zofran) 4  MG tablet    Other Relevant Orders    POC Urinalysis Dipstick (Completed)    US Fetal Biophysical Profile;With Non-Stress Testing    AlP+ALT+AST+Creat+LD+TBili+..      NST (non-stress test) nonreactive        Relevant Orders    US Fetal Biophysical Profile;With Non-Stress Testing      Blurry vision        Relevant Orders    AlP+ALT+AST+Creat+LD+TBili+..            Pregnancy at 36w6d  Fetal status reassuring.   Discussed/encouraged social distancing/COVID19 precautions; encouraged vaccination when able  Reviewed FSBS goals: fasting <90, 2hr PP < 120  Reviewed Pre-eclampsia signs/symptoms. PEP labs pending.   Reviewed upcoming c/s and PAT instructions with patient. Arrival time and NPO status reviewed.   Zofran prescribed for nausea.  Encouraged to increase H20 intake to at least 80oz/day.  Tylenol sparingly, heating pad to back only, and belly band PRN.  Offered to send patient to triage for management of HA with Morphine. Patient states she prefers to continue Fioricet, Hydration, and Rest for management of HA.   Delivery options reviewed with patient  Signs of labor reviewed  Kick counts reviewed  Activity and Exercise discussed.  Return in about 7 weeks (around 6/13/2025) for 6 week PP visitJorge.    Sabiha Ochoa, APRN  04/25/2025

## 2025-04-26 LAB
ALP SERPL-CCNC: 102 U/L (ref 39–117)
ALT SERPL-CCNC: 10 U/L (ref 1–33)
AST SERPL-CCNC: 18 U/L (ref 1–32)
BASOPHILS # BLD AUTO: 0.04 10*3/MM3 (ref 0–0.2)
BASOPHILS NFR BLD AUTO: 0.4 % (ref 0–1.5)
BILIRUB SERPL-MCNC: <0.2 MG/DL (ref 0–1.2)
CREAT SERPL-MCNC: 0.62 MG/DL (ref 0.57–1)
EGFRCR SERPLBLD CKD-EPI 2021: 119.3 ML/MIN/1.73
EOSINOPHIL # BLD AUTO: 0.04 10*3/MM3 (ref 0–0.4)
EOSINOPHIL NFR BLD AUTO: 0.4 % (ref 0.3–6.2)
ERYTHROCYTE [DISTWIDTH] IN BLOOD BY AUTOMATED COUNT: 14.7 % (ref 12.3–15.4)
HCT VFR BLD AUTO: 38.1 % (ref 34–46.6)
HGB BLD-MCNC: 12.4 G/DL (ref 12–15.9)
IMM GRANULOCYTES # BLD AUTO: 0.05 10*3/MM3 (ref 0–0.05)
IMM GRANULOCYTES NFR BLD AUTO: 0.6 % (ref 0–0.5)
LDH SERPL L TO P-CCNC: 198 U/L (ref 135–214)
LYMPHOCYTES # BLD AUTO: 1.72 10*3/MM3 (ref 0.7–3.1)
LYMPHOCYTES NFR BLD AUTO: 19 % (ref 19.6–45.3)
MCH RBC QN AUTO: 30.4 PG (ref 26.6–33)
MCHC RBC AUTO-ENTMCNC: 32.5 G/DL (ref 31.5–35.7)
MCV RBC AUTO: 93.4 FL (ref 79–97)
MONOCYTES # BLD AUTO: 1.2 10*3/MM3 (ref 0.1–0.9)
MONOCYTES NFR BLD AUTO: 13.2 % (ref 5–12)
NEUTROPHILS # BLD AUTO: 6.02 10*3/MM3 (ref 1.7–7)
NEUTROPHILS NFR BLD AUTO: 66.4 % (ref 42.7–76)
NRBC BLD AUTO-RTO: 0 /100 WBC (ref 0–0.2)
PLATELET # BLD AUTO: 248 10*3/MM3 (ref 140–450)
RBC # BLD AUTO: 4.08 10*6/MM3 (ref 3.77–5.28)
URATE SERPL-MCNC: 5.2 MG/DL (ref 2.4–5.7)
WBC # BLD AUTO: 9.07 10*3/MM3 (ref 3.4–10.8)

## 2025-04-26 RX ORDER — SERTRALINE HYDROCHLORIDE 100 MG/1
100 TABLET, FILM COATED ORAL DAILY
Qty: 30 TABLET | Refills: 2 | Status: SHIPPED | OUTPATIENT
Start: 2025-04-26

## 2025-04-28 ENCOUNTER — PRE-ADMISSION TESTING (OUTPATIENT)
Dept: PREADMISSION TESTING | Facility: HOSPITAL | Age: 35
End: 2025-04-28
Payer: COMMERCIAL

## 2025-04-28 ENCOUNTER — ANESTHESIA EVENT (OUTPATIENT)
Dept: LABOR AND DELIVERY | Facility: HOSPITAL | Age: 35
End: 2025-04-28
Payer: COMMERCIAL

## 2025-04-28 VITALS — WEIGHT: 198.3 LBS | BODY MASS INDEX: 33.04 KG/M2 | HEIGHT: 65 IN

## 2025-04-28 DIAGNOSIS — O13.3 GESTATIONAL HYPERTENSION, THIRD TRIMESTER: ICD-10-CM

## 2025-04-28 LAB
ABO GROUP BLD: NORMAL
ALP SERPL-CCNC: 93 U/L (ref 39–117)
ALT SERPL W P-5'-P-CCNC: 10 U/L (ref 1–33)
AST SERPL-CCNC: 24 U/L (ref 1–32)
BILIRUB SERPL-MCNC: <0.2 MG/DL (ref 0–1.2)
BLD GP AB SCN SERPL QL: NEGATIVE
CREAT SERPL-MCNC: 0.62 MG/DL (ref 0.57–1)
DEPRECATED RDW RBC AUTO: 50.9 FL (ref 37–54)
ERYTHROCYTE [DISTWIDTH] IN BLOOD BY AUTOMATED COUNT: 15.6 % (ref 12.3–15.4)
HCT VFR BLD AUTO: 35 % (ref 34–46.6)
HGB BLD-MCNC: 11.9 G/DL (ref 12–15.9)
LDH SERPL-CCNC: 260 U/L (ref 135–214)
MCH RBC QN AUTO: 31.1 PG (ref 26.6–33)
MCHC RBC AUTO-ENTMCNC: 34 G/DL (ref 31.5–35.7)
MCV RBC AUTO: 91.4 FL (ref 79–97)
PLATELET # BLD AUTO: 208 10*3/MM3 (ref 140–450)
PMV BLD AUTO: 10.2 FL (ref 6–12)
RBC # BLD AUTO: 3.83 10*6/MM3 (ref 3.77–5.28)
RH BLD: POSITIVE
T&S EXPIRATION DATE: NORMAL
URATE SERPL-MCNC: 5.5 MG/DL (ref 2.4–5.7)
WBC NRBC COR # BLD AUTO: 7.4 10*3/MM3 (ref 3.4–10.8)

## 2025-04-28 PROCEDURE — 85027 COMPLETE CBC AUTOMATED: CPT

## 2025-04-28 PROCEDURE — 86780 TREPONEMA PALLIDUM: CPT | Performed by: OBSTETRICS & GYNECOLOGY

## 2025-04-28 PROCEDURE — 84550 ASSAY OF BLOOD/URIC ACID: CPT

## 2025-04-28 PROCEDURE — 84450 TRANSFERASE (AST) (SGOT): CPT

## 2025-04-28 PROCEDURE — 82247 BILIRUBIN TOTAL: CPT

## 2025-04-28 PROCEDURE — 82565 ASSAY OF CREATININE: CPT

## 2025-04-28 PROCEDURE — 86850 RBC ANTIBODY SCREEN: CPT

## 2025-04-28 PROCEDURE — 36415 COLL VENOUS BLD VENIPUNCTURE: CPT

## 2025-04-28 PROCEDURE — 86901 BLOOD TYPING SEROLOGIC RH(D): CPT

## 2025-04-28 PROCEDURE — 86900 BLOOD TYPING SEROLOGIC ABO: CPT

## 2025-04-28 PROCEDURE — 84460 ALANINE AMINO (ALT) (SGPT): CPT

## 2025-04-28 PROCEDURE — 83615 LACTATE (LD) (LDH) ENZYME: CPT

## 2025-04-28 PROCEDURE — 84075 ASSAY ALKALINE PHOSPHATASE: CPT

## 2025-04-28 NOTE — DISCHARGE INSTRUCTIONS
What to know before your arrive:    -Do not eat, drink or chew gum beginning 8 hours before your scheduled arrival time to the hospital.  Except please drink 20 ounces of Gatorade and complete two hours before your given arrival time to the hospital.  If you drink too close to surgery time, your sugery may  be delayed or cancelled.  Please complete as instructed.     -Do not shave any part of your body including abdomen or pelvic are for two days before your procedure.  -If you are taking a scheduled medication (insulin, blood pressure medicine,antibiotics) please consult with your physician whether to take on the day of surgery.  -Remove all jewelry including rings, wedding bands, and piercing before coming to the hospital.  -Leave important valuables at home.  -Do not wear dark fingernail polish.  -Please take two Tylenol 500 mg tablets the evening before surgery.  -Bring the following with you to the hospital:    -Picture ID and insurance, Medicare or Medicaid cards    -Co-pay/deductible required by insurance (Cash, Check, Credit Card)    -Copy of living will or power  document (if applicable)    -CPAP mask and tubing, not machine (if applicable)    -Skin prep instructions sheet    What to know the day of procedure:    -Park in the [facility], take elevator for first floor, exit to the right and  proceed through the doors to outside, follow the covered sidewalk to the entrance of the [facility], follow the hallway and signs to the Health systemer, enter the [facility] to your right BEFORE entering the 1720 lobby.  Take the elevators to the 3rd floor ([address] [facility]).  -Leave unnecessary items in your vehicle, including your suitcase.  Your support  person or a family member can get it for you after your procedure.   -Check in at the reception desk in the lobby of the 3rd floor ([address] [facility]).   -One person may accompany you to the pre-op/recovery area.  Please have  other family members wait in the  waiting room.   -An anesthesiologist will meet with your prior to your procedure.   -After anesthesia has been initiated, one person may accompany you in the  operating room.   -No video cameras are permitted in the operating room; only still cameras,  Please.      What to expect while you are in recovery:     -One person may stay with you while you are in recovery.   -If the baby is stable, he/she may visit to initiate breastfeeding & [facility].      CHLORHEXIDINE GLUCONATE WIPES AND INSTRUCTIONS GIVEN TO PATIENT

## 2025-04-28 NOTE — PAT
An arrival time for procedure was not provided during PAT visit. If patient had any questions or concerns about their arrival time, they were instructed to contact their surgeon/physician.  Additionally, if the patient referred to an arrival time that was acquired from their my chart account, patient was encouraged to verify that time with their surgeon/physician. Arrival times are NOT provided in Pre Admission Testing Department.    Patient denies any current skin issues.     Patient to apply Chlorhexadine wipes  to surgical area (as instructed) the night before procedure and the AM of procedure. Wipes provided.    Patient viewed general PAT education video as instructed in their preoperative information received from their surgeon.  Patient stated the general PAT education video was viewed in its entirety and survey completed.  Copies of PAT general education handouts (Incentive Spirometry, Meds to Beds Program, Patient Belongings, Pre-op skin preparation instructions, Blood Glucose testing, Visitor policy, Surgery FAQ, Code H) distributed to patient if not printed. Education related to the PAT pass and skin preparation for surgery (if applicable) completed in PAT as a reinforcement to PAT education video. Patient instructed to return PAT pass provided today as well as completed skin preparation sheet (if applicable) on the day of procedure.     Additionally if patient had not viewed video yet but intended to view it at home or in our waiting area, then referred them to the handout with QR code/link provided during PAT visit.  Encouraged patient/family to read PAT general education handouts thoroughly and notify PAT staff with any questions or concerns. Patient verbalized understanding of all information and priority content.    Patient instructed to drink 20 ounces of Gatorade or Gatorlyte (if diabetic) and it needs to be completed 1 hour (for Main OR patients) or 2 hours (scheduled  section & [facility]  patients) before given arrival time for procedure (NO RED Gatorade and NO Gatorade Zero).    Patient verbalized understanding.    Instructed patient to take two Tylenol extra strength (total of 1000 mg) the night before surgery.    Blood bank bracelet applied to patient during Pre Admission Testing visit.  Patient instructed not to remove from arm until after procedure and they are discharged from the hospital.  Explained to patient that they may shower and get the bracelet wet, but not to immerse under water for longer periods (bathing, swimming, hand dishwashing, etc).  Patient verbalized understanding.

## 2025-04-29 ENCOUNTER — ANESTHESIA (OUTPATIENT)
Dept: LABOR AND DELIVERY | Facility: HOSPITAL | Age: 35
End: 2025-04-29
Payer: COMMERCIAL

## 2025-04-29 ENCOUNTER — HOSPITAL ENCOUNTER (INPATIENT)
Facility: HOSPITAL | Age: 35
LOS: 3 days | Discharge: HOME | End: 2025-05-02
Attending: OBSTETRICS & GYNECOLOGY | Admitting: OBSTETRICS & GYNECOLOGY
Payer: COMMERCIAL

## 2025-04-29 LAB
GLUCOSE BLDC GLUCOMTR-MCNC: 75 MG/DL (ref 70–130)
TREPONEMA PALLIDUM IGG+IGM AB [PRESENCE] IN SERUM OR PLASMA BY IMMUNOASSAY: NORMAL

## 2025-04-29 PROCEDURE — 59510 CESAREAN DELIVERY: CPT | Performed by: OBSTETRICS & GYNECOLOGY

## 2025-04-29 PROCEDURE — 25010000002 KETOROLAC TROMETHAMINE PER 15 MG: Performed by: OBSTETRICS & GYNECOLOGY

## 2025-04-29 PROCEDURE — 25010000002 CEFAZOLIN PER 500 MG: Performed by: OBSTETRICS & GYNECOLOGY

## 2025-04-29 PROCEDURE — 25010000002 MIDAZOLAM PER 1 MG: Performed by: NURSE ANESTHETIST, CERTIFIED REGISTERED

## 2025-04-29 PROCEDURE — 25010000002 ONDANSETRON PER 1 MG: Performed by: NURSE ANESTHETIST, CERTIFIED REGISTERED

## 2025-04-29 PROCEDURE — 25010000002 MORPHINE PER 10 MG: Performed by: NURSE ANESTHETIST, CERTIFIED REGISTERED

## 2025-04-29 PROCEDURE — 59025 FETAL NON-STRESS TEST: CPT

## 2025-04-29 PROCEDURE — 63710000001 PROMETHAZINE PER 25 MG: Performed by: OBSTETRICS & GYNECOLOGY

## 2025-04-29 PROCEDURE — 25010000002 BUPIVACAINE IN DEXTROSE 0.75-8.25 % SOLUTION: Performed by: NURSE ANESTHETIST, CERTIFIED REGISTERED

## 2025-04-29 PROCEDURE — 25010000002 METOCLOPRAMIDE PER 10 MG: Performed by: NURSE ANESTHETIST, CERTIFIED REGISTERED

## 2025-04-29 PROCEDURE — 25010000002 OXYTOCIN PER 10 UNITS: Performed by: NURSE ANESTHETIST, CERTIFIED REGISTERED

## 2025-04-29 PROCEDURE — 25010000002 FAMOTIDINE (PF) 20 MG/2ML SOLUTION: Performed by: NURSE ANESTHETIST, CERTIFIED REGISTERED

## 2025-04-29 PROCEDURE — 82948 REAGENT STRIP/BLOOD GLUCOSE: CPT

## 2025-04-29 PROCEDURE — 25810000003 LACTATED RINGERS SOLUTION: Performed by: OBSTETRICS & GYNECOLOGY

## 2025-04-29 PROCEDURE — 25010000002 FENTANYL CITRATE (PF) 100 MCG/2ML SOLUTION: Performed by: NURSE ANESTHETIST, CERTIFIED REGISTERED

## 2025-04-29 PROCEDURE — 25810000003 LACTATED RINGERS PER 1000 ML: Performed by: NURSE ANESTHETIST, CERTIFIED REGISTERED

## 2025-04-29 RX ORDER — MISOPROSTOL 200 UG/1
600 TABLET ORAL AS NEEDED
Status: DISCONTINUED | OUTPATIENT
Start: 2025-04-29 | End: 2025-05-02 | Stop reason: HOSPADM

## 2025-04-29 RX ORDER — OXYCODONE HYDROCHLORIDE 10 MG/1
10 TABLET ORAL EVERY 4 HOURS PRN
Status: DISCONTINUED | OUTPATIENT
Start: 2025-04-29 | End: 2025-05-02 | Stop reason: HOSPADM

## 2025-04-29 RX ORDER — NALOXONE HCL 0.4 MG/ML
0.4 VIAL (ML) INJECTION
Status: ACTIVE | OUTPATIENT
Start: 2025-04-29 | End: 2025-04-30

## 2025-04-29 RX ORDER — SIMETHICONE 80 MG
80 TABLET,CHEWABLE ORAL 4 TIMES DAILY PRN
Status: DISCONTINUED | OUTPATIENT
Start: 2025-04-29 | End: 2025-05-02 | Stop reason: HOSPADM

## 2025-04-29 RX ORDER — SODIUM CHLORIDE 9 MG/ML
40 INJECTION, SOLUTION INTRAVENOUS AS NEEDED
Status: DISCONTINUED | OUTPATIENT
Start: 2025-04-29 | End: 2025-04-29

## 2025-04-29 RX ORDER — METOCLOPRAMIDE HYDROCHLORIDE 5 MG/ML
INJECTION INTRAMUSCULAR; INTRAVENOUS AS NEEDED
Status: DISCONTINUED | OUTPATIENT
Start: 2025-04-29 | End: 2025-04-29 | Stop reason: SURG

## 2025-04-29 RX ORDER — ACETAMINOPHEN 325 MG/1
650 TABLET ORAL EVERY 6 HOURS
Status: DISCONTINUED | OUTPATIENT
Start: 2025-04-30 | End: 2025-05-02 | Stop reason: HOSPADM

## 2025-04-29 RX ORDER — OXYTOCIN/0.9 % SODIUM CHLORIDE 30/500 ML
125 PLASTIC BAG, INJECTION (ML) INTRAVENOUS ONCE AS NEEDED
Status: DISCONTINUED | OUTPATIENT
Start: 2025-04-29 | End: 2025-05-02 | Stop reason: HOSPADM

## 2025-04-29 RX ORDER — SODIUM CHLORIDE, SODIUM LACTATE, POTASSIUM CHLORIDE, CALCIUM CHLORIDE 600; 310; 30; 20 MG/100ML; MG/100ML; MG/100ML; MG/100ML
INJECTION, SOLUTION INTRAVENOUS CONTINUOUS PRN
Status: DISCONTINUED | OUTPATIENT
Start: 2025-04-29 | End: 2025-04-29 | Stop reason: SURG

## 2025-04-29 RX ORDER — ACETAMINOPHEN 500 MG
1000 TABLET ORAL ONCE
Status: COMPLETED | OUTPATIENT
Start: 2025-04-29 | End: 2025-04-29

## 2025-04-29 RX ORDER — CITRIC ACID/SODIUM CITRATE 334-500MG
30 SOLUTION, ORAL ORAL ONCE
Status: COMPLETED | OUTPATIENT
Start: 2025-04-29 | End: 2025-04-29

## 2025-04-29 RX ORDER — CARBOPROST TROMETHAMINE 250 UG/ML
250 INJECTION, SOLUTION INTRAMUSCULAR AS NEEDED
Status: DISCONTINUED | OUTPATIENT
Start: 2025-04-29 | End: 2025-04-29 | Stop reason: HOSPADM

## 2025-04-29 RX ORDER — ACETAMINOPHEN 500 MG
1000 TABLET ORAL EVERY 6 HOURS
Status: COMPLETED | OUTPATIENT
Start: 2025-04-29 | End: 2025-04-30

## 2025-04-29 RX ORDER — ONDANSETRON 2 MG/ML
4 INJECTION INTRAMUSCULAR; INTRAVENOUS ONCE AS NEEDED
Status: ACTIVE | OUTPATIENT
Start: 2025-04-29 | End: 2025-04-30

## 2025-04-29 RX ORDER — PRENATAL VIT/IRON FUM/FOLIC AC 27MG-0.8MG
1 TABLET ORAL DAILY
Status: DISCONTINUED | OUTPATIENT
Start: 2025-04-29 | End: 2025-05-02 | Stop reason: HOSPADM

## 2025-04-29 RX ORDER — SODIUM CHLORIDE 0.9 % (FLUSH) 0.9 %
10 SYRINGE (ML) INJECTION AS NEEDED
Status: DISCONTINUED | OUTPATIENT
Start: 2025-04-29 | End: 2025-04-29

## 2025-04-29 RX ORDER — PROMETHAZINE HYDROCHLORIDE 25 MG/1
25 TABLET ORAL EVERY 6 HOURS PRN
Status: DISCONTINUED | OUTPATIENT
Start: 2025-04-29 | End: 2025-05-02 | Stop reason: HOSPADM

## 2025-04-29 RX ORDER — OXYTOCIN 10 [USP'U]/ML
INJECTION, SOLUTION INTRAMUSCULAR; INTRAVENOUS AS NEEDED
Status: DISCONTINUED | OUTPATIENT
Start: 2025-04-29 | End: 2025-04-29 | Stop reason: SURG

## 2025-04-29 RX ORDER — FAMOTIDINE 10 MG/ML
INJECTION, SOLUTION INTRAVENOUS AS NEEDED
Status: DISCONTINUED | OUTPATIENT
Start: 2025-04-29 | End: 2025-04-29 | Stop reason: SURG

## 2025-04-29 RX ORDER — LIDOCAINE HYDROCHLORIDE 10 MG/ML
0.5 INJECTION, SOLUTION EPIDURAL; INFILTRATION; INTRACAUDAL; PERINEURAL ONCE AS NEEDED
Status: DISCONTINUED | OUTPATIENT
Start: 2025-04-29 | End: 2025-04-29

## 2025-04-29 RX ORDER — ONDANSETRON 2 MG/ML
INJECTION INTRAMUSCULAR; INTRAVENOUS AS NEEDED
Status: DISCONTINUED | OUTPATIENT
Start: 2025-04-29 | End: 2025-04-29 | Stop reason: SURG

## 2025-04-29 RX ORDER — HYDROCORTISONE 25 MG/G
1 CREAM TOPICAL AS NEEDED
Status: DISCONTINUED | OUTPATIENT
Start: 2025-04-29 | End: 2025-05-02 | Stop reason: HOSPADM

## 2025-04-29 RX ORDER — BUPIVACAINE HYDROCHLORIDE 7.5 MG/ML
INJECTION, SOLUTION INTRASPINAL AS NEEDED
Status: DISCONTINUED | OUTPATIENT
Start: 2025-04-29 | End: 2025-04-29 | Stop reason: SURG

## 2025-04-29 RX ORDER — PROMETHAZINE HYDROCHLORIDE 12.5 MG/1
12.5 SUPPOSITORY RECTAL EVERY 6 HOURS PRN
Status: DISCONTINUED | OUTPATIENT
Start: 2025-04-29 | End: 2025-05-02 | Stop reason: HOSPADM

## 2025-04-29 RX ORDER — MORPHINE SULFATE 0.5 MG/ML
INJECTION, SOLUTION EPIDURAL; INTRATHECAL; INTRAVENOUS AS NEEDED
Status: DISCONTINUED | OUTPATIENT
Start: 2025-04-29 | End: 2025-04-29 | Stop reason: SURG

## 2025-04-29 RX ORDER — FENTANYL CITRATE 50 UG/ML
INJECTION, SOLUTION INTRAMUSCULAR; INTRAVENOUS AS NEEDED
Status: DISCONTINUED | OUTPATIENT
Start: 2025-04-29 | End: 2025-04-29 | Stop reason: SURG

## 2025-04-29 RX ORDER — SODIUM CHLORIDE 0.9 % (FLUSH) 0.9 %
10 SYRINGE (ML) INJECTION EVERY 12 HOURS SCHEDULED
Status: DISCONTINUED | OUTPATIENT
Start: 2025-04-29 | End: 2025-04-29

## 2025-04-29 RX ORDER — CARBOPROST TROMETHAMINE 250 UG/ML
250 INJECTION, SOLUTION INTRAMUSCULAR AS NEEDED
Status: DISCONTINUED | OUTPATIENT
Start: 2025-04-29 | End: 2025-05-02 | Stop reason: HOSPADM

## 2025-04-29 RX ORDER — CALCIUM CARBONATE 500 MG/1
1 TABLET, CHEWABLE ORAL EVERY 4 HOURS PRN
Status: DISCONTINUED | OUTPATIENT
Start: 2025-04-29 | End: 2025-05-02 | Stop reason: HOSPADM

## 2025-04-29 RX ORDER — ALUMINA, MAGNESIA, AND SIMETHICONE 2400; 2400; 240 MG/30ML; MG/30ML; MG/30ML
15 SUSPENSION ORAL EVERY 4 HOURS PRN
Status: DISCONTINUED | OUTPATIENT
Start: 2025-04-29 | End: 2025-05-02 | Stop reason: HOSPADM

## 2025-04-29 RX ORDER — OXYTOCIN/0.9 % SODIUM CHLORIDE 30/500 ML
PLASTIC BAG, INJECTION (ML) INTRAVENOUS AS NEEDED
Status: DISCONTINUED | OUTPATIENT
Start: 2025-04-29 | End: 2025-04-29 | Stop reason: SURG

## 2025-04-29 RX ORDER — SERTRALINE HYDROCHLORIDE 100 MG/1
100 TABLET, FILM COATED ORAL DAILY
Status: DISCONTINUED | OUTPATIENT
Start: 2025-04-29 | End: 2025-05-02 | Stop reason: HOSPADM

## 2025-04-29 RX ORDER — OXYTOCIN/0.9 % SODIUM CHLORIDE 30/500 ML
85 PLASTIC BAG, INJECTION (ML) INTRAVENOUS ONCE
Status: DISCONTINUED | OUTPATIENT
Start: 2025-04-29 | End: 2025-04-29 | Stop reason: HOSPADM

## 2025-04-29 RX ORDER — METHYLERGONOVINE MALEATE 0.2 MG/ML
200 INJECTION INTRAVENOUS ONCE AS NEEDED
Status: DISCONTINUED | OUTPATIENT
Start: 2025-04-29 | End: 2025-04-29 | Stop reason: HOSPADM

## 2025-04-29 RX ORDER — IBUPROFEN 600 MG/1
600 TABLET, FILM COATED ORAL EVERY 6 HOURS
Status: DISCONTINUED | OUTPATIENT
Start: 2025-04-30 | End: 2025-05-02 | Stop reason: HOSPADM

## 2025-04-29 RX ORDER — MISOPROSTOL 200 UG/1
800 TABLET ORAL AS NEEDED
Status: DISCONTINUED | OUTPATIENT
Start: 2025-04-29 | End: 2025-04-29 | Stop reason: HOSPADM

## 2025-04-29 RX ORDER — KETOROLAC TROMETHAMINE 15 MG/ML
15 INJECTION, SOLUTION INTRAMUSCULAR; INTRAVENOUS EVERY 6 HOURS
Status: COMPLETED | OUTPATIENT
Start: 2025-04-29 | End: 2025-04-30

## 2025-04-29 RX ORDER — MIDAZOLAM HYDROCHLORIDE 1 MG/ML
INJECTION, SOLUTION INTRAMUSCULAR; INTRAVENOUS AS NEEDED
Status: DISCONTINUED | OUTPATIENT
Start: 2025-04-29 | End: 2025-04-29 | Stop reason: SURG

## 2025-04-29 RX ORDER — METHYLERGONOVINE MALEATE 0.2 MG/ML
200 INJECTION INTRAVENOUS AS NEEDED
Status: DISCONTINUED | OUTPATIENT
Start: 2025-04-29 | End: 2025-05-02 | Stop reason: HOSPADM

## 2025-04-29 RX ORDER — METFORMIN HYDROCHLORIDE 500 MG/1
1000 TABLET, EXTENDED RELEASE ORAL
Status: DISCONTINUED | OUTPATIENT
Start: 2025-04-30 | End: 2025-05-02 | Stop reason: HOSPADM

## 2025-04-29 RX ORDER — KETOROLAC TROMETHAMINE 30 MG/ML
30 INJECTION, SOLUTION INTRAMUSCULAR; INTRAVENOUS ONCE
Status: COMPLETED | OUTPATIENT
Start: 2025-04-29 | End: 2025-04-29

## 2025-04-29 RX ORDER — OXYCODONE HYDROCHLORIDE 5 MG/1
5 TABLET ORAL EVERY 4 HOURS PRN
Status: DISCONTINUED | OUTPATIENT
Start: 2025-04-29 | End: 2025-05-02 | Stop reason: HOSPADM

## 2025-04-29 RX ORDER — OXYTOCIN/0.9 % SODIUM CHLORIDE 30/500 ML
650 PLASTIC BAG, INJECTION (ML) INTRAVENOUS ONCE
Status: DISCONTINUED | OUTPATIENT
Start: 2025-04-29 | End: 2025-04-29 | Stop reason: HOSPADM

## 2025-04-29 RX ORDER — DOCUSATE SODIUM 100 MG/1
100 CAPSULE, LIQUID FILLED ORAL 2 TIMES DAILY PRN
Status: DISCONTINUED | OUTPATIENT
Start: 2025-04-29 | End: 2025-05-02 | Stop reason: HOSPADM

## 2025-04-29 RX ADMIN — Medication 500 ML: at 11:05

## 2025-04-29 RX ADMIN — PROMETHAZINE HYDROCHLORIDE 25 MG: 25 TABLET ORAL at 19:55

## 2025-04-29 RX ADMIN — ACETAMINOPHEN 1000 MG: 500 TABLET ORAL at 16:23

## 2025-04-29 RX ADMIN — OXYTOCIN 10 UNITS: 10 INJECTION INTRAVENOUS at 10:57

## 2025-04-29 RX ADMIN — MORPHINE SULFATE 150 MCG: 0.5 INJECTION, SOLUTION EPIDURAL; INTRATHECAL; INTRAVENOUS at 10:35

## 2025-04-29 RX ADMIN — KETOROLAC TROMETHAMINE 30 MG: 30 INJECTION, SOLUTION INTRAMUSCULAR; INTRAVENOUS at 11:38

## 2025-04-29 RX ADMIN — FENTANYL CITRATE 20 MCG: 50 INJECTION, SOLUTION INTRAMUSCULAR; INTRAVENOUS at 10:35

## 2025-04-29 RX ADMIN — ACETAMINOPHEN 1000 MG: 500 TABLET ORAL at 21:27

## 2025-04-29 RX ADMIN — SILVER NITRATE APPLICATORS: 25; 75 STICK TOPICAL at 14:30

## 2025-04-29 RX ADMIN — FAMOTIDINE 20 MG: 10 INJECTION, SOLUTION INTRAVENOUS at 10:28

## 2025-04-29 RX ADMIN — SODIUM CHLORIDE, POTASSIUM CHLORIDE, SODIUM LACTATE AND CALCIUM CHLORIDE: 600; 310; 30; 20 INJECTION, SOLUTION INTRAVENOUS at 10:45

## 2025-04-29 RX ADMIN — MIDAZOLAM HYDROCHLORIDE 1 MG: 1 INJECTION, SOLUTION INTRAMUSCULAR; INTRAVENOUS at 11:00

## 2025-04-29 RX ADMIN — SIMETHICONE 80 MG: 80 TABLET, CHEWABLE ORAL at 21:27

## 2025-04-29 RX ADMIN — KETOROLAC TROMETHAMINE 15 MG: 15 INJECTION, SOLUTION INTRAMUSCULAR; INTRAVENOUS at 18:37

## 2025-04-29 RX ADMIN — SODIUM CHLORIDE, POTASSIUM CHLORIDE, SODIUM LACTATE AND CALCIUM CHLORIDE: 600; 310; 30; 20 INJECTION, SOLUTION INTRAVENOUS at 10:27

## 2025-04-29 RX ADMIN — MIDAZOLAM HYDROCHLORIDE 1 MG: 1 INJECTION, SOLUTION INTRAMUSCULAR; INTRAVENOUS at 10:28

## 2025-04-29 RX ADMIN — SODIUM CHLORIDE 2 G: 900 INJECTION INTRAVENOUS at 09:57

## 2025-04-29 RX ADMIN — DOCUSATE SODIUM 100 MG: 100 CAPSULE, LIQUID FILLED ORAL at 21:27

## 2025-04-29 RX ADMIN — SODIUM CITRATE AND CITRIC ACID MONOHYDRATE 30 ML: 500; 334 SOLUTION ORAL at 10:25

## 2025-04-29 RX ADMIN — OXYCODONE HYDROCHLORIDE 10 MG: 10 TABLET ORAL at 17:31

## 2025-04-29 RX ADMIN — BUPIVACAINE HYDROCHLORIDE IN DEXTROSE 1.5 ML: 7.5 INJECTION, SOLUTION SUBARACHNOID at 10:35

## 2025-04-29 RX ADMIN — METOCLOPRAMIDE 10 MG: 5 INJECTION, SOLUTION INTRAMUSCULAR; INTRAVENOUS at 10:49

## 2025-04-29 RX ADMIN — OXYCODONE HYDROCHLORIDE 5 MG: 5 TABLET ORAL at 13:39

## 2025-04-29 RX ADMIN — OXYCODONE HYDROCHLORIDE 10 MG: 10 TABLET ORAL at 21:27

## 2025-04-29 RX ADMIN — SODIUM CHLORIDE, POTASSIUM CHLORIDE, SODIUM LACTATE AND CALCIUM CHLORIDE 1000 ML: 600; 310; 30; 20 INJECTION, SOLUTION INTRAVENOUS at 09:24

## 2025-04-29 RX ADMIN — ACETAMINOPHEN 1000 MG: 500 TABLET ORAL at 09:23

## 2025-04-29 RX ADMIN — ONDANSETRON 4 MG: 2 INJECTION INTRAMUSCULAR; INTRAVENOUS at 10:28

## 2025-04-29 NOTE — OP NOTE
"Operative Note    Patient name: Sima Booker  YOB: 1990   MRN: [id number]  Admission Date: 2025  Referring Provider: Hue Patel MD    ID: 35 y.o.  at 37w3d    Pre-Operative Dx:   Intrauterine pregnancy at 37w3d weeks   Previous  section - declines       Postoperative dx:    Intrauterine pregnancy at 37w3d weeks   Previous  section - declines             Procedure(s): repeatlow transverse  delivery     Surgeons: Surgeons and Role:     * Hue Patel MD - Primary    Staff:  Surgeon(s):  Hue Patel MD           Assistant: Lynette Fernando PCT    Anesthesia: Spinal    Estimated Blood Loss:  600  mL        Preoperative antibiotic: Ancef (cefazolin) 2 grams    Blood products:   Blood Administration Record (From admission, onward)      None            Pathology: * No orders in the log *    Drains: Pitts catheter to gravity    Complications: None    Condition: Stable to recovery room    Infant:                Gender: female infant    Weight: 3603 g (7 lb 15.1 oz)    Apgars: 7  @ 1 minute /     9  @ 5 minutes    Cord gases: Venous:  No results found for: \"PHCVEN\", \"BECVEN\"     Arterial:  No results found for: \"PHCART\", \"BECART\"         Operative Summary:   After obtaining informed consent the patient was taken to the operating room where adequate anesthesia was obtained.  Pitts catheter was placed in the bladder preoperatively.  IV antibiotics were given preoperatively.       The abdomen was prepped and draped in the usual sterile fashion for  delivery.  After confirming adequate anesthesia a Pfannenstiel skin incision was made with the scalpel and carried through to the underlying layer of fascia.  The fascia was incised in the midline and the incision extended laterally with the Castanon scissors and with blunt dissection.       The upper aspect of the fascia was grasped with 2 Kocher clamps, elevated, and dissected off the underlying rectus " muscles bluntly and with the Castanon scissors.  The Kocher clamps were removed and applied to the inferior aspect of the fascia.  The fascia was dissected off of the rectus muscles in the same fashion.  The peritoneum was entered bluntly.  The incision was stretched and the bladder blade and Mehta retractor inserted for visualization of the uterus. A bladder flap was made and bladder blade replaced.        The uterus was incised with the scalpel in a low transverse fashion.  The uterine incision was entered digitally and the incision extended bluntly in a craniocaudal fashion.  Retractors were removed and membranes were ruptured.  The infant was delivered atraumatically from vtx presentation.  The umbilical cord was milked 4 times, clamped and cut and the nose and mouth bulb suctioned.  The infant was handed off to waiting pediatric staff.       Cord blood gases were not collected.  Cord blood was collected.  The placenta was removed using cord traction and uterine massage.  The uterus was exteriorized and cleared of all clots and debris.  The uterine incision was repaired with #1 chromic in a running locked fashion. A double layer technique was used.  Additional hemostatic measures required: none.    The incision was inspected and excellent hemostasis was noted.  The tubes and ovaries were noted to be normal.   The uterus was returned to the abdomen.  The gutters were cleared of all clots and debris.  Irrigation was used.  The uterine incision was again inspected and found to be hemostatic.       The peritoneum was reapproximated with 2-0 Vicryl in a running fashion.  The fascia was closed with 0 Vicryl in a running fashion.  The subcutaneous space was reapproximated using 3-0 plain gut in interrupted stiches.      The skin was closed using staples, and dressing placed. All sharp, instrument, and sponge counts were correct. The patient was transferred to the recovery room in stable condition.    Surgical  Assist:          Hue Patel MD  4/29/2025

## 2025-04-29 NOTE — PAYOR COMM NOTE
"Ade, Sima Amarilis \"Epifanio Amarilis\" (35 y.o. Female)     From:Nimo Chandler LPN, Utilization Review  Phone #[phone]  Fax #[phone]    Faith Ref#[id number]     Delivery Information.          Date of Birth   1990    Social Security Number       Address   [address] [place] [address]    Home Phone   [phone]    MRN   [id number]       Episcopalian   None    Marital Status                               Admission Date   2025    Admission Type   Elective    Admitting Provider   Hue Patel MD    Attending Provider   Hue Patel MD    Department, Room/Bed   [facility] 4A, N415/1       Discharge Date       Discharge Disposition       Discharge Destination                                 Attending Provider: Hue Patel MD    Allergies: Penicillins, Amoxicillin    Isolation: None   Infection: None   Code Status: CPR    Ht: 165.1 cm (65\")   Wt: 89.8 kg (198 lb)    Admission Cmt: None   Principal Problem: Previous  delivery affecting pregnancy [O34.219]                   Active Insurance as of 2025       Primary Coverage       Payor Plan Insurance Group Employer/Plan Group    ANTHEM BLUE CROSS ANTHEM BLUE CROSS BLUE SHIELD PPO [id number]       Payor Plan Address Payor Plan Phone Number Payor Plan Fax Number Effective Dates    [address] [phone]  2023 - None Entered    [place] [address]         Subscriber Name Subscriber Birth Date Member ID       LESLIE LLANOS 1992 [id number]                     Emergency Contacts        (Rel.) Home Phone Work Phone Mobile Phone    LESLIE LLANOS (Spouse) [phone] -- [phone]              Insurance Information                  ANTHEM BLUE CROSS/ANTHEM BLUE CROSS BLUE SHIELD PPO Phone: [phone]    Subscriber: Leslie Llanos Subscriber#: [id number]    Group#: [id number] Precert#: --    Authorization#: [id number] Effective Date: --           "   History & Physical        Hue Patel MD at 25 0927          History and Physical:    Subjective    Chief Complaint   Patient presents with    Scheduled        Sima Booker is a 35 y.o. year old  with an Estimated Date of Delivery: 25 currently at 37w3d presenting for scheduled  section.    Prenatal care has been significant for GDM - A2, gestational hypertension, AMA (genetic screening was normal), and previous C/S - (desires repeat ).        Review of Systems  Pertinent items are noted in HPI.     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 Not sure how old i was     Past Surgical History:   Procedure Laterality Date     SECTION N/A 2021    Procedure:  SECTION PRIMARY;  Surgeon: Stef Lindsey MD;  Location: [facility] LABOR DELIVERY;  Service: Obstetrics/Gynecology;  Laterality: N/A;    COLONOSCOPY  10/01/2017    FERTILITY SURGERY  2020    Oocyte retrieval for IVF; IVF for 2nd pregnancy    NASAL SINUS SURGERY  2016    WISDOM TOOTH EXTRACTION       Family History   Problem Relation Age of Onset    Thyroid disease Mother     Hyperlipidemia Father     Arthritis Maternal Grandmother     Miscarriages / Stillbirths Maternal Grandmother     Diabetes Maternal Grandfather     Miscarriages / Stillbirths Maternal Grandfather     Diabetes Paternal Grandmother     Breast cancer Other         great aunt     Social History     Tobacco Use    Smoking status: Never    Smokeless tobacco: Never   Vaping Use    Vaping status: Never Used   Substance Use Topics    Alcohol use: Not Currently    Drug use: Never     Medications Prior to Admission    Medication Sig Dispense Refill Last Dose/Taking    ASPIRIN 81 PO Take 1 tablet by mouth Daily.   2025 Morning    butalbital-acetaminophen-caffeine (Fioricet) -40 MG capsule capsule Take 2 capsules by mouth Every 4 (Four) Hours As Needed (headache). 40 capsule 0 2025    Continuous Glucose Sensor (FreeStyle Lalita 3 Plus Sensor) Use Every 15 (Fifteen) Days. Called to Prescription Pad in [place] 24 2 each 6 2025    ferrous sulfate 325 (65 FE) MG tablet Take 1 tablet by mouth Daily With Breakfast.   2025    glucose blood test strip Use as instructed 100 each 3 2025    Lancets (freestyle) lancets Use as directed. 100 each 3 2025    levocetirizine (XYZAL) 5 MG tablet Take 1 tablet by mouth Daily.   2025 Evening    metFORMIN ER (GLUCOPHAGE-XR) 500 MG 24 hr tablet Take 2 tablets by mouth Daily With Breakfast. 60 tablet 4 2025    ondansetron (Zofran) 4 MG tablet Take 1 tablet by mouth Every 8 (Eight) Hours As Needed for Nausea or Vomiting. 30 tablet 1 2025    Prenatal Vit-Fe Fumarate-FA (prenatal vitamin 27-0.8) 27-0.8 MG tablet tablet Take 1 tablet by mouth Daily. 90 tablet 3 2025    sertraline (ZOLOFT) 100 MG tablet Take 1 tablet by mouth Daily. 30 tablet 2 Past Week    acetaminophen (TYLENOL) 650 MG 8 hr tablet Take 1 tablet by mouth Every 8 (Eight) Hours As Needed for Mild Pain.       Magnesium Glycinate 100 MG capsule Take 200 mg by mouth 2 (Two) Times a Day.        Allergies:  Penicillins and Amoxicillin  OB History    Para Term  AB Living   2 1 1 0 0 1   SAB IAB Ectopic Molar Multiple Live Births   0 0 0 0 0 1      # Outcome Date GA Lbr Russell/2nd Weight Sex Type Anes PTL Lv   2 Current            1 Term 21 37w4d  3530 g (7 lb 12.5 oz) M CS-LTranv EPI, Spinal N SUJATA      Complications: Intraamniotic Infection, Failure to Progress in Second Stage      Obstetric Comments   FOB #1 : Pregnancy #1(IVF, frozen cycle retrieved 2020, no  "donors)         Objective    /88 (BP Location: Right arm, Patient Position: Lying)   Pulse 74   Temp 98.9 °F (37.2 °C) (Oral)   Resp 16   Ht 165.1 cm (65\")   Wt 89.8 kg (198 lb)   LMP  (Approximate)   SpO2 98%   BMI 32.95 kg/m²     Physical Exam    General:  No acute distress           Abdomen: Gravid, nontender       FHT's: reactive             Lab Review   External Prenatal Results       Pregnancy Outside Results - Transcribed From Office Records - See Scanned Records For Details       Test Value Date Time    ABO  A  04/28/25 1522    Rh  Positive  04/28/25 1522    Antibody Screen  Negative  04/28/25 1522       Negative  11/05/24 1412    Varicella IgG       Rubella  3.91 index 11/05/24 1412    Hgb  11.9 g/dL 04/28/25 1522       12.4 g/dL 04/25/25 1107       11.6 g/dL 04/23/25 0333       12.8 g/dL 04/22/25 1430       11.6 g/dL 04/15/25 2059       12.2 g/dL 04/10/25 1543       13.3 g/dL 11/05/24 1412    Hct  35.0 % 04/28/25 1522       38.1 % 04/25/25 1107       35.0 % 04/23/25 0333       38.1 % 04/22/25 1430       33.6 % 04/15/25 2059       35.7 % 04/10/25 1543       41.3 % 11/05/24 1412    HgB A1c        1h GTT  142 mg/dL 12/09/24 1051    3h GTT Fasting  73 mg/dL 12/12/24 1047    3h GTT 1 hour  185 mg/dL 12/12/24 1047    3h GTT 2 hour  207 mg/dL 12/12/24 1047    3h GTT 3 hour   142 mg/dL 12/12/24 1047    Gonorrhea (discrete)  Negative  11/05/24 1412    Chlamydia (discrete)  Negative  11/05/24 1412    RPR  Non Reactive  11/05/24 1412    Syphils cascade: TP-Ab (FTA)  Non-Reactive  04/28/25 1522    TP-Ab  Non-Reactive  04/28/25 1522    TP-Ab (EIA)       TPPA       HBsAg  Negative  11/05/24 1412    Herpes Simplex Virus PCR       Herpes Simplex VIrus Culture       HIV  Non Reactive  11/05/24 1412    Hep C RNA Quant PCR       Hep C Antibody  Non Reactive  11/05/24 1412    AFP       NIPT       Cystic Fibrosis ([place])       Cystic Fibroisis        Spinal Muscular atrophy       Fragile X       Group B Strep  " No Group B Streptococcus Isolated at 2 days  25 1859    GBS Susceptibility to Clindamycin       GBS Susceptibility to Erythromycin       Fetal Fibronectin       Genetic Testing, Maternal Blood                 Drug Screening       Test Value Date Time    Urine Drug Screen       Amphetamine Screen  Negative ng/mL 24 1412    Barbiturate Screen  Negative ng/mL 24 1412    Benzodiazepine Screen  Negative ng/mL 24 1412    Methadone Screen  Negative ng/mL 24 1412    Phencyclidine Screen  Negative ng/mL 24 1412    Opiates Screen       THC Screen       Cocaine Screen       Propoxyphene Screen  Negative ng/mL 24 1412    Buprenorphine Screen       Methamphetamine Screen       Oxycodone Screen       Tricyclic Antidepressants Screen                 Legend    ^: Historical                                Assessment & Plan    ASSESSMENT  IUP at 37w3d, GHTN, GDM  scheduled  section     PLAN  Admit to labor and delivery for C/S  Antibiotic prophylaxis and SCDs          Hue Patel MD  2025@    Electronically signed by Hue Patel MD at 25 0928       Facility-Administered Medications as of 2025   Medication Dose Route Frequency Provider Last Rate Last Admin    [COMPLETED] acetaminophen (TYLENOL) tablet 1,000 mg  1,000 mg Oral Once Hue Patel MD   1,000 mg at 25 0923    acetaminophen (TYLENOL) tablet 1,000 mg  1,000 mg Oral Q6H Hue Patel MD   1,000 mg at 25 1623    Followed by    [START ON 2025] acetaminophen (TYLENOL) tablet 650 mg  650 mg Oral Q6H Hue Patel MD        aluminum-magnesium hydroxide-simethicone (MAALOX MAX) 400-400-40 MG/5ML suspension 15 mL  15 mL Oral Q4H PRN Hue Patel MD        Or    calcium carbonate (TUMS) chewable tablet 500 mg (200 mg elemental)  1 tablet Oral Q4H PRN Hue Patel MD        carboprost (HEMABATE) injection 250 mcg  250 mcg Intramuscular PRN Hue Patel MD        [COMPLETED] ceFAZolin 2000  mg IVPB in 100 mL NS (MBP)  2 g Intravenous Once Hue Patel MD   2 g at 04/29/25 0957    docusate sodium (COLACE) capsule 100 mg  100 mg Oral BID PRN Hue Patel MD        Hydrocortisone (Perianal) (ANUSOL-HC) 2.5 % rectal cream 1 Application  1 Application Rectal PRN Hue Patel MD        ketorolac (TORADOL) injection 15 mg  15 mg Intravenous Q6H Hue Patel MD        Followed by    [START ON 4/30/2025] ibuprofen (ADVIL,MOTRIN) tablet 600 mg  600 mg Oral Q6H Hue Patel MD        [COMPLETED] ketorolac (TORADOL) injection 30 mg  30 mg Intravenous Once Hue Patel MD   30 mg at 04/29/25 1138    [COMPLETED] lactated ringers bolus 1,000 mL  1,000 mL Intravenous Once Hue Patel MD 2,000 mL/hr at 04/29/25 0924 1,000 mL at 04/29/25 0924    lanolin topical 1 Application  1 Application Topical Q1H PRN Hue Patel MD        [START ON 4/30/2025] metFORMIN ER (GLUCOPHAGE-XR) 24 hr tablet 1,000 mg  1,000 mg Oral Daily With Breakfast Hue Patel MD        methylergonovine (METHERGINE) injection 200 mcg  200 mcg Intramuscular PRN Hue Patel MD        miSOPROStol (CYTOTEC) tablet 600 mcg  600 mcg Oral PRN Hue Patel MD        [COMPLETED] Morphine (PF) injection 15 mg  15 mg Intramuscular Once Nba Bazzi MD   15 mg at 04/23/25 0408    naloxone (NARCAN) injection 0.4 mg  0.4 mg Intravenous Q1H PRN Ina Rayo CRNA        ondansetron (ZOFRAN) injection 4 mg  4 mg Intravenous Once PRN Ina Rayo CRNA        oxyCODONE (ROXICODONE) immediate release tablet 5 mg  5 mg Oral Q4H PRN Hue Patel MD   5 mg at 04/29/25 1339    Or    oxyCODONE (ROXICODONE) immediate release tablet 10 mg  10 mg Oral Q4H PRN Hue Patel MD        oxytocin (PITOCIN) 30 units in 0.9% sodium chloride 500 mL (premix)  125 mL/hr Intravenous Once PRN Hue Patel MD        prenatal vitamin tablet 1 tablet  1 tablet Oral Daily Hue Patel MD        promethazine (PHENERGAN) tablet  "25 mg  25 mg Oral Q6H PRN Hue Patel MD        Or    promethazine (PHENERGAN) suppository 12.5 mg  12.5 mg Rectal Q6H PRN Hue Patel MD        [COMPLETED] promethazine (PHENERGAN) tablet 25 mg  25 mg Oral Once High, Nba MCCLAIN MD   25 mg at 04/23/25 0408    sertraline (ZOLOFT) tablet 100 mg  100 mg Oral Daily Hue Patel MD        silver nitrate 75-25 % applicator             simethicone (MYLICON) chewable tablet 80 mg  80 mg Oral 4x Daily PRN Hue Patel MD        [COMPLETED] Sod Citrate-Citric Acid (BICITRA) oral solution 30 mL  30 mL Oral Once Hue Patel MD   30 mL at 04/29/25 1025     Orders (last 24 hrs)        Start     Ordered    04/30/25 1800  ibuprofen (ADVIL,MOTRIN) tablet 600 mg  Every 6 Hours        Placed in \"Followed by\" Linked Group    04/29/25 1407    04/30/25 1500  acetaminophen (TYLENOL) tablet 650 mg  Every 6 Hours        Placed in \"Followed by\" Linked Group    04/29/25 1407    04/30/25 0800  metFORMIN ER (GLUCOPHAGE-XR) 24 hr tablet 1,000 mg  Daily With Breakfast         04/29/25 1407    04/30/25 0600  Discontinue Indwelling Urinary Catheter in AM  Once        Comments: Pitts catheter may be removed at 8 hours post-op if urine output is adequate (>30 ml/hr)    04/29/25 1407    04/30/25 0600  CBC & Differential  Timed         04/29/25 1407    04/30/25 0600  Preeclampsia Panel  Morning Draw         04/29/25 1407    04/30/25 0000  Discontinue IV  Once         04/29/25 1407    04/30/25 0000  Remove Abdominal Dressing  Once         04/29/25 1407    04/29/25 1800  Ambulate Patient  2 Times Daily      Comments: After anesthesia wears off.    04/29/25 1407    04/29/25 1800  ketorolac (TORADOL) injection 15 mg  Every 6 Hours        Placed in \"Followed by\" Linked Group    04/29/25 1407    04/29/25 1526  silver nitrate 75-25 % applicator        Note to Pharmacy: Created by cabinet override    04/29/25 1526    04/29/25 1500  sertraline (ZOLOFT) tablet 100 mg  Daily         04/29/25 1407    " 04/29/25 1500  Incentive spirometry RT  Every Hour       04/29/25 1407    04/29/25 1500  prenatal vitamin tablet 1 tablet  Daily         04/29/25 1407    04/29/25 1408  Code Status and Medical Interventions: CPR (Attempt to Resuscitate); Full  Continuous         04/29/25 1407    04/29/25 1408  Vital Signs Per hospital policy  Per Hospital Policy/Protocol         04/29/25 1407    04/29/25 1408  Notify Provider  Continuous        Comments: Open Order Report to View Parameters Requiring Provider Notification    04/29/25 1407    04/29/25 1408  Patient May Shower  Once        Comments: After anesthesia wears off and with assistance    04/29/25 1407    04/29/25 1408  Diet: Regular/House; Fluid Consistency: Thin (IDDSI 0)  Diet Effective Now         04/29/25 1407    04/29/25 1408  Advance Diet As Tolerated -Regular  Until Discontinued         04/29/25 1407    04/29/25 1408  I/O  Every Shift       04/29/25 1407    04/29/25 1408  Fundal and Lochia Check  Per Hospital Policy/Protocol        Comments: Q 30 min x 2, Q 1 hr x 4, Q 4 hrs x 24 hrs, then Q shift.    04/29/25 1407    04/29/25 1408  Straight Catheterize  Once        Comments: 1) May straight catheterize one time, PRN for inablility to void. 2) If necessary to catheterize a second time, insert indwelling urinary catheter and leave in if residual is greater than 150ml.    04/29/25 1407    04/29/25 1408  Continue Indwelling Urinary Catheter Already in Place  Once         04/29/25 1407    04/29/25 1408  Notify Provider if Bladder Distention Continues  Continuous        Comments: Post Catheter Removal    04/29/25 1407    04/29/25 1408  Urinary Catheter Care  Every Shift       04/29/25 1407    04/29/25 1408  Turn Cough Deep Breathe  Once         04/29/25 1407    04/29/25 1408  Encourage early intake of PO fluids  Continuous         04/29/25 1407    04/29/25 1408  Ambulate Patient 3-5 times per day (with or without Pitts)  Every Shift       04/29/25 1407    04/29/25 1408   "Apply Abdominal Binding Until Discontinued  Until Discontinued         04/29/25 1407    04/29/25 1408  \"If patient tolerates food and liquids after completion of second bag of Pitocin, saline lock IV and discontinue IV fluid infusions.  Once         04/29/25 1407    04/29/25 1408  Breast pump to bed  Once         04/29/25 1407    04/29/25 1408  If indicated -- Please administer RH Immunoglobulin based on results of cord blood evaluation and fetal screen lab tests, pharmacy to dispense  Per Order Details        Comments: See Process Instructions For Reference Range Details.    04/29/25 1407    04/29/25 1408  Place Sequential Compression Device  Once         04/29/25 1407    04/29/25 1408  Maintain Sequential Compression Device  Continuous         04/29/25 1407    04/29/25 1408  VTE Prophylaxis Not Indicated: Low Risk; No Risk Factors (0)  Once         04/29/25 1407    04/29/25 1407  oxytocin (PITOCIN) 30 units in 0.9% sodium chloride 500 mL (premix)  Once As Needed         04/29/25 1407    04/29/25 1407  docusate sodium (COLACE) capsule 100 mg  2 Times Daily PRN         04/29/25 1407    04/29/25 1407  simethicone (MYLICON) chewable tablet 80 mg  4 Times Daily PRN         04/29/25 1407    04/29/25 1407  promethazine (PHENERGAN) tablet 25 mg  Every 6 Hours PRN        Placed in \"Or\" Linked Group    04/29/25 1407    04/29/25 1407  promethazine (PHENERGAN) suppository 12.5 mg  Every 6 Hours PRN        Placed in \"Or\" Linked Group    04/29/25 1407    04/29/25 1407  lanolin topical 1 Application  Every 1 Hour PRN         04/29/25 1407    04/29/25 1407  carboprost (HEMABATE) injection 250 mcg  As Needed         04/29/25 1407    04/29/25 1407  miSOPROStol (CYTOTEC) tablet 600 mcg  As Needed         04/29/25 1407    04/29/25 1407  methylergonovine (METHERGINE) injection 200 mcg  As Needed         04/29/25 1407    04/29/25 1407  Hydrocortisone (Perianal) (ANUSOL-HC) 2.5 % rectal cream 1 Application  As Needed         04/29/25 " "1407    04/29/25 1407  aluminum-magnesium hydroxide-simethicone (MAALOX MAX) 400-400-40 MG/5ML suspension 15 mL  Every 4 Hours PRN        Placed in \"Or\" Linked Group    04/29/25 1407    04/29/25 1407  calcium carbonate (TUMS) chewable tablet 500 mg (200 mg elemental)  Every 4 Hours PRN        Placed in \"Or\" Linked Group    04/29/25 1407    04/29/25 1407  acetaminophen (TYLENOL) tablet 1,000 mg  Every 6 Hours        Placed in \"Followed by\" Linked Group    04/29/25 1407    04/29/25 1407  ondansetron (ZOFRAN) injection 4 mg  Once As Needed         04/29/25 1407    04/29/25 1407  naloxone (NARCAN) injection 0.4 mg  Every 1 Hour PRN         04/29/25 1407    04/29/25 1332  oxyCODONE (ROXICODONE) immediate release tablet 5 mg  Every 4 Hours PRN        Placed in \"Or\" Linked Group    04/29/25 1332    04/29/25 1332  oxyCODONE (ROXICODONE) immediate release tablet 10 mg  Every 4 Hours PRN        Placed in \"Or\" Linked Group    04/29/25 1332    04/29/25 1230  oxytocin (PITOCIN) 30 units in 0.9% sodium chloride 500 mL (premix)  Once,   Status:  Discontinued        Placed in \"Followed by\" Linked Group    04/29/25 1131    04/29/25 1230  oxytocin (PITOCIN) 30 units in 0.9% sodium chloride 500 mL (premix)  Once,   Status:  Discontinued        Placed in \"Followed by\" Linked Group    04/29/25 1131    04/29/25 1230  ketorolac (TORADOL) injection 30 mg  Once         04/29/25 1131    04/29/25 1200  Vital Signs q 4 while awake  Every 4 Hours,   Status:  Canceled      Comments: While the patient is awake.    04/29/25 0837    04/29/25 1200  Respirations  Every Hour      Comments: If respiratory rate is less than 10/min, notify the Anesthesiologist    04/29/25 1131    04/29/25 1132  Vital Signs  Per Hospital Policy/Protocol         04/29/25 1131    04/29/25 1132  If Respiratory Rate is Less Than 8/Min, See Narcan Order. Notify Anesthesiologist STAT.  Continuous         04/29/25 1131    04/29/25 1132  Blood Pressure and Pulse Every 4 Hours  " Continuous         04/29/25 1131    04/29/25 1132  Activity & Removal of Pitts Catheter, Per Obstetrician  Continuous         04/29/25 1131    04/29/25 1132  Notify Anesthesiologist for Any Questions / Problems  Continuous         04/29/25 1131    04/29/25 1132  Notify Anesthesia for Temp Over 101.4F  Continuous        Comments: May discharge from PACU with temperature at or above 95 degrees.    04/29/25 1131    04/29/25 1132  Ambu Bag at Bedside  Continuous         04/29/25 1131    04/29/25 1132  Notify Physician (specified)  Until Discontinued         04/29/25 1131    04/29/25 1132  Vital Signs Per Hospital Policy  Per Hospital Policy/Protocol         04/29/25 1131    04/29/25 1132  Strict Bed Rest  Until Discontinued         04/29/25 1131    04/29/25 1132  Fundal & Lochia Check  Per Order Details,   Status:  Canceled        Comments: Every 15 Minutes x4, Then Every 30 Minutes x2, Then Every Shift    04/29/25 1131    04/29/25 1132  Diet: Regular/House; Fluid Consistency: Thin (IDDSI 0)  Diet Effective Now,   Status:  Canceled         04/29/25 1131    04/29/25 1131  methylergonovine (METHERGINE) injection 200 mcg  Once As Needed,   Status:  Discontinued         04/29/25 1131    04/29/25 1131  carboprost (HEMABATE) injection 250 mcg  As Needed,   Status:  Discontinued         04/29/25 1131    04/29/25 1131  miSOPROStol (CYTOTEC) tablet 800 mcg  As Needed,   Status:  Discontinued         04/29/25 1131    04/29/25 1131  Fundal & Lochia Check  Every Shift       04/29/25 1131    04/29/25 1022  POC Glucose Once  PROCEDURE ONCE        Comments: Complete no more than 45 minutes prior to patient eating      04/29/25 1021    04/29/25 0930  sodium chloride 0.9 % flush 10 mL  Every 12 Hours Scheduled,   Status:  Discontinued         04/29/25 0837    04/29/25 0930  lactated ringers bolus 1,000 mL  Once         04/29/25 0837    04/29/25 0930  Sod Citrate-Citric Acid (BICITRA) oral solution 30 mL  Once         04/29/25 0837     25 09  acetaminophen (TYLENOL) tablet 1,000 mg  Once         2537    25  ceFAZolin 2000 mg IVPB in 100 mL NS (MBP)  Once         25  Code Status and Medical Interventions: CPR (Attempt to Resuscitate); Full Support  Continuous,   Status:  Canceled         2537    25  Vital Signs Per Hospital Policy  Per Hospital Policy/Protocol,   Status:  Canceled         25  Continuous Fetal Monitoring With NST on Admission and Prior to Initiation of Oxytocin.  Per Order Details,   Status:  Canceled        Comments: Continuous Fetal Monitoring With NST on Admission    25  External Uterine Contraction Monitoring  Per Hospital Policy/Protocol,   Status:  Canceled         25  Notify Physician (specified)  Until Discontinued,   Status:  Canceled         25  Notify physician for tachysystole (per hospital algorithm)  Until Discontinued,   Status:  Canceled         25  Notify physician if membranes ruptured, bleeding greater than 1 pad an hour, fetal heart tone abnormality, and severe pain  Until Discontinued,   Status:  Canceled         25 0825  Up as Tolerated  Until Discontinued,   Status:  Canceled         25  Initiate Group Beta Strep (GBS) Prophylaxis Protocol, If Criteria Met  Continuous,   Status:  Canceled        Comments: NO TREATMENT RECOMMENDED IF: 1)  Maternal GBS status known negative 2)  Scheduled  birth with intact membranes, not in labor.  3 ) Maternal GBS unknown, no risk factors.   TREAT WITH ANTIBIOTICS IF:  1)  Maternal GBS status is known postive.  2)  Maternal GBS status unknown with these risk factors:  a)  Previous infant affected by GBS infection.  b)  GBS urinary tract infection (UTI) or bacteruria during pregnancy  c)  Unexplained maternal  fever in labor (greater than or equal to 100.4F or 38.0C)  d)  Prolonged rupture of the membranes greater than or equal to 18 hours.  e)  Gestational age less than 37 weeks.    04/29/25 0837 04/29/25 0838  Abdominal Prep with Clippers  Once,   Status:  Canceled         04/29/25 0837    04/29/25 0838  Chlorhexadine Skin Prep Unless Otherwise Indicated  Once,   Status:  Canceled         04/29/25 0837    04/29/25 0838  SCD (sequential compression devices)  Once,   Status:  Canceled         04/29/25 0837    04/29/25 0838  Document Gatorade Consumption Prior to Admission (Yes or No)  Once,   Status:  Canceled         04/29/25 0837 04/29/25 0838  NPO Diet NPO Type: Ice Chips  Diet Effective Now,   Status:  Canceled         04/29/25 0837 04/29/25 0838  Insert Peripheral IV  Once,   Status:  Canceled         04/29/25 0837 04/29/25 0838  Saline Lock & Maintain IV Access  Continuous,   Status:  Canceled         04/29/25 0837    04/29/25 0838  Admit To Obstetrics Inpatient  Once         04/29/25 0837 04/29/25 0837  sodium chloride 0.9 % flush 10 mL  As Needed,   Status:  Discontinued         04/29/25 0837 04/29/25 0837  sodium chloride 0.9 % infusion 40 mL  As Needed,   Status:  Discontinued         04/29/25 0837 04/29/25 0837  lidocaine PF 1% (XYLOCAINE) injection 0.5 mL  Once As Needed,   Status:  Discontinued         04/29/25 0837    Unscheduled  IV Site Care  As Needed       04/29/25 1131    Unscheduled  Up with Assistance  As Needed       04/29/25 1407    Unscheduled  Bladder Scan if Patient Unable to Void 4-6 Hours After Catheter Removal  As Needed         04/29/25 1407    Unscheduled  Straight Cath Every 4-6 Hours As Needed If Patient is Unable to Void After 4-6 Hours, Bladder Scan Volume is Greater Than 500mL & Patient Has Symptoms of Bladder Discomfort / Distention  As Needed       04/29/25 1407    Unscheduled  Schedule / Prompt Voiding For Patients With Urinary Incontinence  As Needed        "25 140    Unscheduled  Wound Care  As Needed      Comments: Postop day 1. Remove dressing and leave incision open to air.    25 140    Unscheduled  Chewing Gum  As Needed       25 140    Unscheduled  Warm compress  As Needed       25 140    Unscheduled  Apply ace wrap, tight bra, or binder  As Needed       25 140    Unscheduled  Apply ice packs  As Needed       25 140    Unscheduled  Bladder Assessment  As Needed       25 1131    Signed and Held  diphenhydrAMINE (BENADRYL) capsule 25 mg  Every 4 Hours PRN        Placed in \"Or\" Linked Group    Signed and Held    Signed and Held  diphenhydrAMINE (BENADRYL) injection 25 mg  Once As Needed        Placed in \"Or\" Linked Group    Signed and Held    Signed and Held  diphenhydrAMINE (BENADRYL) injection 25 mg  Every 4 Hours PRN        Placed in \"Or\" Linked Group    Signed and Held                     Operative/Procedure Notes (last 24 hours)        Hue Patel MD at 25 1051          Operative Note    Patient name: Sima Booker  YOB: 1990   MRN: [id number]  Admission Date: 2025  Referring Provider: Hue Patel MD    ID: 35 y.o.  at 37w3d    Pre-Operative Dx:   Intrauterine pregnancy at 37w3d weeks   Previous  section - declines       Postoperative dx:    Intrauterine pregnancy at 37w3d weeks   Previous  section - declines             Procedure(s): repeatlow transverse  delivery     Surgeons: Surgeons and Role:     * Hue Patel MD - Primary    Staff:  Surgeon(s):  Hue Patel MD           Assistant: Lynette Fernando PCT    Anesthesia: Spinal    Estimated Blood Loss:  600  mL        Preoperative antibiotic: Ancef (cefazolin) 2 grams    Blood products:   Blood Administration Record (From admission, onward)      None            Pathology: * No orders in the log *    Drains: Pitts catheter to gravity    Complications: None    Condition: Stable to " "recovery room    Infant:                Gender: female infant    Weight: 3603 g (7 lb 15.1 oz)    Apgars: 7  @ 1 minute /     9  @ 5 minutes    Cord gases: Venous:  No results found for: \"PHCVEN\", \"BECVEN\"     Arterial:  No results found for: \"PHCART\", \"BECART\"         Operative Summary:   After obtaining informed consent the patient was taken to the operating room where adequate anesthesia was obtained.  Pitts catheter was placed in the bladder preoperatively.  IV antibiotics were given preoperatively.       The abdomen was prepped and draped in the usual sterile fashion for  delivery.  After confirming adequate anesthesia a Pfannenstiel skin incision was made with the scalpel and carried through to the underlying layer of fascia.  The fascia was incised in the midline and the incision extended laterally with the Castanon scissors and with blunt dissection.       The upper aspect of the fascia was grasped with 2 Kocher clamps, elevated, and dissected off the underlying rectus muscles bluntly and with the Castanon scissors.  The Kocher clamps were removed and applied to the inferior aspect of the fascia.  The fascia was dissected off of the rectus muscles in the same fashion.  The peritoneum was entered bluntly.  The incision was stretched and the bladder blade and Mehta retractor inserted for visualization of the uterus. A bladder flap was made and bladder blade replaced.        The uterus was incised with the scalpel in a low transverse fashion.  The uterine incision was entered digitally and the incision extended bluntly in a craniocaudal fashion.  Retractors were removed and membranes were ruptured.  The infant was delivered atraumatically from vtx presentation.  The umbilical cord was milked 4 times, clamped and cut and the nose and mouth bulb suctioned.  The infant was handed off to waiting pediatric staff.       Cord blood gases were not collected.  Cord blood was collected.  The placenta was removed " using cord traction and uterine massage.  The uterus was exteriorized and cleared of all clots and debris.  The uterine incision was repaired with #1 chromic in a running locked fashion. A double layer technique was used.  Additional hemostatic measures required: none.    The incision was inspected and excellent hemostasis was noted.  The tubes and ovaries were noted to be normal.   The uterus was returned to the abdomen.  The gutters were cleared of all clots and debris.  Irrigation was used.  The uterine incision was again inspected and found to be hemostatic.       The peritoneum was reapproximated with 2-0 Vicryl in a running fashion.  The fascia was closed with 0 Vicryl in a running fashion.  The subcutaneous space was reapproximated using 3-0 plain gut in interrupted stiches.      The skin was closed using staples, and dressing placed. All sharp, instrument, and sponge counts were correct. The patient was transferred to the recovery room in stable condition.    Surgical Assist:          Hue Patel MD  4/29/2025    Electronically signed by Hue Patel MD at 04/29/25 1121       Physician Progress Notes (last 24 hours)  Notes from 04/28/25 1644 through 04/29/25 1644   No notes of this type exist for this encounter.

## 2025-04-29 NOTE — ANESTHESIA PROCEDURE NOTES
Spinal Block      Patient reassessed immediately prior to procedure    Patient location during procedure: OR  Indication:at surgeon's request  Performed By  CRNA/DANIELA: Ina Rayo CRNA  Preanesthetic Checklist  Completed: patient identified, IV checked, site marked, risks and benefits discussed, surgical consent, monitors and equipment checked, pre-op evaluation and timeout performed  Spinal Block Prep:  Patient Position:sitting  Sterile Tech:cap, gloves, mask and sterile barriers  Prep:Betadine  Patient Monitoring:blood pressure monitoring, continuous pulse oximetry and EKG    Spinal Block Procedure  Approach:midline  Guidance:palpation technique  Location:L3-L4  Needle Type:Clare  Needle Gauge:25 G  Placement of Spinal needle event:cerebrospinal fluid aspirated  Paresthesia: no  Fluid Appearance:clear     Post Assessment  Patient Tolerance:patient tolerated the procedure well with no apparent complications  Complications no

## 2025-04-29 NOTE — H&P
History and Physical:    Subjective     Chief Complaint   Patient presents with    Scheduled        Sima Booker is a 35 y.o. year old  with an Estimated Date of Delivery: 25 currently at 37w3d presenting for scheduled  section.    Prenatal care has been significant for GDM - A2, gestational hypertension, AMA (genetic screening was normal), and previous C/S - (desires repeat ).        Review of Systems  Pertinent items are noted in HPI.     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 Not sure how old i was     Past Surgical History:   Procedure Laterality Date     SECTION N/A 2021    Procedure:  SECTION PRIMARY;  Surgeon: Stef Lindsey MD;  [facility];  Service: Obstetrics/Gynecology;  Laterality: N/A;    COLONOSCOPY  10/01/2017    FERTILITY SURGERY  2020    Oocyte retrieval for IVF; IVF for 2nd pregnancy    NASAL SINUS SURGERY  2016    WISDOM TOOTH EXTRACTION       Family History   Problem Relation Age of Onset    Thyroid disease Mother     Hyperlipidemia Father     Arthritis Maternal Grandmother     Miscarriages / Stillbirths Maternal Grandmother     Diabetes Maternal Grandfather     Miscarriages / Stillbirths Maternal Grandfather     Diabetes Paternal Grandmother     Breast cancer Other         great aunt     Social History     Tobacco Use    Smoking status: Never    Smokeless tobacco: Never   Vaping Use    Vaping status: Never Used   Substance Use Topics    Alcohol use: Not Currently    Drug use: Never     Medications Prior to Admission   Medication Sig Dispense Refill Last Dose/Taking    ASPIRIN 81 PO Take 1  tablet by mouth Daily.   2025 Morning    butalbital-acetaminophen-caffeine (Fioricet) -40 MG capsule capsule Take 2 capsules by mouth Every 4 (Four) Hours As Needed (headache). 40 capsule 0 2025    Continuous Glucose Sensor (FreeStyle Lalita 3 Plus Sensor) Use Every 15 (Fifteen) Days. Called to Prescription Pad in [place] 24 2 each 6 2025    ferrous sulfate 325 (65 FE) MG tablet Take 1 tablet by mouth Daily With Breakfast.   2025    glucose blood test strip Use as instructed 100 each 3 2025    Lancets (freestyle) lancets Use as directed. 100 each 3 2025    levocetirizine (XYZAL) 5 MG tablet Take 1 tablet by mouth Daily.   2025 Evening    metFORMIN ER (GLUCOPHAGE-XR) 500 MG 24 hr tablet Take 2 tablets by mouth Daily With Breakfast. 60 tablet 4 2025    ondansetron (Zofran) 4 MG tablet Take 1 tablet by mouth Every 8 (Eight) Hours As Needed for Nausea or Vomiting. 30 tablet 1 2025    Prenatal Vit-Fe Fumarate-FA (prenatal vitamin 27-0.8) 27-0.8 MG tablet tablet Take 1 tablet by mouth Daily. 90 tablet 3 2025    sertraline (ZOLOFT) 100 MG tablet Take 1 tablet by mouth Daily. 30 tablet 2 Past Week    acetaminophen (TYLENOL) 650 MG 8 hr tablet Take 1 tablet by mouth Every 8 (Eight) Hours As Needed for Mild Pain.       Magnesium Glycinate 100 MG capsule Take 200 mg by mouth 2 (Two) Times a Day.        Allergies:  Penicillins and Amoxicillin  OB History    Para Term  AB Living   2 1 1 0 0 1   SAB IAB Ectopic Molar Multiple Live Births   0 0 0 0 0 1      # Outcome Date GA Lbr Russell/2nd Weight Sex Type Anes PTL Lv   2 Current            1 Term 21 37w4d  3530 g (7 lb 12.5 oz) M CS-LTranv EPI, Spinal N SUJATA      Complications: Intraamniotic Infection, Failure to Progress in Second Stage      Obstetric Comments   FOB #1 : Pregnancy #1(IVF, frozen cycle retrieved 2020, no donors)         Objective     /88 (BP Location: Right arm, Patient  "Position: Lying)   Pulse 74   Temp 98.9 °F (37.2 °C) (Oral)   Resp 16   Ht 165.1 cm (65\")   Wt 89.8 kg (198 lb)   LMP  (Approximate)   SpO2 98%   BMI 32.95 kg/m²     Physical Exam    General:  No acute distress           Abdomen: Gravid, nontender       FHT's: reactive             Lab Review   External Prenatal Results       Pregnancy Outside Results - Transcribed From Office Records - See Scanned Records For Details       Test Value Date Time    ABO  A  04/28/25 1522    Rh  Positive  04/28/25 1522    Antibody Screen  Negative  04/28/25 1522       Negative  11/05/24 1412    Varicella IgG       Rubella  3.91 index 11/05/24 1412    Hgb  11.9 g/dL 04/28/25 1522       12.4 g/dL 04/25/25 1107       11.6 g/dL 04/23/25 0333       12.8 g/dL 04/22/25 1430       11.6 g/dL 04/15/25 2059       12.2 g/dL 04/10/25 1543       13.3 g/dL 11/05/24 1412    Hct  35.0 % 04/28/25 1522       38.1 % 04/25/25 1107       35.0 % 04/23/25 0333       38.1 % 04/22/25 1430       33.6 % 04/15/25 2059       35.7 % 04/10/25 1543       41.3 % 11/05/24 1412    HgB A1c        1h GTT  142 mg/dL 12/09/24 1051    3h GTT Fasting  73 mg/dL 12/12/24 1047    3h GTT 1 hour  185 mg/dL 12/12/24 1047    3h GTT 2 hour  207 mg/dL 12/12/24 1047    3h GTT 3 hour   142 mg/dL 12/12/24 1047    Gonorrhea (discrete)  Negative  11/05/24 1412    Chlamydia (discrete)  Negative  11/05/24 1412    RPR  Non Reactive  11/05/24 1412    Syphils cascade: TP-Ab (FTA)  Non-Reactive  04/28/25 1522    TP-Ab  Non-Reactive  04/28/25 1522    TP-Ab (EIA)       TPPA       HBsAg  Negative  11/05/24 1412    Herpes Simplex Virus PCR       Herpes Simplex VIrus Culture       HIV  Non Reactive  11/05/24 1412    Hep C RNA Quant PCR       Hep C Antibody  Non Reactive  11/05/24 1412    AFP       NIPT       Cystic Fibrosis ([place])       Cystic Fibroisis        Spinal Muscular atrophy       Fragile X       Group B Strep  No Group B Streptococcus Isolated at 2 days  04/18/25 8900    GBS " Susceptibility to Clindamycin       GBS Susceptibility to Erythromycin       Fetal Fibronectin       Genetic Testing, Maternal Blood                 Drug Screening       Test Value Date Time    Urine Drug Screen       Amphetamine Screen  Negative ng/mL 24 1412    Barbiturate Screen  Negative ng/mL 24 141    Benzodiazepine Screen  Negative ng/mL 24 1412    Methadone Screen  Negative ng/mL 24 1412    Phencyclidine Screen  Negative ng/mL 24 141    Opiates Screen       THC Screen       Cocaine Screen       Propoxyphene Screen  Negative ng/mL 24 141    Buprenorphine Screen       Methamphetamine Screen       Oxycodone Screen       Tricyclic Antidepressants Screen                 Legend    ^: Historical                                Assessment & Plan     ASSESSMENT  IUP at 37w3d, GHTN, GDM  scheduled  section     PLAN  Admit to labor and delivery for C/S  Antibiotic prophylaxis and SCDs          Hue Patel MD  2025@

## 2025-04-29 NOTE — ANESTHESIA PREPROCEDURE EVALUATION
Anesthesia Evaluation     Patient summary reviewed and Nursing notes reviewed   NPO Solid Status: > 8 hours  NPO Liquid Status: > 2 hours           Airway   Mallampati: II  TM distance: >3 FB  Neck ROM: full  Dental      Pulmonary - negative pulmonary ROS   Cardiovascular     (+) hypertension      Neuro/Psych  (+) headaches, tremors, psychiatric history Anxiety and ADHD  GI/Hepatic/Renal/Endo    (+) renal disease- stones, diabetes mellitus gestational    Musculoskeletal (-) negative ROS    Abdominal    Substance History - negative use     OB/GYN    (+) Pregnant, pregnancy induced hypertension        Other - negative ROS                   Anesthesia Plan    ASA 3     ITN and spinal       Anesthetic plan, risks, benefits, and alternatives have been provided, discussed and informed consent has been obtained with: patient.    Use of blood products discussed with patient .    Plan discussed with attending.    CODE STATUS:    Code Status (Patient has no pulse and is not breathing): CPR (Attempt to Resuscitate)  Medical Interventions (Patient has pulse or is breathing): Full Support  Level Of Support Discussed With: Patient

## 2025-04-30 LAB
ALP SERPL-CCNC: 78 U/L (ref 39–117)
ALT SERPL W P-5'-P-CCNC: 11 U/L (ref 1–33)
AST SERPL-CCNC: 23 U/L (ref 1–32)
BASOPHILS # BLD AUTO: 0.02 10*3/MM3 (ref 0–0.2)
BASOPHILS NFR BLD AUTO: 0.2 % (ref 0–1.5)
BILIRUB SERPL-MCNC: <0.2 MG/DL (ref 0–1.2)
CREAT SERPL-MCNC: 0.69 MG/DL (ref 0.57–1)
DEPRECATED RDW RBC AUTO: 54 FL (ref 37–54)
EOSINOPHIL # BLD AUTO: 0.04 10*3/MM3 (ref 0–0.4)
EOSINOPHIL NFR BLD AUTO: 0.4 % (ref 0.3–6.2)
ERYTHROCYTE [DISTWIDTH] IN BLOOD BY AUTOMATED COUNT: 16.1 % (ref 12.3–15.4)
HCT VFR BLD AUTO: 27.4 % (ref 34–46.6)
HGB BLD-MCNC: 9 G/DL (ref 12–15.9)
IMM GRANULOCYTES # BLD AUTO: 0.03 10*3/MM3 (ref 0–0.05)
IMM GRANULOCYTES NFR BLD AUTO: 0.3 % (ref 0–0.5)
LDH SERPL-CCNC: 289 U/L (ref 135–214)
LYMPHOCYTES # BLD AUTO: 1.17 10*3/MM3 (ref 0.7–3.1)
LYMPHOCYTES NFR BLD AUTO: 12.4 % (ref 19.6–45.3)
MCH RBC QN AUTO: 30.5 PG (ref 26.6–33)
MCHC RBC AUTO-ENTMCNC: 32.8 G/DL (ref 31.5–35.7)
MCV RBC AUTO: 92.9 FL (ref 79–97)
MONOCYTES # BLD AUTO: 0.91 10*3/MM3 (ref 0.1–0.9)
MONOCYTES NFR BLD AUTO: 9.6 % (ref 5–12)
NEUTROPHILS NFR BLD AUTO: 7.27 10*3/MM3 (ref 1.7–7)
NEUTROPHILS NFR BLD AUTO: 77.1 % (ref 42.7–76)
NRBC BLD AUTO-RTO: 0 /100 WBC (ref 0–0.2)
PLATELET # BLD AUTO: 169 10*3/MM3 (ref 140–450)
PMV BLD AUTO: 10.5 FL (ref 6–12)
RBC # BLD AUTO: 2.95 10*6/MM3 (ref 3.77–5.28)
URATE SERPL-MCNC: 5.5 MG/DL (ref 2.4–5.7)
WBC NRBC COR # BLD AUTO: 9.44 10*3/MM3 (ref 3.4–10.8)

## 2025-04-30 PROCEDURE — 84075 ASSAY ALKALINE PHOSPHATASE: CPT | Performed by: OBSTETRICS & GYNECOLOGY

## 2025-04-30 PROCEDURE — 85025 COMPLETE CBC W/AUTO DIFF WBC: CPT | Performed by: OBSTETRICS & GYNECOLOGY

## 2025-04-30 PROCEDURE — 82247 BILIRUBIN TOTAL: CPT | Performed by: OBSTETRICS & GYNECOLOGY

## 2025-04-30 PROCEDURE — 84450 TRANSFERASE (AST) (SGOT): CPT | Performed by: OBSTETRICS & GYNECOLOGY

## 2025-04-30 PROCEDURE — 63710000001 DIPHENHYDRAMINE PER 50 MG: Performed by: NURSE ANESTHETIST, CERTIFIED REGISTERED

## 2025-04-30 PROCEDURE — 0503F POSTPARTUM CARE VISIT: CPT | Performed by: NURSE PRACTITIONER

## 2025-04-30 PROCEDURE — 25010000002 KETOROLAC TROMETHAMINE PER 15 MG: Performed by: OBSTETRICS & GYNECOLOGY

## 2025-04-30 PROCEDURE — 25010000002 ONDANSETRON PER 1 MG: Performed by: OBSTETRICS & GYNECOLOGY

## 2025-04-30 PROCEDURE — 84550 ASSAY OF BLOOD/URIC ACID: CPT | Performed by: OBSTETRICS & GYNECOLOGY

## 2025-04-30 PROCEDURE — 63710000001 PROMETHAZINE PER 25 MG: Performed by: OBSTETRICS & GYNECOLOGY

## 2025-04-30 PROCEDURE — 84460 ALANINE AMINO (ALT) (SGPT): CPT | Performed by: OBSTETRICS & GYNECOLOGY

## 2025-04-30 PROCEDURE — 82565 ASSAY OF CREATININE: CPT | Performed by: OBSTETRICS & GYNECOLOGY

## 2025-04-30 PROCEDURE — 83615 LACTATE (LD) (LDH) ENZYME: CPT | Performed by: OBSTETRICS & GYNECOLOGY

## 2025-04-30 RX ORDER — ONDANSETRON 2 MG/ML
4 INJECTION INTRAMUSCULAR; INTRAVENOUS EVERY 6 HOURS PRN
Status: DISPENSED | OUTPATIENT
Start: 2025-04-30 | End: 2025-05-01

## 2025-04-30 RX ORDER — DIPHENHYDRAMINE HCL 25 MG
25 CAPSULE ORAL EVERY 4 HOURS PRN
Status: COMPLETED | OUTPATIENT
Start: 2025-04-30 | End: 2025-04-30

## 2025-04-30 RX ORDER — GUAIFENESIN 200 MG/10ML
200 LIQUID ORAL EVERY 4 HOURS PRN
Status: DISCONTINUED | OUTPATIENT
Start: 2025-04-30 | End: 2025-05-02 | Stop reason: HOSPADM

## 2025-04-30 RX ORDER — DIPHENHYDRAMINE HYDROCHLORIDE 50 MG/ML
25 INJECTION, SOLUTION INTRAMUSCULAR; INTRAVENOUS EVERY 4 HOURS PRN
Status: COMPLETED | OUTPATIENT
Start: 2025-04-30 | End: 2025-04-30

## 2025-04-30 RX ORDER — DIPHENHYDRAMINE HYDROCHLORIDE 50 MG/ML
25 INJECTION, SOLUTION INTRAMUSCULAR; INTRAVENOUS ONCE AS NEEDED
Status: COMPLETED | OUTPATIENT
Start: 2025-04-30 | End: 2025-04-30

## 2025-04-30 RX ADMIN — ACETAMINOPHEN 650 MG: 325 TABLET ORAL at 21:26

## 2025-04-30 RX ADMIN — PRENATAL VITAMINS-IRON FUMARATE 27 MG IRON-FOLIC ACID 0.8 MG TABLET 1 TABLET: at 08:44

## 2025-04-30 RX ADMIN — IBUPROFEN 600 MG: 600 TABLET, FILM COATED ORAL at 18:14

## 2025-04-30 RX ADMIN — DOCUSATE SODIUM 100 MG: 100 CAPSULE, LIQUID FILLED ORAL at 21:26

## 2025-04-30 RX ADMIN — SERTRALINE HYDROCHLORIDE 100 MG: 100 TABLET ORAL at 08:44

## 2025-04-30 RX ADMIN — KETOROLAC TROMETHAMINE 15 MG: 15 INJECTION, SOLUTION INTRAMUSCULAR; INTRAVENOUS at 12:31

## 2025-04-30 RX ADMIN — ACETAMINOPHEN 650 MG: 325 TABLET ORAL at 14:45

## 2025-04-30 RX ADMIN — ACETAMINOPHEN 1000 MG: 500 TABLET ORAL at 02:39

## 2025-04-30 RX ADMIN — SIMETHICONE 80 MG: 80 TABLET, CHEWABLE ORAL at 08:44

## 2025-04-30 RX ADMIN — DIPHENHYDRAMINE HYDROCHLORIDE 25 MG: 25 CAPSULE ORAL at 00:28

## 2025-04-30 RX ADMIN — ACETAMINOPHEN 1000 MG: 500 TABLET ORAL at 08:43

## 2025-04-30 RX ADMIN — SIMETHICONE 80 MG: 80 TABLET, CHEWABLE ORAL at 15:05

## 2025-04-30 RX ADMIN — SIMETHICONE 80 MG: 80 TABLET, CHEWABLE ORAL at 21:26

## 2025-04-30 RX ADMIN — ONDANSETRON 4 MG: 2 INJECTION INTRAMUSCULAR; INTRAVENOUS at 17:01

## 2025-04-30 RX ADMIN — DOCUSATE SODIUM 100 MG: 100 CAPSULE, LIQUID FILLED ORAL at 08:43

## 2025-04-30 RX ADMIN — KETOROLAC TROMETHAMINE 15 MG: 15 INJECTION, SOLUTION INTRAMUSCULAR; INTRAVENOUS at 05:58

## 2025-04-30 RX ADMIN — PROMETHAZINE HYDROCHLORIDE 25 MG: 25 TABLET ORAL at 21:25

## 2025-04-30 RX ADMIN — OXYCODONE HYDROCHLORIDE 10 MG: 10 TABLET ORAL at 21:25

## 2025-04-30 RX ADMIN — OXYCODONE HYDROCHLORIDE 10 MG: 10 TABLET ORAL at 12:40

## 2025-04-30 RX ADMIN — METFORMIN HYDROCHLORIDE 1000 MG: 500 TABLET, EXTENDED RELEASE ORAL at 12:32

## 2025-04-30 RX ADMIN — KETOROLAC TROMETHAMINE 15 MG: 15 INJECTION, SOLUTION INTRAMUSCULAR; INTRAVENOUS at 00:08

## 2025-04-30 NOTE — ANESTHESIA POSTPROCEDURE EVALUATION
Patient: Sima Booker    Procedure Summary       Date: 25 Room / Location: [facility] LABOR DELIVERY   [facility] LABOR DELIVERY    Anesthesia Start: 1027 Anesthesia Stop:     Procedure:  SECTION REPEAT (Abdomen) Diagnosis:     Surgeons: Hue Patel MD Provider: Shelia Morgan DO    Anesthesia Type: ITN, spinal ASA Status: 3            Anesthesia Type: ITN, spinal    Vitals  Vitals Value Taken Time   /84 25 11:50   Temp 98.5 °F (36.9 °C) 25 11:50   Pulse 72 25 11:50   Resp 18 25 11:50   SpO2 99 % 25 13:42   Vitals shown include unfiled device data.        Post Anesthesia Care and Evaluation    Patient location during evaluation: bedside  Patient participation: complete - patient participated  Level of consciousness: awake and alert  Pain management: adequate    Airway patency: patent  Anesthetic complications: No anesthetic complications    Cardiovascular status: acceptable  Respiratory status: acceptable  Hydration status: acceptable  Post Neuraxial Block status: Motor and sensory function returned to baseline and No signs or symptoms of PDPH

## 2025-04-30 NOTE — PROGRESS NOTES
Postpartum Progress Note    Patient name: Sima Booker  YOB: 1990   MRN: [id number]  Referring Provider: Hue Patel MD  Admission Date: 2025  Date of Service: 2025    ID: 35 y.o.     Diagnosis:   S/p vaginal delivery     Previous  delivery affecting pregnancy       Subjective:      No complaints.  Moderate lochia.  Ambulating, voiding, tolerating diet.  Pain well controlled.  The patient is not currently breastfeeding.   This baby is a female    Objective:      Vital signs:  Vital Signs Range for the last 24 hours  Temperature: Temp:  [97.6 °F (36.4 °C)-98.5 °F (36.9 °C)] 97.8 °F (36.6 °C)   Temp Source: Temp src: Oral   BP: BP: ()/(57-97) 138/85   Pulse: Heart Rate:  [53-80] 80   Respirations: Resp:  [16-18] 16   Weight: 89.8 kg (198 lb)     General: Alert & oriented x4, in no apparent distress  Abdomen: soft, nontender  Uterus: firm, nontender  Extremities: nontender; no edema      Labs:  Lab Results   Component Value Date    WBC 7.40 2025    HGB 11.9 (L) 2025    HCT 35.0 2025    MCV 91.4 2025     2025     Results from last 7 days   Lab Units 25  1522   ABO TYPING  A   RH TYPING  Positive     External Prenatal Results       Pregnancy Outside Results - Transcribed From Office Records - See Scanned Records For Details       Test Value Date Time    ABO  A  25 1522    Rh  Positive  25 1522    Antibody Screen  Negative  25 1522       Negative  24 1412    Varicella IgG       Rubella  3.91 index 24 1412    Hgb  11.9 g/dL 25 1522       12.4 g/dL 25 1107       11.6 g/dL 25 0333       12.8 g/dL 25 1430       11.6 g/dL 04/15/25 2059       12.2 g/dL 04/10/25 1543       13.3 g/dL 24 1412    Hct  35.0 % 25 1522       38.1 % 25 1107       35.0 % 25 0333       38.1 % 25 1430       33.6 % 04/15/25 2059       35.7 % 04/10/25 1543       41.3 % 24  1412    HgB A1c        1h GTT  142 mg/dL 12/09/24 1051    3h GTT Fasting  73 mg/dL 12/12/24 1047    3h GTT 1 hour  185 mg/dL 12/12/24 1047    3h GTT 2 hour  207 mg/dL 12/12/24 1047    3h GTT 3 hour   142 mg/dL 12/12/24 1047    Gonorrhea (discrete)  Negative  11/05/24 1412    Chlamydia (discrete)  Negative  11/05/24 1412    RPR  Non Reactive  11/05/24 1412    Syphils cascade: TP-Ab (FTA)  Non-Reactive  04/28/25 1522    TP-Ab  Non-Reactive  04/28/25 1522    TP-Ab (EIA)       TPPA       HBsAg  Negative  11/05/24 1412    Herpes Simplex Virus PCR       Herpes Simplex VIrus Culture       HIV  Non Reactive  11/05/24 1412    Hep C RNA Quant PCR       Hep C Antibody  Non Reactive  11/05/24 1412    AFP       NIPT       Cystic Fibrosis ([place])       Cystic Fibroisis        Spinal Muscular atrophy       Fragile X       Group B Strep  No Group B Streptococcus Isolated at 2 days  04/18/25 1859    GBS Susceptibility to Clindamycin       GBS Susceptibility to Erythromycin       Fetal Fibronectin       Genetic Testing, Maternal Blood                 Drug Screening       Test Value Date Time    Urine Drug Screen       Amphetamine Screen  Negative ng/mL 11/05/24 1412    Barbiturate Screen  Negative ng/mL 11/05/24 1412    Benzodiazepine Screen  Negative ng/mL 11/05/24 1412    Methadone Screen  Negative ng/mL 11/05/24 1412    Phencyclidine Screen  Negative ng/mL 11/05/24 1412    Opiates Screen       THC Screen       Cocaine Screen       Propoxyphene Screen  Negative ng/mL 11/05/24 1412    Buprenorphine Screen       Methamphetamine Screen       Oxycodone Screen       Tricyclic Antidepressants Screen                 Legend    ^: Historical                            Assessment/Plan:      PPD#1 s/p vaginal delivery.  1. S/p vaginal delivery: Doing well.Continue routine postpartum care.    2. Infant feeding: Supportive care.  The patient is not currently breastfeeding.  3. GHTN: Some mid range BP elevations since delivery. Pt is not on  medication. A.m. CBC and PEP are still pending. Pt denies S/S preeclampsia.  4. GDM on metformin.  5. Pt has preexisting cough she thinks from allergies. Encourage incentive spirometry. Robitussin ordered prn cough.

## 2025-05-01 LAB
ALP SERPL-CCNC: 73 U/L (ref 39–117)
ALT SERPL W P-5'-P-CCNC: 10 U/L (ref 1–33)
AST SERPL-CCNC: 20 U/L (ref 1–32)
BILIRUB SERPL-MCNC: <0.2 MG/DL (ref 0–1.2)
CREAT SERPL-MCNC: 0.67 MG/DL (ref 0.57–1)
DEPRECATED RDW RBC AUTO: 54.3 FL (ref 37–54)
ERYTHROCYTE [DISTWIDTH] IN BLOOD BY AUTOMATED COUNT: 16.3 % (ref 12.3–15.4)
HCT VFR BLD AUTO: 26.5 % (ref 34–46.6)
HGB BLD-MCNC: 8.8 G/DL (ref 12–15.9)
LDH SERPL-CCNC: 234 U/L (ref 135–214)
MCH RBC QN AUTO: 30.4 PG (ref 26.6–33)
MCHC RBC AUTO-ENTMCNC: 33.2 G/DL (ref 31.5–35.7)
MCV RBC AUTO: 91.7 FL (ref 79–97)
PLATELET # BLD AUTO: 166 10*3/MM3 (ref 140–450)
PMV BLD AUTO: 10.3 FL (ref 6–12)
RBC # BLD AUTO: 2.89 10*6/MM3 (ref 3.77–5.28)
URATE SERPL-MCNC: 5.7 MG/DL (ref 2.4–5.7)
WBC NRBC COR # BLD AUTO: 10.14 10*3/MM3 (ref 3.4–10.8)

## 2025-05-01 PROCEDURE — 82565 ASSAY OF CREATININE: CPT

## 2025-05-01 PROCEDURE — 83615 LACTATE (LD) (LDH) ENZYME: CPT

## 2025-05-01 PROCEDURE — 84075 ASSAY ALKALINE PHOSPHATASE: CPT

## 2025-05-01 PROCEDURE — 82247 BILIRUBIN TOTAL: CPT

## 2025-05-01 PROCEDURE — 84460 ALANINE AMINO (ALT) (SGPT): CPT

## 2025-05-01 PROCEDURE — 85027 COMPLETE CBC AUTOMATED: CPT

## 2025-05-01 PROCEDURE — 84450 TRANSFERASE (AST) (SGOT): CPT

## 2025-05-01 PROCEDURE — 84550 ASSAY OF BLOOD/URIC ACID: CPT

## 2025-05-01 RX ORDER — NIFEDIPINE 30 MG/1
30 TABLET, EXTENDED RELEASE ORAL
Status: DISCONTINUED | OUTPATIENT
Start: 2025-05-01 | End: 2025-05-02 | Stop reason: HOSPADM

## 2025-05-01 RX ADMIN — SIMETHICONE 80 MG: 80 TABLET, CHEWABLE ORAL at 08:58

## 2025-05-01 RX ADMIN — ACETAMINOPHEN 650 MG: 325 TABLET ORAL at 02:42

## 2025-05-01 RX ADMIN — IBUPROFEN 600 MG: 600 TABLET, FILM COATED ORAL at 11:41

## 2025-05-01 RX ADMIN — ACETAMINOPHEN 650 MG: 325 TABLET ORAL at 21:48

## 2025-05-01 RX ADMIN — IBUPROFEN 600 MG: 600 TABLET, FILM COATED ORAL at 18:15

## 2025-05-01 RX ADMIN — METFORMIN HYDROCHLORIDE 1000 MG: 500 TABLET, EXTENDED RELEASE ORAL at 08:58

## 2025-05-01 RX ADMIN — IBUPROFEN 600 MG: 600 TABLET, FILM COATED ORAL at 00:01

## 2025-05-01 RX ADMIN — OXYCODONE HYDROCHLORIDE 10 MG: 10 TABLET ORAL at 21:48

## 2025-05-01 RX ADMIN — DOCUSATE SODIUM 100 MG: 100 CAPSULE, LIQUID FILLED ORAL at 08:58

## 2025-05-01 RX ADMIN — ACETAMINOPHEN 650 MG: 325 TABLET ORAL at 15:16

## 2025-05-01 RX ADMIN — SERTRALINE HYDROCHLORIDE 100 MG: 100 TABLET ORAL at 08:58

## 2025-05-01 RX ADMIN — NIFEDIPINE 30 MG: 30 TABLET, FILM COATED, EXTENDED RELEASE ORAL at 12:07

## 2025-05-01 RX ADMIN — PRENATAL VITAMINS-IRON FUMARATE 27 MG IRON-FOLIC ACID 0.8 MG TABLET 1 TABLET: at 08:59

## 2025-05-01 RX ADMIN — IBUPROFEN 600 MG: 600 TABLET, FILM COATED ORAL at 06:20

## 2025-05-01 RX ADMIN — ACETAMINOPHEN 650 MG: 325 TABLET ORAL at 08:58

## 2025-05-01 RX ADMIN — OXYCODONE HYDROCHLORIDE 10 MG: 10 TABLET ORAL at 11:41

## 2025-05-01 RX ADMIN — OXYCODONE HYDROCHLORIDE 5 MG: 5 TABLET ORAL at 18:14

## 2025-05-01 NOTE — PROGRESS NOTES
Postpartum Progress Note    Patient name: Sima Booker  YOB: 1990   MRN: [id number]  Referring Provider: Hue Patel MD  Admission Date: 2025  Date of Service: 2025    ID: 35 y.o.     Diagnosis:   S/p  delivery 2 Days Post-Op     Previous  delivery affecting pregnancy    AMA (advanced maternal age) multigravida 35+    Gestational diabetes mellitus (GDM) in third trimester controlled on oral hypoglycemic drug    Gestational hypertension       Subjective:      No complaints.  Moderate lochia.  Ambulating, voiding, tolerating diet.  Pain well controlled.  The patient is not currently breastfeeding.   This baby is a female    Objective:      Vital signs:  Vital Signs Range for the last 24 hours  Temperature: Temp:  [98.2 °F (36.8 °C)-98.6 °F (37 °C)] 98.3 °F (36.8 °C)   Temp Source: Temp src: Oral   BP: BP: (133-157)/(73-87) 144/73   Pulse: Heart Rate:  [69-88] 76   Respirations: Resp:  [16-18] 16   Weight: 89.8 kg (198 lb)     General: Alert & oriented x4, in no apparent distress  Abdomen: soft, nontender  Uterus: firm, nontender  Incision: clean, dry, intact, dressing clean, sutures  Extremities: nontender; no edema      Labs:  Lab Results   Component Value Date    WBC 9.44 2025    HGB 9.0 (L) 2025    HCT 27.4 (L) 2025    MCV 92.9 2025     2025     Results from last 7 days   Lab Units 25  1522   ABO TYPING  A   RH TYPING  Positive     External Prenatal Results       Pregnancy Outside Results - Transcribed From Office Records - See Scanned Records For Details       Test Value Date Time    ABO  A  25 1522    Rh  Positive  25 1522    Antibody Screen  Negative  25 1522       Negative  24 1412    Varicella IgG       Rubella  3.91 index 24 1412    Hgb  9.0 g/dL 25 1108       11.9 g/dL 25 1522       12.4 g/dL 25 1107       11.6 g/dL 25 0333       12.8 g/dL 25 1430        11.6 g/dL 04/15/25 2059       12.2 g/dL 04/10/25 1543       13.3 g/dL 11/05/24 1412    Hct  27.4 % 04/30/25 1108       35.0 % 04/28/25 1522       38.1 % 04/25/25 1107       35.0 % 04/23/25 0333       38.1 % 04/22/25 1430       33.6 % 04/15/25 2059       35.7 % 04/10/25 1543       41.3 % 11/05/24 1412    HgB A1c        1h GTT  142 mg/dL 12/09/24 1051    3h GTT Fasting  73 mg/dL 12/12/24 1047    3h GTT 1 hour  185 mg/dL 12/12/24 1047    3h GTT 2 hour  207 mg/dL 12/12/24 1047    3h GTT 3 hour   142 mg/dL 12/12/24 1047    Gonorrhea (discrete)  Negative  11/05/24 1412    Chlamydia (discrete)  Negative  11/05/24 1412    RPR  Non Reactive  11/05/24 1412    Syphils cascade: TP-Ab (FTA)  Non-Reactive  04/28/25 1522    TP-Ab  Non-Reactive  04/28/25 1522    TP-Ab (EIA)       TPPA       HBsAg  Negative  11/05/24 1412    Herpes Simplex Virus PCR       Herpes Simplex VIrus Culture       HIV  Non Reactive  11/05/24 1412    Hep C RNA Quant PCR       Hep C Antibody  Non Reactive  11/05/24 1412    AFP       NIPT       Cystic Fibrosis ([place])       Cystic Fibroisis        Spinal Muscular atrophy       Fragile X       Group B Strep  No Group B Streptococcus Isolated at 2 days  04/18/25 1859    GBS Susceptibility to Clindamycin       GBS Susceptibility to Erythromycin       Fetal Fibronectin       Genetic Testing, Maternal Blood                 Drug Screening       Test Value Date Time    Urine Drug Screen       Amphetamine Screen  Negative ng/mL 11/05/24 1412    Barbiturate Screen  Negative ng/mL 11/05/24 1412    Benzodiazepine Screen  Negative ng/mL 11/05/24 1412    Methadone Screen  Negative ng/mL 11/05/24 1412    Phencyclidine Screen  Negative ng/mL 11/05/24 1412    Opiates Screen       THC Screen       Cocaine Screen       Propoxyphene Screen  Negative ng/mL 11/05/24 1412    Buprenorphine Screen       Methamphetamine Screen       Oxycodone Screen       Tricyclic Antidepressants Screen                 Legend    ^:  Historical                            Assessment/Plan:      2 Days Post-Op s/p Procedure(s):   SECTION REPEAT  1. S/p  delivery: Continue postoperative care.  Doing well.  2. Infant feeding: Supportive care.  The patient is not currently breastfeeding.  3. Gestational hypertension: Blood pressures remain mildly elevated. Will start on procardia xl daily. Denies preeclampsia symptoms labs stable.

## 2025-05-02 VITALS
DIASTOLIC BLOOD PRESSURE: 73 MMHG | WEIGHT: 198 LBS | RESPIRATION RATE: 18 BRPM | TEMPERATURE: 98.6 F | SYSTOLIC BLOOD PRESSURE: 148 MMHG | HEIGHT: 65 IN | BODY MASS INDEX: 32.99 KG/M2 | OXYGEN SATURATION: 98 % | HEART RATE: 82 BPM

## 2025-05-02 PROCEDURE — 0503F POSTPARTUM CARE VISIT: CPT | Performed by: ADVANCED PRACTICE MIDWIFE

## 2025-05-02 RX ORDER — OXYCODONE HYDROCHLORIDE 5 MG/1
5 TABLET ORAL EVERY 6 HOURS PRN
Qty: 12 TABLET | Refills: 0 | Status: SHIPPED | OUTPATIENT
Start: 2025-05-02 | End: 2025-05-05

## 2025-05-02 RX ORDER — NIFEDIPINE 30 MG
30 TABLET, EXTENDED RELEASE ORAL
Qty: 90 TABLET | Refills: 0 | Status: SHIPPED | OUTPATIENT
Start: 2025-05-03

## 2025-05-02 RX ORDER — IBUPROFEN 600 MG/1
600 TABLET, FILM COATED ORAL EVERY 6 HOURS
Qty: 90 TABLET | Refills: 0 | Status: SHIPPED | OUTPATIENT
Start: 2025-05-02

## 2025-05-02 RX ORDER — PSEUDOEPHEDRINE HCL 30 MG
100 TABLET ORAL 2 TIMES DAILY PRN
Qty: 60 CAPSULE | Refills: 0 | Status: SHIPPED | OUTPATIENT
Start: 2025-05-02

## 2025-05-02 RX ADMIN — IBUPROFEN 600 MG: 600 TABLET, FILM COATED ORAL at 06:35

## 2025-05-02 RX ADMIN — NIFEDIPINE 30 MG: 30 TABLET, FILM COATED, EXTENDED RELEASE ORAL at 08:34

## 2025-05-02 RX ADMIN — METFORMIN HYDROCHLORIDE 1000 MG: 500 TABLET, EXTENDED RELEASE ORAL at 08:34

## 2025-05-02 RX ADMIN — OXYCODONE HYDROCHLORIDE 5 MG: 5 TABLET ORAL at 08:41

## 2025-05-02 RX ADMIN — PRENATAL VITAMINS-IRON FUMARATE 27 MG IRON-FOLIC ACID 0.8 MG TABLET 1 TABLET: at 08:34

## 2025-05-02 RX ADMIN — IBUPROFEN 600 MG: 600 TABLET, FILM COATED ORAL at 12:26

## 2025-05-02 RX ADMIN — DOCUSATE SODIUM 100 MG: 100 CAPSULE, LIQUID FILLED ORAL at 08:34

## 2025-05-02 RX ADMIN — IBUPROFEN 600 MG: 600 TABLET, FILM COATED ORAL at 00:47

## 2025-05-02 RX ADMIN — SERTRALINE HYDROCHLORIDE 100 MG: 100 TABLET ORAL at 08:34

## 2025-05-02 RX ADMIN — SIMETHICONE 80 MG: 80 TABLET, CHEWABLE ORAL at 08:34

## 2025-05-02 RX ADMIN — ACETAMINOPHEN 650 MG: 325 TABLET ORAL at 08:34

## 2025-05-02 NOTE — DISCHARGE SUMMARY
Discharge Summary    Date of Admission: 2025  Date of Discharge:  2025      Patient: Sima Booker      MR#:[id number]    Primary Surgeon/OB: Hue Patel MD    Discharge Surgeon/OB:same    Presenting Problem/History of Present Illness  Previous  delivery affecting pregnancy [O34.219]       Previous  delivery affecting pregnancy    AMA (advanced maternal age) multigravida 35+    Gestational diabetes mellitus (GDM) in third trimester controlled on oral hypoglycemic drug    Gestational hypertension     delivery delivered    Postpartum care and examination immediately after delivery         Discharge Diagnosis:  section at 37w3d    Procedures:  , Low Transverse    2025   10:56 AM       Rh Immune globulin given: not applicable    Rubella vaccine given: not applicable    Discharge Date: 2025; Discharge Time: 11:59 EDT    Early Discharge:  NO    Hospital Course  Patient is a 35 y.o. female  at 37w3d status post  section with postoperative recovery complicated by GHTN.  Patient was advanced to regular diet on postoperative day#1.  She was started on procardia  30mg once daily and her blood pressure was controlled with this. On discharge, ambulating, tolerating a regular diet without any difficulties and her incision is dry, clean and intact.     Infant:   female fetus 3603 g (7 lb 15.1 oz) with Apgar scores of 7 , 9  at five minutes.    Condition on Discharge:  Stable    Vital Signs  Temp:  [98.6 °F (37 °C)-98.8 °F (37.1 °C)] 98.6 °F (37 °C)  Heart Rate:  [71-82] 82  Resp:  [16-20] 18  BP: (137-165)/(69-98) 148/73    Lab Results   Component Value Date    WBC 10.14 2025    HGB 8.8 (L) 2025    HCT 26.5 (L) 2025    MCV 91.7 2025     2025       Discharge Disposition  Home or Self Care    Discharge Medications     Discharge Medications        New Medications        Instructions Start Date   docusate sodium 100  MG capsule   100 mg, Oral, 2 Times Daily PRN      ibuprofen 600 MG tablet  Commonly known as: ADVIL,MOTRIN   600 mg, Oral, Every 6 Hours      NIFEdipine CC 30 MG 24 hr tablet  Commonly known as: ADALAT CC   30 mg, Oral, Every 24 Hours Scheduled   Start Date: May 3, 2025     oxyCODONE 5 MG immediate release tablet  Commonly known as: ROXICODONE   5 mg, Oral, Every 6 Hours PRN             Continue These Medications        Instructions Start Date   butalbital-acetaminophen-caffeine -40 MG capsule capsule  Commonly known as: Fioricet   2 capsules, Oral, Every 4 Hours PRN      ferrous sulfate 325 (65 FE) MG tablet   325 mg, Daily With Breakfast      freestyle lancets   Use as directed.      FreeStyle Lalita 3 Plus Sensor   Not Applicable, Every 15 Days, Called to Prescription Pad in [place] 12/17/24      metFORMIN  MG 24 hr tablet  Commonly known as: GLUCOPHAGE-XR   1,000 mg, Oral, Daily With Breakfast      prenatal vitamin 27-0.8 27-0.8 MG tablet tablet   1 tablet, Oral, Daily      sertraline 100 MG tablet  Commonly known as: ZOLOFT   100 mg, Oral, Daily             Stop These Medications      acetaminophen 650 MG 8 hr tablet  Commonly known as: TYLENOL     ASPIRIN 81 PO     glucose blood test strip     levocetirizine 5 MG tablet  Commonly known as: XYZAL     Magnesium Glycinate 100 MG capsule     ondansetron 4 MG tablet  Commonly known as: Zofran              Discharge Diet: Regular    Activity at Discharge:   Activity Instructions       Activity as Tolerated      Other Instructions (Specify)      No driving for 2 weeks, No lifting over 10lbs for 6 weeks.    Pelvic Rest      Pelvic rest including no tampons, no tub baths, and no intercourse until 6 weeks/cleared by Provider.            Follow-up Appointments  Future Appointments   Date Time Provider Department Center   5/6/2025 10:30 AM Gaye Elizondo APRN [facility] COR2 COR   5/22/2025  9:00 AM Ninfa Hwang LCSW [facility] COR    6/17/2025  2:30 PM Hue Patel MD [facility] OB  TAD   6/26/2025 10:00 AM Ninfa Hwang LCSW [facility]  COR2 COR     Additional Instructions for the Follow-ups that You Need to Schedule       Call MD for problems / concerns.   As directed      Call with foul smelling discharge, fever of >100.4, signs of postpartum depression, saturating a pad in <1 hour, blood clots larger than a golf ball. Also call with headache that does not resolve with medication or rest, vision changes, pain in right upper quadrant, worsening swelling, or BP >140/90 if able to monitor at home.    Order Comments: Call with foul smelling discharge, fever of >100.4, signs of postpartum depression, saturating a pad in <1 hour, blood clots larger than a golf ball. Also call with headache that does not resolve with medication or rest, vision changes, pain in right upper quadrant, worsening swelling, or BP >140/90 if able to monitor at home.         Discharge Follow-up with Specialty: NP/Jorge; 1 Week   As directed      Specialty: Amelia   Follow Up: 1 Week   Follow Up Details: BP/incision check                QI Byers  05/02/25  11:57 EDT  Csd

## 2025-05-02 NOTE — PLAN OF CARE
Goal Outcome Evaluation:  Plan of Care Reviewed With: patient   Ready for d/c

## 2025-05-02 NOTE — PAYOR COMM NOTE
"Sima Llanos \"Epifanio Olmos\" (35 y.o. Female)     From:Nimo Chandler LPN, Utilization Review  Phone #[phone]  Fax #[phone]    A#[id number]     Discharged 25.          Date of Birth   1990    Social Security Number       Address   [address] [place] [address]    Home Phone   [phone]    MRN   [id number]       Orthodox   None    Marital Status                               Admission Date   2025    Admission Type   Elective    Admitting Provider   Hue Patel MD    Attending Provider       Department, Room/Bed   [facility] 4A, N415/1       Discharge Date   2025    Discharge Disposition   Home or Self Care    Discharge Destination                                 Attending Provider: (none)   Allergies: Penicillins, Amoxicillin    Isolation: None   Infection: None   Code Status: Prior    Ht: 165.1 cm (65\")   Wt: 89.8 kg (198 lb)    Admission Cmt: None   Principal Problem: Previous  delivery affecting pregnancy [O34.219]                   Active Insurance as of 2025       Primary Coverage       Payor Plan Insurance Group Employer/Plan Group    [employer] BLUE [employer]OSS [employer]UE [employer] BLUE [employer] PPO [id number]       Payor Plan Address Payor Plan Phone Number Payor Plan Fax Number Effective Dates    [address] [phone]  2023 - None Entered    [place] [address]         Subscriber Name Subscriber Birth Date Member ID       LAURENCE LLANOS 1992 [id number]                     Emergency Contacts        (Rel.) Home Phone Work Phone Mobile Phone    LAURENCE LLANOS (Spouse) [phone] -- [phone]                 Discharge Summary        Melanie Jenkins APRN at 25 1157          Discharge Summary    Date of Admission: 2025  Date of Discharge:  2025      Patient: Sima Llanos      MR#:[id number]    Primary Surgeon/OB: Hue Patel MD    Discharge " Surgeon/OB:same    Presenting Problem/History of Present Illness  Previous  delivery affecting pregnancy [O34.219]       Previous  delivery affecting pregnancy    AMA (advanced maternal age) multigravida 35+    Gestational diabetes mellitus (GDM) in third trimester controlled on oral hypoglycemic drug    Gestational hypertension     delivery delivered    Postpartum care and examination immediately after delivery         Discharge Diagnosis:  section at 37w3d    Procedures:  , Low Transverse    2025   10:56 AM       Rh Immune globulin given: not applicable    Rubella vaccine given: not applicable    Discharge Date: 2025; Discharge Time: 11:59 EDT    Early Discharge:  NO    Hospital Course  Patient is a 35 y.o. female  at 37w3d status post  section with postoperative recovery complicated by GHTN.  Patient was advanced to regular diet on postoperative day#1.  She was started on procardia  30mg once daily and her blood pressure was controlled with this. On discharge, ambulating, tolerating a regular diet without any difficulties and her incision is dry, clean and intact.     Infant:   female fetus 3603 g (7 lb 15.1 oz) with Apgar scores of 7 , 9  at five minutes.    Condition on Discharge:  Stable    Vital Signs  Temp:  [98.6 °F (37 °C)-98.8 °F (37.1 °C)] 98.6 °F (37 °C)  Heart Rate:  [71-82] 82  Resp:  [16-20] 18  BP: (137-165)/(69-98) 148/73    Lab Results   Component Value Date    WBC 10.14 2025    HGB 8.8 (L) 2025    HCT 26.5 (L) 2025    MCV 91.7 2025     2025       Discharge Disposition  Home or Self Care    Discharge Medications     Discharge Medications        New Medications        Instructions Start Date   docusate sodium 100 MG capsule   100 mg, Oral, 2 Times Daily PRN      ibuprofen 600 MG tablet  Commonly known as: ADVIL,MOTRIN   600 mg, Oral, Every 6 Hours      NIFEdipine CC 30 MG 24 hr tablet  Commonly  known as: ADALAT CC   30 mg, Oral, Every 24 Hours Scheduled   Start Date: May 3, 2025     oxyCODONE 5 MG immediate release tablet  Commonly known as: ROXICODONE   5 mg, Oral, Every 6 Hours PRN             Continue These Medications        Instructions Start Date   butalbital-acetaminophen-caffeine -40 MG capsule capsule  Commonly known as: Fioricet   2 capsules, Oral, Every 4 Hours PRN      ferrous sulfate 325 (65 FE) MG tablet   325 mg, Daily With Breakfast      freestyle lancets   Use as directed.      FreeStyle Lalita 3 Plus Sensor   Not Applicable, Every 15 Days, Called to Prescription Pad in [place] 12/17/24      metFORMIN  MG 24 hr tablet  Commonly known as: GLUCOPHAGE-XR   1,000 mg, Oral, Daily With Breakfast      prenatal vitamin 27-0.8 27-0.8 MG tablet tablet   1 tablet, Oral, Daily      sertraline 100 MG tablet  Commonly known as: ZOLOFT   100 mg, Oral, Daily             Stop These Medications      acetaminophen 650 MG 8 hr tablet  Commonly known as: TYLENOL     ASPIRIN 81 PO     glucose blood test strip     levocetirizine 5 MG tablet  Commonly known as: XYZAL     Magnesium Glycinate 100 MG capsule     ondansetron 4 MG tablet  Commonly known as: Zofran              Discharge Diet: Regular    Activity at Discharge:   Activity Instructions       Activity as Tolerated      Other Instructions (Specify)      No driving for 2 weeks, No lifting over 10lbs for 6 weeks.    Pelvic Rest      Pelvic rest including no tampons, no tub baths, and no intercourse until 6 weeks/cleared by Provider.            Follow-up Appointments  Future Appointments   Date Time Provider Department Center   5/6/2025 10:30 AM Gaye Elizondo APRN [facility]  COR2 COR   5/22/2025  9:00 AM Ninfa Hwang LCSW [facility]  COR2 COR   6/17/2025  2:30 PM Hue Patel MD [facility] OB  TAD   6/26/2025 10:00 AM Ninfa Hwang LCSW [facility]  COR2 COR     Additional Instructions for the Follow-ups that You Need to Schedule        Call MD for problems / concerns.   As directed      Call with foul smelling discharge, fever of >100.4, signs of postpartum depression, saturating a pad in <1 hour, blood clots larger than a golf ball. Also call with headache that does not resolve with medication or rest, vision changes, pain in right upper quadrant, worsening swelling, or BP >140/90 if able to monitor at home.    Order Comments: Call with foul smelling discharge, fever of >100.4, signs of postpartum depression, saturating a pad in <1 hour, blood clots larger than a golf ball. Also call with headache that does not resolve with medication or rest, vision changes, pain in right upper quadrant, worsening swelling, or BP >140/90 if able to monitor at home.         Discharge Follow-up with Specialty: NP/Jorge; 1 Week   As directed      Specialty: NP/Jorge   Follow Up: 1 Week   Follow Up Details: BP/incision check                QI Byers  05/02/25  11:57 EDT  Csd          Electronically signed by Melanie Jenkins APRN at 05/02/25 1209

## 2025-05-03 ENCOUNTER — MATERNAL SCREENING (OUTPATIENT)
Dept: CALL CENTER | Facility: HOSPITAL | Age: 35
End: 2025-05-03
Payer: COMMERCIAL

## 2025-05-03 NOTE — OUTREACH NOTE
Maternal Screening Survey      Flowsheet Row Responses   Eligibility Eligible   Prep survey completed? Yes   Facility patient discharged from? [facility] - Registered Nurse

## 2025-05-05 ENCOUNTER — TELEPHONE (OUTPATIENT)
Dept: OBSTETRICS AND GYNECOLOGY | Facility: CLINIC | Age: 35
End: 2025-05-05
Payer: COMMERCIAL

## 2025-05-05 NOTE — TELEPHONE ENCOUNTER
"    Caller: Sima Booker \"Epifanio Olmos\"  Female, 35 y.o., 1990  MRN: [id number]  CSN: [id number]  Phone: [phone]    Relationship to patient: SELF        Type of visit: 1 WEEK POST OP   INC CHECK  PT DELIVERED ON 25, UNABLE TO WARM TRANSFER PT REQ A CALL BACK      "

## 2025-05-05 NOTE — TELEPHONE ENCOUNTER
Hub staff attempted to follow warm transfer process and was unsuccessful     Caller: SHANICE LLANOS    Relationship to patient: SELF    Best call back number: [phone] (home) [phone] (work)    Patient is needing: PT HAD A MISSED CALL FROM JUNIOR / UNABLE TO WT

## 2025-05-06 ENCOUNTER — HOSPITAL ENCOUNTER (OUTPATIENT)
Facility: HOSPITAL | Age: 35
Discharge: HOME | End: 2025-05-06
Attending: OBSTETRICS & GYNECOLOGY | Admitting: OBSTETRICS & GYNECOLOGY
Payer: COMMERCIAL

## 2025-05-06 ENCOUNTER — PATIENT MESSAGE (OUTPATIENT)
Dept: OBSTETRICS AND GYNECOLOGY | Facility: CLINIC | Age: 35
End: 2025-05-06
Payer: COMMERCIAL

## 2025-05-06 ENCOUNTER — POSTPARTUM VISIT (OUTPATIENT)
Dept: OBSTETRICS AND GYNECOLOGY | Facility: CLINIC | Age: 35
End: 2025-05-06
Payer: COMMERCIAL

## 2025-05-06 VITALS
TEMPERATURE: 97.9 F | DIASTOLIC BLOOD PRESSURE: 86 MMHG | HEART RATE: 108 BPM | OXYGEN SATURATION: 96 % | SYSTOLIC BLOOD PRESSURE: 133 MMHG

## 2025-05-06 VITALS — BODY MASS INDEX: 30.72 KG/M2 | SYSTOLIC BLOOD PRESSURE: 162 MMHG | DIASTOLIC BLOOD PRESSURE: 98 MMHG | WEIGHT: 184.6 LBS

## 2025-05-06 LAB
ALP SERPL-CCNC: 82 U/L (ref 39–117)
ALT SERPL W P-5'-P-CCNC: 15 U/L (ref 1–33)
AST SERPL-CCNC: 17 U/L (ref 1–32)
BILIRUB SERPL-MCNC: 0.2 MG/DL (ref 0–1.2)
CREAT SERPL-MCNC: 0.63 MG/DL (ref 0.57–1)
DEPRECATED RDW RBC AUTO: 47.1 FL (ref 37–54)
ERYTHROCYTE [DISTWIDTH] IN BLOOD BY AUTOMATED COUNT: 14.7 % (ref 12.3–15.4)
HCT VFR BLD AUTO: 32 % (ref 34–46.6)
HGB BLD-MCNC: 11 G/DL (ref 12–15.9)
LDH SERPL-CCNC: 213 U/L (ref 135–214)
MCH RBC QN AUTO: 30.4 PG (ref 26.6–33)
MCHC RBC AUTO-ENTMCNC: 34.4 G/DL (ref 31.5–35.7)
MCV RBC AUTO: 88.4 FL (ref 79–97)
PLATELET # BLD AUTO: 325 10*3/MM3 (ref 140–450)
PMV BLD AUTO: 8.8 FL (ref 6–12)
RBC # BLD AUTO: 3.62 10*6/MM3 (ref 3.77–5.28)
URATE SERPL-MCNC: 5.5 MG/DL (ref 2.4–5.7)
WBC NRBC COR # BLD AUTO: 9.45 10*3/MM3 (ref 3.4–10.8)

## 2025-05-06 PROCEDURE — 82247 BILIRUBIN TOTAL: CPT | Performed by: OBSTETRICS & GYNECOLOGY

## 2025-05-06 PROCEDURE — 84460 ALANINE AMINO (ALT) (SGPT): CPT | Performed by: OBSTETRICS & GYNECOLOGY

## 2025-05-06 PROCEDURE — 83615 LACTATE (LD) (LDH) ENZYME: CPT | Performed by: OBSTETRICS & GYNECOLOGY

## 2025-05-06 PROCEDURE — 85027 COMPLETE CBC AUTOMATED: CPT | Performed by: OBSTETRICS & GYNECOLOGY

## 2025-05-06 PROCEDURE — G0463 HOSPITAL OUTPT CLINIC VISIT: HCPCS

## 2025-05-06 PROCEDURE — 84550 ASSAY OF BLOOD/URIC ACID: CPT | Performed by: OBSTETRICS & GYNECOLOGY

## 2025-05-06 PROCEDURE — 84075 ASSAY ALKALINE PHOSPHATASE: CPT | Performed by: OBSTETRICS & GYNECOLOGY

## 2025-05-06 PROCEDURE — 82565 ASSAY OF CREATININE: CPT | Performed by: OBSTETRICS & GYNECOLOGY

## 2025-05-06 PROCEDURE — 84450 TRANSFERASE (AST) (SGOT): CPT | Performed by: OBSTETRICS & GYNECOLOGY

## 2025-05-06 PROCEDURE — 36415 COLL VENOUS BLD VENIPUNCTURE: CPT | Performed by: OBSTETRICS & GYNECOLOGY

## 2025-05-06 RX ORDER — OXYCODONE HYDROCHLORIDE 5 MG/1
5 TABLET ORAL ONCE
Refills: 0 | Status: COMPLETED | OUTPATIENT
Start: 2025-05-06 | End: 2025-05-06

## 2025-05-06 RX ORDER — SUMATRIPTAN 50 MG/1
50 TABLET, FILM COATED ORAL ONCE
Status: COMPLETED | OUTPATIENT
Start: 2025-05-06 | End: 2025-05-06

## 2025-05-06 RX ORDER — OXYCODONE HCL 5 MG/5 ML
5 SOLUTION, ORAL ORAL EVERY 4 HOURS PRN
Refills: 0 | Status: DISCONTINUED | OUTPATIENT
Start: 2025-05-06 | End: 2025-05-06

## 2025-05-06 RX ORDER — METOCLOPRAMIDE 10 MG/1
10 TABLET ORAL ONCE
Status: COMPLETED | OUTPATIENT
Start: 2025-05-06 | End: 2025-05-06

## 2025-05-06 RX ORDER — BUTALBITAL, ACETAMINOPHEN AND CAFFEINE 50; 325; 40 MG/1; MG/1; MG/1
2 TABLET ORAL ONCE
Status: COMPLETED | OUTPATIENT
Start: 2025-05-06 | End: 2025-05-06

## 2025-05-06 RX ADMIN — SUMATRIPTAN SUCCINATE 50 MG: 50 TABLET ORAL at 13:22

## 2025-05-06 RX ADMIN — BUTALBITAL, ACETAMINOPHEN, AND CAFFEINE 2 TABLET: 325; 50; 40 TABLET ORAL at 11:31

## 2025-05-06 RX ADMIN — OXYCODONE HYDROCHLORIDE 5 MG: 5 TABLET ORAL at 12:33

## 2025-05-06 RX ADMIN — METOCLOPRAMIDE 10 MG: 10 TABLET ORAL at 13:22

## 2025-05-06 NOTE — PROGRESS NOTES
Epifanio Olmos was monitored in triage for a few hours. Her labs were normal. BPs normal, 1 mild range, and her headache improved after treatment. Ok for DC home. FU 1 week in office for BP check. Cont procardia daily.

## 2025-05-06 NOTE — PROGRESS NOTES
Chief Complaint   Patient presents with    Postpartum Care       Postpartum Visit         Sima Booker is a 35 y.o.  who presents today for a 1 week postpartum BP and incision check.      C/S: repeat     , Low Transverse   Information for the patient's :  Kvng Booker [[id number]]   2025   female   Kvng Booker   3603 g (7 lb 15.1 oz)   Gestational Age: 37w3d     Baby Discharged with Mom  Delivering MD: Hue Patel MD.    Pregnancy complications: gestational DM , gestational HTN, AMA -- she does not check BP at home.    Patient reports her incision  she reports an open area on the L side. She reports there is some drainage with some yellow tinge.  Patient describes vaginal bleeding as light. She is bottle feeding. She would like to discuss the following complaints today: spinal headache and back pain from spinal. She states she does not have a HA today but the one yesterday was a 10. She states it took taking motrin and oxycodone and Fioricet a few hours later and a nap to relieve it.    Spinal headache in hospital but did not want to do blood patch. Still been having h/a, gets better (but doesn't resolve) when laying flat, but it's hard to do all the time with a 3 year old too.     Patient denies concerns for postpartum depression/anxiety. She reports normal anxiety that has not increased since delivery Patient denies  suicidal or homicidal ideation. Her postpartum depression screening questionnaire: 5. No treatment is indicated      The additional following portions of the patient's history were reviewed and updated as appropriate: allergies and current medications.      Review of Systems   Constitutional:  Positive for fatigue.   Respiratory: Negative.     Cardiovascular: Negative.    Gastrointestinal: Negative.    Genitourinary:  Positive for vaginal bleeding.   Neurological:  Positive for headache.   Psychiatric/Behavioral:  The patient is  nervous/anxious.        I have reviewed and agree with the HPI, ROS, and historical information as entered above. QI Byers      Objective   /98   Wt 83.7 kg (184 lb 9.6 oz)   LMP  (Approximate)   Breastfeeding No   BMI 30.72 kg/m²   156/92  Physical Exam  Vitals and nursing note reviewed.   Constitutional:       Appearance: Normal appearance.   Pulmonary:      Effort: Pulmonary effort is normal.   Abdominal:      General: A surgical scar is present.      Palpations: Abdomen is soft.      Tenderness: There is abdominal tenderness.          Comments: 2cm superficial opening, does not tunnel. Grayish slough present. Slightly erythematous and tender. No foul odor. Dried drainage present but no current drainage.   Tender above incision with deep palpation, 6/10   Musculoskeletal:         General: Normal range of motion.      Right lower leg: No edema.      Left lower leg: No edema.   Neurological:      Mental Status: She is alert and oriented to person, place, and time.      Deep Tendon Reflexes: Reflexes are normal and symmetric.                    Assessment and Plan    Problem List Items Addressed This Visit    None  Visit Diagnoses         Postpartum hypertension    -  Primary      Routine postpartum follow-up                Severe range BP's on procardia with continued spinal headache. Discussed with Dr. Patel. Since she did not receive magnesium, we will send to triage for stat labs and serial BP's, probable mag x24 hrs.       QI Byers  05/06/2025

## 2025-05-07 RX ORDER — SUMATRIPTAN 50 MG/1
50 TABLET, FILM COATED ORAL ONCE AS NEEDED
Qty: 18 TABLET | Refills: 1 | Status: SHIPPED | OUTPATIENT
Start: 2025-05-07

## 2025-05-12 ENCOUNTER — MATERNAL SCREENING (OUTPATIENT)
Dept: CALL CENTER | Facility: HOSPITAL | Age: 35
End: 2025-05-12
Payer: COMMERCIAL

## 2025-05-12 NOTE — OUTREACH NOTE
Maternal Screening Survey      Flowsheet Row Responses   Facility patient discharged from? [facility]   Attempt successful? Yes   Call start time    Call end time    I have been able to laugh and see the funny side of things. 0   I have looked forward with enjoyment to things. 0   I have blamed myself unnecessarily when things went wrong. 0   I have been anxious or worried for no good reason. 0   I have felt scared or panicky for no good reason. 0   Things have been getting on top of me. 1   I have been so unhappy that I have had difficulty sleeping. 0   I have felt sad or miserable. 0   I have been so unhappy that I have been crying. 0   The thought of harming myself has occurred to me. 0   Onset  Depression Scale Total 1   Did any of your parents have problems with alcohol or drug use? No   Do any of your peers have problems with alcohol or drug use? No   Does your partner have problems with alcohol or drug use? No   Before you were pregnant did you have problems with alcohol or drug use? (past) No   In the past month, did you drink beer, wine, liquor or use any other drugs? (pregnancy) No   Maternal Screening call completed Yes   Call end time               Lilliana RODRIGUEZ - Registered Nurse

## 2025-05-13 ENCOUNTER — POSTPARTUM VISIT (OUTPATIENT)
Dept: OBSTETRICS AND GYNECOLOGY | Facility: CLINIC | Age: 35
End: 2025-05-13
Payer: COMMERCIAL

## 2025-05-13 VITALS
SYSTOLIC BLOOD PRESSURE: 136 MMHG | BODY MASS INDEX: 29.26 KG/M2 | WEIGHT: 175.6 LBS | DIASTOLIC BLOOD PRESSURE: 82 MMHG | HEIGHT: 65 IN

## 2025-05-13 DIAGNOSIS — B37.2 YEAST INFECTION OF THE SKIN: ICD-10-CM

## 2025-05-13 DIAGNOSIS — O13.9 GESTATIONAL HYPERTENSION, ANTEPARTUM: ICD-10-CM

## 2025-05-13 RX ORDER — NYSTATIN AND TRIAMCINOLONE ACETONIDE 100000; 1 [USP'U]/G; MG/G
1 OINTMENT TOPICAL 2 TIMES DAILY
Qty: 30 G | Refills: 0 | Status: SHIPPED | OUTPATIENT
Start: 2025-05-13

## 2025-05-13 RX ORDER — LEVOCETIRIZINE DIHYDROCHLORIDE 5 MG/1
5 TABLET, FILM COATED ORAL EVERY EVENING
COMMUNITY

## 2025-05-13 NOTE — PROGRESS NOTES
Chief Complaint   Patient presents with    Postpartum Care       Postpartum Visit         Sima Booker is a 35 y.o.  who presents today for a 2 week(s) postpartum check.        , Low Transverse   Information for the patient's :  Sadie Booker [[id number]]   2025   female   Sadie Booker   3603 g (7 lb 15.1 oz)   Gestational Age: 37w3d     Baby Discharged with Mom  Delivering MD: Hue Patel MD.    Pregnancy complications: gestational DM , gestational HTN, AMA    Pt was monitored in triage for a few hours. Her labs were normal. BPs normal, 1 mild range, and her headache improved after treatment.       Patient reports her incision does not appear to be healing properly. Opening on left side of incision, pt denies any drainage from incision . Patient describes vaginal bleeding as light. She is bottle feeding. She would like to discuss the following complaints today: headaches still not resolved with fioricet and imitrex.    Pt states her BP at home 130-160/80-90    She desires to discuss  none  for contraception.    Patient denies concerns for postpartum depression/anxiety. Patient denies  suicidal or homicidal ideation. Her postpartum depression screening questionnaire: 6. She is currently being treated with zoloft 100mg. She feels it is helping.      The additional following portions of the patient's history were reviewed and updated as appropriate: allergies, current medications, past family history, past medical history, past social history, past surgical history, and problem list.      Review of Systems   Constitutional: Negative.    HENT: Negative.     Eyes: Negative.    Respiratory: Negative.     Cardiovascular: Negative.    Gastrointestinal: Negative.    Endocrine: Negative.    Genitourinary: Negative.    Musculoskeletal: Negative.    Skin:  Positive for wound.   Allergic/Immunologic: Negative.    Neurological: Negative.    Hematological: Negative.   "  Psychiatric/Behavioral: Negative.         I have reviewed and agree with the HPI, ROS, and historical information as entered above. Rosalie Olmos Jadyn, APRN      Objective   /82   Ht 165.1 cm (65\")   Wt 79.7 kg (175 lb 9.6 oz)   LMP  (Approximate)   Breastfeeding No   BMI 29.22 kg/m²     Physical Exam  Vitals and nursing note reviewed.   Constitutional:       General: She is not in acute distress.     Appearance: Normal appearance. She is not ill-appearing.   Pulmonary:      Effort: Pulmonary effort is normal. No respiratory distress.   Abdominal:      General: There is no distension.      Palpations: Abdomen is soft. There is no mass.      Tenderness: There is no abdominal tenderness. There is no guarding or rebound.      Hernia: No hernia is present.      Comments: Removed steristrips.  Incision closed without drainage.  Left side with yeast rash.   Skin:     General: Skin is warm and dry.   Neurological:      Mental Status: She is alert and oriented to person, place, and time.   Psychiatric:         Mood and Affect: Mood normal.         Behavior: Behavior normal.              Assessment and Plan    Problem List Items Addressed This Visit          Gravid and     Gestational hypertension    Relevant Medications    NIFEdipine CC (ADALAT CC) 30 MG 24 hr tablet     delivery delivered - Primary    Postpartum care and examination immediately after delivery     Other Visit Diagnoses         Yeast infection of the skin        Relevant Medications    nystatin-triamcinolone (MYCOLOG) 969505-7.1 UNIT/GM-% ointment            S/p , 2 week(s) postpartum.    Continued pelvic rest with a return to driving and light physical activity.  Baby doing well.  Bottlefeeding going well.  No si/sx of postpartum depression  Return in about 4 weeks (around 6/10/2025).  D/w pt keep incision clean and dry--- air dry.    Stop Procardia, monitor BP bid and prn symptoms.  Call with elevated BP.      Rosalie Olmos " Jadyn, QI  05/13/2025    78.39

## 2025-05-22 ENCOUNTER — TELEMEDICINE (OUTPATIENT)
Dept: PSYCHIATRY | Facility: CLINIC | Age: 35
End: 2025-05-22
Payer: COMMERCIAL

## 2025-05-22 DIAGNOSIS — F41.1 GENERALIZED ANXIETY DISORDER: ICD-10-CM

## 2025-05-22 DIAGNOSIS — F90.2 ATTENTION DEFICIT HYPERACTIVITY DISORDER (ADHD), COMBINED TYPE: Primary | ICD-10-CM

## 2025-05-22 DIAGNOSIS — F32.1 CURRENT MODERATE EPISODE OF MAJOR DEPRESSIVE DISORDER WITHOUT PRIOR EPISODE: ICD-10-CM

## 2025-05-22 NOTE — PROGRESS NOTES
Date: May 22, 2025  Time In: 09:05 EDT  Time out: 9:53 AM EST    This provider is located at [facility], [facility] [place], [place], [facility], using a secure [employer] Video Visit through [employer]. Patient is being seen remotely via telehealth at their home address is located in [place]. Patient stated they are in a secure environment for this session. The patient's condition being diagnosed and treated is appropriate for telemedicine. The provider identified themself as well as their credentials. The patient, or  patient's legal guardian consent to be seen remotely, and when consent is given they understand that the consent allows for patient identifiable information to be sent to a third party as needed. They may refuse to be seen remotely at any time. The electronic data is encrypted and password protected, and the patient's or  legal guardian has been advised of the potential risks to privacy not withstanding such measures.   PT Identifiers used: Name and .    You have chosen to receive care through a telehealth visit.  Do you consent to use a video/audio connection for your medical care today? Yes    Subjective   Sima Booker is a 35 y.o. female who presents today for follow up    Chief Complaint:   Chief Complaint   Patient presents with    ADHD    Anxiety    Depression        Data: Pt reported having her baby girl about four weeks ago, reflected on the birthing process and transition into having another child to care for. Pt reported that the  went better than her first one, not as traumatic. Pt reported that she is experiencing some behavioral issues from her toddler with these changes. Pt reported that she has been struggling with high BP and migraines since birth. Pt reported she is only getting about 2 hours of sleep at a time.      Clinical Maneuvering/Intervention:  Assisted patient in processing above session content; acknowledged and normalized patient’s  thoughts, feelings, and concerns.  Rationalized patient thought process regarding concerns presented at session.  Discussed triggers associated with patient's  anxiety , depression , and ADHD Also discussed coping skills for patient to implement such as grounding , mindfulness , self care , and positive self talk     Allowed patient to freely discuss issues without interruption or judgment. Provided safe, confidential environment to facilitate the development of positive therapeutic relationship and encourage open, honest communication. Assisted patient in identifying risk factors which would indicate the need for higher level of care including thoughts to harm self or others and/or self-harming behavior and encouraged patient to contact this office, call 911, or present to the nearest emergency room should any of these events occur. Discussed crisis intervention services and means to access. Patient adamantly and convincingly denies current suicidal or homicidal ideation or perceptual disturbance.    Assessment:  Pt was alert and oriented x3.  Pt appears open and honest re MH symptoms that have affected life in a negative way. Pt appears motivated to improve MH symptoms and overall quality of life. Pt appears to be transition and adjusting to having an infant. Pt appears stressed and exhausted. Pt appears to have limited time for herself with having a . Pt was encouraged to seek more support from family. Pt appeared receptive to techniques and encouragements discussed in session.     Patient appears to maintain relative stability as compared to their baseline.  However, patient continues to struggle with   Chief Complaint   Patient presents with    ADHD    Anxiety    Depression    which continues to cause impairment in important areas of functioning.  A result, they can be reasonably expected to continue to benefit from treatment and would likely be at increased risk for decompensation otherwise.      Mental  Status Exam:   Hygiene:   good  Cooperation:  Cooperative  Eye Contact:  Fair  Psychomotor Behavior:  Appropriate  Mood:  tired  Speech:  Normal  Thought Process:  Linear  Thought Content:  Mood congruent  Suicidal:  None  Homicidal:  None  Hallucinations:  None  Delusion:  None  Memory:  Intact  Orientation:  Grossly intact  Reliability:  good  Insight:  Fair  Judgement:  Fair  Impulse Control:  Fair  Physical/Medical Issues:  No        Patient's Support Network Includes:  significant other and extended family    Functional Status: No impairment    Progress toward goal: Not at goal    Prognosis: Good with Ongoing Treatment     Plan: update tx plan    Patient will continue in individual outpatient therapy with focus on improved functioning and coping skills, maintaining stability, and avoiding decompensation and the need for higher level of care.    Patient will adhere to medication regimen as prescribed and report any side effects. Patient will contact this office, call 911 or present to the nearest emergency room should suicidal or homicidal ideations occur. Provide Cognitive Behavioral Therapy and Solution Focused Therapy to improve functioning, maintain stability, and avoid decompensation and the need for higher level of care.     Return in about 4 weeks (around 6/19/2025).      VISIT DIAGNOSIS:    Diagnosis Plan   1. Attention deficit hyperactivity disorder (ADHD), combined type        2. Current moderate episode of major depressive disorder without prior episode        3. Generalized anxiety disorder         09:00 EDT         This document has been electronically signed by Ninfa Hwang LCSW  May 22, 2025      Part of this note may be an electronic transcription/translation of spoken language to printed text using the Dragon Dictation System.

## 2025-06-17 ENCOUNTER — POSTPARTUM VISIT (OUTPATIENT)
Dept: OBSTETRICS AND GYNECOLOGY | Facility: CLINIC | Age: 35
End: 2025-06-17
Payer: COMMERCIAL

## 2025-06-17 VITALS
WEIGHT: 177 LBS | SYSTOLIC BLOOD PRESSURE: 140 MMHG | HEIGHT: 65 IN | DIASTOLIC BLOOD PRESSURE: 92 MMHG | BODY MASS INDEX: 29.49 KG/M2

## 2025-06-17 DIAGNOSIS — Z01.419 PAP TEST, AS PART OF ROUTINE GYNECOLOGICAL EXAMINATION: ICD-10-CM

## 2025-06-17 PROBLEM — Z34.90 PREGNANCY: Status: RESOLVED | Noted: 2024-11-05 | Resolved: 2025-06-17

## 2025-06-17 PROBLEM — O34.219 PREVIOUS CESAREAN DELIVERY AFFECTING PREGNANCY: Status: RESOLVED | Noted: 2025-04-22 | Resolved: 2025-06-17

## 2025-06-17 PROBLEM — O09.819 PREGNANCY RESULTING FROM IN VITRO FERTILIZATION, ANTEPARTUM: Status: RESOLVED | Noted: 2024-11-05 | Resolved: 2025-06-17

## 2025-06-17 PROBLEM — O24.415 GESTATIONAL DIABETES MELLITUS (GDM) IN THIRD TRIMESTER CONTROLLED ON ORAL HYPOGLYCEMIC DRUG: Status: RESOLVED | Noted: 2025-01-21 | Resolved: 2025-06-17

## 2025-06-17 PROBLEM — Z98.891 PREVIOUS CESAREAN SECTION: Status: RESOLVED | Noted: 2024-11-05 | Resolved: 2025-06-17

## 2025-06-17 PROBLEM — O43.199 MARGINAL INSERTION OF UMBILICAL CORD AFFECTING MANAGEMENT OF MOTHER: Status: RESOLVED | Noted: 2025-01-15 | Resolved: 2025-06-17

## 2025-06-17 PROBLEM — O26.893 HEADACHE IN PREGNANCY, ANTEPARTUM, THIRD TRIMESTER: Status: RESOLVED | Noted: 2025-04-23 | Resolved: 2025-06-17

## 2025-06-17 PROBLEM — R51.9 HEADACHE IN PREGNANCY, ANTEPARTUM, THIRD TRIMESTER: Status: RESOLVED | Noted: 2025-04-23 | Resolved: 2025-06-17

## 2025-06-17 PROBLEM — O13.9 GESTATIONAL HYPERTENSION: Status: RESOLVED | Noted: 2025-04-15 | Resolved: 2025-06-17

## 2025-06-17 PROBLEM — O09.529 AMA (ADVANCED MATERNAL AGE) MULTIGRAVIDA 35+: Status: RESOLVED | Noted: 2024-11-05 | Resolved: 2025-06-17

## 2025-06-17 NOTE — PROGRESS NOTES
Chief Complaint   Patient presents with    Postpartum Care       Postpartum Visit         Sima Booker is a 35 y.o.  who presents today for a 7 week(s) postpartum check.     C/S: repeat     , Low Transverse   Information for the patient's :  Sadie Booker [[id number]]   2025   female   Sadie Booker   3603 g (7 lb 15.1 oz)   Gestational Age: 37w3d       Baby Discharged: Discharged with Mom  Delivering Physician: Hue Patel MD    Her pregnancy was complicated by gestational DM , gestational HTN. The incision is healing well. Patient describes vaginal bleeding as absent.  Patient is bottle feeding.  She desires vasectomy for contraception.      She would like to discuss the following complaints today: elevated b/p. She states that she d/c Nifedipine due to headaches. .He b/p at home has been averaging 140's-150's/80's-90's. She denies swelling, vision changes, or RUQ pain.     Patient denies concerns for postpartum depression/anxiety. Patient denies  suicidal or homicidal ideation. Her postpartum depression screening questionnaire: 8. She is currently being treated with Zoloft. She feels it is helping.      Last Pap : 2024. Results: ASCUS. HPV: negative.   Last Completed Pap Smear            Awaiting Completion       PAP SMEAR (Every 3 Years) Order placed this encounter      2025  Order placed for LIQUID-BASED PAP SMEAR WITH HPV GENOTYPING REGARDLESS OF INTERPRETATION (ZARA BOWMAN,NICOLE) by Kim Meek RN    2024  LIQUID-BASED PAP SMEAR WITH HPV GENOTYPING REGARDLESS OF INTERPRETATION (ZARA BOWMAN,NICOLE)    2021  Pap IG, Ct-Ng, HPV-hr    10/25/2018  Done - neg                              The additional following portions of the patient's history were reviewed and updated as appropriate: allergies, current medications, past family history, past medical history, past social history, past surgical history, and problem list.    Review of Systems  "  Constitutional: Negative.    HENT: Negative.     Eyes: Negative.    Respiratory: Negative.     Cardiovascular: Negative.    Gastrointestinal: Negative.    Endocrine: Negative.    Genitourinary: Negative.    Musculoskeletal: Negative.    Skin: Negative.    Allergic/Immunologic: Negative.    Neurological: Negative.    Hematological: Negative.    Psychiatric/Behavioral: Negative.       All other systems reviewed and are negative.     I have reviewed and agree with the HPI, ROS, and historical information as entered above. Hue Patel MD      /92   Ht 165.1 cm (65\")   Wt 80.3 kg (177 lb)   LMP  (LMP Unknown)   Breastfeeding No   BMI 29.45 kg/m²     Physical Exam  Vitals and nursing note reviewed. Exam conducted with a chaperone present.   Constitutional:       Appearance: She is well-developed.   HENT:      Head: Normocephalic and atraumatic.   Neck:      Thyroid: No thyroid mass or thyromegaly.   Pulmonary:      Breath sounds: No rhonchi.   Abdominal:      Palpations: Abdomen is soft. Abdomen is not rigid. There is no mass.      Tenderness: There is no abdominal tenderness. There is no guarding.      Hernia: No hernia is present.      Comments: Incision C/D/I   Genitourinary:     Vagina: Normal.      Cervix: Normal.      Uterus: Normal.    Musculoskeletal:      Cervical back: Normal range of motion. No muscular tenderness.   Neurological:      Mental Status: She is alert and oriented to person, place, and time.   Psychiatric:         Behavior: Behavior normal.             Assessment and Plan    Problem List Items Addressed This Visit          Gynecologic and Obstetric Problems     delivery delivered - Primary       Other    RESOLVED: Postpartum care and examination immediately after delivery     Other Visit Diagnoses         Pap test, as part of routine gynecological examination        Relevant Orders    LIQUID-BASED PAP SMEAR WITH HPV GENOTYPING REGARDLESS OF INTERPRETATION (GRACIE,COR,MAD)      "       S/p , 6 week(s) postpartum.  Doing well.    She is going to see her PCP for BP mgt.   Return to normal physical activity.  No pelvic restrictions.   Baby doing well.  Bottlefeeding going well.  No si/sx of postpartum depression  Contraception: contraceptive methods: Vasectomy   Return for Annual physical.     Hue Patel MD  2025

## 2025-06-18 LAB — REF LAB TEST METHOD: NORMAL

## 2025-06-26 ENCOUNTER — TELEMEDICINE (OUTPATIENT)
Dept: PSYCHIATRY | Facility: CLINIC | Age: 35
End: 2025-06-26
Payer: COMMERCIAL

## 2025-06-26 DIAGNOSIS — F32.1 CURRENT MODERATE EPISODE OF MAJOR DEPRESSIVE DISORDER WITHOUT PRIOR EPISODE: ICD-10-CM

## 2025-06-26 DIAGNOSIS — F90.2 ATTENTION DEFICIT HYPERACTIVITY DISORDER (ADHD), COMBINED TYPE: Primary | ICD-10-CM

## 2025-06-26 DIAGNOSIS — F41.1 GENERALIZED ANXIETY DISORDER: ICD-10-CM

## 2025-06-26 NOTE — PROGRESS NOTES
Date: 2025  Time In: 10:01 EDT  Time out: 11:02 AM EST    This provider is located at [facility], [facility] [place], [place], [facility], using a secure [employer] Video Visit through [employer]. Patient is being seen remotely via telehealth at their home address is located in [place]. Patient stated they are in a secure environment for this session. The patient's condition being diagnosed and treated is appropriate for telemedicine. The provider identified themself as well as their credentials. The patient, or  patient's legal guardian consent to be seen remotely, and when consent is given they understand that the consent allows for patient identifiable information to be sent to a third party as needed. They may refuse to be seen remotely at any time. The electronic data is encrypted and password protected, and the patient's or  legal guardian has been advised of the potential risks to privacy not withstanding such measures.   PT Identifiers used: Name and .    You have chosen to receive care through a telehealth visit.  Do you consent to use a video/audio connection for your medical care today? Yes    Subjective   Sima Booker is a 35 y.o. female who presents today for follow up    Chief Complaint:   Chief Complaint   Patient presents with    Anxiety    Depression    ADHD        Data: Pt reported she is doing good. Pt reported that she has returned to work slowly. Pt reported that her toddler is getting more used to the new baby and showing interest. Pt reported that she has adjusted to having two small children in the home well. Pt reported that she has also adjusted well without ADHD medication, could use more focus.      Clinical Maneuvering/Intervention:  Assisted patient in processing above session content; acknowledged and normalized patient’s thoughts, feelings, and concerns.  Rationalized patient thought process regarding concerns presented at session.  Discussed  triggers associated with patient's  anxiety , depression , and ADHD Also discussed coping skills for patient to implement such as grounding , mindfulness , increasing activity , self care , and positive self talk     Allowed patient to freely discuss issues without interruption or judgment. Provided safe, confidential environment to facilitate the development of positive therapeutic relationship and encourage open, honest communication. Assisted patient in identifying risk factors which would indicate the need for higher level of care including thoughts to harm self or others and/or self-harming behavior and encouraged patient to contact this office, call 911, or present to the nearest emergency room should any of these events occur. Discussed crisis intervention services and means to access. Patient adamantly and convincingly denies current suicidal or homicidal ideation or perceptual disturbance.    Assessment: Pt was alert and oriented x3. Pt appears open and honest re MH symptoms that have affected life in a negative way. Pt appears motivated to improve MH symptoms and overall quality of life. Pt appears to be adjusting to changes that she is currently going through. Pt appears to struggle with some focus without her medication. Pts MH appears overall manageable at this time.    Patient appears to maintain relative stability as compared to their baseline.  However, patient continues to struggle with   Chief Complaint   Patient presents with    Anxiety    Depression    ADHD    which continues to cause impairment in important areas of functioning.  A result, they can be reasonably expected to continue to benefit from treatment and would likely be at increased risk for decompensation otherwise.      Mental Status Exam:   Hygiene:   good  Cooperation:  Cooperative  Eye Contact:  Good  Psychomotor Behavior:  Appropriate  Affect:  Appropriate  Mood: normal  Speech:  Normal  Thought Process:  Linear  Thought Content:   Normal  Suicidal:  None  Homicidal:  None  Hallucinations:  None  Delusion:  None  Memory:  Intact  Orientation:  Person, Place, and Time  Reliability:  good  Insight:  Fair  Judgement:  Fair  Impulse Control:  Fair  Physical/Medical Issues:  No        Patient's Support Network Includes:  significant other and extended family    Functional Status: No impairment    Progress toward goal: Not at goal    Prognosis: Fair with Ongoing Treatment     Plan:     Patient will continue in individual outpatient therapy with focus on improved functioning and coping skills, maintaining stability, and avoiding decompensation and the need for higher level of care.    Patient will adhere to medication regimen as prescribed and report any side effects. Patient will contact this office, call 911 or present to the nearest emergency room should suicidal or homicidal ideations occur. Provide Cognitive Behavioral Therapy and Solution Focused Therapy to improve functioning, maintain stability, and avoid decompensation and the need for higher level of care.     Return in about 4 weeks (around 7/24/2025).      VISIT DIAGNOSIS:    Diagnosis Plan   1. Attention deficit hyperactivity disorder (ADHD), combined type        2. Current moderate episode of major depressive disorder without prior episode        3. Generalized anxiety disorder         10:01 EDT         This document has been electronically signed by Ninfa Hwang LCSW  June 26, 2025      Part of this note may be an electronic transcription/translation of spoken language to printed text using the Dragon Dictation System.

## 2025-07-24 ENCOUNTER — TELEMEDICINE (OUTPATIENT)
Dept: PSYCHIATRY | Facility: CLINIC | Age: 35
End: 2025-07-24
Payer: COMMERCIAL

## 2025-07-24 DIAGNOSIS — F41.1 GENERALIZED ANXIETY DISORDER: ICD-10-CM

## 2025-07-24 DIAGNOSIS — F32.1 CURRENT MODERATE EPISODE OF MAJOR DEPRESSIVE DISORDER WITHOUT PRIOR EPISODE: ICD-10-CM

## 2025-07-24 DIAGNOSIS — F90.2 ATTENTION DEFICIT HYPERACTIVITY DISORDER (ADHD), COMBINED TYPE: Primary | ICD-10-CM

## 2025-07-24 NOTE — PROGRESS NOTES
Date: 2025  Time In: 09:59 EDT  Time out: 11:06 AM EST    This provider is located at [facility], [facility] [place], [place], [facility], using a secure [employer] Video Visit through [employer]. Patient is being seen remotely via telehealth at their home address is located in [place]. Patient stated they are in a secure environment for this session. The patient's condition being diagnosed and treated is appropriate for telemedicine. The provider identified themself as well as their credentials. The patient, or  patient's legal guardian consent to be seen remotely, and when consent is given they understand that the consent allows for patient identifiable information to be sent to a third party as needed. They may refuse to be seen remotely at any time. The electronic data is encrypted and password protected, and the patient's or  legal guardian has been advised of the potential risks to privacy not withstanding such measures.   PT Identifiers used: Name and .    You have chosen to receive care through a telehealth visit.  Do you consent to use a video/audio connection for your medical care today? Yes    Subjective   Sima Booker is a 35 y.o. female who presents today for follow up    Chief Complaint:   Chief Complaint   Patient presents with    Anxiety    ADHD    Depression        Data: Pt reported she is doing well. Pt reported that she started back at the [employer], continuing to adjust. 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 Pt reflected on gratitude re the ability to spend more time with her infant daughter with her work schedule. Pt reported that she has concerns with sons , son continues to get bit by a peer. Pt reported that baby has been sleeping throughout the night, helpful with her sleep pattern. Pt reflected on all adjustments that she is transitioning into. Pt reported anxiety is manageable. Depression feels more like feelings of overwhelmed. Pt  has psych appoint next week to discuss med management.       Clinical Maneuvering/Intervention: Assisted patient in processing above session content; acknowledged and normalized patient’s thoughts, feelings, and concerns.  Rationalized patient thought process regarding concerns presented at session.  Discussed triggers associated with patient's  anxiety , depression , and ADHD Also discussed coping skills for patient to implement such as grounding , 4:6 breathing , mindfulness , increasing activity , self care , and positive self talk     Allowed patient to freely discuss issues without interruption or judgment. Provided safe, confidential environment to facilitate the development of positive therapeutic relationship and encourage open, honest communication. Assisted patient in identifying risk factors which would indicate the need for higher level of care including thoughts to harm self or others and/or self-harming behavior and encouraged patient to contact this office, call 911, or present to the nearest emergency room should any of these events occur. Discussed crisis intervention services and means to access. Patient adamantly and convincingly denies current suicidal or homicidal ideation or perceptual disturbance.    Assessment: Pt was alert and oriented x3. Pt appears open and honest re MH symptoms that have affected life in a negative way. Pt appears motivated to improve MH symptoms and overall quality of life. Pt appears to be adjusting to multiple transitions at a slow pace which appears to be helpful. Pt appears to continue struggling with ADHD symptoms without medication management. Pt appears to have manageable anxiety. Pt will continue to monitor depression symptoms and feelings of overwhelmed. Pt appears to get overwhelmed trying to manage herself and two young children at time. This is not unmanageable at this time but pt is being mindful of this. Pt appeared receptive to techniques and  encouragements discussed in session.     Patient appears to maintain relative stability as compared to their baseline.  However, patient continues to struggle with   Chief Complaint   Patient presents with    Anxiety    ADHD    Depression    which continues to cause impairment in important areas of functioning.  A result, they can be reasonably expected to continue to benefit from treatment and would likely be at increased risk for decompensation otherwise.        Mental Status Exam:   Hygiene:   good  Cooperation:  Cooperative  Eye Contact:  Fair  Psychomotor Behavior:  Appropriate  Affect:  Appropriate  Mood: normal  Speech:  Normal  Thought Process:  Linear  Thought Content:  Normal  Suicidal:  None  Homicidal:  None  Hallucinations:  None  Delusion:  None  Memory:  Intact  Orientation:  Person, Place, and Time  Reliability:  good  Insight:  Fair  Judgement:  Fair  Impulse Control:  Fair  Physical/Medical Issues:  No        Patient's Support Network Includes:   and extended family    Functional Status: No impairment    Progress toward goal: Not at goal    Prognosis: Fair with Ongoing Treatment     Plan:     Patient will continue in individual outpatient therapy with focus on improved functioning and coping skills, maintaining stability, and avoiding decompensation and the need for higher level of care.    Patient will adhere to medication regimen as prescribed and report any side effects. Patient will contact this office, call 911 or present to the nearest emergency room should suicidal or homicidal ideations occur. Provide Cognitive Behavioral Therapy and Solution Focused Therapy to improve functioning, maintain stability, and avoid decompensation and the need for higher level of care.     Return in about 4 weeks (around 8/21/2025).      VISIT DIAGNOSIS:    Diagnosis Plan   1. Attention deficit hyperactivity disorder (ADHD), combined type        2. Generalized anxiety disorder        3. Current moderate  episode of major depressive disorder without prior episode         09:59 EDT         This document has been electronically signed by Ninfa Hwang LCSW  July 24, 2025      Part of this note may be an electronic transcription/translation of spoken language to printed text using the Dragon Dictation System.

## 2025-07-31 ENCOUNTER — TELEMEDICINE (OUTPATIENT)
Dept: PSYCHIATRY | Facility: CLINIC | Age: 35
End: 2025-07-31
Payer: COMMERCIAL

## 2025-07-31 DIAGNOSIS — F41.1 GENERALIZED ANXIETY DISORDER: Primary | ICD-10-CM

## 2025-07-31 DIAGNOSIS — F32.1 CURRENT MODERATE EPISODE OF MAJOR DEPRESSIVE DISORDER WITHOUT PRIOR EPISODE: ICD-10-CM

## 2025-07-31 DIAGNOSIS — F90.2 ATTENTION DEFICIT HYPERACTIVITY DISORDER (ADHD), COMBINED TYPE: ICD-10-CM

## 2025-07-31 RX ORDER — METHYLPHENIDATE HYDROCHLORIDE 40 MG/1
40 CAPSULE ORAL EVERY EVENING
Qty: 30 CAPSULE | Refills: 0 | Status: SHIPPED | OUTPATIENT
Start: 2025-07-31

## 2025-07-31 NOTE — PROGRESS NOTES
Patient Name: Sima Booker  MRN: [id number]   :  1990     This provider is located at her home office through the [facility] (through [facility]), [address], [address] using a secure [employer] Video Visit through [employer]. Patient is being seen remotely via telehealth in [place], and stated they are in a secure environment for this session. The patient's condition being diagnosed/treated is appropriate for telemedicine. The provider identified herself as well as her credentials.   The patient, and/or patients guardian, consent to be seen remotely, and when consent is given they understand that the consent allows for patient identifiable information to be sent to a third party as needed.   They may refuse to be seen remotely at any time. The electronic data is encrypted and password protected, and the patient and/or guardian has been advised of the potential risks to privacy not withstanding such measures.    You have chosen to receive care through a telehealth visit.  Do you consent to use a video/audio connection for your medical care today? Yes    Chief Complaint:      ICD-10-CM ICD-9-CM   1. Generalized anxiety disorder  F41.1 300.02   2. Current moderate episode of major depressive disorder without prior episode  F32.1 296.22   3. Attention deficit hyperactivity disorder (ADHD), combined type  F90.2 314.01       History of Present Illness  The patient is a 35-year-old female who presents for medication management follow-up via telehealth.    She reports that Zoloft has been effective in managing her symptoms, preventing any postpartum issues. However, she experiences occasional days of low energy and motivation, which she attributes to her ADHD. She is not currently breastfeeding, allowing her to resume her previous medications. Her anxiety and depression are well-managed with Zoloft, but she struggles with task paralysis due to her ADHD. She  was previously on Jornay PM 40 mg, Wellbutrin 150 mg, and Lexapro.    The following portions of the patient's history were reviewed and updated as appropriate: allergies, current medications, past family history, past medical history, past social history, past surgical history, and problem list.    Review of Systems;;  Review of Systems   Constitutional:  Negative for activity change, appetite change, fatigue, unexpected weight gain and unexpected weight loss.   Respiratory:  Negative for shortness of breath and wheezing.    Gastrointestinal:  Negative for constipation, diarrhea, nausea and vomiting.   Musculoskeletal:  Negative for gait problem.   Skin:  Negative for dry skin and rash.   Neurological:  Negative for dizziness, speech difficulty, weakness, light-headedness, headache, memory problem and confusion.   Psychiatric/Behavioral:  Positive for decreased concentration and stress. Negative for agitation, behavioral problems, dysphoric mood, hallucinations, self-injury, sleep disturbance, suicidal ideas, negative for hyperactivity and depressed mood. The patient is nervous/anxious.        Physical Exam;;  Physical Exam  Constitutional:       General: She is not in acute distress.     Appearance: She is well-developed. She is not diaphoretic.   HENT:      Head: Normocephalic and atraumatic.   Eyes:      Conjunctiva/sclera: Conjunctivae normal.   Pulmonary:      Effort: Pulmonary effort is normal. No respiratory distress.   Musculoskeletal:         General: Normal range of motion.      Cervical back: Full passive range of motion without pain and normal range of motion.   Neurological:      Mental Status: She is alert and oriented to person, place, and time.   Psychiatric:         Attention and Perception: Attention and perception normal.         Mood and Affect: Affect normal. Mood is anxious. Mood is not depressed. Affect is not labile, blunt, angry or inappropriate.         Speech: Speech normal. Speech is not  rapid and pressured or tangential.         Behavior: Behavior normal. Behavior is not agitated, slowed, aggressive, withdrawn, hyperactive or combative. Behavior is cooperative.         Thought Content: Thought content normal. Thought content is not paranoid or delusional. Thought content does not include homicidal or suicidal ideation. Thought content does not include homicidal or suicidal plan.         Cognition and Memory: Cognition and memory normal.         Judgment: Judgment normal.       not currently breastfeeding.  There is no height or weight on file to calculate BMI. Video appt unable to obtain.      Current Medications;;    Current Outpatient Medications:     levocetirizine (XYZAL) 5 MG tablet, Take 1 tablet by mouth Every Evening., Disp: , Rfl:     Methylphenidate HCl ER, PM, (Jornay PM) 40 MG capsule sustained-release 24 hr, Take 40 mg by mouth Every Evening., Disp: 30 capsule, Rfl: 0    sertraline (ZOLOFT) 100 MG tablet, Take 1 tablet by mouth Daily., Disp: 30 tablet, Rfl: 2    Lab Results:   Postpartum Visit on 2025   Component Date Value Ref Range Status    Reference Lab Report 2025    Final                    Value:[facility]  [address] [place] [address]  Phone: [phone] or [phone]  Fax: [phone]  Petros Graves M.D., Medical Director    PATIENT NAME                                     LABORATORY NO.  [id number]   SHANICE LLANOS                           [id number]  [id number]                                 AGE                    SEX   SSN              CLIENT REF #  [facility]                                 35        1990      F     [id number]      [id number]    [address]                 REQUESTING M.D.           ATTENDING M.D.         [place] [address]                        HEAVENLY KHANNA  DATE COLLECTED            DATE RECEIVED          DATE REPORTED  2025              06/18/2025    ThinPrep Pap with [facility]    DIAGNOSIS:  Negative for intraepithelial lesion or malignancy    Multiple factors can influence accuracy of Pap tests; therefore, screening                           at  regular intervals is necessary for early cancer detection.    COMMENT:     Benign cellular changes associated with atrophy are present.  Professional interpretation rendered by Terry Moses M.D. at [facility],  [facility], [address] [place] [address].  SPECIMEN ADEQUACY:  SATISFACTORY FOR EVALUATION  Transformation zone is present.  SOURCE OF SPECIMEN:       CERVICAL  SLIDES:  1  CLINICAL HISTORY:  Pap test, as part of routine gynecological examination  Postpartum    HPV  HR-HPV POOL: Negative    The Aptima HPV assay is an in vitro nucleic acid amplification test for the  qualitative detection of E6/E7 viral messenger RNA from 14 high risk types of  HPV in cervical specimens. The high risk HPV types detected include: 16, 18,  31, 33, 35, 39, 45, 51, 52, 56, 58, 59, 66, 68    CYTOTECHNOLOGIST:             MED, CT(ASCP)                                       REVIEWED, DIAGNOSED AND  ELECTRONICALLY SIGNED BY:      Terry Moses M.D.    CPT CODES:  21915, 84899     Admission on 05/06/2025, Discharged on 05/06/2025   Component Date Value Ref Range Status    WBC 05/06/2025 9.45  3.40 - 10.80 10*3/mm3 Final    RBC 05/06/2025 3.62 (L)  3.77 - 5.28 10*6/mm3 Final    Hemoglobin 05/06/2025 11.0 (L)  12.0 - 15.9 g/dL Final    Hematocrit 05/06/2025 32.0 (L)  34.0 - 46.6 % Final    MCV 05/06/2025 88.4  79.0 - 97.0 fL Final    MCH 05/06/2025 30.4  26.6 - 33.0 pg Final    MCHC 05/06/2025 34.4  31.5 - 35.7 g/dL Final    RDW 05/06/2025 14.7  12.3 - 15.4 % Final    RDW-SD 05/06/2025 47.1  37.0 - 54.0 fl Final    MPV 05/06/2025 8.8  6.0 - 12.0 fL Final    Platelets 05/06/2025 325  140 - 450 10*3/mm3 Final    Alkaline Phosphatase 05/06/2025 82  39 - 117 U/L Final    ALT (SGPT)  05/06/2025 15  1 - 33 U/L Final    AST (SGOT) 05/06/2025 17  1 - 32 U/L Final    Creatinine 05/06/2025 0.63  0.57 - 1.00 mg/dL Final    Total Bilirubin 05/06/2025 0.2  0.0 - 1.2 mg/dL Final    LDH 05/06/2025 213  135 - 214 U/L Final    Uric Acid 05/06/2025 5.5  2.4 - 5.7 mg/dL Final   Admission on 04/29/2025, Discharged on 05/02/2025   Component Date Value Ref Range Status    Glucose 04/29/2025 75  70 - 130 mg/dL Final    Alkaline Phosphatase 04/30/2025 78  39 - 117 U/L Final    ALT (SGPT) 04/30/2025 11  1 - 33 U/L Final    AST (SGOT) 04/30/2025 23  1 - 32 U/L Final    Creatinine 04/30/2025 0.69  0.57 - 1.00 mg/dL Final    Total Bilirubin 04/30/2025 <0.2  0.0 - 1.2 mg/dL Final    LDH 04/30/2025 289 (H)  135 - 214 U/L Final    Specimen hemolyzed.  Results may be affected.    Uric Acid 04/30/2025 5.5  2.4 - 5.7 mg/dL Final    WBC 04/30/2025 9.44  3.40 - 10.80 10*3/mm3 Final    RBC 04/30/2025 2.95 (L)  3.77 - 5.28 10*6/mm3 Final    Hemoglobin 04/30/2025 9.0 (L)  12.0 - 15.9 g/dL Final    Hematocrit 04/30/2025 27.4 (L)  34.0 - 46.6 % Final    MCV 04/30/2025 92.9  79.0 - 97.0 fL Final    MCH 04/30/2025 30.5  26.6 - 33.0 pg Final    MCHC 04/30/2025 32.8  31.5 - 35.7 g/dL Final    RDW 04/30/2025 16.1 (H)  12.3 - 15.4 % Final    RDW-SD 04/30/2025 54.0  37.0 - 54.0 fl Final    MPV 04/30/2025 10.5  6.0 - 12.0 fL Final    Platelets 04/30/2025 169  140 - 450 10*3/mm3 Final    Neutrophil % 04/30/2025 77.1 (H)  42.7 - 76.0 % Final    Lymphocyte % 04/30/2025 12.4 (L)  19.6 - 45.3 % Final    Monocyte % 04/30/2025 9.6  5.0 - 12.0 % Final    Eosinophil % 04/30/2025 0.4  0.3 - 6.2 % Final    Basophil % 04/30/2025 0.2  0.0 - 1.5 % Final    Immature Grans % 04/30/2025 0.3  0.0 - 0.5 % Final    Neutrophils, Absolute 04/30/2025 7.27 (H)  1.70 - 7.00 10*3/mm3 Final    Lymphocytes, Absolute 04/30/2025 1.17  0.70 - 3.10 10*3/mm3 Final    Monocytes, Absolute 04/30/2025 0.91 (H)  0.10 - 0.90 10*3/mm3 Final    Eosinophils, Absolute 04/30/2025  0.04  0.00 - 0.40 10*3/mm3 Final    Basophils, Absolute 04/30/2025 0.02  0.00 - 0.20 10*3/mm3 Final    Immature Grans, Absolute 04/30/2025 0.03  0.00 - 0.05 10*3/mm3 Final    nRBC 04/30/2025 0.0  0.0 - 0.2 /100 WBC Final    WBC 05/01/2025 10.14  3.40 - 10.80 10*3/mm3 Final    RBC 05/01/2025 2.89 (L)  3.77 - 5.28 10*6/mm3 Final    Hemoglobin 05/01/2025 8.8 (L)  12.0 - 15.9 g/dL Final    Hematocrit 05/01/2025 26.5 (L)  34.0 - 46.6 % Final    MCV 05/01/2025 91.7  79.0 - 97.0 fL Final    MCH 05/01/2025 30.4  26.6 - 33.0 pg Final    MCHC 05/01/2025 33.2  31.5 - 35.7 g/dL Final    RDW 05/01/2025 16.3 (H)  12.3 - 15.4 % Final    RDW-SD 05/01/2025 54.3 (H)  37.0 - 54.0 fl Final    MPV 05/01/2025 10.3  6.0 - 12.0 fL Final    Platelets 05/01/2025 166  140 - 450 10*3/mm3 Final    Alkaline Phosphatase 05/01/2025 73  39 - 117 U/L Final    ALT (SGPT) 05/01/2025 10  1 - 33 U/L Final    AST (SGOT) 05/01/2025 20  1 - 32 U/L Final    Creatinine 05/01/2025 0.67  0.57 - 1.00 mg/dL Final    Total Bilirubin 05/01/2025 <0.2  0.0 - 1.2 mg/dL Final    LDH 05/01/2025 234 (H)  135 - 214 U/L Final    Uric Acid 05/01/2025 5.7  2.4 - 5.7 mg/dL Final       Mental Status Exam:   Hygiene:   good  Cooperation:  Cooperative  Eye Contact:  Good  Psychomotor Behavior:  Appropriate  Mood:anxious sad  Affect:  Appropriate  Hopelessness: Denies  Speech:  Normal  Thought Process:  Goal directed  Thought Content:  Normal  Suicidal:  None  Homicidal:  None  Hallucinations:  None  Delusion:  None  Memory:  Intact  Orientation:  Person, Place, Time, and Situation  Reliability:  good  Insight:  Good  Judgement:  Good  Impulse Control:  Good       [facility] Anxiety Behav Health    Question 7/24/2025 11:13 AM EDT - Filed by Patient   In the last 2 weeks, how often have you been bothered by the following problems?    Feeling nervous, anxious, or on edge? More than half the days   Not being able to sleep or control worrying? Several days   Worrying too much  about different things? Several days   Trouble relaxing? More than half the days   Being so restless that it is hard to sit still Not at all   Becoming easily annoyed or irritable? Several days   Feeling afraid, as if something awful might happen? Several days   If you checked any problems, how difficult have they made it for you to do your work, take care of things at home, or get along with other people? Somewhat difficult   Over the last 2 weeks, how often have you been bothered by the following problems? (range: 0 - 28) 8         PHQ-9 Depression Screening  Little interest or pleasure in doing things? (Patient-Rptd) Several days   Feeling down, depressed, or hopeless? (Patient-Rptd) Not at all   PHQ-2 Total Score (Patient-Rptd) 1   Trouble falling or staying asleep, or sleeping too much? (Patient-Rptd) Several days   Feeling tired or having little energy? (Patient-Rptd) Almost all   Poor appetite or overeating? (Patient-Rptd) Not at all   Feeling bad about yourself - or that you are a failure or have let yourself or your family down? (Patient-Rptd) Several days   Trouble concentrating on things, such as reading the newspaper or watching television? (Patient-Rptd) Not at all   Moving or speaking so slowly that other people could have noticed? Or the opposite - being so fidgety or restless that you have been moving around a lot more than usual? (Patient-Rptd) Not at all   Thoughts that you would be better off dead, or of hurting yourself in some way? (Patient-Rptd) Not at all   PHQ-9 Total Score (Patient-Rptd) 6   If you checked off any problems, how difficult have these problems made it for you to do your work, take care of things at home, or get along with other people? (Patient-Rptd) Somewhat difficult          Diagnoses and all orders for this visit:    1. Generalized anxiety disorder (Primary)    2. Current moderate episode of major depressive disorder without prior episode    3. Attention deficit  hyperactivity disorder (ADHD), combined type  -     Methylphenidate HCl ER, PM, (Jornay PM) 40 MG capsule sustained-release 24 hr; Take 40 mg by mouth Every Evening.  Dispense: 30 capsule; Refill: 0         Assessment & Plan  Problems:  - Attention deficit hyperactivity disorder (ADHD)  - Anxiety  - Depression    Content of Therapy:  During the session, the patient discussed her current experience with Zoloft, noting that it has helped prevent postpartum depression. She expressed feelings of numbness and lack of motivation, particularly in relation to her ADHD. The patient mentioned difficulty in completing tasks and experiencing task paralysis. The discussion included the potential benefits of resuming Jornay PM and Wellbutrin to address these symptoms.    Clinical Impression:  The patient reports that her anxiety and depression are well-managed with Zoloft, although she experiences occasional low motivation and task paralysis, likely related to ADHD. She is not breastfeeding, which allows for the reintroduction of ADHD medication. Overall, her mental health appears stable, with specific concerns about motivation and task completion.    Therapeutic Intervention:  The session included cognitive reframing to address feelings of numbness and lack of motivation. The clinician and patient discussed the importance of medication management, specifically the reintroduction of Jornay PM and Wellbutrin to improve ADHD symptoms.    Plan:  - Resume Jornay PM 40 mg.  - Continue Zoloft.  - Reassess the effectiveness of Zoloft and Jornay PM at the next appointment.  - Contact the office if additional support or medication is needed before the next appointment.    Follow-up:  A follow-up appointment is scheduled for 09/03/2025 at 1:30 PM.    Notes & Risk Factors:  *      We discussed risks, benefits,goals and side effects of the above medication and the patient was agreeable with the plan.Patient was educated on the importance of  compliance with treatment and follow-up appointments. Patient is aware to contact the [facility] [phone] with any worsening of symptoms. To call for questions or concerns and return early if necessary. Patent is agreeable to go to the Emergency Department or call 911 should they begin SI/HI.     Patient or patient representative verbalized consent for the use of Ambient Listening during the visit with  QI Barry for chart documentation. 7/31/2025  14:27 EDT    Part of this note may be an electronic transcription/translation of spoken language to printed text using the Dragon Dictation System.        QI Contreras

## 2025-08-21 ENCOUNTER — TELEMEDICINE (OUTPATIENT)
Dept: PSYCHIATRY | Facility: CLINIC | Age: 35
End: 2025-08-21
Payer: COMMERCIAL

## 2025-08-21 DIAGNOSIS — F32.1 CURRENT MODERATE EPISODE OF MAJOR DEPRESSIVE DISORDER WITHOUT PRIOR EPISODE: ICD-10-CM

## 2025-08-21 DIAGNOSIS — F41.1 GENERALIZED ANXIETY DISORDER: Primary | ICD-10-CM

## 2025-08-21 DIAGNOSIS — F90.2 ATTENTION DEFICIT HYPERACTIVITY DISORDER (ADHD), COMBINED TYPE: ICD-10-CM

## 2025-08-24 DIAGNOSIS — F41.1 GENERALIZED ANXIETY DISORDER: ICD-10-CM

## 2025-08-24 DIAGNOSIS — F32.1 CURRENT MODERATE EPISODE OF MAJOR DEPRESSIVE DISORDER WITHOUT PRIOR EPISODE: ICD-10-CM

## 2025-08-25 RX ORDER — SERTRALINE HYDROCHLORIDE 100 MG/1
100 TABLET, FILM COATED ORAL DAILY
Qty: 30 TABLET | Refills: 2 | Status: SHIPPED | OUTPATIENT
Start: 2025-08-25

## (undated) DEVICE — ADHS SKIN PREMIERPRO EXOFIN TOPICAL HI/VISC .5ML

## (undated) DEVICE — SUT GUT CHRM 1 CTX 36IN 905H

## (undated) DEVICE — SUT PLAIN  3/0 CT1 27IN 842H

## (undated) DEVICE — PROXIMATE RH ROTATING HEAD SKIN STAPLERS (35 WIDE) CONTAINS 35 STAINLESS STEEL STAPLES: Brand: PROXIMATE

## (undated) DEVICE — STPLR SKIN SUBCUTICULAR INSORB 2030

## (undated) DEVICE — GLV SURG BIOGEL LTX PF 7 1/2

## (undated) DEVICE — COATED VICRYL  (POLYGLACTIN 910) SUTURE, VIOLET BRAIDED, STERILE, SYNTHETIC ABSORBABLE SUTURE: Brand: COATED VICRYL

## (undated) DEVICE — SOL IRR NACL 0.9PCT BO 1000ML

## (undated) DEVICE — MAT PREVALON MOBL TRANSFR AIR WO/PAD 39X80IN

## (undated) DEVICE — MAT PREVALON MOBL TRANSFR AIR W/PAD 39X80IN

## (undated) DEVICE — PK C/SECT 10

## (undated) DEVICE — SOL IRR NACL 0.9PCT BT 1000ML

## (undated) DEVICE — SUT VIC 2/0 CT1 27IN J339H BX/36

## (undated) DEVICE — TRY SPINE BLCK WHITACRE 25G 3X5IN

## (undated) DEVICE — GLV SURG BIOGEL LTX PF 6 1/2

## (undated) DEVICE — SOL IRR H2O BTL 1000ML STRL

## (undated) DEVICE — SOL IRR H2O BO 1000ML STRL